# Patient Record
Sex: FEMALE | Race: BLACK OR AFRICAN AMERICAN | NOT HISPANIC OR LATINO | Employment: OTHER | URBAN - METROPOLITAN AREA
[De-identification: names, ages, dates, MRNs, and addresses within clinical notes are randomized per-mention and may not be internally consistent; named-entity substitution may affect disease eponyms.]

---

## 2017-01-16 ENCOUNTER — HISTORICAL (OUTPATIENT)
Dept: ADMINISTRATIVE | Facility: HOSPITAL | Age: 82
End: 2017-01-16

## 2017-02-13 ENCOUNTER — HISTORICAL (OUTPATIENT)
Dept: ADMINISTRATIVE | Facility: HOSPITAL | Age: 82
End: 2017-02-13

## 2017-02-23 ENCOUNTER — HISTORICAL (OUTPATIENT)
Dept: LAB | Facility: HOSPITAL | Age: 82
End: 2017-02-23

## 2017-03-20 ENCOUNTER — HISTORICAL (OUTPATIENT)
Dept: ADMINISTRATIVE | Facility: HOSPITAL | Age: 82
End: 2017-03-20

## 2017-04-19 ENCOUNTER — HISTORICAL (OUTPATIENT)
Dept: ADMINISTRATIVE | Facility: HOSPITAL | Age: 82
End: 2017-04-19

## 2017-05-23 ENCOUNTER — HISTORICAL (OUTPATIENT)
Dept: ADMINISTRATIVE | Facility: HOSPITAL | Age: 82
End: 2017-05-23

## 2017-06-21 ENCOUNTER — HISTORICAL (OUTPATIENT)
Dept: ADMINISTRATIVE | Facility: HOSPITAL | Age: 82
End: 2017-06-21

## 2017-07-18 ENCOUNTER — HISTORICAL (OUTPATIENT)
Dept: ADMINISTRATIVE | Facility: HOSPITAL | Age: 82
End: 2017-07-18

## 2017-09-12 ENCOUNTER — HISTORICAL (OUTPATIENT)
Dept: ADMINISTRATIVE | Facility: HOSPITAL | Age: 82
End: 2017-09-12

## 2017-10-24 ENCOUNTER — HISTORICAL (OUTPATIENT)
Dept: ADMINISTRATIVE | Facility: HOSPITAL | Age: 82
End: 2017-10-24

## 2017-12-07 ENCOUNTER — HISTORICAL (OUTPATIENT)
Dept: ADMINISTRATIVE | Facility: HOSPITAL | Age: 82
End: 2017-12-07

## 2017-12-07 LAB
INR PPP: 3.9
PROTHROMBIN TIME: 47.2 S

## 2017-12-20 ENCOUNTER — HISTORICAL (OUTPATIENT)
Dept: LAB | Facility: HOSPITAL | Age: 82
End: 2017-12-20

## 2017-12-20 LAB
ALBUMIN SERPL-MCNC: 3.4 GM/DL (ref 3.4–5)
ALBUMIN/GLOB SERPL: 0.7 RATIO (ref 1.1–2)
ALP SERPL-CCNC: 125 UNIT/L (ref 46–116)
ALT SERPL-CCNC: 18 UNIT/L (ref 12–78)
AST SERPL-CCNC: 19 UNIT/L (ref 15–37)
BILIRUB SERPL-MCNC: 0.3 MG/DL (ref 0.2–1)
BILIRUBIN DIRECT+TOT PNL SERPL-MCNC: 0.14 MG/DL (ref 0–0.2)
BILIRUBIN DIRECT+TOT PNL SERPL-MCNC: 0.16 MG/DL (ref 0–0.8)
BNP BLD-MCNC: 175 PG/ML (ref 0–125)
BUN SERPL-MCNC: 14 MG/DL (ref 7–18)
CALCIUM SERPL-MCNC: 8.9 MG/DL (ref 8.5–10.1)
CHLORIDE SERPL-SCNC: 94 MMOL/L (ref 98–107)
CO2 SERPL-SCNC: 27 MMOL/L (ref 21–32)
CREAT SERPL-MCNC: 0.97 MG/DL (ref 0.6–1.3)
ERYTHROCYTE [DISTWIDTH] IN BLOOD BY AUTOMATED COUNT: 19.4 % (ref 11.5–17)
GLOBULIN SER-MCNC: 4.8 GM/DL (ref 2.4–3.5)
GLUCOSE SERPL-MCNC: 107 MG/DL (ref 74–106)
HCT VFR BLD AUTO: 24.1 % (ref 37–47)
HGB BLD-MCNC: 7.4 GM/DL (ref 12–16)
MCH RBC QN AUTO: 22.3 PG (ref 27–31)
MCHC RBC AUTO-ENTMCNC: 30.8 GM/DL (ref 33–36)
MCV RBC AUTO: 72.4 FL (ref 80–94)
PLATELET # BLD AUTO: 458 X10(3)/MCL (ref 130–400)
PMV BLD AUTO: 6.8 FL (ref 7.4–10.4)
POTASSIUM SERPL-SCNC: 3.2 MMOL/L (ref 3.5–5.1)
PROT SERPL-MCNC: 8.2 GM/DL (ref 6.4–8.2)
RBC # BLD AUTO: 3.32 X10(6)/MCL (ref 4.2–5.4)
SODIUM SERPL-SCNC: 132 MMOL/L (ref 136–145)
WBC # SPEC AUTO: 7.1 X10(3)/MCL (ref 4.5–11.5)

## 2017-12-27 LAB
COLOR STL: NORMAL
CONSISTENCY STL: NORMAL
HEMOCCULT SP1 STL QL: NEGATIVE

## 2017-12-28 ENCOUNTER — HISTORICAL (OUTPATIENT)
Dept: LAB | Facility: HOSPITAL | Age: 82
End: 2017-12-28

## 2017-12-28 LAB
COLOR STL: NORMAL
COLOR STL: NORMAL
CONSISTENCY STL: NORMAL
CONSISTENCY STL: NORMAL
HEMOCCULT SP2 STL QL: NEGATIVE

## 2018-01-30 ENCOUNTER — HISTORICAL (OUTPATIENT)
Dept: LAB | Facility: HOSPITAL | Age: 83
End: 2018-01-30

## 2018-01-30 LAB
BUN SERPL-MCNC: 15 MG/DL (ref 7–18)
CALCIUM SERPL-MCNC: 9.6 MG/DL (ref 8.5–10.1)
CHLORIDE SERPL-SCNC: 99 MMOL/L (ref 98–107)
CO2 SERPL-SCNC: 28.4 MMOL/L (ref 21–32)
CREAT SERPL-MCNC: 1.12 MG/DL (ref 0.6–1.3)
ERYTHROCYTE [DISTWIDTH] IN BLOOD BY AUTOMATED COUNT: 23.5 % (ref 11.5–17)
FERRITIN SERPL-MCNC: 15 NG/ML (ref 8–388)
GLUCOSE SERPL-MCNC: 110 MG/DL (ref 74–106)
HCT VFR BLD AUTO: 34.5 % (ref 37–47)
HGB BLD-MCNC: 10.8 GM/DL (ref 12–16)
IRON SATN MFR SERPL: 9.6 % (ref 20–50)
IRON SERPL-MCNC: 39 MCG/DL (ref 50–175)
MAGNESIUM SERPL-MCNC: 2.1 MG/DL (ref 1.8–2.4)
MCH RBC QN AUTO: 25.4 PG (ref 27–31)
MCHC RBC AUTO-ENTMCNC: 31.3 GM/DL (ref 33–36)
MCV RBC AUTO: 81.2 FL (ref 80–94)
PLATELET # BLD AUTO: 415 X10(3)/MCL (ref 130–400)
PMV BLD AUTO: 8 FL (ref 7.4–10.4)
POTASSIUM SERPL-SCNC: 4.6 MMOL/L (ref 3.5–5.1)
RBC # BLD AUTO: 4.25 X10(6)/MCL (ref 4.2–5.4)
SODIUM SERPL-SCNC: 135 MMOL/L (ref 136–145)
TIBC SERPL-MCNC: 401 MCG/DL (ref 250–450)
TRANSFERRIN SERPL-MCNC: 340 MG/DL (ref 200–360)
WBC # SPEC AUTO: 9.8 X10(3)/MCL (ref 4.5–11.5)

## 2018-06-05 ENCOUNTER — HISTORICAL (OUTPATIENT)
Dept: LAB | Facility: HOSPITAL | Age: 83
End: 2018-06-05

## 2018-06-05 LAB
ALBUMIN SERPL-MCNC: 3.3 GM/DL (ref 3.4–5)
ALBUMIN/GLOB SERPL: 0.7 RATIO (ref 1.1–2)
ALP SERPL-CCNC: 122 UNIT/L (ref 46–116)
ALT SERPL-CCNC: 13 UNIT/L (ref 12–78)
AST SERPL-CCNC: 12 UNIT/L (ref 15–37)
BILIRUB SERPL-MCNC: 0.3 MG/DL (ref 0.2–1)
BILIRUBIN DIRECT+TOT PNL SERPL-MCNC: 0.15 MG/DL (ref 0–0.2)
BILIRUBIN DIRECT+TOT PNL SERPL-MCNC: 0.15 MG/DL (ref 0–0.8)
BUN SERPL-MCNC: 16.4 MG/DL (ref 7–18)
CALCIUM SERPL-MCNC: 8.9 MG/DL (ref 8.5–10.1)
CHLORIDE SERPL-SCNC: 98 MMOL/L (ref 98–107)
CO2 SERPL-SCNC: 27.6 MMOL/L (ref 21–32)
CREAT SERPL-MCNC: 1.12 MG/DL (ref 0.6–1.3)
ERYTHROCYTE [DISTWIDTH] IN BLOOD BY AUTOMATED COUNT: 19.6 % (ref 11.5–17)
GLOBULIN SER-MCNC: 4.6 GM/DL (ref 2.4–3.5)
GLUCOSE SERPL-MCNC: 127 MG/DL (ref 74–106)
HCT VFR BLD AUTO: 24.6 % (ref 37–47)
HGB BLD-MCNC: 7.2 GM/DL (ref 12–16)
MCH RBC QN AUTO: 20.7 PG (ref 27–31)
MCHC RBC AUTO-ENTMCNC: 29.2 GM/DL (ref 33–36)
MCV RBC AUTO: 70.7 FL (ref 80–94)
PLATELET # BLD AUTO: 560 X10(3)/MCL (ref 130–400)
PMV BLD AUTO: 6.8 FL (ref 7.4–10.4)
POTASSIUM SERPL-SCNC: 4.6 MMOL/L (ref 3.5–5.1)
PROT SERPL-MCNC: 7.9 GM/DL (ref 6.4–8.2)
RBC # BLD AUTO: 3.47 X10(6)/MCL (ref 4.2–5.4)
SODIUM SERPL-SCNC: 135 MMOL/L (ref 136–145)
WBC # SPEC AUTO: 8.6 X10(3)/MCL (ref 4.5–11.5)

## 2018-07-06 ENCOUNTER — HISTORICAL (OUTPATIENT)
Dept: LAB | Facility: HOSPITAL | Age: 83
End: 2018-07-06

## 2018-07-06 LAB
ERYTHROCYTE [DISTWIDTH] IN BLOOD BY AUTOMATED COUNT: 28.6 % (ref 11.5–17)
HCT VFR BLD AUTO: 29.5 % (ref 37–47)
HGB BLD-MCNC: 8.6 GM/DL (ref 12–16)
MCH RBC QN AUTO: 21.9 PG (ref 27–31)
MCHC RBC AUTO-ENTMCNC: 29.3 GM/DL (ref 33–36)
MCV RBC AUTO: 74.8 FL (ref 80–94)
PLATELET # BLD AUTO: 486 X10(3)/MCL (ref 130–400)
PMV BLD AUTO: 7.5 FL (ref 7.4–10.4)
RBC # BLD AUTO: 3.94 X10(6)/MCL (ref 4.2–5.4)
WBC # SPEC AUTO: 7.4 X10(3)/MCL (ref 4.5–11.5)

## 2018-07-07 LAB
COLOR STL: ABNORMAL
CONSISTENCY STL: ABNORMAL
HEMOCCULT SP1 STL QL: POSITIVE

## 2018-07-08 LAB
COLOR STL: ABNORMAL
CONSISTENCY STL: ABNORMAL
HEMOCCULT SP2 STL QL: POSITIVE

## 2018-07-09 LAB
COLOR STL: NORMAL
CONSISTENCY STL: NORMAL

## 2019-03-13 ENCOUNTER — HISTORICAL (OUTPATIENT)
Dept: LAB | Facility: HOSPITAL | Age: 84
End: 2019-03-13

## 2019-03-13 LAB
ALBUMIN SERPL-MCNC: 3.5 GM/DL (ref 3.4–5)
ALBUMIN/GLOB SERPL: 0.8 RATIO (ref 1.1–2)
ALP SERPL-CCNC: 116 UNIT/L (ref 46–116)
ALT SERPL-CCNC: 19 UNIT/L (ref 12–78)
AST SERPL-CCNC: 17 UNIT/L (ref 15–37)
BILIRUB SERPL-MCNC: 0.3 MG/DL (ref 0.2–1)
BILIRUBIN DIRECT+TOT PNL SERPL-MCNC: 0.14 MG/DL (ref 0–0.2)
BILIRUBIN DIRECT+TOT PNL SERPL-MCNC: 0.16 MG/DL (ref 0–0.8)
BUN SERPL-MCNC: 18.9 MG/DL (ref 7–18)
CALCIUM SERPL-MCNC: 9.9 MG/DL (ref 8.5–10.1)
CHLORIDE SERPL-SCNC: 97 MMOL/L (ref 98–107)
CO2 SERPL-SCNC: 31.7 MMOL/L (ref 21–32)
COLOR STL: NORMAL
CONSISTENCY STL: NORMAL
CREAT SERPL-MCNC: 1.13 MG/DL (ref 0.6–1.3)
ERYTHROCYTE [DISTWIDTH] IN BLOOD BY AUTOMATED COUNT: 12.8 % (ref 11.5–17)
GLOBULIN SER-MCNC: 4.3 GM/DL (ref 2.4–3.5)
GLUCOSE SERPL-MCNC: 109 MG/DL (ref 74–106)
HCT VFR BLD AUTO: 39.1 % (ref 37–47)
HEMOCCULT SP1 STL QL: NEGATIVE
HGB BLD-MCNC: 13 GM/DL (ref 12–16)
MCH RBC QN AUTO: 31.6 PG (ref 27–31)
MCHC RBC AUTO-ENTMCNC: 33.2 GM/DL (ref 33–36)
MCV RBC AUTO: 95.1 FL (ref 80–94)
PLATELET # BLD AUTO: 310 X10(3)/MCL (ref 130–400)
PMV BLD AUTO: 9.3 FL (ref 9.4–12.4)
POTASSIUM SERPL-SCNC: 4.5 MMOL/L (ref 3.5–5.1)
PROT SERPL-MCNC: 7.8 GM/DL (ref 6.4–8.2)
RBC # BLD AUTO: 4.11 X10(6)/MCL (ref 4.2–5.4)
SODIUM SERPL-SCNC: 137 MMOL/L (ref 136–145)
WBC # SPEC AUTO: 9.5 X10(3)/MCL (ref 4.5–11.5)

## 2019-03-14 LAB
COLOR STL: NORMAL
CONSISTENCY STL: NORMAL
HEMOCCULT SP2 STL QL: NEGATIVE

## 2019-03-15 LAB
COLOR STL: NORMAL
CONSISTENCY STL: NORMAL

## 2019-06-11 ENCOUNTER — HISTORICAL (OUTPATIENT)
Dept: RADIOLOGY | Facility: HOSPITAL | Age: 84
End: 2019-06-11

## 2019-06-11 LAB
ALBUMIN SERPL-MCNC: 3.5 GM/DL (ref 3.4–5)
ALBUMIN/GLOB SERPL: 0.8 RATIO (ref 1.1–2)
ALP SERPL-CCNC: 122 UNIT/L (ref 46–116)
ALT SERPL-CCNC: 19 UNIT/L (ref 12–78)
APPEARANCE, UA: ABNORMAL
AST SERPL-CCNC: 16 UNIT/L (ref 15–37)
BACTERIA SPEC CULT: ABNORMAL
BILIRUB SERPL-MCNC: 0.3 MG/DL (ref 0.2–1)
BILIRUB UR QL STRIP: NEGATIVE
BILIRUBIN DIRECT+TOT PNL SERPL-MCNC: 0.11 MG/DL (ref 0–0.2)
BILIRUBIN DIRECT+TOT PNL SERPL-MCNC: 0.19 MG/DL (ref 0–0.8)
BUN SERPL-MCNC: 21.4 MG/DL (ref 7–18)
CALCIUM SERPL-MCNC: 9.1 MG/DL (ref 8.5–10.1)
CHLORIDE SERPL-SCNC: 101 MMOL/L (ref 98–107)
CHOLEST SERPL-MCNC: 166 MG/DL (ref 0–200)
CHOLEST/HDLC SERPL: 2.2 {RATIO} (ref 0–4)
CO2 SERPL-SCNC: 30.9 MMOL/L (ref 21–32)
COLOR UR: YELLOW
CREAT SERPL-MCNC: 1.1 MG/DL (ref 0.6–1.3)
ERYTHROCYTE [DISTWIDTH] IN BLOOD BY AUTOMATED COUNT: 13.5 % (ref 11.5–17)
GLOBULIN SER-MCNC: 4.5 GM/DL (ref 2.4–3.5)
GLUCOSE (UA): NEGATIVE
GLUCOSE SERPL-MCNC: 106 MG/DL (ref 74–106)
HCT VFR BLD AUTO: 42.7 % (ref 37–47)
HDLC SERPL-MCNC: 75 MG/DL (ref 40–60)
HGB BLD-MCNC: 14 GM/DL (ref 12–16)
HGB UR QL STRIP: ABNORMAL
KETONES UR QL STRIP: 15
LDLC SERPL CALC-MCNC: 73 MG/DL (ref 0–129)
LEUKOCYTE ESTERASE UR QL STRIP: ABNORMAL
MCH RBC QN AUTO: 31.7 PG (ref 27–31)
MCHC RBC AUTO-ENTMCNC: 32.8 GM/DL (ref 33–36)
MCV RBC AUTO: 96.6 FL (ref 80–94)
NITRITE UR QL STRIP: POSITIVE
PH UR STRIP: 5 [PH] (ref 5–9)
PLATELET # BLD AUTO: 285 X10(3)/MCL (ref 130–400)
PMV BLD AUTO: 9.4 FL (ref 9.4–12.4)
POTASSIUM SERPL-SCNC: 3.5 MMOL/L (ref 3.5–5.1)
PROT SERPL-MCNC: 8 GM/DL (ref 6.4–8.2)
PROT UR QL STRIP: ABNORMAL
RBC # BLD AUTO: 4.42 X10(6)/MCL (ref 4.2–5.4)
RBC #/AREA URNS HPF: ABNORMAL /HPF
SODIUM SERPL-SCNC: 143 MMOL/L (ref 136–145)
SP GR UR STRIP: >=1.03 (ref 1–1.03)
SQUAMOUS EPITHELIAL, UA: ABNORMAL
TRIGL SERPL-MCNC: 92 MG/DL
TSH SERPL-ACNC: 2.18 MIU/ML (ref 0.36–3.74)
UROBILINOGEN UR STRIP-ACNC: 1
VLDLC SERPL CALC-MCNC: 18 MG/DL
WBC # SPEC AUTO: 7 X10(3)/MCL (ref 4.5–11.5)
WBC #/AREA URNS HPF: ABNORMAL /HPF

## 2020-03-03 ENCOUNTER — HISTORICAL (OUTPATIENT)
Dept: RADIOLOGY | Facility: HOSPITAL | Age: 85
End: 2020-03-03

## 2020-03-03 LAB
ALBUMIN SERPL-MCNC: 3.7 GM/DL (ref 3.4–5)
ALBUMIN/GLOB SERPL: 0.9 RATIO (ref 1.1–2)
ALP SERPL-CCNC: 130 UNIT/L (ref 46–116)
ALT SERPL-CCNC: 17 UNIT/L (ref 12–78)
AST SERPL-CCNC: 18 UNIT/L (ref 15–37)
BILIRUB SERPL-MCNC: 0.2 MG/DL (ref 0.2–1)
BILIRUBIN DIRECT+TOT PNL SERPL-MCNC: 0.1 MG/DL (ref 0–0.2)
BILIRUBIN DIRECT+TOT PNL SERPL-MCNC: 0.1 MG/DL (ref 0–0.8)
BNP BLD-MCNC: 193 PG/ML (ref 0–125)
BUN SERPL-MCNC: 24 MG/DL (ref 7–18)
CALCIUM SERPL-MCNC: 9.6 MG/DL (ref 8.5–10.1)
CHLORIDE SERPL-SCNC: 103 MMOL/L (ref 98–107)
CO2 SERPL-SCNC: 31.8 MMOL/L (ref 21–32)
CREAT SERPL-MCNC: 1.19 MG/DL (ref 0.6–1.3)
GLOBULIN SER-MCNC: 4.3 GM/DL (ref 2.4–3.5)
GLUCOSE SERPL-MCNC: 118 MG/DL (ref 74–106)
POTASSIUM SERPL-SCNC: 4.1 MMOL/L (ref 3.5–5.1)
PROT SERPL-MCNC: 8 GM/DL (ref 6.4–8.2)
SODIUM SERPL-SCNC: 141 MMOL/L (ref 136–145)

## 2020-04-22 ENCOUNTER — HISTORICAL (OUTPATIENT)
Dept: LAB | Facility: HOSPITAL | Age: 85
End: 2020-04-22

## 2020-04-22 LAB
ERYTHROCYTE [DISTWIDTH] IN BLOOD BY AUTOMATED COUNT: 13.7 % (ref 11.5–17)
HCT VFR BLD AUTO: 42.7 % (ref 37–47)
HGB BLD-MCNC: 13.4 GM/DL (ref 12–16)
MCH RBC QN AUTO: 30.7 PG (ref 27–31)
MCHC RBC AUTO-ENTMCNC: 31.4 GM/DL (ref 33–36)
MCV RBC AUTO: 97.9 FL (ref 80–94)
PLATELET # BLD AUTO: 324 X10(3)/MCL (ref 130–400)
PMV BLD AUTO: 9.7 FL (ref 9.4–12.4)
RBC # BLD AUTO: 4.36 X10(6)/MCL (ref 4.2–5.4)
WBC # SPEC AUTO: 7.7 X10(3)/MCL (ref 4.5–11.5)

## 2020-06-04 ENCOUNTER — HISTORICAL (OUTPATIENT)
Dept: LAB | Facility: HOSPITAL | Age: 85
End: 2020-06-04

## 2020-06-04 LAB
BNP BLD-MCNC: 290 PG/ML (ref 0–125)
BUN SERPL-MCNC: 16.5 MG/DL (ref 7–18)
CALCIUM SERPL-MCNC: 9.1 MG/DL (ref 8.5–10.1)
CHLORIDE SERPL-SCNC: 103 MMOL/L (ref 98–107)
CO2 SERPL-SCNC: 30.6 MMOL/L (ref 21–32)
CREAT SERPL-MCNC: 0.86 MG/DL (ref 0.6–1.3)
CREAT/UREA NIT SERPL: 19
GLUCOSE SERPL-MCNC: 125 MG/DL (ref 74–106)
POTASSIUM SERPL-SCNC: 3.5 MMOL/L (ref 3.5–5.1)
SODIUM SERPL-SCNC: 140 MMOL/L (ref 136–145)

## 2020-07-30 ENCOUNTER — HISTORICAL (OUTPATIENT)
Dept: LAB | Facility: HOSPITAL | Age: 85
End: 2020-07-30

## 2020-07-30 LAB
ALBUMIN SERPL-MCNC: 3.5 GM/DL (ref 3.4–5)
ALBUMIN/GLOB SERPL: 0.8 RATIO (ref 1.1–2)
ALP SERPL-CCNC: 113 UNIT/L (ref 46–116)
ALT SERPL-CCNC: 14 UNIT/L (ref 12–78)
APPEARANCE, UA: ABNORMAL
AST SERPL-CCNC: 15 UNIT/L (ref 15–37)
BACTERIA SPEC CULT: ABNORMAL
BILIRUB SERPL-MCNC: 0.3 MG/DL (ref 0.2–1)
BILIRUB UR QL STRIP: NEGATIVE
BILIRUBIN DIRECT+TOT PNL SERPL-MCNC: 0.11 MG/DL (ref 0–0.2)
BILIRUBIN DIRECT+TOT PNL SERPL-MCNC: 0.19 MG/DL (ref 0–0.8)
BUN SERPL-MCNC: 16.7 MG/DL (ref 7–18)
CALCIUM SERPL-MCNC: 10 MG/DL (ref 8.5–10.1)
CHLORIDE SERPL-SCNC: 102 MMOL/L (ref 98–107)
CHOLEST SERPL-MCNC: 174 MG/DL (ref 0–200)
CHOLEST/HDLC SERPL: 2.1 {RATIO} (ref 0–4)
CO2 SERPL-SCNC: 35.4 MMOL/L (ref 21–32)
COLOR UR: YELLOW
CREAT SERPL-MCNC: 0.94 MG/DL (ref 0.6–1.3)
ERYTHROCYTE [DISTWIDTH] IN BLOOD BY AUTOMATED COUNT: 13.8 % (ref 11.5–17)
GLOBULIN SER-MCNC: 4.6 GM/DL (ref 2.4–3.5)
GLUCOSE (UA): NEGATIVE
GLUCOSE SERPL-MCNC: 107 MG/DL (ref 74–106)
HCT VFR BLD AUTO: 44.1 % (ref 37–47)
HDLC SERPL-MCNC: 83 MG/DL (ref 40–60)
HGB BLD-MCNC: 13.6 GM/DL (ref 12–16)
HGB UR QL STRIP: ABNORMAL
KETONES UR QL STRIP: NEGATIVE
LDLC SERPL CALC-MCNC: 72 MG/DL (ref 0–129)
LEUKOCYTE ESTERASE UR QL STRIP: NEGATIVE
MCH RBC QN AUTO: 30.9 PG (ref 27–31)
MCHC RBC AUTO-ENTMCNC: 30.8 GM/DL (ref 33–36)
MCV RBC AUTO: 100.2 FL (ref 80–94)
NITRITE UR QL STRIP: NEGATIVE
PH UR STRIP: 5.5 [PH] (ref 5–9)
PLATELET # BLD AUTO: 303 X10(3)/MCL (ref 130–400)
PMV BLD AUTO: 9.6 FL (ref 9.4–12.4)
POTASSIUM SERPL-SCNC: 4.7 MMOL/L (ref 3.5–5.1)
PROT SERPL-MCNC: 8.1 GM/DL (ref 6.4–8.2)
PROT UR QL STRIP: NEGATIVE
RBC # BLD AUTO: 4.4 X10(6)/MCL (ref 4.2–5.4)
RBC #/AREA URNS HPF: ABNORMAL /HPF
SODIUM SERPL-SCNC: 142 MMOL/L (ref 136–145)
SP GR UR STRIP: 1.02 (ref 1–1.03)
SQUAMOUS EPITHELIAL, UA: ABNORMAL
TRIGL SERPL-MCNC: 95 MG/DL
TSH SERPL-ACNC: 2.74 MIU/ML (ref 0.36–3.74)
UROBILINOGEN UR STRIP-ACNC: 0.2
VLDLC SERPL CALC-MCNC: 19 MG/DL
WBC # SPEC AUTO: 7.4 X10(3)/MCL (ref 4.5–11.5)
WBC #/AREA URNS HPF: ABNORMAL /[HPF]

## 2021-06-11 LAB — EST CREAT CLEARANCE SER (OHS): 95.59 ML/MIN

## 2021-09-20 ENCOUNTER — HISTORICAL (OUTPATIENT)
Dept: LAB | Facility: HOSPITAL | Age: 86
End: 2021-09-20

## 2021-09-20 LAB
ALBUMIN SERPL-MCNC: 3.4 GM/DL (ref 3.4–4.8)
ALBUMIN/GLOB SERPL: 0.8 RATIO (ref 1.1–2)
ALP SERPL-CCNC: 99 UNIT/L (ref 40–150)
ALT SERPL-CCNC: 8 UNIT/L (ref 0–55)
APPEARANCE, UA: CLEAR
AST SERPL-CCNC: 13 UNIT/L (ref 5–34)
BACTERIA #/AREA URNS AUTO: NORMAL /HPF
BILIRUB SERPL-MCNC: 0.5 MG/DL
BILIRUB UR QL STRIP: NEGATIVE
BILIRUBIN DIRECT+TOT PNL SERPL-MCNC: 0.2 MG/DL (ref 0–0.8)
BILIRUBIN DIRECT+TOT PNL SERPL-MCNC: 0.3 MG/DL (ref 0–0.5)
BUN SERPL-MCNC: 13 MG/DL (ref 9.8–20.1)
CALCIUM SERPL-MCNC: 9.4 MG/DL (ref 8.4–10.2)
CHLORIDE SERPL-SCNC: 101 MMOL/L (ref 98–107)
CHOLEST SERPL-MCNC: 163 MG/DL
CHOLEST/HDLC SERPL: 2 {RATIO} (ref 0–5)
CO2 SERPL-SCNC: 27 MMOL/L (ref 23–31)
COLOR UR: YELLOW
CREAT SERPL-MCNC: 0.82 MG/DL (ref 0.55–1.02)
ERYTHROCYTE [DISTWIDTH] IN BLOOD BY AUTOMATED COUNT: 13.2 % (ref 11.5–17)
GLOBULIN SER-MCNC: 4.2 GM/DL (ref 2.4–3.5)
GLUCOSE (UA): NEGATIVE
GLUCOSE SERPL-MCNC: 109 MG/DL (ref 82–115)
HCT VFR BLD AUTO: 43.5 % (ref 37–47)
HDLC SERPL-MCNC: 71 MG/DL (ref 35–60)
HGB BLD-MCNC: 13.5 GM/DL (ref 12–16)
HGB UR QL STRIP: NEGATIVE
KETONES UR QL STRIP: NEGATIVE
LDLC SERPL CALC-MCNC: 76 MG/DL (ref 50–140)
LEUKOCYTE ESTERASE UR QL STRIP: NEGATIVE
MCH RBC QN AUTO: 33.3 PG (ref 27–31)
MCHC RBC AUTO-ENTMCNC: 31 GM/DL (ref 33–36)
MCV RBC AUTO: 107.4 FL (ref 80–94)
NITRITE UR QL STRIP.AUTO: NEGATIVE
PH UR STRIP: 5.5 [PH] (ref 5–9)
PLATELET # BLD AUTO: 242 X10(3)/MCL (ref 130–400)
PMV BLD AUTO: 9.1 FL (ref 9.4–12.4)
POTASSIUM SERPL-SCNC: 4.2 MMOL/L (ref 3.5–5.1)
PROT SERPL-MCNC: 7.6 GM/DL (ref 5.8–7.6)
PROT UR QL STRIP: NEGATIVE
RBC # BLD AUTO: 4.05 X10(6)/MCL (ref 4.2–5.4)
RBC #/AREA URNS HPF: NORMAL /[HPF]
SODIUM SERPL-SCNC: 139 MMOL/L (ref 136–145)
SP GR UR STRIP: >=1.03 (ref 1–1.03)
SQUAMOUS EPITHELIAL, UA: NORMAL
TRIGL SERPL-MCNC: 78 MG/DL (ref 37–140)
TSH SERPL-ACNC: 1.61 UIU/ML (ref 0.35–4.94)
UROBILINOGEN UR STRIP-ACNC: 1
VLDLC SERPL CALC-MCNC: 16 MG/DL
WBC # SPEC AUTO: 6.8 X10(3)/MCL (ref 4.5–11.5)
WBC #/AREA URNS AUTO: NORMAL /[HPF]

## 2022-03-18 ENCOUNTER — HISTORICAL (OUTPATIENT)
Dept: ADMINISTRATIVE | Facility: HOSPITAL | Age: 87
End: 2022-03-18

## 2022-03-18 LAB
APPEARANCE, UA: NORMAL
BACTERIA SPEC CULT: NORMAL
BILIRUB UR QL STRIP: NEGATIVE
BUDDING YEAST: PRESENT
CASTS, UA: NORMAL
COLOR UR: NORMAL
CRYSTALS: NORMAL
GLUCOSE (UA): NEGATIVE
HGB UR QL STRIP: NEGATIVE
KETONES UR QL STRIP: NEGATIVE
LEUKOCYTE ESTERASE UR QL STRIP: NORMAL
NITRITE UR QL STRIP: NEGATIVE
PH UR STRIP: 6 [PH] (ref 5–9)
PROT UR QL STRIP: NEGATIVE
RBC #/AREA URNS HPF: NORMAL /[HPF] (ref 0–2)
SMALL ROUND CELLS, UA: NORMAL
SP GR UR STRIP: 1.02 (ref 1–1.03)
SPERM URNS QL MICRO: NORMAL
SQUAMOUS EPITHELIAL, UA: NORMAL (ref 0–4)
UROBILINOGEN UR STRIP-ACNC: 1
WBC #/AREA URNS HPF: NORMAL /[HPF] (ref 0–2)

## 2022-03-23 ENCOUNTER — HISTORICAL (OUTPATIENT)
Dept: ADMINISTRATIVE | Facility: HOSPITAL | Age: 87
End: 2022-03-23

## 2022-04-16 ENCOUNTER — HISTORICAL (OUTPATIENT)
Dept: ADMINISTRATIVE | Facility: HOSPITAL | Age: 87
End: 2022-04-16

## 2022-04-21 ENCOUNTER — HOSPITAL ENCOUNTER (INPATIENT)
Facility: HOSPITAL | Age: 87
LOS: 12 days | Discharge: NURSING, REHAB OR LONG-TERM CARE | DRG: 492 | End: 2022-05-03
Attending: ORTHOPAEDIC SURGERY | Admitting: INTERNAL MEDICINE
Payer: MEDICARE

## 2022-04-21 ENCOUNTER — HISTORICAL (OUTPATIENT)
Dept: ADMINISTRATIVE | Facility: HOSPITAL | Age: 87
End: 2022-04-21
Payer: MEDICARE

## 2022-04-21 PROCEDURE — 93306 TTE W/DOPPLER COMPLETE: CPT

## 2022-04-21 PROCEDURE — 70450 CT HEAD/BRAIN W/O DYE: CPT

## 2022-04-21 PROCEDURE — 71045 X-RAY EXAM CHEST 1 VIEW: CPT

## 2022-04-21 PROCEDURE — 93005 ELECTROCARDIOGRAM TRACING: CPT

## 2022-04-21 PROCEDURE — 85025 COMPLETE CBC W/AUTO DIFF WBC: CPT

## 2022-04-21 PROCEDURE — 93970 EXTREMITY STUDY: CPT

## 2022-04-21 PROCEDURE — 93880 EXTRACRANIAL BILAT STUDY: CPT

## 2022-04-21 PROCEDURE — 36415 COLL VENOUS BLD VENIPUNCTURE: CPT

## 2022-04-21 PROCEDURE — 99990 CHARGE CONVERSION: CPT | Mod: 25

## 2022-04-21 PROCEDURE — 99284 EMERGENCY DEPT VISIT MOD MDM: CPT | Mod: 25

## 2022-04-21 PROCEDURE — 85730 THROMBOPLASTIN TIME PARTIAL: CPT

## 2022-04-21 PROCEDURE — 80053 COMPREHEN METABOLIC PANEL: CPT

## 2022-04-21 PROCEDURE — 85610 PROTHROMBIN TIME: CPT

## 2022-04-22 PROCEDURE — 71045 X-RAY EXAM CHEST 1 VIEW: CPT

## 2022-04-22 PROCEDURE — 84443 ASSAY THYROID STIM HORMONE: CPT

## 2022-04-22 PROCEDURE — 92610 EVALUATE SWALLOWING FUNCTION: CPT | Mod: GN

## 2022-04-22 PROCEDURE — 99990 CHARGE CONVERSION: CPT | Mod: GO

## 2022-04-22 PROCEDURE — 97162 PT EVAL MOD COMPLEX 30 MIN: CPT | Mod: GP

## 2022-04-22 PROCEDURE — 85025 COMPLETE CBC W/AUTO DIFF WBC: CPT

## 2022-04-22 PROCEDURE — 83880 ASSAY OF NATRIURETIC PEPTIDE: CPT

## 2022-04-22 PROCEDURE — 36415 COLL VENOUS BLD VENIPUNCTURE: CPT

## 2022-04-22 PROCEDURE — 97166 OT EVAL MOD COMPLEX 45 MIN: CPT | Mod: GO

## 2022-04-22 PROCEDURE — 80053 COMPREHEN METABOLIC PANEL: CPT

## 2022-04-22 PROCEDURE — 87635 SARS-COV-2 COVID-19 AMP PRB: CPT | Mod: QW

## 2022-04-23 PROCEDURE — 97110 THERAPEUTIC EXERCISES: CPT | Mod: GO

## 2022-04-23 PROCEDURE — 99990 CHARGE CONVERSION: CPT | Mod: GP

## 2022-04-23 PROCEDURE — 80053 COMPREHEN METABOLIC PANEL: CPT

## 2022-04-23 PROCEDURE — 97530 THERAPEUTIC ACTIVITIES: CPT | Mod: GP

## 2022-04-23 PROCEDURE — 83735 ASSAY OF MAGNESIUM: CPT

## 2022-04-23 PROCEDURE — 36415 COLL VENOUS BLD VENIPUNCTURE: CPT

## 2022-04-23 PROCEDURE — 85025 COMPLETE CBC W/AUTO DIFF WBC: CPT

## 2022-04-24 PROCEDURE — 83880 ASSAY OF NATRIURETIC PEPTIDE: CPT

## 2022-04-24 PROCEDURE — 36415 COLL VENOUS BLD VENIPUNCTURE: CPT

## 2022-04-24 PROCEDURE — 80048 BASIC METABOLIC PNL TOTAL CA: CPT

## 2022-04-24 PROCEDURE — 83735 ASSAY OF MAGNESIUM: CPT

## 2022-04-24 PROCEDURE — 99990 CHARGE CONVERSION: CPT

## 2022-04-25 PROCEDURE — 97530 THERAPEUTIC ACTIVITIES: CPT | Mod: GO

## 2022-04-25 PROCEDURE — 99990 CHARGE CONVERSION: CPT | Mod: GO

## 2022-04-26 PROCEDURE — 99990 CHARGE CONVERSION: CPT | Mod: RT

## 2022-04-26 PROCEDURE — C1769 GUIDE WIRE: HCPCS

## 2022-04-26 PROCEDURE — 80053 COMPREHEN METABOLIC PANEL: CPT

## 2022-04-26 PROCEDURE — C1713 ANCHOR/SCREW BN/BN,TIS/BN: HCPCS

## 2022-04-26 PROCEDURE — 85027 COMPLETE CBC AUTOMATED: CPT

## 2022-04-26 PROCEDURE — 73600 X-RAY EXAM OF ANKLE: CPT | Mod: RT

## 2022-04-26 PROCEDURE — 73610 X-RAY EXAM OF ANKLE: CPT | Mod: RT

## 2022-04-26 PROCEDURE — 85007 BL SMEAR W/DIFF WBC COUNT: CPT

## 2022-04-26 PROCEDURE — 36415 COLL VENOUS BLD VENIPUNCTURE: CPT

## 2022-04-27 PROCEDURE — 97530 THERAPEUTIC ACTIVITIES: CPT | Mod: GP

## 2022-04-27 PROCEDURE — 99990 CHARGE CONVERSION: CPT | Mod: GO

## 2022-04-27 PROCEDURE — 36415 COLL VENOUS BLD VENIPUNCTURE: CPT

## 2022-04-27 PROCEDURE — 85007 BL SMEAR W/DIFF WBC COUNT: CPT

## 2022-04-27 PROCEDURE — 97162 PT EVAL MOD COMPLEX 30 MIN: CPT | Mod: GP

## 2022-04-27 PROCEDURE — 97168 OT RE-EVAL EST PLAN CARE: CPT | Mod: GO

## 2022-04-27 PROCEDURE — 85027 COMPLETE CBC AUTOMATED: CPT

## 2022-04-27 PROCEDURE — A9150 MISC/EXPER NON-PRESCRIPT DRU: HCPCS

## 2022-04-27 PROCEDURE — 80048 BASIC METABOLIC PNL TOTAL CA: CPT

## 2022-04-28 PROCEDURE — 83540 ASSAY OF IRON: CPT

## 2022-04-28 PROCEDURE — 76770 US EXAM ABDO BACK WALL COMP: CPT

## 2022-04-28 PROCEDURE — 82607 VITAMIN B-12: CPT

## 2022-04-28 PROCEDURE — 82728 ASSAY OF FERRITIN: CPT

## 2022-04-28 PROCEDURE — A9150 MISC/EXPER NON-PRESCRIPT DRU: HCPCS

## 2022-04-28 PROCEDURE — 87077 CULTURE AEROBIC IDENTIFY: CPT

## 2022-04-28 PROCEDURE — 83615 LACTATE (LD) (LDH) ENZYME: CPT

## 2022-04-28 PROCEDURE — 85060 BLOOD SMEAR INTERPRETATION: CPT

## 2022-04-28 PROCEDURE — 94729 DIFFUSING CAPACITY: CPT

## 2022-04-28 PROCEDURE — 94010 BREATHING CAPACITY TEST: CPT

## 2022-04-28 PROCEDURE — 81001 URINALYSIS AUTO W/SCOPE: CPT

## 2022-04-28 PROCEDURE — 71045 X-RAY EXAM CHEST 1 VIEW: CPT

## 2022-04-28 PROCEDURE — 85045 AUTOMATED RETICULOCYTE COUNT: CPT

## 2022-04-28 PROCEDURE — 80048 BASIC METABOLIC PNL TOTAL CA: CPT

## 2022-04-28 PROCEDURE — 97535 SELF CARE MNGMENT TRAINING: CPT | Mod: GO

## 2022-04-28 PROCEDURE — 36415 COLL VENOUS BLD VENIPUNCTURE: CPT

## 2022-04-28 PROCEDURE — 99990 CHARGE CONVERSION: CPT

## 2022-04-28 PROCEDURE — 83550 IRON BINDING TEST: CPT

## 2022-04-28 PROCEDURE — 87184 SC STD DISK METHOD PER PLATE: CPT

## 2022-04-28 PROCEDURE — 97530 THERAPEUTIC ACTIVITIES: CPT | Mod: GO

## 2022-04-28 PROCEDURE — 82746 ASSAY OF FOLIC ACID SERUM: CPT

## 2022-04-28 PROCEDURE — 85025 COMPLETE CBC W/AUTO DIFF WBC: CPT

## 2022-04-28 PROCEDURE — 87040 BLOOD CULTURE FOR BACTERIA: CPT

## 2022-04-28 PROCEDURE — 83010 ASSAY OF HAPTOGLOBIN QUANT: CPT

## 2022-04-28 PROCEDURE — 87088 URINE BACTERIA CULTURE: CPT

## 2022-04-29 DIAGNOSIS — S82.891A CLOSED RIGHT ANKLE FRACTURE: ICD-10-CM

## 2022-04-29 DIAGNOSIS — R78.81 STAPHYLOCOCCUS EPIDERMIDIS BACTEREMIA: ICD-10-CM

## 2022-04-29 DIAGNOSIS — B95.7 STAPHYLOCOCCUS EPIDERMIDIS BACTEREMIA: ICD-10-CM

## 2022-04-29 PROCEDURE — 97530 THERAPEUTIC ACTIVITIES: CPT | Mod: GO

## 2022-04-29 PROCEDURE — A9150 MISC/EXPER NON-PRESCRIPT DRU: HCPCS

## 2022-04-29 PROCEDURE — 85025 COMPLETE CBC W/AUTO DIFF WBC: CPT

## 2022-04-29 PROCEDURE — 36600 WITHDRAWAL OF ARTERIAL BLOOD: CPT

## 2022-04-29 PROCEDURE — 83880 ASSAY OF NATRIURETIC PEPTIDE: CPT

## 2022-04-29 PROCEDURE — 80053 COMPREHEN METABOLIC PANEL: CPT

## 2022-04-29 PROCEDURE — 36415 COLL VENOUS BLD VENIPUNCTURE: CPT

## 2022-04-29 PROCEDURE — 99990 CHARGE CONVERSION: CPT

## 2022-04-29 PROCEDURE — A9152 SINGLE VITAMIN NOS: HCPCS

## 2022-04-29 PROCEDURE — 71250 CT THORAX DX C-: CPT

## 2022-04-29 PROCEDURE — 71045 X-RAY EXAM CHEST 1 VIEW: CPT

## 2022-04-29 PROCEDURE — 82805 BLOOD GASES W/O2 SATURATION: CPT

## 2022-04-30 PROCEDURE — 80202 ASSAY OF VANCOMYCIN: CPT

## 2022-04-30 PROCEDURE — A9150 MISC/EXPER NON-PRESCRIPT DRU: HCPCS

## 2022-04-30 PROCEDURE — 36415 COLL VENOUS BLD VENIPUNCTURE: CPT

## 2022-04-30 PROCEDURE — 99990 CHARGE CONVERSION: CPT

## 2022-04-30 PROCEDURE — A9152 SINGLE VITAMIN NOS: HCPCS

## 2022-04-30 PROCEDURE — 85025 COMPLETE CBC W/AUTO DIFF WBC: CPT

## 2022-04-30 PROCEDURE — 80053 COMPREHEN METABOLIC PANEL: CPT

## 2022-04-30 RX ORDER — ALBUTEROL SULFATE 90 UG/1
2 AEROSOL, METERED RESPIRATORY (INHALATION) EVERY 6 HOURS
COMMUNITY
Start: 2022-03-24

## 2022-04-30 RX ORDER — CLOTRIMAZOLE AND BETAMETHASONE DIPROPIONATE 10; .64 MG/G; MG/G
1 CREAM TOPICAL 2 TIMES DAILY
COMMUNITY
Start: 2021-12-06

## 2022-04-30 RX ORDER — DICLOFENAC SODIUM 10 MG/G
1 GEL TOPICAL 4 TIMES DAILY
COMMUNITY
Start: 2021-12-06

## 2022-04-30 RX ORDER — TRAZODONE HYDROCHLORIDE 50 MG/1
50 TABLET ORAL NIGHTLY
Status: DISCONTINUED | OUTPATIENT
Start: 2022-04-30 | End: 2022-05-03 | Stop reason: HOSPADM

## 2022-04-30 RX ORDER — PHENOL/SODIUM PHENOLATE
1 AEROSOL, SPRAY (ML) MUCOUS MEMBRANE DAILY
COMMUNITY
Start: 2021-12-06 | End: 2022-05-03

## 2022-04-30 RX ORDER — LOSARTAN POTASSIUM 50 MG/1
100 TABLET ORAL DAILY
Status: DISCONTINUED | OUTPATIENT
Start: 2022-04-30 | End: 2022-05-03 | Stop reason: HOSPADM

## 2022-04-30 RX ORDER — PANTOPRAZOLE SODIUM 40 MG/1
40 TABLET, DELAYED RELEASE ORAL DAILY
Status: DISCONTINUED | OUTPATIENT
Start: 2022-04-30 | End: 2022-05-03 | Stop reason: HOSPADM

## 2022-04-30 RX ORDER — MORPHINE SULFATE 4 MG/ML
2 INJECTION, SOLUTION INTRAMUSCULAR; INTRAVENOUS
Status: DISCONTINUED | OUTPATIENT
Start: 2022-04-30 | End: 2022-05-03 | Stop reason: HOSPADM

## 2022-04-30 RX ORDER — HYDRALAZINE HYDROCHLORIDE 20 MG/ML
10 INJECTION INTRAMUSCULAR; INTRAVENOUS EVERY 4 HOURS PRN
Status: DISCONTINUED | OUTPATIENT
Start: 2022-04-30 | End: 2022-05-01

## 2022-04-30 RX ORDER — DICYCLOMINE HYDROCHLORIDE 20 MG/1
1 TABLET ORAL DAILY
Status: ON HOLD | COMMUNITY
Start: 2021-12-06 | End: 2022-06-13 | Stop reason: HOSPADM

## 2022-04-30 RX ORDER — DILTIAZEM HYDROCHLORIDE 120 MG/1
120 CAPSULE, COATED, EXTENDED RELEASE ORAL DAILY
Status: DISCONTINUED | OUTPATIENT
Start: 2022-04-30 | End: 2022-05-03 | Stop reason: HOSPADM

## 2022-04-30 RX ORDER — HYDROCODONE BITARTRATE AND ACETAMINOPHEN 5; 325 MG/1; MG/1
1 TABLET ORAL EVERY 4 HOURS PRN
Status: DISCONTINUED | OUTPATIENT
Start: 2022-04-30 | End: 2022-05-03 | Stop reason: HOSPADM

## 2022-04-30 RX ORDER — POLYETHYLENE GLYCOL 3350 17 G/17G
17 POWDER, FOR SOLUTION ORAL DAILY
Status: DISCONTINUED | OUTPATIENT
Start: 2022-04-30 | End: 2022-05-03 | Stop reason: HOSPADM

## 2022-04-30 RX ORDER — TRAZODONE HYDROCHLORIDE 50 MG/1
1 TABLET ORAL DAILY
Status: ON HOLD | COMMUNITY
Start: 2021-12-06 | End: 2022-05-03 | Stop reason: SDUPTHER

## 2022-04-30 RX ORDER — FLUTICASONE PROPIONATE AND SALMETEROL XINAFOATE 115; 21 UG/1; UG/1
2 AEROSOL, METERED RESPIRATORY (INHALATION) 2 TIMES DAILY
COMMUNITY
Start: 2022-03-24 | End: 2022-05-03

## 2022-04-30 RX ORDER — ONDANSETRON 2 MG/ML
4 INJECTION INTRAMUSCULAR; INTRAVENOUS EVERY 4 HOURS PRN
Status: DISCONTINUED | OUTPATIENT
Start: 2022-04-30 | End: 2022-05-03 | Stop reason: HOSPADM

## 2022-04-30 RX ORDER — DONEPEZIL HYDROCHLORIDE 10 MG/1
1 TABLET, FILM COATED ORAL NIGHTLY
COMMUNITY
Start: 2021-12-06

## 2022-04-30 RX ORDER — GABAPENTIN 300 MG/1
300 CAPSULE ORAL NIGHTLY
Status: DISCONTINUED | OUTPATIENT
Start: 2022-04-30 | End: 2022-05-03 | Stop reason: HOSPADM

## 2022-04-30 RX ORDER — POTASSIUM CHLORIDE 1500 MG/1
20 TABLET, EXTENDED RELEASE ORAL 2 TIMES DAILY
Status: ON HOLD | COMMUNITY
Start: 2022-03-24 | End: 2022-05-03

## 2022-04-30 RX ORDER — LORAZEPAM 1 MG/1
1 TABLET ORAL NIGHTLY PRN
COMMUNITY
Start: 2022-01-04 | End: 2022-06-13

## 2022-04-30 RX ORDER — DICYCLOMINE HYDROCHLORIDE 10 MG/1
20 CAPSULE ORAL EVERY 12 HOURS PRN
Status: DISCONTINUED | OUTPATIENT
Start: 2022-04-30 | End: 2022-05-03 | Stop reason: HOSPADM

## 2022-04-30 RX ORDER — BETAMETHASONE DIPROPIONATE 0.5 MG/G
1 CREAM TOPICAL 2 TIMES DAILY
Status: ON HOLD | COMMUNITY
Start: 2022-01-04 | End: 2022-05-03 | Stop reason: HOSPADM

## 2022-04-30 RX ORDER — BISACODYL 10 MG
10 SUPPOSITORY, RECTAL RECTAL DAILY PRN
Status: DISCONTINUED | OUTPATIENT
Start: 2022-04-30 | End: 2022-05-03 | Stop reason: HOSPADM

## 2022-04-30 RX ORDER — ALBUTEROL SULFATE 90 UG/1
2 AEROSOL, METERED RESPIRATORY (INHALATION) EVERY 6 HOURS
Status: DISCONTINUED | OUTPATIENT
Start: 2022-04-30 | End: 2022-05-03 | Stop reason: HOSPADM

## 2022-04-30 RX ORDER — PRAMIPEXOLE DIHYDROCHLORIDE 1 MG/1
1 TABLET ORAL NIGHTLY
COMMUNITY
Start: 2021-12-06

## 2022-04-30 RX ORDER — DONEPEZIL HYDROCHLORIDE 5 MG/1
10 TABLET, FILM COATED ORAL NIGHTLY
Status: DISCONTINUED | OUTPATIENT
Start: 2022-04-30 | End: 2022-05-03 | Stop reason: HOSPADM

## 2022-04-30 RX ORDER — FLUTICASONE FUROATE AND VILANTEROL 200; 25 UG/1; UG/1
1 POWDER RESPIRATORY (INHALATION) DAILY
Status: DISCONTINUED | OUTPATIENT
Start: 2022-04-30 | End: 2022-05-03 | Stop reason: HOSPADM

## 2022-04-30 RX ORDER — VENLAFAXINE HYDROCHLORIDE 37.5 MG/1
75 CAPSULE, EXTENDED RELEASE ORAL DAILY
Status: DISCONTINUED | OUTPATIENT
Start: 2022-04-30 | End: 2022-05-03 | Stop reason: HOSPADM

## 2022-04-30 RX ORDER — UBIDECARENONE 75 MG
500 CAPSULE ORAL DAILY
Status: DISCONTINUED | OUTPATIENT
Start: 2022-04-30 | End: 2022-05-03 | Stop reason: HOSPADM

## 2022-04-30 RX ORDER — DILTIAZEM HYDROCHLORIDE 120 MG/1
1 TABLET, FILM COATED ORAL DAILY
Status: ON HOLD | COMMUNITY
Start: 2021-12-06 | End: 2022-08-11 | Stop reason: HOSPADM

## 2022-04-30 RX ORDER — PRAMIPEXOLE DIHYDROCHLORIDE 0.25 MG/1
1 TABLET ORAL NIGHTLY
Status: DISCONTINUED | OUTPATIENT
Start: 2022-04-30 | End: 2022-05-03 | Stop reason: HOSPADM

## 2022-04-30 RX ORDER — FUROSEMIDE 20 MG/1
20 TABLET ORAL DAILY
Status: DISCONTINUED | OUTPATIENT
Start: 2022-04-30 | End: 2022-05-03 | Stop reason: HOSPADM

## 2022-04-30 RX ORDER — VENLAFAXINE HYDROCHLORIDE 37.5 MG/1
1 CAPSULE, EXTENDED RELEASE ORAL DAILY
Status: ON HOLD | COMMUNITY
Start: 2021-12-06 | End: 2022-08-11 | Stop reason: SDUPTHER

## 2022-04-30 RX ORDER — GABAPENTIN 300 MG/1
1 CAPSULE ORAL NIGHTLY
Status: ON HOLD | COMMUNITY
Start: 2021-12-06 | End: 2022-06-13 | Stop reason: HOSPADM

## 2022-04-30 RX ORDER — HYDROCHLOROTHIAZIDE 12.5 MG/1
1 TABLET ORAL DAILY
Status: ON HOLD | COMMUNITY
Start: 2021-12-06 | End: 2022-08-11 | Stop reason: HOSPADM

## 2022-04-30 RX ORDER — ADHESIVE BANDAGE
30 BANDAGE TOPICAL DAILY PRN
Status: DISCONTINUED | OUTPATIENT
Start: 2022-04-30 | End: 2022-05-03 | Stop reason: HOSPADM

## 2022-04-30 RX ORDER — LORATADINE 10 MG/1
1 TABLET ORAL DAILY
COMMUNITY
Start: 2022-01-04

## 2022-04-30 RX ORDER — LOSARTAN POTASSIUM 100 MG/1
1 TABLET ORAL DAILY
Status: ON HOLD | COMMUNITY
Start: 2021-12-30 | End: 2022-08-19 | Stop reason: HOSPADM

## 2022-04-30 RX ORDER — DOCUSATE SODIUM 100 MG/1
100 CAPSULE, LIQUID FILLED ORAL DAILY
Status: DISCONTINUED | OUTPATIENT
Start: 2022-04-30 | End: 2022-05-03 | Stop reason: HOSPADM

## 2022-05-01 LAB
ANION GAP SERPL CALC-SCNC: 6 MEQ/L
BUN SERPL-MCNC: 18.3 MG/DL (ref 9.8–20.1)
CALCIUM SERPL-MCNC: 9.3 MG/DL (ref 8.4–10.2)
CHLORIDE SERPL-SCNC: 91 MMOL/L (ref 98–107)
CO2 SERPL-SCNC: 34 MMOL/L (ref 23–31)
CREAT SERPL-MCNC: 0.67 MG/DL (ref 0.55–1.02)
CREAT/UREA NIT SERPL: 27
GLUCOSE SERPL-MCNC: 99 MG/DL (ref 82–115)
MAGNESIUM SERPL-MCNC: 2 MG/DL (ref 1.6–2.6)
POTASSIUM SERPL-SCNC: 4.2 MMOL/L (ref 3.5–5.1)
SODIUM SERPL-SCNC: 131 MMOL/L (ref 136–145)
VANCOMYCIN TROUGH SERPL-MCNC: 14.7 UG/ML (ref 15–20)

## 2022-05-01 PROCEDURE — 25000003 PHARM REV CODE 250

## 2022-05-01 PROCEDURE — 83735 ASSAY OF MAGNESIUM: CPT | Performed by: ORTHOPAEDIC SURGERY

## 2022-05-01 PROCEDURE — 36415 COLL VENOUS BLD VENIPUNCTURE: CPT | Performed by: ORTHOPAEDIC SURGERY

## 2022-05-01 PROCEDURE — 97530 THERAPEUTIC ACTIVITIES: CPT | Mod: CQ

## 2022-05-01 PROCEDURE — 80202 ASSAY OF VANCOMYCIN: CPT | Performed by: ORTHOPAEDIC SURGERY

## 2022-05-01 PROCEDURE — 11000001 HC ACUTE MED/SURG PRIVATE ROOM

## 2022-05-01 PROCEDURE — 63600175 PHARM REV CODE 636 W HCPCS

## 2022-05-01 PROCEDURE — 25000242 PHARM REV CODE 250 ALT 637 W/ HCPCS

## 2022-05-01 PROCEDURE — 80048 BASIC METABOLIC PNL TOTAL CA: CPT | Performed by: ORTHOPAEDIC SURGERY

## 2022-05-01 PROCEDURE — 99990 CHARGE CONVERSION: CPT

## 2022-05-01 RX ORDER — HYDRALAZINE HYDROCHLORIDE 20 MG/ML
10 INJECTION INTRAMUSCULAR; INTRAVENOUS EVERY 4 HOURS PRN
Status: DISCONTINUED | OUTPATIENT
Start: 2022-04-30 | End: 2022-05-03 | Stop reason: HOSPADM

## 2022-05-01 RX ADMIN — PANTOPRAZOLE SODIUM 40 MG: 40 TABLET, DELAYED RELEASE ORAL at 10:05

## 2022-05-01 RX ADMIN — FUROSEMIDE 20 MG: 20 TABLET ORAL at 10:05

## 2022-05-01 RX ADMIN — FLUTICASONE FUROATE AND VILANTEROL TRIFENATATE 1 PUFF: 200; 25 POWDER RESPIRATORY (INHALATION) at 10:05

## 2022-05-01 RX ADMIN — CYANOCOBALAMIN TAB 500 MCG 500 MCG: 500 TAB at 10:05

## 2022-05-01 RX ADMIN — VENLAFAXINE HYDROCHLORIDE 75 MG: 37.5 CAPSULE, EXTENDED RELEASE ORAL at 10:05

## 2022-05-01 RX ADMIN — DOCUSATE SODIUM 100 MG: 100 CAPSULE, LIQUID FILLED ORAL at 10:05

## 2022-05-01 RX ADMIN — ALBUTEROL SULFATE 2 PUFF: 90 AEROSOL, METERED RESPIRATORY (INHALATION) at 06:05

## 2022-05-01 RX ADMIN — ALBUTEROL SULFATE 2 PUFF: 90 AEROSOL, METERED RESPIRATORY (INHALATION) at 12:05

## 2022-05-01 RX ADMIN — POLYETHYLENE GLYCOL 3350 17 G: 17 POWDER, FOR SOLUTION ORAL at 10:05

## 2022-05-01 RX ADMIN — DILTIAZEM HYDROCHLORIDE 120 MG: 120 CAPSULE, COATED, EXTENDED RELEASE ORAL at 10:05

## 2022-05-01 RX ADMIN — GABAPENTIN 300 MG: 300 CAPSULE ORAL at 09:05

## 2022-05-01 RX ADMIN — LOSARTAN POTASSIUM 100 MG: 50 TABLET, FILM COATED ORAL at 10:05

## 2022-05-01 RX ADMIN — DONEPEZIL HYDROCHLORIDE 10 MG: 5 TABLET, FILM COATED ORAL at 09:05

## 2022-05-01 RX ADMIN — TRAZODONE HYDROCHLORIDE 50 MG: 50 TABLET ORAL at 09:05

## 2022-05-01 RX ADMIN — APIXABAN 2.5 MG: 2.5 TABLET, FILM COATED ORAL at 09:05

## 2022-05-01 RX ADMIN — HYDRALAZINE HYDROCHLORIDE 10 MG: 20 INJECTION, SOLUTION INTRAMUSCULAR; INTRAVENOUS at 12:05

## 2022-05-01 RX ADMIN — PRAMIPEXOLE DIHYDROCHLORIDE 1 MG: 0.25 TABLET ORAL at 09:05

## 2022-05-01 RX ADMIN — APIXABAN 2.5 MG: 2.5 TABLET, FILM COATED ORAL at 10:05

## 2022-05-01 NOTE — PROGRESS NOTES
"[facility] Hospital Medicine Progress Note        Chief Complaint: Inpatient Follow-up for     HPI:   88 years old with history of dementia, PAF, remote hx of DVT/PE on Eliquis, HTN, anxiety  Recently patient started to have multiple falls. She had lower extremity fractures which was managed by orthopedic surgery team. Now coming with a new fracture.  During this fall episodes she never had symptoms like chest pain shortness of breath dizziness, according to history the family gives and patient confirms that she was having mechanical falls.  She had a recent UTI 2 weeks ago which was resolved also was treated for pneumonia twice in the last 2 months.   She denies having any MIs or heart failure,  Also denies hearing any history of smoking lung disease.  He takes hydrochlorothiazide spironolactone Cardizem for blood pressure.  She also complains of abdominal pain, however she denies any nausea vomiting or diarrhea, her pain is chronic and she was treated for H. pylori years ago.  Patient is now found to have severe AS and diastolic heart failure requiring Lasix.        Interval Hx:   No new complaints made.  Doing well.  Still remains on 2 L oxygen hopefully we can wean that soon.  Blood cultures returning back Staphylococcus epidermidis.      Objective/physical exam:  Vitals can be found below  General: In no acute distress, afebrile  Chest: Clear to auscultation bilaterally  Heart: S1, S2, no appreciable murmur  Abdomen: Soft, nontender, BS +  MSK: Warm, no lower extremity edema, no clubbing or cyanosis  Neurologic: Alert and oriented x4, right foot exam deferred to ortho, neurovascularly intact at the right foot     Blood pressure 138/66, pulse 82, temperature 98.4 °F (36.9 °C), resp. rate (!) 23, height 5' 5.75" (1.67 m), weight 111 kg (244 lb 11.4 oz), SpO2 99 %, not currently breastfeeding.    @Lab@     @Blood Culture No results found for: LABBLOO@     ultrasound, CT scan, MRI " scan, CT angiogram and X-ray    Scheduled Med:   albuterol  2 puff Inhalation Q6H    apixaban  2.5 mg Oral BID    cyanocobalamin  500 mcg Oral Daily    diltiaZEM  120 mg Oral Daily    docusate sodium  100 mg Oral Daily    donepeziL  10 mg Oral QHS    fluticasone furoate-vilanteroL  1 puff Inhalation Daily    furosemide  20 mg Oral Daily    gabapentin  300 mg Oral QHS    losartan  100 mg Oral Daily    pantoprazole  40 mg Oral Daily    polyethylene glycol  17 g Oral Daily    pramipexole  1 mg Oral QHS    trazodone  50 mg Oral QHS    venlafaxine  75 mg Oral Daily      Continuous Infusions:     PRN Meds:  bisacodyL, dicyclomine, hydrALAZINE, HYDROcodone-acetaminophen, magnesium hydroxide 400 mg/5 ml, morphine, ondansetron, vancomycin - pharmacy to dose       Assessment/Plan:  Closed Right Trimalar Ankle Fracture-s/p ORIF April 26  Acute Proximal Phalanx Great Toe Fracture  s/p Ground Level Fall  Acute kidney injury  Acute urinary retention with incomplete bladder emptying  B12 deficiency  GPC blood cultures +-contaminant versus true  Impaired Mobility  Dementia  PAF  Remote Hx of DVT/PE on Eliquis  Essential HTN  Anxiety      Orthopedics following. Nonweightbearing RLE x2 weeks. Up with Dr. Krause in 2 weeks.  ID following. D/cvancomycin, staph epi likely contaminant, will d/c abx.    Resume Lasix, continue losartan for now. Eliquis renally dosed. extra dose lasix today with developing hypona  Replace B12 especially with dementia.   Cardiology signed off. Option work-up for TAVR.  Case management following. SNF upon discharge.    DVT prophylaxis: Eliquis as above      Anticipated discharge and Disposition:      All diagnosis and differential diagnosis have been reviewed; assessment and plan has been documented; I have personally reviewed the labs and test results that are presently available; I have reviewed the patients medication list; I have reviewed the consulting providers response and  recommendations. I have reviewed or attempted to review medical records based upon their availability    All of the patient's questions have been  addressed and answered. Patient's is agreeable to the above stated plan. I will continue to monitor closely and make adjustments to medical management as needed.      Nutrition Status:        Demarcus Bailey MD   05/01/2022

## 2022-05-01 NOTE — PT/OT/SLP PROGRESS
Physical Therapy  Treatment    Emma Jorgensen   MRN: [id number]   Admitting Diagnosis: <principal problem not specified>    PT Received On: 05/01/22                     Billable Minutes:  Therapeutic Activity 19    Treatment Type: Treatment  PT/PTA: PTA     PTA Visit Number: 1       General Precautions: Standard,    Orthopedic Precautions: RLE non weight bearing, LLE weight bearing as tolerated   Braces:    Respiratory Status: Nasal cannula, flow 3 L/min         Subjective:  Communicated with nurse prior to session.      Pain/Comfort  Pain Rating 1: 8/10  Location - Side 1: Right  Location 1: ankle  Pain Addressed 1: Pre-medicate for activity, Reposition    Objective:   Patient found with: oxygen    Functional Mobility:    Balance:   Static Sit: POOR+: Needs MINIMAL assist to maintain  Dynamic Sit: FAIR+: Maintains balance through MINIMAL excursions of active trunk motion  Static Stand: 0: Needs MAXIMAL assist to maintain   Dynamic stand: 0: N/A       Therapeutic Activities and Exercises:Patient performed supine to sit transfer with modA with verbal and tactile cues for correct technique and safety awareness. Patient performed transfer training including sit<>stand tf for 2 trials with maxA x2 and verbal and tactile cues needed for correct technique, maintaining weight bearing pxns and anterior displacement of COG.Patient performed scooting along EOB x2 trials with modA and assistance for maintaining NWB pxns on RLE and verbal and tactile cues for correct technique and safety awareness .     AM-PAC 6 CLICK MOBILITY  How much help from another person does this patient currently need?   1 = Unable, Total/Dependent Assistance  2 = A lot, Maximum/Moderate Assistance  3 = A little, Minimum/Contact Guard/Supervision  4 = None, Modified [place]/Independent    Turning over in bed (including adjusting bedclothes, sheets and blankets)?: 2  Sitting down on and standing up from a chair with arms (e.g., wheelchair,  bedside commode, etc.): 1  Moving from lying on back to sitting on the side of the bed?: 2  Moving to and from a bed to a chair (including a wheelchair)?: 1  Need to walk in hospital room?: 1  Climbing 3-5 steps with a railing?: 1  Basic Mobility Total Score: 8    AM-PAC Raw Score CMS G-Code Modifier Level of Impairment Assistance   6 % Total / Unable   7 - 9 CM 80 - 100% Maximal Assist   10 - 14 CL 60 - 80% Moderate Assist   15 - 19 CK 40 - 60% Moderate Assist   20 - 22 CJ 20 - 40% Minimal Assist   23 CI 1-20% SBA / CGA   24 CH 0% Independent/ Mod I     Patient left HOB elevated with all lines intact and call button in reach.    Assessment:  Emma Jorgensen is a 88 y.o. female with a medical diagnosis of trimalleolar fx of R ankle s/p ORIF and presents with .    Rehab identified problem list/impairments: Rehab identified problem list/impairments: weakness, impaired endurance, impaired functional mobilty, impaired balance, pain, orthopedic precautions    Rehab potential is good.    Activity tolerance: Fair    Discharge recommendations: Discharge Facility/Level of Care Needs: nursing facility, skilled     Barriers to discharge:      Equipment recommendations:       GOALS:   Goals reviewed per previous EMR. PT to re-establish POC in [facility].     PLAN:    Patient to be seen daily  to address the above listed problems via therapeutic activities, therapeutic exercises, wheelchair management/training  Plan of Care expires:    Plan of Care reviewed with: patient         05/01/2022

## 2022-05-02 PROCEDURE — 99232 PR SUBSEQUENT HOSPITAL CARE,LEVL II: ICD-10-PCS | Mod: ,,, | Performed by: GENERAL PRACTICE

## 2022-05-02 PROCEDURE — 99232 SBSQ HOSP IP/OBS MODERATE 35: CPT | Mod: ,,, | Performed by: GENERAL PRACTICE

## 2022-05-02 PROCEDURE — 99990 CHARGE CONVERSION: CPT

## 2022-05-02 PROCEDURE — 25000003 PHARM REV CODE 250

## 2022-05-02 PROCEDURE — 97530 THERAPEUTIC ACTIVITIES: CPT | Mod: CQ

## 2022-05-02 PROCEDURE — 25000242 PHARM REV CODE 250 ALT 637 W/ HCPCS

## 2022-05-02 PROCEDURE — 11000001 HC ACUTE MED/SURG PRIVATE ROOM

## 2022-05-02 RX ADMIN — ALBUTEROL SULFATE 2 PUFF: 90 AEROSOL, METERED RESPIRATORY (INHALATION) at 12:05

## 2022-05-02 RX ADMIN — APIXABAN 2.5 MG: 2.5 TABLET, FILM COATED ORAL at 09:05

## 2022-05-02 RX ADMIN — HYDROCODONE BITARTRATE AND ACETAMINOPHEN 1 TABLET: 5; 325 TABLET ORAL at 11:05

## 2022-05-02 RX ADMIN — DILTIAZEM HYDROCHLORIDE 120 MG: 120 CAPSULE, COATED, EXTENDED RELEASE ORAL at 09:05

## 2022-05-02 RX ADMIN — TRAZODONE HYDROCHLORIDE 50 MG: 50 TABLET ORAL at 09:05

## 2022-05-02 RX ADMIN — DOCUSATE SODIUM 100 MG: 100 CAPSULE, LIQUID FILLED ORAL at 09:05

## 2022-05-02 RX ADMIN — ALBUTEROL SULFATE 2 PUFF: 90 AEROSOL, METERED RESPIRATORY (INHALATION) at 06:05

## 2022-05-02 RX ADMIN — FUROSEMIDE 20 MG: 20 TABLET ORAL at 09:05

## 2022-05-02 RX ADMIN — CYANOCOBALAMIN TAB 500 MCG 500 MCG: 500 TAB at 09:05

## 2022-05-02 RX ADMIN — VENLAFAXINE HYDROCHLORIDE 75 MG: 37.5 CAPSULE, EXTENDED RELEASE ORAL at 09:05

## 2022-05-02 RX ADMIN — ALBUTEROL SULFATE 2 PUFF: 90 AEROSOL, METERED RESPIRATORY (INHALATION) at 02:05

## 2022-05-02 RX ADMIN — POLYETHYLENE GLYCOL 3350 17 G: 17 POWDER, FOR SOLUTION ORAL at 09:05

## 2022-05-02 RX ADMIN — LOSARTAN POTASSIUM 100 MG: 50 TABLET, FILM COATED ORAL at 09:05

## 2022-05-02 RX ADMIN — PRAMIPEXOLE DIHYDROCHLORIDE 1 MG: 0.25 TABLET ORAL at 09:05

## 2022-05-02 RX ADMIN — DONEPEZIL HYDROCHLORIDE 10 MG: 5 TABLET, FILM COATED ORAL at 09:05

## 2022-05-02 RX ADMIN — GABAPENTIN 300 MG: 300 CAPSULE ORAL at 09:05

## 2022-05-02 RX ADMIN — PANTOPRAZOLE SODIUM 40 MG: 40 TABLET, DELAYED RELEASE ORAL at 09:05

## 2022-05-02 RX ADMIN — HYDROCODONE BITARTRATE AND ACETAMINOPHEN 1 TABLET: 5; 325 TABLET ORAL at 09:05

## 2022-05-02 RX ADMIN — FLUTICASONE FUROATE AND VILANTEROL TRIFENATATE 1 PUFF: 200; 25 POWDER RESPIRATORY (INHALATION) at 09:05

## 2022-05-02 NOTE — PLAN OF CARE
05/02/22 0858   Post-Acute Status   Post-Acute Authorization Placement   Post-Acute Placement Status Pending state direction/certification   Discharge Plan   Discharge Plan A Skilled Nursing Facility

## 2022-05-02 NOTE — PLAN OF CARE
Problem: Adult Inpatient Plan of Care  Goal: Plan of Care Review  Outcome: Ongoing, Progressing  Flowsheets (Taken 5/2/2022 0210)  Plan of Care Reviewed With: patient  Goal: Absence of Hospital-Acquired Illness or Injury  Outcome: Ongoing, Progressing  Intervention: Prevent Skin Injury  Flowsheets (Taken 5/2/2022 0210)  Skin Protection:   incontinence pads utilized   protective footwear used   pulse oximeter probe site changed   skin-to-device areas padded   tubing/devices free from skin contact  Goal: Optimal Comfort and Wellbeing  Outcome: Ongoing, Progressing  Intervention: Provide Person-Centered Care  Flowsheets (Taken 5/2/2022 0210)  Trust Relationship/Rapport:   care explained   choices provided   emotional support provided   empathic listening provided   thoughts/feelings acknowledged   reassurance provided   questions encouraged   questions answered     Problem: Fall Injury Risk  Goal: Absence of Fall and Fall-Related Injury  Outcome: Ongoing, Progressing  Intervention: Promote Injury-Free Environment  Flowsheets (Taken 5/2/2022 0210)  Safety Promotion/Fall Prevention:   assistive device/personal item within reach   side rails raised x 3   supervised activity   instructed to call staff for mobility

## 2022-05-02 NOTE — PT/OT/SLP PROGRESS
Physical Therapy  Treatment    Emma Jorgensen   MRN: [id number]   Admitting Diagnosis: <principal problem not specified>                          Billable Minutes:  Therapeutic Activity 24    Treatment Type: Treatment  PT/PTA: PTA     PTA Visit Number: 2       General Precautions: Standard,    Orthopedic Precautions: RLE non weight bearing (WBAT with LLE with orthotic shoe)   Braces:    Respiratory Status: Room air         Subjective:  Communicated with nurse prior to session.           Objective:   Patient found with: torres catheter    Functional Mobility:  Bed Mobility:        Transfers:   Mod assist bed mobility and stood with max assist x 2 but could not maintain WB precautions. Max assist x 2 to get into chair.           Balance:   Static Sit: FAIR: Maintains without assist, but unable to take any challenges   Dynamic Sit: FAIR+: Maintains balance through MINIMAL excursions of active trunk motion  Static Stand: 0: Needs MAXIMAL assist to maintain   Dynamic stand: 0: N/A              AM-PAC 6 CLICK MOBILITY  How much help from another person does this patient currently need?   1 = Unable, Total/Dependent Assistance  2 = A lot, Maximum/Moderate Assistance  3 = A little, Minimum/Contact Guard/Supervision  4 = None, Modified [place]/Independent    Turning over in bed (including adjusting bedclothes, sheets and blankets)?: 2  Sitting down on and standing up from a chair with arms (e.g., wheelchair, bedside commode, etc.): 2  Moving from lying on back to sitting on the side of the bed?: 2  Moving to and from a bed to a chair (including a wheelchair)?: 2    AM-PAC Raw Score CMS G-Code Modifier Level of Impairment Assistance   6 % Total / Unable   7 - 9 CM 80 - 100% Maximal Assist   10 - 14 CL 60 - 80% Moderate Assist   15 - 19 CK 40 - 60% Moderate Assist   20 - 22 CJ 20 - 40% Minimal Assist   23 CI 1-20% SBA / CGA   24 CH 0% Independent/ Mod I     Patient left up in chair on oleg pad with call button  in reach.    Assessment:  Emma Jorgensen is a 88 y.o. female with a medical diagnosis of right ankle fracutre and presents with generalize weakness.     Rehab identified problem list/impairments:      Rehab potential is good.    Activity tolerance: Fair    Discharge recommendations:       Barriers to discharge:      Equipment recommendations:       GOALS:   Multidisciplinary Problems     Physical Therapy Goals     Not on file                PLAN:    Patient to be seen daily  to address the above listed problems via gait training, therapeutic activities  Plan of Care expires:    Plan of Care reviewed with: patient         05/02/2022

## 2022-05-02 NOTE — PROGRESS NOTES
[facility] Hospital Medicine Progress Note        CHIEF COMPLAINT  Hospital follow up    HOSPITAL COURSE  88 years old with history of dementia, PAF, remote hx of DVT/PE on Eliquis, HTN, anxiety  Recently patient started to have multiple falls. She had lower extremity fractures which was managed by orthopedic surgery team. Now coming with a new fracture.  During this fall episodes she never had symptoms like chest pain shortness of breath dizziness, according to history the family gives and patient confirms that she was having mechanical falls.  She had a recent UTI 2 weeks ago which was resolved also was treated for pneumonia twice in the last 2 months.   She denies having any MIs or heart failure,  Also denies hearing any history of smoking lung disease.  He takes hydrochlorothiazide spironolactone Cardizem for blood pressure.  She also complains of abdominal pain, however she denies any nausea vomiting or diarrhea, her pain is chronic and she was treated for H. pylori years ago.  Patient is now found to have severe AS and diastolic heart failure requiring Lasix.    Seen this morning remains on 2L NC.  No complaints and doing well.        OBJECTIVE/PHYSICAL EXAM  Vitals:    05/02/22 0800   BP: (!) 167/90   Pulse: 89   Resp: 18   Temp: 98.5 °F (36.9 °C)     General: In no acute distress, afebrile  Chest: Clear to auscultation bilaterally  Heart: S1, S2, no appreciable murmur  Abdomen: Soft, nontender, BS +  MSK: Warm, no lower extremity edema, no clubbing or cyanosis, right foot in brace  Neurologic: Alert and oriented x4, moving all extremities with good strength         ASSESSMENT/PLAN  Closed Right Trimalar Ankle Fracture-s/p ORIF April 26  Acute Proximal Phalanx Great Toe Fracture  s/p Ground Level Fall  Acute kidney injury-resolved  Acute urinary retention with incomplete bladder emptying  B12 deficiency  GPC blood cultures +-contaminant versus true  Impaired  Mobility  Dementia  PAF  Remote Hx of DVT/PE on Eliquis  Essential HTN  Anxiety      Orthopedics following. Nonweightbearing RLE x2 weeks. Up with Dr. Krause in 2 weeks.  ID following. D/cvancomycin, staph epi likely contaminant, will d/c abx.    Continue Lasix and losartan for now. Eliquis renally dosed.   Replace B12 especially with dementia.   Cardiology signed off. Option work-up for TAVR.  Case management following. Pending SNF placement.  Ready to discharge.      DVT prophylaxis: Eliquis as above  __________________________________________________________________________  VITALS/LABS/MEDS/DIAGNOSTICS    No results found for this or any previous visit (from the past 24 hour(s)).    Microbiology Results (last 7 days)     ** No results found for the last 168 hours. **          Current Facility-Administered Medications   Medication Frequency    albuterol inhaler 2 puff Q6H    apixaban tablet 2.5 mg BID    bisacodyL suppository 10 mg Daily PRN    cyanocobalamin tablet 500 mcg Daily    dicyclomine capsule 20 mg Q12H PRN    diltiaZEM 24 hr capsule 120 mg Daily    docusate sodium capsule 100 mg Daily    donepeziL tablet 10 mg QHS    fluticasone furoate-vilanteroL 200-25 mcg/dose diskus inhaler 1 puff Daily    furosemide tablet 20 mg Daily    gabapentin capsule 300 mg QHS    hydrALAZINE injection 10 mg Q4H PRN    HYDROcodone-acetaminophen 5-325 mg per tablet 1 tablet Q4H PRN    losartan tablet 100 mg Daily    magnesium hydroxide 400 mg/5 ml suspension 2,400 mg Daily PRN    morphine injection 2 mg Q3H PRN    ondansetron injection 4 mg Q4H PRN    pantoprazole EC tablet 40 mg Daily    polyethylene glycol packet 17 g Daily    pramipexole tablet 1 mg QHS    traZODone tablet 50 mg QHS    venlafaxine 24 hr capsule 75 mg Daily        No orders to display            All diagnosis and differential diagnosis have been reviewed; assessment and plan has been documented; I have personally reviewed the labs and  test results that are presently available; I have reviewed the patients medication list; I have reviewed the consulting providers response and recommendations. I have reviewed or attempted to review medical records based upon their availability    All of the patient's questions have been  addressed and answered. Patient's is agreeable to the above stated plan. I will continue to monitor closely and make adjustments to medical management as needed.      Demarcus Bailey MD   05/02/2022   Internal Medicine  Department of [facility]

## 2022-05-02 NOTE — PLAN OF CARE
Problem: Adult Inpatient Plan of Care  Goal: Plan of Care Review  Outcome: Ongoing, Progressing  Flowsheets (Taken 5/1/2022 1957)  Plan of Care Reviewed With: patient  Goal: Patient-Specific Goal (Individualized)  Outcome: Ongoing, Progressing  Goal: Absence of Hospital-Acquired Illness or Injury  Outcome: Ongoing, Progressing  Intervention: Identify and Manage Fall Risk  Flowsheets (Taken 5/1/2022 1957)  Safety Promotion/Fall Prevention:   assistive device/personal item within reach   pulse ox   nonskid shoes/socks when out of bed   medications reviewed  Intervention: Prevent Skin Injury  Flowsheets (Taken 5/1/2022 1957)  Skin Protection: pulse oximeter probe site changed  Intervention: Prevent and Manage VTE (Venous Thromboembolism) Risk  Flowsheets (Taken 5/1/2022 1957)  Activity Management: Patient unable to perform activities  Goal: Optimal Comfort and Wellbeing  Outcome: Ongoing, Progressing  Goal: Readiness for Transition of Care  Outcome: Ongoing, Progressing  Intervention: Mutually Develop Transition Plan  Flowsheets (Taken 5/1/2022 1957)  Equipment Currently Used at Home:   walker, rolling   wheelchair     Problem: Infection  Goal: Absence of Infection Signs and Symptoms  Outcome: Ongoing, Progressing     Problem: Fall Injury Risk  Goal: Absence of Fall and Fall-Related Injury  Outcome: Ongoing, Progressing  Intervention: Identify and Manage Contributors  Flowsheets (Taken 5/1/2022 1957)  Self-Care Promotion: meal set-up provided  Medication Review/Management:   medications reviewed   high-risk medications identified  Intervention: Promote Injury-Free Environment  Flowsheets (Taken 5/1/2022 1957)  Safety Promotion/Fall Prevention:   assistive device/personal item within reach   pulse ox   nonskid shoes/socks when out of bed   medications reviewed     Problem: Skin Injury Risk Increased  Goal: Skin Health and Integrity  Outcome: Ongoing, Progressing

## 2022-05-03 VITALS
HEIGHT: 66 IN | OXYGEN SATURATION: 92 % | WEIGHT: 244.69 LBS | RESPIRATION RATE: 18 BRPM | HEART RATE: 75 BPM | TEMPERATURE: 98 F | DIASTOLIC BLOOD PRESSURE: 72 MMHG | SYSTOLIC BLOOD PRESSURE: 158 MMHG | BODY MASS INDEX: 39.32 KG/M2

## 2022-05-03 PROBLEM — S82.891A CLOSED RIGHT ANKLE FRACTURE: Status: ACTIVE | Noted: 2022-05-03

## 2022-05-03 PROCEDURE — 97530 THERAPEUTIC ACTIVITIES: CPT | Mod: CQ

## 2022-05-03 PROCEDURE — 25000003 PHARM REV CODE 250

## 2022-05-03 PROCEDURE — 99990 CHARGE CONVERSION: CPT

## 2022-05-03 PROCEDURE — 97116 GAIT TRAINING THERAPY: CPT | Mod: CQ

## 2022-05-03 PROCEDURE — 97110 THERAPEUTIC EXERCISES: CPT | Mod: CQ

## 2022-05-03 RX ORDER — TRAZODONE HYDROCHLORIDE 50 MG/1
50 TABLET ORAL NIGHTLY
Qty: 30 TABLET | Refills: 0 | Status: SHIPPED | OUTPATIENT
Start: 2022-05-03 | End: 2022-05-03 | Stop reason: SDUPTHER

## 2022-05-03 RX ORDER — TRAZODONE HYDROCHLORIDE 50 MG/1
50 TABLET ORAL NIGHTLY
Qty: 30 TABLET | Refills: 0 | Status: ON HOLD | OUTPATIENT
Start: 2022-05-03 | End: 2022-08-11 | Stop reason: SDUPTHER

## 2022-05-03 RX ORDER — HYDROCODONE BITARTRATE AND ACETAMINOPHEN 5; 325 MG/1; MG/1
1 TABLET ORAL EVERY 6 HOURS PRN
Qty: 28 TABLET | Refills: 0 | Status: SHIPPED | OUTPATIENT
Start: 2022-05-03 | End: 2022-05-03 | Stop reason: SDUPTHER

## 2022-05-03 RX ORDER — FUROSEMIDE 20 MG/1
20 TABLET ORAL DAILY
Qty: 30 TABLET | Refills: 11 | Status: ON HOLD
Start: 2022-05-03 | End: 2022-05-27 | Stop reason: HOSPADM

## 2022-05-03 RX ORDER — PANTOPRAZOLE SODIUM 40 MG/1
40 TABLET, DELAYED RELEASE ORAL DAILY
Qty: 30 TABLET | Refills: 11
Start: 2022-05-03 | End: 2022-08-19

## 2022-05-03 RX ORDER — UBIDECARENONE 75 MG
500 CAPSULE ORAL DAILY
Qty: 30 TABLET | Refills: 0
Start: 2022-05-03

## 2022-05-03 RX ORDER — HYDROCODONE BITARTRATE AND ACETAMINOPHEN 5; 325 MG/1; MG/1
1 TABLET ORAL EVERY 6 HOURS PRN
Qty: 28 TABLET | Refills: 0 | Status: SHIPPED | OUTPATIENT
Start: 2022-05-03 | End: 2022-05-10

## 2022-05-03 RX ADMIN — HYDROCODONE BITARTRATE AND ACETAMINOPHEN 1 TABLET: 5; 325 TABLET ORAL at 09:05

## 2022-05-03 RX ADMIN — CYANOCOBALAMIN TAB 500 MCG 500 MCG: 500 TAB at 09:05

## 2022-05-03 RX ADMIN — HYDROCODONE BITARTRATE AND ACETAMINOPHEN 1 TABLET: 5; 325 TABLET ORAL at 03:05

## 2022-05-03 RX ADMIN — FUROSEMIDE 20 MG: 20 TABLET ORAL at 09:05

## 2022-05-03 RX ADMIN — POLYETHYLENE GLYCOL 3350 17 G: 17 POWDER, FOR SOLUTION ORAL at 09:05

## 2022-05-03 RX ADMIN — DILTIAZEM HYDROCHLORIDE 120 MG: 120 CAPSULE, COATED, EXTENDED RELEASE ORAL at 09:05

## 2022-05-03 RX ADMIN — LOSARTAN POTASSIUM 100 MG: 50 TABLET, FILM COATED ORAL at 09:05

## 2022-05-03 RX ADMIN — HYDROCODONE BITARTRATE AND ACETAMINOPHEN 1 TABLET: 5; 325 TABLET ORAL at 04:05

## 2022-05-03 RX ADMIN — APIXABAN 2.5 MG: 2.5 TABLET, FILM COATED ORAL at 09:05

## 2022-05-03 RX ADMIN — PANTOPRAZOLE SODIUM 40 MG: 40 TABLET, DELAYED RELEASE ORAL at 09:05

## 2022-05-03 RX ADMIN — DOCUSATE SODIUM 100 MG: 100 CAPSULE, LIQUID FILLED ORAL at 09:05

## 2022-05-03 RX ADMIN — VENLAFAXINE HYDROCHLORIDE 75 MG: 37.5 CAPSULE, EXTENDED RELEASE ORAL at 09:05

## 2022-05-03 NOTE — PROGRESS NOTES
Infectious Disease  Progress Note    Patient Name: Emma Jorgensen   MRN: [id number]   Admission Date: 4/21/2022   Hospital Length of Stay: 12 days  Attending Physician: Rajiv Boles DO   Primary Care Provider: Mary Jane Dean MD     Isolation Status: No active isolations       Subjective:     Principal Problem: RLE ankle fracture    Interval History:  Sitting in chair comfortably, denies fevers chills reports that her pain has been much better controlled.    Review of Systems   Constitutional: Negative for chills, fatigue and fever.    Eyes: Negative for visual disturbance.   Respiratory: Negative for cough, shortness of breath and wheezing.    Cardiovascular: Negative for chest pain and palpitations.   Gastrointestinal: Negative for abdominal pain, diarrhea, nausea and vomiting.   Musculoskeletal: Negative for arthralgias, back pain, myalgias and neck stiffness.   Skin: Negative for rash.   Neurological: Negative for seizures and headaches.   Psychiatric/Behavioral: Negative for agitation and confusion.     Objective:     Vital Signs (Most Recent):  Temp: 98.3 °F (36.8 °C) (05/02/22 1614)  Pulse: 78 (05/02/22 1825)  Resp: 14 (05/02/22 2334)  BP: (!) 160/77 (05/02/22 1614)  SpO2: 96 % (05/02/22 1825)  Vital Signs (24h Range):  Temp:  [98.3 °F (36.8 °C)-99 °F (37.2 °C)] 98.3 °F (36.8 °C)  Pulse:  [78-89] 78  Resp:  [14-20] 14  SpO2:  [93 %-99 %] 96 %  BP: (112-167)/(66-90) 160/77      Weight:   Wt Readings from Last 1 Encounters:   04/29/22 111 kg (244 lb 11.4 oz)      Body mass index is Body mass index is 39.8 kg/m².     Estimated Creatinine Clearance: Estimated Creatinine Clearance: 72.9 mL/min (based on SCr of 0.67 mg/dL).     Physical Exam  Constitutional: Normal appearance, Not in acute distress  HENT: No oropharyngeal exudate or posterior oropharyngeal erythema.   Eyes: Extraocular movements intact. Pupils are equal, round, and reactive to light.   Cardiovascular: Normal rate and regular rhythm.   Systolic murmur heard.  Pulmonary: No respiratory distress. No wheezing, rhonchi or rales.   Abdominal: Bowel sounds are normal. There is no distension. Abdomen is soft. No tenderness. No CVA tenderness  Musculoskeletal:  Right foot in clean dressing  Skin:No lesion or rash.   Neurological: No focal deficit present. AAOx3. No gross cranial nerve deficit. No weakness.   Psychiatric: Mood normal. Behavior normal.     Significant Labs:   CBC: No results for input(s): WBC, HGB, HCT, PLT in the last 48 hours.  CMP:   Recent Labs   Lab 05/01/22  0645   CO2 34*   BUN 18.3   CREATININE 0.67   CALCIUM 9.3       Microbiology Results (last 7 days)     ** No results found for the last 168 hours. **           Significant Imaging: I have reviewed all pertinent imaging results/findings within the past 24 hours.    Assessment/Plan:      88-year-old patient history significant for dementia, AFib, hypertension with increased frequency of recent falls, presents fall and a right ankle pain found fracture, underwent ORIF on 04/26/2022.  She was noted to temperature of 38.1° on 04/28/2022 and blood cultures were collected.  Culture is growing epidermidis which ID is consulted.      1. Staph epidermidis in blood culture  2. R ankle fracture    -staph epidermidis collected in blood culture likely to represent contamination  -Patient not exhibiting any signs of active infection    Plan:  -Maintain off antibiotics unless clinical change  -Discussed with primary physician Dr. Bailey    ID will signoff, please call with questions or concerns.     Andrea Dumont MD  Infectious Disease  [facility]

## 2022-05-03 NOTE — PT/OT/SLP PROGRESS
Physical Therapy  Treatment    Emma Jorgensen   MRN: [id number]   Admitting Diagnosis: Closed right ankle fracture                          Billable Minutes:  Therapeutic Activity 13 and Therapeutic Exercise 13    Treatment Type: Treatment  PT/PTA: PTA     PTA Visit Number: 3       General Precautions: Standard,    Orthopedic Precautions: RLE non weight bearing   Braces:    Respiratory Status: Room air         Subjective:  Communicated with nurse prior to session.           Objective:   Patient found with: torres catheter    Functional Mobility:  Bed Mobility:    mod assist bed mobility    Transfers:   Max assist x 2 squat pivot to chair NWB RLE                        Therapeutic Activities and Exercises:  Pt performed LE PREs supine and sitting to improve transfer and independence. Pt able to stand x 2 trials with better static stand and improve compliance with maintaining WB precaution.      AM-PAC 6 CLICK MOBILITY  How much help from another person does this patient currently need?   1 = Unable, Total/Dependent Assistance  2 = A lot, Maximum/Moderate Assistance  3 = A little, Minimum/Contact Guard/Supervision  4 = None, Modified [place]/Independent    Turning over in bed (including adjusting bedclothes, sheets and blankets)?: 2  Sitting down on and standing up from a chair with arms (e.g., wheelchair, bedside commode, etc.): 2  Moving from lying on back to sitting on the side of the bed?: 3  Moving to and from a bed to a chair (including a wheelchair)?: 2  Need to walk in hospital room?: 1  Climbing 3-5 steps with a railing?: 1  Basic Mobility Total Score: 11    AM-PAC Raw Score CMS G-Code Modifier Level of Impairment Assistance   6 % Total / Unable   7 - 9 CM 80 - 100% Maximal Assist   10 - 14 CL 60 - 80% Moderate Assist   15 - 19 CK 40 - 60% Moderate Assist   20 - 22 CJ 20 - 40% Minimal Assist   23 CI 1-20% SBA / CGA   24 CH 0% Independent/ Mod I     Patient left on oleg pad with all lines  intact and call button in reach.    Assessment:  Emma Jorgensen is a 88 y.o. female with a medical diagnosis of Closed right ankle fracture.    Rehab identified problem list/impairments:      Rehab potential is good.    Activity tolerance: Good    Discharge recommendations: Discharge Facility/Level of Care Needs: nursing facility, skilled     Barriers to discharge:      Equipment recommendations:       GOALS:   Multidisciplinary Problems     Physical Therapy Goals     Not on file                PLAN:    Patient to be seen daily  to address the above listed problems via gait training, therapeutic exercises  Plan of Care expires:    Plan of Care reviewed with: patient         05/03/2022

## 2022-05-03 NOTE — PLAN OF CARE
Problem: Infection  Goal: Absence of Infection Signs and Symptoms  MY URINE CATHETER WONT GET ME INFECTED   Intervention: Prevent or Manage Infection  Flowsheets (Taken 5/3/2022 0827)  Fever Reduction/Comfort Measures:   fluid intake increased   lightweight bedding     Problem: Fall Injury Risk  Goal: Absence of Fall and Fall-Related Injury  Outcome: Ongoing, Progressing     Problem: Skin Injury Risk Increased  Goal: Skin Health and Integrity  Outcome: Ongoing, Progressing  I WILL TURN WITH THE PINK WEDGE    Problem: Adult Inpatient Plan of Care  Goal: Absence of Hospital-Acquired Illness or Injury  TO NOT FALL WITH THERAPY   Intervention: Identify and Manage Fall Risk  Flowsheets (Taken 5/3/2022 0827)  Safety Promotion/Fall Prevention:   assistive device/personal item within reach   Fall Risk reviewed with patient/family   instructed to call staff for mobility   pulse ox   medications reviewed

## 2022-05-03 NOTE — PROGRESS NOTES
Ms. Jorgensen and her daughter have verbalized understanding of all discharge instructions, new prescriptions, wound care, and importance of follow up visits. Report has been called to MASON Whyte at [place]. [facility] has been scheduled for transport, will be here within the hour.

## 2022-05-03 NOTE — CONSULTS
Pt accepted by [facility]. They can admit today. Dr. Bailey notified.

## 2022-05-03 NOTE — DISCHARGE SUMMARY
[facility] MEDICINE DISCHARGE SUMMARY    Admit Date: 4/21/2022  Discharge Date and Time: 5/3/30958:16 AM  Admitting Physician: [unfilled]   Discharge Attending Physician: Demarcus Bailey.  Primary Care Physician: Mary Jane Dean MD    DISCHARGE DIAGNOSIS  Closed Right Trimalar Ankle Fracture-s/p ORIF April 26  Acute Proximal Phalanx Great Toe Fracture  s/p Ground Level Fall  Acute kidney injury-resolved  Acute urinary retention with incomplete bladder emptying  B12 deficiency  GPC blood cultures +-contaminant versus true  Impaired Mobility  Dementia  PAF  Remote Hx of DVT/PE on Eliquis  Essential HTN  Anxiety        HOSPITAL COURSE  88 years old with history of dementia, PAF, remote hx of DVT/PE on Eliquis, HTN, anxiety  Recently patient started to have multiple falls. She had lower extremity fractures which was managed by orthopedic surgery team. Now coming with a new fracture.  During this fall episodes she never had symptoms like chest pain shortness of breath dizziness, according to history the family gives and patient confirms that she was having mechanical falls.  She had a recent UTI 2 weeks ago which was resolved also was treated for pneumonia twice in the last 2 months.   She denies having any MIs or heart failure,  Also denies hearing any history of smoking lung disease.  He takes hydrochlorothiazide spironolactone Cardizem for blood pressure.  She also complains of abdominal pain, however she denies any nausea vomiting or diarrhea, her pain is chronic and she was treated for H. pylori years ago.  Patient is now found to have severe AS and diastolic heart failure requiring Lasix.    She underwent ORIF of the right ankle on April 26.  She did have 1 episode of fever and that blood cultures were obtained however came back contaminated per Infectious Disease consult.  She also did have episode of urinary retention and had to get a Daugherty catheter placed  "back.        PHYSICAL EXAM  BP (!) 184/80   Pulse 80   Temp 98 °F (36.7 °C) (Oral)   Resp 18   Ht 5' 5.75" (1.67 m)   Wt 111 kg (244 lb 11.4 oz)   LMP  (LMP Unknown)   SpO2 95%   Breastfeeding No   BMI 39.80 kg/m²    General: In no acute distress, afebrile  Chest: Clear to auscultation bilaterally  Heart: S1, S2, no appreciable murmur  Abdomen: Soft, nontender, BS +  MSK: Warm, no lower extremity edema, no clubbing or cyanosis  Neurologic: Alert and oriented x4, moving all extremities with good strength        DISCHARGE MEDICATION RECONCILIATION  Current Discharge Medication List      START taking these medications    Details   apixaban (ELIQUIS) 2.5 mg Tab Take 2 tablets (5 mg total) by mouth 2 (two) times daily.      cyanocobalamin 500 MCG tablet Take 1 tablet (500 mcg total) by mouth once daily.  Qty: 30 tablet, Refills: 0      furosemide (LASIX) 20 MG tablet Take 1 tablet (20 mg total) by mouth once daily.  Qty: 30 tablet, Refills: 11      pantoprazole (PROTONIX) 40 MG tablet Take 1 tablet (40 mg total) by mouth once daily.  Qty: 30 tablet, Refills: 11         CONTINUE these medications which have NOT CHANGED    Details   albuterol (PROVENTIL/VENTOLIN HFA) 90 mcg/actuation inhaler Inhale 2 puffs into the lungs every 6 (six) hours.      budesonide-glycopyr-formoterol 160-9-4.8 mcg/actuation HFAA Inhale 2 puffs into the lungs 2 (two) times a day.      clotrimazole-betamethasone 1-0.05% (LOTRISONE) cream Apply 1 application topically 2 (two) times daily.      diclofenac sodium (VOLTAREN) 1 % Gel Apply 1 application topically 4 (four) times daily.      dicyclomine (BENTYL) 20 mg tablet Take 1 tablet by mouth once daily at 6am.      diltiaZEM (CARDIZEM) 120 MG tablet Take 1 tablet by mouth once daily at 6am.      donepeziL (ARICEPT) 10 MG tablet Take 1 tablet by mouth nightly.      gabapentin (NEURONTIN) 300 MG capsule Take 1 tablet by mouth nightly.      hydroCHLOROthiazide (HYDRODIURIL) 12.5 MG Tab Take 1 " tablet by mouth once daily at 6am.      loratadine (CLARITIN) 10 mg tablet Take 1 tablet by mouth once daily at 6am.      LORazepam (ATIVAN) 1 MG tablet Take 1 tablet by mouth nightly as needed.      losartan (COZAAR) 100 MG tablet Take 1 tablet by mouth once daily.      pramipexole (MIRAPEX) 1 MG tablet Take 1 tablet by mouth nightly.      traZODone (DESYREL) 50 MG tablet Take 1 tablet by mouth once daily.      venlafaxine (EFFEXOR-XR) 37.5 MG 24 hr capsule Take 1 tablet by mouth once daily at 6am.         STOP taking these medications       betamethasone dipropionate 0.05 % cream Comments:   Reason for Stopping:         FLUTICASONE FUROATE NASL Comments:   Reason for Stopping:         fluticasone propion-salmeterol 115-21 mcg/dose (ADVAIR HFA) 115-21 mcg/actuation HFAA inhaler Comments:   Reason for Stopping:         omeprazole 20 mg TbEC Comments:   Reason for Stopping:         potassium chloride (K-TAB) 20 mEq Comments:   Reason for Stopping:                  Procedures Performed: No admission procedures for hospital encounter.     Significant Diagnostic Studies: See Full reports for all details  Admission on 04/21/2022   Component Date Value    Sodium Level 05/01/2022 131 (A)    Potassium Level 05/01/2022 4.2     Chloride 05/01/2022 91 (A)    Carbon Dioxide 05/01/2022 34 (A)    Glucose Level 05/01/2022 99     Blood Urea Nitrogen 05/01/2022 18.3     Creatinine 05/01/2022 0.67     BUN/Creatinine Ratio 05/01/2022 27     Calcium Level Total 05/01/2022 9.3     Estimated GFR-Non Jessy* 05/01/2022 >60     Anion Gap 05/01/2022 6.0     Magnesium Level 05/01/2022 2.00     Vancomycin Trough 05/01/2022 14.7 (A)        Microbiology Results (last 7 days)     ** No results found for the last 168 hours. **           No results found.- pulls last radiology orders     DISCHARGE INSTRUCTIONS  Explained in detail to the patient about the discharge plan, medications, and follow-up visits. Pt understands and agrees  with the treatment plan  Discharged Condition: stable  Diet as tolerated  Activities as tolerated  Follow-up   For further questions contact hospitalist office  Discharge to SNF    TIME SPENT ON DISCHARGE  35 minutes        Demarcus Winn M.D on 5/3/2022. at 9:16 AM.

## 2022-05-06 PROCEDURE — 83735 ASSAY OF MAGNESIUM: CPT | Performed by: NURSE PRACTITIONER

## 2022-05-06 PROCEDURE — 83718 ASSAY OF LIPOPROTEIN: CPT | Performed by: NURSE PRACTITIONER

## 2022-05-06 PROCEDURE — 80053 COMPREHEN METABOLIC PANEL: CPT | Performed by: NURSE PRACTITIONER

## 2022-05-06 PROCEDURE — 83036 HEMOGLOBIN GLYCOSYLATED A1C: CPT | Performed by: NURSE PRACTITIONER

## 2022-05-06 PROCEDURE — 84134 ASSAY OF PREALBUMIN: CPT | Performed by: NURSE PRACTITIONER

## 2022-05-06 PROCEDURE — 84443 ASSAY THYROID STIM HORMONE: CPT | Performed by: NURSE PRACTITIONER

## 2022-05-06 PROCEDURE — 82306 VITAMIN D 25 HYDROXY: CPT | Performed by: NURSE PRACTITIONER

## 2022-05-06 PROCEDURE — 82607 VITAMIN B-12: CPT | Performed by: NURSE PRACTITIONER

## 2022-05-09 ENCOUNTER — LAB REQUISITION (OUTPATIENT)
Dept: LAB | Facility: HOSPITAL | Age: 87
End: 2022-05-09
Payer: MEDICARE

## 2022-05-09 DIAGNOSIS — I10 ESSENTIAL (PRIMARY) HYPERTENSION: ICD-10-CM

## 2022-05-09 LAB
ALBUMIN SERPL-MCNC: 2.9 GM/DL (ref 3.4–4.8)
ALBUMIN/GLOB SERPL: 0.7 RATIO (ref 1.1–2)
ALP SERPL-CCNC: 109 UNIT/L (ref 40–150)
ALT SERPL-CCNC: 7 UNIT/L (ref 0–55)
AST SERPL-CCNC: 15 UNIT/L (ref 5–34)
BILIRUBIN DIRECT+TOT PNL SERPL-MCNC: 0.1 MG/DL (ref 0–0.5)
BILIRUBIN DIRECT+TOT PNL SERPL-MCNC: 0.2 MG/DL (ref 0–0.8)
BILIRUBIN DIRECT+TOT PNL SERPL-MCNC: 0.3 MG/DL
BUN SERPL-MCNC: 16.5 MG/DL (ref 9.8–20.1)
CALCIUM SERPL-MCNC: 8.9 MG/DL (ref 8.4–10.2)
CHLORIDE SERPL-SCNC: 97 MMOL/L (ref 98–107)
CHOLEST SERPL-MCNC: 130 MG/DL
CHOLEST/HDLC SERPL: 3 {RATIO} (ref 0–5)
CO2 SERPL-SCNC: 28 MMOL/L (ref 23–31)
CREAT SERPL-MCNC: 0.78 MG/DL (ref 0.55–1.02)
DEPRECATED CALCIDIOL+CALCIFEROL SERPL-MC: 5.6 NG/ML (ref 30–80)
EST. AVERAGE GLUCOSE BLD GHB EST-MCNC: 111.2 MG/DL
GLOBULIN SER-MCNC: 3.9 GM/DL (ref 2.4–3.5)
GLUCOSE SERPL-MCNC: 105 MG/DL (ref 82–115)
HBA1C MFR BLD: 5.5 %
HDLC SERPL-MCNC: 50 MG/DL (ref 35–60)
LDLC SERPL CALC-MCNC: 65 MG/DL (ref 50–140)
MAGNESIUM SERPL-MCNC: 2.3 MG/DL (ref 1.6–2.6)
POTASSIUM SERPL-SCNC: 4.3 MMOL/L (ref 3.5–5.1)
PREALB SERPL-MCNC: 14.5 MG/DL (ref 14–37)
PROT SERPL-MCNC: 6.8 GM/DL (ref 5.8–7.6)
SODIUM SERPL-SCNC: 136 MMOL/L (ref 136–145)
TRIGL SERPL-MCNC: 75 MG/DL (ref 37–140)
TSH SERPL-ACNC: 1.54 UIU/ML (ref 0.35–4.94)
VIT B12 SERPL-MCNC: 869 PG/ML (ref 213–816)
VLDLC SERPL CALC-MCNC: 15 MG/DL

## 2022-05-18 ENCOUNTER — OFFICE VISIT (OUTPATIENT)
Dept: ORTHOPEDICS | Facility: CLINIC | Age: 87
End: 2022-05-18
Payer: MEDICARE

## 2022-05-18 VITALS — BODY MASS INDEX: 40.77 KG/M2 | WEIGHT: 244.69 LBS | HEIGHT: 65 IN

## 2022-05-18 DIAGNOSIS — S82.854D: Primary | ICD-10-CM

## 2022-05-18 PROCEDURE — 99024 PR POST-OP FOLLOW-UP VISIT: ICD-10-PCS | Mod: ,,, | Performed by: NURSE PRACTITIONER

## 2022-05-18 PROCEDURE — 99024 POSTOP FOLLOW-UP VISIT: CPT | Mod: ,,, | Performed by: NURSE PRACTITIONER

## 2022-05-18 NOTE — PROGRESS NOTES
Subjective:       Patient ID: Emma Jorgensen is a 88 y.o. female.    Chief Complaint   Patient presents with    Right Ankle - Post-op Evaluation, Injury, Pain     2 week f/u ORIF right trimalleolar ankle fx, SX 04/26/22, NWB in splint, wheel chair, sutures present, burning sensation        The patient is here today for a follow-up evaluation 3 weeks out from placement of a hindfoot fusion nail for a right trimalleolar ankle fracture.  She states she is doing well today.  Her pain is controlled.  She does report some burning in the ankle intermittently.  She comes in with her splint intact.  She states she has been compliant with her nonweightbearing status.  She states she has been working with physical therapy at the skilled nursing facility.  She has not had any fever.  She has no other complaints today.      Review of Systems   Constitutional: Negative for chills and fever.   HENT: Negative for congestion and hearing loss.    Eyes: Negative for visual disturbance.   Cardiovascular: Negative for chest pain and syncope.   Respiratory: Negative for cough and shortness of breath.    Hematologic/Lymphatic: Does not bruise/bleed easily.   Skin: Negative for color change and rash.   Gastrointestinal: Negative for abdominal pain, nausea and vomiting.   Genitourinary: Negative for dysuria and hematuria.   Neurological: Negative for numbness, sensory change and weakness.   Psychiatric/Behavioral: Negative for altered mental status.        Current Outpatient Medications on File Prior to Visit   Medication Sig Dispense Refill    albuterol (PROVENTIL/VENTOLIN HFA) 90 mcg/actuation inhaler Inhale 2 puffs into the lungs every 6 (six) hours.      apixaban (ELIQUIS) 2.5 mg Tab Take 2 tablets (5 mg total) by mouth 2 (two) times daily.      budesonide-glycopyr-formoterol 160-9-4.8 mcg/actuation HFAA Inhale 2 puffs into the lungs 2 (two) times a day.      clotrimazole-betamethasone 1-0.05% (LOTRISONE) cream Apply 1  "application topically 2 (two) times daily.      cyanocobalamin 500 MCG tablet Take 1 tablet (500 mcg total) by mouth once daily. 30 tablet 0    diclofenac sodium (VOLTAREN) 1 % Gel Apply 1 application topically 4 (four) times daily.      dicyclomine (BENTYL) 20 mg tablet Take 1 tablet by mouth once daily at 6am.      diltiaZEM (CARDIZEM) 120 MG tablet Take 1 tablet by mouth once daily at 6am.      donepeziL (ARICEPT) 10 MG tablet Take 1 tablet by mouth nightly.      furosemide (LASIX) 20 MG tablet Take 1 tablet (20 mg total) by mouth once daily. 30 tablet 11    gabapentin (NEURONTIN) 300 MG capsule Take 1 tablet by mouth nightly.      hydroCHLOROthiazide (HYDRODIURIL) 12.5 MG Tab Take 1 tablet by mouth once daily at 6am.      loratadine (CLARITIN) 10 mg tablet Take 1 tablet by mouth once daily at 6am.      LORazepam (ATIVAN) 1 MG tablet Take 1 tablet by mouth nightly as needed.      losartan (COZAAR) 100 MG tablet Take 1 tablet by mouth once daily.      pantoprazole (PROTONIX) 40 MG tablet Take 1 tablet (40 mg total) by mouth once daily. 30 tablet 11    pramipexole (MIRAPEX) 1 MG tablet Take 1 tablet by mouth nightly.      traZODone (DESYREL) 50 MG tablet Take 1 tablet (50 mg total) by mouth every evening. 30 tablet 0    venlafaxine (EFFEXOR-XR) 37.5 MG 24 hr capsule Take 1 tablet by mouth once daily at 6am.      [DISCONTINUED] betamethasone dipropionate 0.05 % cream Apply 1 application topically 2 (two) times daily.      [DISCONTINUED] fluticasone propion-salmeterol 115-21 mcg/dose (ADVAIR HFA) 115-21 mcg/actuation HFAA inhaler Inhale 2 puffs into the lungs 2 (two) times daily.      [DISCONTINUED] omeprazole 20 mg TbEC Take 1 tablet by mouth once daily at 6am.       No current facility-administered medications on file prior to visit.          Objective:      Ht 5' 5" (1.651 m)   Wt 111 kg (244 lb 11.4 oz)   LMP  (LMP Unknown)   BMI 40.72 kg/m²   Physical Exam  Constitutional:       General: She is not in acute " distress.     Appearance: Normal appearance.   HENT:      Head: Normocephalic and atraumatic.      Mouth/Throat:      Mouth: Mucous membranes are moist.   Eyes:      Extraocular Movements: Extraocular movements intact.   Cardiovascular:      Rate and Rhythm: Normal rate.      Pulses: Normal pulses.   Pulmonary:      Effort: Pulmonary effort is normal. No respiratory distress.   Abdominal:      General: There is no distension.      Palpations: Abdomen is soft.      Tenderness: There is no abdominal tenderness.   Musculoskeletal:      Cervical back: Normal range of motion and neck supple.      Comments: Right ankle:  No calf swelling or tenderness.  Surgical incisions are well healed with no signs of infection.  She has no painful or prominent hardware.  Mild swelling at the ankle as expected postoperatively.  No ankle range of motion.  Brisk capillary refill distally.  Sensation to light touch is intact distally.  She can flex and extend her digits actively.   Neurological:      Mental Status: She is alert and oriented to person, place, and time. Mental status is at baseline.   Psychiatric:         Mood and Affect: Mood normal.         Behavior: Behavior normal.         Thought Content: Thought content normal.         Judgment: Judgment normal.        Body mass index is 40.72 kg/m².    Radiology:  No new x-rays        Assessment:         1. Closed nondisplaced trimalleolar fracture of right ankle with routine healing, subsequent encounter             Plan:     the patient is doing well today 3 weeks out from a right hindfoot fusion nail for a trimalleolar ankle fracture.  Her incisions are well healed and free of any signs of infection.  Sutures were removed in the office.  She can shower with antibacterial soap and water, pat dry, and leave incisions open to air.  Her splint was removed.  She was fitted for a cam boot.  They will need to remove the Cam boot daily for skin checks.  She can begin weight-bearing as  tolerated to the right leg to stand and pivot for transfers.  No ambulation.  Updated orders were provided for the skilled nursing facility.  Follow-up in 1 month for repeat x-rays and evaluation.  All questions and concerns were addressed.  The patient and her family understand and agree with the plan.    The above findings, diagnosis, and treatment plan were discussed with Dr. Miguel Krause who is in agreement.        I have evaluated the patient and reviewed the plan of care with Marnie Ford NP and agree with her assessment with any exceptions or additions noted.     Miguel Krause MD  Orthopedic Trauma  [facility]       Follow up in about 4 weeks (around 6/15/2022).    Closed nondisplaced trimalleolar fracture of right ankle with routine healing, subsequent encounter              No orders of the defined types were placed in this encounter.      No future appointments.

## 2022-05-24 ENCOUNTER — HOSPITAL ENCOUNTER (OUTPATIENT)
Facility: HOSPITAL | Age: 87
LOS: 3 days | Discharge: NURSING, REHAB OR LONG-TERM CARE | End: 2022-06-01
Attending: EMERGENCY MEDICINE | Admitting: INTERNAL MEDICINE
Payer: MEDICARE

## 2022-05-24 DIAGNOSIS — J18.9 PNEUMONIA OF BOTH LUNGS DUE TO INFECTIOUS ORGANISM, UNSPECIFIED PART OF LUNG: ICD-10-CM

## 2022-05-24 DIAGNOSIS — I50.9 CHF (CONGESTIVE HEART FAILURE): Primary | ICD-10-CM

## 2022-05-24 DIAGNOSIS — R06.02 SHORTNESS OF BREATH: ICD-10-CM

## 2022-05-24 LAB
ALBUMIN SERPL-MCNC: 3 GM/DL (ref 3.4–4.8)
ALBUMIN/GLOB SERPL: 0.8 RATIO (ref 1.1–2)
ALP SERPL-CCNC: 100 UNIT/L (ref 40–150)
ALT SERPL-CCNC: 9 UNIT/L (ref 0–55)
AST SERPL-CCNC: 15 UNIT/L (ref 5–34)
AV INDEX (PROSTH): 0.24
AV MEAN GRADIENT: 30 MMHG
AV PEAK GRADIENT: 50 MMHG
AV REGURGITATION PRESSURE HALF TIME: 340 MS
AV VALVE AREA: 0.75 CM2
AV VELOCITY RATIO: 0.27
BASOPHILS # BLD AUTO: 0.03 X10(3)/MCL (ref 0–0.2)
BASOPHILS NFR BLD AUTO: 0.4 %
BILIRUBIN DIRECT+TOT PNL SERPL-MCNC: 0.4 MG/DL
BNP BLD-MCNC: 412.6 PG/ML
BUN SERPL-MCNC: 11.4 MG/DL (ref 9.8–20.1)
CALCIUM SERPL-MCNC: 9.1 MG/DL (ref 8.4–10.2)
CHLORIDE SERPL-SCNC: 91 MMOL/L (ref 98–107)
CO2 SERPL-SCNC: 36 MMOL/L (ref 23–31)
CREAT SERPL-MCNC: 0.7 MG/DL (ref 0.55–1.02)
CV ECHO LV RWT: 0.53 CM
DOP CALC AO PEAK VEL: 3.53 M/S
DOP CALC AO VTI: 74.6 CM
DOP CALC LVOT AREA: 3.1 CM2
DOP CALC LVOT DIAMETER: 2 CM
DOP CALC LVOT PEAK VEL: 0.95 M/S
DOP CALC LVOT STROKE VOLUME: 55.58 CM3
DOP CALC MV VTI: 31.6 CM
DOP CALCLVOT PEAK VEL VTI: 17.7 CM
E WAVE DECELERATION TIME: 224 MSEC
E/A RATIO: 1.1
E/E' RATIO: 15.43 M/S
ECHO LV POSTERIOR WALL: 1.3 CM (ref 0.6–1.1)
EJECTION FRACTION: 55 %
EOSINOPHIL # BLD AUTO: 0.12 X10(3)/MCL (ref 0–0.9)
EOSINOPHIL NFR BLD AUTO: 1.6 %
ERYTHROCYTE [DISTWIDTH] IN BLOOD BY AUTOMATED COUNT: 13.7 % (ref 11.5–17)
FLUAV AG UPPER RESP QL IA.RAPID: NOT DETECTED
FLUBV AG UPPER RESP QL IA.RAPID: NOT DETECTED
FRACTIONAL SHORTENING: 41 % (ref 28–44)
GLOBULIN SER-MCNC: 3.6 GM/DL (ref 2.4–3.5)
GLUCOSE SERPL-MCNC: 98 MG/DL (ref 82–115)
HCT VFR BLD AUTO: 32.5 % (ref 37–47)
HGB BLD-MCNC: 9.9 GM/DL (ref 12–16)
IMM GRANULOCYTES # BLD AUTO: 0.04 X10(3)/MCL (ref 0–0.02)
IMM GRANULOCYTES NFR BLD AUTO: 0.5 % (ref 0–0.43)
INTERVENTRICULAR SEPTUM: 1.31 CM (ref 0.6–1.1)
LEFT ATRIUM SIZE: 4.5 CM
LEFT INTERNAL DIMENSION IN SYSTOLE: 2.92 CM (ref 2.1–4)
LEFT VENTRICLE DIASTOLIC VOLUME: 113.35 ML
LEFT VENTRICLE SYSTOLIC VOLUME: 32.76 ML
LEFT VENTRICULAR INTERNAL DIMENSION IN DIASTOLE: 4.91 CM (ref 3.5–6)
LEFT VENTRICULAR MASS: 255.93 G
LV LATERAL E/E' RATIO: 15.43 M/S
LV SEPTAL E/E' RATIO: 15.43 M/S
LVOT MG: 2 MMHG
LVOT MV: 0.59 CM/S
LYMPHOCYTES # BLD AUTO: 0.85 X10(3)/MCL (ref 0.6–4.6)
LYMPHOCYTES NFR BLD AUTO: 11.3 %
MCH RBC QN AUTO: 31 PG (ref 27–31)
MCHC RBC AUTO-ENTMCNC: 30.5 MG/DL (ref 33–36)
MCV RBC AUTO: 101.9 FL (ref 80–94)
MONOCYTES # BLD AUTO: 0.73 X10(3)/MCL (ref 0.1–1.3)
MONOCYTES NFR BLD AUTO: 9.7 %
MRSA PCR SCRN (OHS): NOT DETECTED
MV MEAN GRADIENT: 3 MMHG
MV PEAK A VEL: 0.98 M/S
MV PEAK E VEL: 1.08 M/S
MV PEAK GRADIENT: 6 MMHG
MV STENOSIS PRESSURE HALF TIME: 95 MS
MV VALVE AREA BY CONTINUITY EQUATION: 1.76 CM2
MV VALVE AREA P 1/2 METHOD: 2.32 CM2
NEUTROPHILS # BLD AUTO: 5.7 X10(3)/MCL (ref 2.1–9.2)
NEUTROPHILS NFR BLD AUTO: 76.5 %
NRBC BLD AUTO-RTO: 0 %
PISA AR MAX VEL: 2.88 M/S
PISA MRMAX VEL: 5.14 M/S
PISA TR MAX VEL: 2.6 M/S
PLATELET # BLD AUTO: 309 X10(3)/MCL (ref 130–400)
PMV BLD AUTO: 9.7 FL (ref 9.4–12.4)
POTASSIUM SERPL-SCNC: 4.3 MMOL/L (ref 3.5–5.1)
PROT SERPL-MCNC: 6.6 GM/DL (ref 5.8–7.6)
PV PEAK VELOCITY: 1.24 CM/S
RA PRESSURE: 8 MMHG
RBC # BLD AUTO: 3.19 X10(6)/MCL (ref 4.2–5.4)
SARS-COV-2 RNA RESP QL NAA+PROBE: NOT DETECTED
SODIUM SERPL-SCNC: 133 MMOL/L (ref 136–145)
TDI LATERAL: 0.07 M/S
TDI SEPTAL: 0.07 M/S
TDI: 0.07 M/S
TR MAX PG: 27 MMHG
TRICUSPID ANNULAR PLANE SYSTOLIC EXCURSION: 1.86 CM
TROPONIN I SERPL-MCNC: 0.03 NG/ML (ref 0–0.04)
TV REST PULMONARY ARTERY PRESSURE: 35 MMHG
WBC # SPEC AUTO: 7.5 X10(3)/MCL (ref 4.5–11.5)

## 2022-05-24 PROCEDURE — 63600175 PHARM REV CODE 636 W HCPCS: Performed by: EMERGENCY MEDICINE

## 2022-05-24 PROCEDURE — 25000003 PHARM REV CODE 250: Performed by: INTERNAL MEDICINE

## 2022-05-24 PROCEDURE — 25000003 PHARM REV CODE 250: Performed by: EMERGENCY MEDICINE

## 2022-05-24 PROCEDURE — 36415 COLL VENOUS BLD VENIPUNCTURE: CPT | Performed by: EMERGENCY MEDICINE

## 2022-05-24 PROCEDURE — 93005 ELECTROCARDIOGRAM TRACING: CPT

## 2022-05-24 PROCEDURE — 87636 SARSCOV2 & INF A&B AMP PRB: CPT | Performed by: EMERGENCY MEDICINE

## 2022-05-24 PROCEDURE — 87641 MR-STAPH DNA AMP PROBE: CPT | Performed by: INTERNAL MEDICINE

## 2022-05-24 PROCEDURE — 25000242 PHARM REV CODE 250 ALT 637 W/ HCPCS: Performed by: INTERNAL MEDICINE

## 2022-05-24 PROCEDURE — 80053 COMPREHEN METABOLIC PANEL: CPT | Performed by: EMERGENCY MEDICINE

## 2022-05-24 PROCEDURE — 85025 COMPLETE CBC W/AUTO DIFF WBC: CPT | Performed by: EMERGENCY MEDICINE

## 2022-05-24 PROCEDURE — 63600175 PHARM REV CODE 636 W HCPCS: Performed by: INTERNAL MEDICINE

## 2022-05-24 PROCEDURE — 36415 COLL VENOUS BLD VENIPUNCTURE: CPT | Performed by: INTERNAL MEDICINE

## 2022-05-24 PROCEDURE — 87040 BLOOD CULTURE FOR BACTERIA: CPT | Performed by: INTERNAL MEDICINE

## 2022-05-24 PROCEDURE — 99285 EMERGENCY DEPT VISIT HI MDM: CPT | Mod: 25

## 2022-05-24 PROCEDURE — 83880 ASSAY OF NATRIURETIC PEPTIDE: CPT | Performed by: EMERGENCY MEDICINE

## 2022-05-24 PROCEDURE — 11000001 HC ACUTE MED/SURG PRIVATE ROOM

## 2022-05-24 PROCEDURE — 84484 ASSAY OF TROPONIN QUANT: CPT | Performed by: EMERGENCY MEDICINE

## 2022-05-24 RX ORDER — LOSARTAN POTASSIUM 50 MG/1
100 TABLET ORAL DAILY
Status: DISCONTINUED | OUTPATIENT
Start: 2022-05-25 | End: 2022-06-01 | Stop reason: HOSPADM

## 2022-05-24 RX ORDER — PANTOPRAZOLE SODIUM 40 MG/1
40 TABLET, DELAYED RELEASE ORAL DAILY
Status: DISCONTINUED | OUTPATIENT
Start: 2022-05-25 | End: 2022-06-01 | Stop reason: HOSPADM

## 2022-05-24 RX ORDER — DONEPEZIL HYDROCHLORIDE 5 MG/1
10 TABLET, FILM COATED ORAL NIGHTLY
Status: DISCONTINUED | OUTPATIENT
Start: 2022-05-24 | End: 2022-06-01 | Stop reason: HOSPADM

## 2022-05-24 RX ORDER — HYDROCODONE BITARTRATE AND ACETAMINOPHEN 5; 325 MG/1; MG/1
1 TABLET ORAL EVERY 6 HOURS PRN
Status: DISCONTINUED | OUTPATIENT
Start: 2022-05-24 | End: 2022-06-01 | Stop reason: HOSPADM

## 2022-05-24 RX ORDER — FUROSEMIDE 20 MG/1
20 TABLET ORAL DAILY
Status: DISCONTINUED | OUTPATIENT
Start: 2022-05-25 | End: 2022-05-24

## 2022-05-24 RX ORDER — TRAZODONE HYDROCHLORIDE 50 MG/1
50 TABLET ORAL NIGHTLY
Status: DISCONTINUED | OUTPATIENT
Start: 2022-05-24 | End: 2022-06-01 | Stop reason: HOSPADM

## 2022-05-24 RX ORDER — DILTIAZEM HYDROCHLORIDE 60 MG/1
120 TABLET, FILM COATED ORAL DAILY
Status: DISCONTINUED | OUTPATIENT
Start: 2022-05-25 | End: 2022-06-01 | Stop reason: HOSPADM

## 2022-05-24 RX ORDER — FLUTICASONE FUROATE AND VILANTEROL 100; 25 UG/1; UG/1
1 POWDER RESPIRATORY (INHALATION) DAILY
Status: DISCONTINUED | OUTPATIENT
Start: 2022-05-25 | End: 2022-06-01 | Stop reason: HOSPADM

## 2022-05-24 RX ORDER — CETIRIZINE HYDROCHLORIDE 10 MG/1
10 TABLET ORAL DAILY
Status: DISCONTINUED | OUTPATIENT
Start: 2022-05-25 | End: 2022-06-01 | Stop reason: HOSPADM

## 2022-05-24 RX ORDER — FUROSEMIDE 20 MG/1
20 TABLET ORAL DAILY
Status: DISCONTINUED | OUTPATIENT
Start: 2022-05-26 | End: 2022-06-01 | Stop reason: HOSPADM

## 2022-05-24 RX ORDER — LORAZEPAM 1 MG/1
1 TABLET ORAL NIGHTLY PRN
Status: DISCONTINUED | OUTPATIENT
Start: 2022-05-24 | End: 2022-06-01 | Stop reason: HOSPADM

## 2022-05-24 RX ORDER — HYDROCODONE BITARTRATE AND ACETAMINOPHEN 5; 325 MG/1; MG/1
1 TABLET ORAL EVERY 6 HOURS PRN
Status: ON HOLD | COMMUNITY
End: 2022-08-11 | Stop reason: SDUPTHER

## 2022-05-24 RX ORDER — PRAMIPEXOLE DIHYDROCHLORIDE 0.25 MG/1
1 TABLET ORAL NIGHTLY
Status: DISCONTINUED | OUTPATIENT
Start: 2022-05-24 | End: 2022-06-01 | Stop reason: HOSPADM

## 2022-05-24 RX ORDER — ONDANSETRON 2 MG/ML
4 INJECTION INTRAMUSCULAR; INTRAVENOUS EVERY 6 HOURS PRN
Status: DISCONTINUED | OUTPATIENT
Start: 2022-05-24 | End: 2022-06-01 | Stop reason: HOSPADM

## 2022-05-24 RX ORDER — VENLAFAXINE HYDROCHLORIDE 37.5 MG/1
37.5 CAPSULE, EXTENDED RELEASE ORAL DAILY
Status: DISCONTINUED | OUTPATIENT
Start: 2022-05-25 | End: 2022-06-01 | Stop reason: HOSPADM

## 2022-05-24 RX ORDER — GABAPENTIN 100 MG/1
100 CAPSULE ORAL NIGHTLY
Status: DISCONTINUED | OUTPATIENT
Start: 2022-05-24 | End: 2022-06-01 | Stop reason: HOSPADM

## 2022-05-24 RX ORDER — FUROSEMIDE 10 MG/ML
20 INJECTION INTRAMUSCULAR; INTRAVENOUS
Status: COMPLETED | OUTPATIENT
Start: 2022-05-24 | End: 2022-05-25

## 2022-05-24 RX ORDER — ALBUTEROL SULFATE 90 UG/1
2 AEROSOL, METERED RESPIRATORY (INHALATION) EVERY 6 HOURS
Status: DISCONTINUED | OUTPATIENT
Start: 2022-05-24 | End: 2022-06-01 | Stop reason: HOSPADM

## 2022-05-24 RX ORDER — UBIDECARENONE 75 MG
500 CAPSULE ORAL DAILY
Status: DISCONTINUED | OUTPATIENT
Start: 2022-05-25 | End: 2022-06-01 | Stop reason: HOSPADM

## 2022-05-24 RX ADMIN — DONEPEZIL HYDROCHLORIDE 10 MG: 5 TABLET, FILM COATED ORAL at 09:05

## 2022-05-24 RX ADMIN — PRAMIPEXOLE DIHYDROCHLORIDE 1 MG: 0.25 TABLET ORAL at 09:05

## 2022-05-24 RX ADMIN — VANCOMYCIN HYDROCHLORIDE 2000 MG: 1 INJECTION, POWDER, LYOPHILIZED, FOR SOLUTION INTRAVENOUS at 06:05

## 2022-05-24 RX ADMIN — APIXABAN 5 MG: 5 TABLET, FILM COATED ORAL at 09:05

## 2022-05-24 RX ADMIN — ALBUTEROL SULFATE 2 PUFF: 90 AEROSOL, METERED RESPIRATORY (INHALATION) at 08:05

## 2022-05-24 RX ADMIN — TRAZODONE HYDROCHLORIDE 50 MG: 50 TABLET ORAL at 09:05

## 2022-05-24 RX ADMIN — FUROSEMIDE 20 MG: 10 INJECTION INTRAMUSCULAR; INTRAVENOUS at 06:05

## 2022-05-24 RX ADMIN — PIPERACILLIN SODIUM AND TAZOBACTAM SODIUM 4.5 G: 4; .5 INJECTION, POWDER, LYOPHILIZED, FOR SOLUTION INTRAVENOUS at 04:05

## 2022-05-24 RX ADMIN — GABAPENTIN 100 MG: 100 CAPSULE ORAL at 09:05

## 2022-05-24 NOTE — ED PROVIDER NOTES
Encounter Date: 5/24/2022    SCRIBE #1 NOTE: I, Chuyita Shaffer, am scribing for, and in the presence of,  Buddy Morrison MD. I have scribed the following portions of the note - Other sections scribed: HPI, ROS, PE.       History     Chief Complaint   Patient presents with    Shortness of Breath     Pt to ED via [facility] from [facility] for SOB/cough since last night. Pt was on 3L NC 97-98 but pt put on NRB. PMH CHF on lasix and HCTZ, has meds this AM. Increased to 10LPM NRB.     88 year old female with history of HTN, Parkinson's disease presents to ED, via EMS, from [facility] complaining of shortness of breath.  Pt's daughter, at bedside, says she is in the rehab facility, because she broke her right ankle and left big toe.  Pt reports that she has a dry cough and SOB.  Pt denies any chest pain or wheezing. She states she is not short of breath at the moment, but she was earlier today.    The history is provided by the patient and a relative. No  was used.   Shortness of Breath  This is a new problem. The current episode started 6 to 12 hours ago. The problem has been gradually improving. Associated symptoms include cough. Pertinent negatives include no fever, no rhinorrhea, no neck pain, no wheezing, no chest pain, no vomiting, no abdominal pain and no rash.     Review of patient's allergies indicates:  No Known Allergies  Past Medical History:   Diagnosis Date    Anxiety disorder, unspecified     Arthritis     Deep vein thrombosis     Depression     Irregular heart beat     Obesity, unspecified     Other pulmonary embolism without acute cor pulmonale     Parkinson's disease     Trimalleolar fracture of ankle, closed     Unspecified dementia without behavioral disturbance      Past Surgical History:   Procedure Laterality Date    ANKLE FRACTURE SURGERY Right     CATARACT EXTRACTION      COLONOSCOPY W/ POLYPECTOMY      HYSTERECTOMY      SMALL INTESTINE  SURGERY       Family History   Family history unknown: Yes     Social History     Tobacco Use    Smoking status: Former Smoker    Smokeless tobacco: Never Used   Substance Use Topics    Alcohol use: Never    Drug use: Never     Review of Systems   Constitutional: Negative for fatigue, fever and unexpected weight change.   HENT: Negative for congestion and rhinorrhea.    Eyes: Negative for pain.   Respiratory: Positive for cough and shortness of breath. Negative for chest tightness and wheezing.    Cardiovascular: Negative for chest pain.   Gastrointestinal: Negative for abdominal pain, constipation, diarrhea, nausea and vomiting.   Genitourinary: Negative for dysuria.   Musculoskeletal: Negative for back pain and neck pain.   Skin: Negative for rash.   Allergic/Immunologic: Negative for environmental allergies, food allergies and immunocompromised state.   Neurological: Negative for dizziness and speech difficulty.   Hematological: Does not bruise/bleed easily.   Psychiatric/Behavioral: Negative for sleep disturbance and suicidal ideas.       Physical Exam     Initial Vitals [05/24/22 0939]   BP Pulse Resp Temp SpO2   (!) 165/66 70 14 99 °F (37.2 °C) 97 %      MAP       --         Physical Exam    Nursing note and vitals reviewed.  Constitutional: No distress.   Elderly female minimally tachypneic no respiratory distress   HENT:   Head: Normocephalic and atraumatic.   Neck: Trachea normal.   Cardiovascular: Normal rate and regular rhythm.   No murmur heard.  Bilateral 2+ edema   Pulmonary/Chest: Breath sounds normal. Tachypnea (mild) noted. She is in respiratory distress (mildly).   Abdominal: Abdomen is soft. Bowel sounds are normal. She exhibits no distension. There is no abdominal tenderness.   Musculoskeletal:         General: Normal range of motion.      Lumbar back: Normal range of motion.      Comments: Right ankle in dressing     Neurological: She is alert and oriented to person, place, and time. She has  normal strength. No cranial nerve deficit.   Skin: Skin is warm and dry. No rash noted.   Psychiatric: She has a normal mood and affect. Judgment normal.         ED Course   Procedures  Labs Reviewed   COMPREHENSIVE METABOLIC PANEL - Abnormal; Notable for the following components:       Result Value    Sodium Level 133 (*)     Chloride 91 (*)     Carbon Dioxide 36 (*)     Albumin Level 3.0 (*)     Globulin 3.6 (*)     Albumin/Globulin Ratio 0.8 (*)     All other components within normal limits   B-TYPE NATRIURETIC PEPTIDE - Abnormal; Notable for the following components:    Natriuretic Peptide 412.6 (*)     All other components within normal limits   CBC WITH DIFFERENTIAL - Abnormal; Notable for the following components:    RBC 3.19 (*)     Hgb 9.9 (*)     Hct 32.5 (*)     .9 (*)     MCHC 30.5 (*)     IG# 0.04 (*)     IG% 0.5 (*)     All other components within normal limits   TROPONIN I - Normal   COVID/FLU A&B PCR - Normal   CBC W/ AUTO DIFFERENTIAL    Narrative:     The following orders were created for panel order CBC auto differential.  Procedure                               Abnormality         Status                     ---------                               -----------         ------                     CBC with Differential[[id number]]        Abnormal            Final result                 Please view results for these tests on the individual orders.     EKG Readings: (Independently Interpreted)   Initial Reading: No STEMI. Previous EKG: Compared with most recent EKG Rhythm: Normal Sinus Rhythm. Heart Rate: 65. Ectopy: No Ectopy. Conduction: Normal. ST Segments: Normal ST Segments. T Waves: Normal. Clinical Impression: Normal Sinus Rhythm   1043     ECG Results          EKG 12-lead (In process)  Result time 05/24/22 12:04:29    In process by Interface, Lab In [facility] (05/24/22 12:04:29)                 Narrative:    Test Reason : R06.02,    Vent. Rate : 065 BPM     Atrial Rate : 065 BPM     P-R Int  : 136 ms          QRS Dur : 084 ms      QT Int : 448 ms       P-R-T Axes : 034 031 017 degrees     QTc Int : 465 ms    Normal sinus rhythm  Normal ECG  No previous ECGs available    Referred By: [employer]           Confirmed By:                             Imaging Results          X-Ray Chest AP Portable (Final result)  Result time 05/24/22 11:57:24    Final result by Cherri Arrieta MD (05/24/22 11:57:24)                 Impression:      Patchy airspace opacities with slightly lower lung volumes and atelectatic change compared to prior.      Electronically signed by: Cherri Arrieta  Date:    05/24/2022  Time:    11:57             Narrative:    EXAMINATION:  XR CHEST AP PORTABLE    CLINICAL HISTORY:  CHF;    TECHNIQUE:  Single frontal view of the chest was performed.    COMPARISON:  Chest radiograph 05/03/2022    FINDINGS:  LINES AND TUBES: EKG/telemetry leads overlie the chest.    MEDIASTINUM AND PETER: Cardiac silhouette is enlarged. Aortic atherosclerosis.    LUNGS: Lung volumes are low with associated atelectatic change.  Unchanged elevation the right hemidiaphragm.  Patchy opacities are seen.    PLEURA:No pleural effusion. No pneumothorax.    BONES: No acute osseous abnormality.                                 Medications - No data to display  Medical Decision Making:   History:   Old Records Summarized: records from previous admission(s).  Clinical Tests:   Lab Tests: Ordered and Reviewed  Radiological Study: Ordered and Reviewed  Medical Tests: Reviewed and Ordered  ED Management:  Chest x-ray with patchy infiltrates will start on IV Zosyn for healthcare acquired pneumonia and admit to hospital medicine            Attending Attestation:           Physician Attestation for Scribe:  Physician Attestation Statement for Scribe #1: I, Buddy Morrison MD, reviewed documentation, as scribed by Chuyita Shaffer in my presence, and it is both accurate and complete.                      Clinical Impression:    Final diagnoses:  [R06.02] Shortness of breath  [J18.9] Pneumonia of both lungs due to infectious organism, unspecified part of lung (Primary)          ED Disposition Condition    Admit               Buddy Morrison III, MD  05/24/22 3516

## 2022-05-24 NOTE — H&P
[facility] Hospital Medicine History & Physical Examination       Patient Name: Emma Jorgensen  MRN: [id number]  Patient Class: IP- Inpatient   Admission Date: 5/24/2022   Admitting Physician: VERENICE Service   Length of Stay: 0  Attending Physician: Shakila Bui MD  Primary Care Provider: Mary Jane Dean MD  Face-to-Face encounter date: 05/24/2022  Code Status:<CODESTATUS>   Chief Complaint: Shortness of Breath (Pt to ED via [facility] from [facility] for SOB/cough since last night. Pt was on 3L NC 97-98 but pt put on NRB. PMH CHF on lasix and HCTZ, has meds this AM. Increased to 10LPM NRB.)        Patient information was obtained from patient, patient's family, past medical records and ER records.     HISTORY OF PRESENT ILLNESS:   88-year-old female with significant history of diastolic heart failure, PAF, valvular heart disease, bilateral carotid artery stenosis, HTN, chronic dementia, history of DVT/PE, GERD, anxiety, obesity.  Patient had a recent hospital admission for closed right trimalleolar ankle fracture for which she underwent fixation on 04/26.  Patient also had acute proximal phalanx great toe fracture.  This was secondary to fall.  She does have history of recurrent falls at home.  The patient was admitted to hospitalist medicine for this.  Hospital stay then was even full for acute kidney injury which improved after holding Lasix, losartan.  She was then evaluated by Cardiology for moderate to severe aortic stenosis.  Recommended TAVR as outpatient.  Patient was discharged to skilled nursing facility.  Patient was sent to the ED from skilled nursing facility with complaints of shortness of breath.  Reports acute on chronic dry cough lately.  Upon initial evaluation in the ED patient was hemodynamically stable except for hypoxemia for which she was initiated on 6 L oxygen mask and saturations were stable.  Chest x-ray with bilateral opacities.  Patient was  treated with antimicrobials in the ED and hospitalist team consulted for admission.  Lab significant for chronic anemia.  She was also noted to have mild hyponatremic hypochloremia.    PAST MEDICAL HISTORY:     Past Medical History:   Diagnosis Date    Anxiety disorder, unspecified     Arthritis     Deep vein thrombosis     Depression     Irregular heart beat     Obesity, unspecified     Other pulmonary embolism without acute cor pulmonale     Parkinson's disease     Trimalleolar fracture of ankle, closed     Unspecified dementia without behavioral disturbance        PAST SURGICAL HISTORY:     Past Surgical History:   Procedure Laterality Date    ANKLE FRACTURE SURGERY Right     CATARACT EXTRACTION      COLONOSCOPY W/ POLYPECTOMY      HYSTERECTOMY      SMALL INTESTINE SURGERY         ALLERGIES:   Patient has no known allergies.    FAMILY HISTORY:   Reviewed and negative    SOCIAL HISTORY:     Social History     Tobacco Use    Smoking status: Former Smoker    Smokeless tobacco: Never Used   Substance Use Topics    Alcohol use: Never        HOME MEDICATIONS:     Prior to Admission medications    Medication Sig Start Date End Date Taking? Authorizing Provider   albuterol (PROVENTIL/VENTOLIN HFA) 90 mcg/actuation inhaler Inhale 2 puffs into the lungs every 6 (six) hours. 3/24/22  Yes Historical Provider   apixaban (ELIQUIS) 2.5 mg Tab Take 2 tablets (5 mg total) by mouth 2 (two) times daily. 5/3/22  Yes Demarcus Bailey MD   budesonide-glycopyr-formoterol 160-9-4.8 mcg/actuation HFAA Inhale 2 puffs into the lungs 2 (two) times a day. 3/24/22  Yes Historical Provider   clotrimazole-betamethasone 1-0.05% (LOTRISONE) cream Apply 1 application topically 2 (two) times daily. 12/6/21  Yes Historical Provider   cyanocobalamin 500 MCG tablet Take 1 tablet (500 mcg total) by mouth once daily. 5/3/22  Yes Demarcus Bailey MD   diclofenac sodium (VOLTAREN) 1 % Gel Apply 1 application topically 4 (four) times daily.  12/6/21  Yes Historical Provider   dicyclomine (BENTYL) 20 mg tablet Take 1 tablet by mouth once daily at 6am. 12/6/21  Yes Historical Provider   diltiaZEM (CARDIZEM) 120 MG tablet Take 1 tablet by mouth once daily at 6am. 12/6/21  Yes Historical Provider   donepeziL (ARICEPT) 10 MG tablet Take 1 tablet by mouth nightly. 12/6/21  Yes Historical Provider   furosemide (LASIX) 20 MG tablet Take 1 tablet (20 mg total) by mouth once daily. 5/3/22 5/3/23 Yes Demarcus Bailey MD   gabapentin (NEURONTIN) 300 MG capsule Take 1 tablet by mouth nightly. 12/6/21  Yes Historical Provider   hydroCHLOROthiazide (HYDRODIURIL) 12.5 MG Tab Take 1 tablet by mouth once daily at 6am. 12/6/21  Yes Historical Provider   HYDROcodone-acetaminophen (NORCO) 5-325 mg per tablet Take 1 tablet by mouth every 6 (six) hours as needed for Pain.   Yes Historical Provider   loratadine (CLARITIN) 10 mg tablet Take 1 tablet by mouth once daily at 6am. 1/4/22  Yes Historical Provider   LORazepam (ATIVAN) 1 MG tablet Take 1 tablet by mouth nightly as needed. 1/4/22  Yes Historical Provider   losartan (COZAAR) 100 MG tablet Take 1 tablet by mouth once daily. 12/30/21  Yes Historical Provider   pantoprazole (PROTONIX) 40 MG tablet Take 1 tablet (40 mg total) by mouth once daily. 5/3/22 5/3/23 Yes Demarcus Bailey MD   pramipexole (MIRAPEX) 1 MG tablet Take 1 tablet by mouth nightly. 12/6/21  Yes Historical Provider   traZODone (DESYREL) 50 MG tablet Take 1 tablet (50 mg total) by mouth every evening. 5/3/22  Yes Demarcus Bailey MD   venlafaxine (EFFEXOR-XR) 37.5 MG 24 hr capsule Take 1 tablet by mouth once daily at 6am. 12/6/21  Yes Historical Provider   betamethasone dipropionate 0.05 % cream Apply 1 application topically 2 (two) times daily. 1/4/22 5/3/22  Historical Provider   fluticasone propion-salmeterol 115-21 mcg/dose (ADVAIR HFA) 115-21 mcg/actuation HFAA inhaler Inhale 2 puffs into the lungs 2 (two) times daily. 3/24/22 5/3/22  Historical Provider    omeprazole 20 mg TbEC Take 1 tablet by mouth once daily at 6am. 12/6/21 5/3/22  Historical Provider       REVIEW OF SYSTEMS:   Except as documented, all other systems reviewed and negative     PHYSICAL EXAM:     VITAL SIGNS: 24 HRS MIN & MAX LAST   Temp  Min: 99 °F (37.2 °C)  Max: 99 °F (37.2 °C) 99 °F (37.2 °C)   BP  Min: 114/57  Max: 165/66 131/78   Pulse  Min: 61  Max: 85  85   Resp  Min: 14  Max: 19 16   SpO2  Min: 96 %  Max: 98 % 98 %       General appearance: Well-developed, well-nourished female in no apparent distress.  HENT: Atraumatic head. Moist mucous membranes of oral cavity.  Eyes: Normal extraocular movements.   Neck: Supple.   Lungs: Clear to auscultation bilaterally. No wheezing present.   Heart: Regular rate and rhythm. S1 and S2 present with no murmurs/gallop/rub. No pedal edema. No JVD present.   Abdomen: Soft, non-distended, non-tender. No rebound tenderness/guarding. Bowel sounds are normal.   Extremities: No cyanosis, clubbing, or edema.  Skin: No Rash.   Neuro: Motor and sensory exams grossly intact. Good tone. Muscle strength 5/5 in all 4 extremities  Psych/mental status: Appropriate mood and affect. Responds appropriately to questions.     LABS AND IMAGING:     Recent Labs   Lab 05/24/22  1218   WBC 7.5   RBC 3.19*   HGB 9.9*   HCT 32.5*   .9*   MCH 31.0   MCHC 30.5*   RDW 13.7      MPV 9.7       Recent Labs   Lab 05/24/22  1218   *   K 4.3   CO2 36*   BUN 11.4   CREATININE 0.70   CALCIUM 9.1   ALBUMIN 3.0*   ALKPHOS 100   ALT 9   AST 15   BILITOT 0.4       Microbiology Results (last 7 days)     Procedure Component Value Units Date/Time    Blood Culture [[id number]]     Order Status: Sent Specimen: Blood     Blood Culture [[id number]]     Order Status: Sent Specimen: Blood     Respiratory Culture [[id number]]     Order Status: Sent Specimen: Sputum, Expectorated            X-Ray Chest AP Portable  Narrative: EXAMINATION:  XR CHEST AP PORTABLE    CLINICAL  HISTORY:  CHF;    TECHNIQUE:  Single frontal view of the chest was performed.    COMPARISON:  Chest radiograph 05/03/2022    FINDINGS:  LINES AND TUBES: EKG/telemetry leads overlie the chest.    MEDIASTINUM AND PETER: Cardiac silhouette is enlarged. Aortic atherosclerosis.    LUNGS: Lung volumes are low with associated atelectatic change.  Unchanged elevation the right hemidiaphragm.  Patchy opacities are seen.    PLEURA:No pleural effusion. No pneumothorax.    BONES: No acute osseous abnormality.  Impression: Patchy airspace opacities with slightly lower lung volumes and atelectatic change compared to prior.    Electronically signed by: Cherri Arrieta  Date:    05/24/2022  Time:    11:57        ASSESSMENT & PLAN:     Bilateral lung opacities-HAP versus aspiration pneumonia versus volume overload  Acute hypoxemic respiratory failure secondary to above  Chronic diastolic heart failure-appears compensated  Moderate to severe aortic stenosis awaiting TAVR  Recent trimalleolar ankle fracture status post surgical fixation, late April, discharged to skilled nursing facility  PAF  History of DVT/PE  Bilateral carotid artery disease  Essential HTN-stable  HLD  History of GERD  Obesity  Chronic dementia  Anxiety  Prophylaxis    Chest x-ray reviewed  More concerning for pneumonia even though there is concern for mild volume overload  Questionable aspiration  Consult speech, keep NPO until cleared by speech  I have initiated her on Zosyn, vancomycin  MRSA PCR, respiratory culture and blood culture ordered  Deescalate soon based on the same   Patient had  a recent echocardiogram during April hospital admission which reveals normal ejection fraction, does have grade 2 diastolic dysfunction  She is on Lasix 20 mg daily which I will switch to IV Lasix 20 mg b.i.d. for 24 hours and then can transition to home dose Lasix  Patient also was then evaluated by Cardiology for moderate to severe aortic stenosis and there was plan for  TAVR as outpatient when she is discharged from skilled nursing facility  Supplemental oxygen to maintain saturations more than 90%  Currently she is on 6 L  Wean down as tolerated  Resume home meds appropriately-bronchodilators, Eliquis, vitamin B12, Cardizem, donepezil, gabapentin, losartan, ppi, pramipexole, trazodone, venlafaxine  DVT prophylaxis-she is on Eliquis    Critical care time-45 minutes  Critical care diagnosis-acute hypoxemic respiratory failure requiring 6 L oxygen mask   Critical care interventions: Hands-on evaluation, review of labs/radiographs/records and discussions with patient      VTE Prophylaxis: will be placed on Heparin/Lovenox/ Xarelto/ SCD for DVT prophylaxis and will be advised to be as mobile as possible and sit in a chair as tolerated    Patient condition:  Stable/Fair/Gaurded/ Serious/ Critical    __________________________________________________________________________  INPATIENT LIST OF MEDICATIONS     Scheduled Meds:   albuterol  2 puff Inhalation Q6H    apixaban  5 mg Oral BID    [START ON 5/25/2022] cetirizine  10 mg Oral Daily    [START ON 5/25/2022] cyanocobalamin  500 mcg Oral Daily    [START ON 5/25/2022] diltiaZEM  120 mg Oral Daily    donepeziL  10 mg Oral Nightly    [START ON 5/25/2022] fluticasone furoate-vilanteroL  1 puff Inhalation Daily    [START ON 5/25/2022] furosemide  20 mg Oral Daily    gabapentin  100 mg Oral Nightly    [START ON 5/25/2022] losartan  100 mg Oral Daily    [START ON 5/25/2022] pantoprazole  40 mg Oral Daily    pramipexole  1 mg Oral Nightly    traZODone  50 mg Oral QHS    [START ON 5/25/2022] vancomycin (VANCOCIN) IVPB  1,000 mg Intravenous Q8H    vancomycin (VANCOCIN) IVPB  2,000 mg Intravenous Once    [START ON 5/25/2022] venlafaxine  37.5 mg Oral Daily     Continuous Infusions:  PRN Meds:.HYDROcodone-acetaminophen, LORazepam, ondansetron, Pharmacy to dose Vancomycin consult **AND** vancomycin - pharmacy to dose      I, _ NP/PA  have reviewed and discussed the case with  _   Please see the following addendum for further assessment and plan from there attending MD.    05/24/2022    ________________________________________________________________________________      Shakila Bui MD   05/24/2022

## 2022-05-24 NOTE — ED NOTES
Pt aaox4. Respirations equal and mildly labored. Skin warm and dry. Pt presents to ED from nursing home for increased shortness of breath. Pt placed on 10 L NRB by EMS. Pt able to speak in clear, full sentences. [place] present upon arrival. Coarse breath sounds noted.

## 2022-05-25 LAB
ALBUMIN SERPL-MCNC: 2.9 GM/DL (ref 3.4–4.8)
ALBUMIN/GLOB SERPL: 0.9 RATIO (ref 1.1–2)
ALP SERPL-CCNC: 89 UNIT/L (ref 40–150)
ALT SERPL-CCNC: 9 UNIT/L (ref 0–55)
AST SERPL-CCNC: 16 UNIT/L (ref 5–34)
BASOPHILS # BLD AUTO: 0.05 X10(3)/MCL (ref 0–0.2)
BASOPHILS NFR BLD AUTO: 0.7 %
BILIRUBIN DIRECT+TOT PNL SERPL-MCNC: 0.4 MG/DL
BUN SERPL-MCNC: 11.4 MG/DL (ref 9.8–20.1)
CALCIUM SERPL-MCNC: 8.9 MG/DL (ref 8.4–10.2)
CHLORIDE SERPL-SCNC: 92 MMOL/L (ref 98–107)
CO2 SERPL-SCNC: 34 MMOL/L (ref 23–31)
CREAT SERPL-MCNC: 0.7 MG/DL (ref 0.55–1.02)
EOSINOPHIL # BLD AUTO: 0.29 X10(3)/MCL (ref 0–0.9)
EOSINOPHIL NFR BLD AUTO: 3.9 %
ERYTHROCYTE [DISTWIDTH] IN BLOOD BY AUTOMATED COUNT: 13.4 % (ref 11.5–17)
GLOBULIN SER-MCNC: 3.3 GM/DL (ref 2.4–3.5)
GLUCOSE SERPL-MCNC: 112 MG/DL (ref 82–115)
HCT VFR BLD AUTO: 31 % (ref 37–47)
HGB BLD-MCNC: 9.8 GM/DL (ref 12–16)
IMM GRANULOCYTES # BLD AUTO: 0.03 X10(3)/MCL (ref 0–0.02)
IMM GRANULOCYTES NFR BLD AUTO: 0.4 % (ref 0–0.43)
LYMPHOCYTES # BLD AUTO: 0.92 X10(3)/MCL (ref 0.6–4.6)
LYMPHOCYTES NFR BLD AUTO: 12.4 %
MCH RBC QN AUTO: 31 PG (ref 27–31)
MCHC RBC AUTO-ENTMCNC: 31.6 MG/DL (ref 33–36)
MCV RBC AUTO: 98.1 FL (ref 80–94)
MONOCYTES # BLD AUTO: 0.69 X10(3)/MCL (ref 0.1–1.3)
MONOCYTES NFR BLD AUTO: 9.3 %
NEUTROPHILS # BLD AUTO: 5.4 X10(3)/MCL (ref 2.1–9.2)
NEUTROPHILS NFR BLD AUTO: 73.3 %
NRBC BLD AUTO-RTO: 0 %
PLATELET # BLD AUTO: 304 X10(3)/MCL (ref 130–400)
PMV BLD AUTO: 9.6 FL (ref 9.4–12.4)
POTASSIUM SERPL-SCNC: 4 MMOL/L (ref 3.5–5.1)
PROT SERPL-MCNC: 6.2 GM/DL (ref 5.8–7.6)
RBC # BLD AUTO: 3.16 X10(6)/MCL (ref 4.2–5.4)
SODIUM SERPL-SCNC: 132 MMOL/L (ref 136–145)
VANCOMYCIN TROUGH SERPL-MCNC: 22.5 UG/ML (ref 15–20)
WBC # SPEC AUTO: 7.4 X10(3)/MCL (ref 4.5–11.5)

## 2022-05-25 PROCEDURE — 97535 SELF CARE MNGMENT TRAINING: CPT

## 2022-05-25 PROCEDURE — 63600175 PHARM REV CODE 636 W HCPCS: Performed by: INTERNAL MEDICINE

## 2022-05-25 PROCEDURE — 92610 EVALUATE SWALLOWING FUNCTION: CPT | Mod: 59

## 2022-05-25 PROCEDURE — 25000003 PHARM REV CODE 250: Performed by: INTERNAL MEDICINE

## 2022-05-25 PROCEDURE — 92611 MOTION FLUOROSCOPY/SWALLOW: CPT

## 2022-05-25 PROCEDURE — 80053 COMPREHEN METABOLIC PANEL: CPT | Performed by: PHYSICIAN ASSISTANT

## 2022-05-25 PROCEDURE — 25000242 PHARM REV CODE 250 ALT 637 W/ HCPCS: Performed by: INTERNAL MEDICINE

## 2022-05-25 PROCEDURE — 97166 OT EVAL MOD COMPLEX 45 MIN: CPT

## 2022-05-25 PROCEDURE — 85025 COMPLETE CBC W/AUTO DIFF WBC: CPT | Performed by: PHYSICIAN ASSISTANT

## 2022-05-25 PROCEDURE — 36415 COLL VENOUS BLD VENIPUNCTURE: CPT | Performed by: PHYSICIAN ASSISTANT

## 2022-05-25 PROCEDURE — 80202 ASSAY OF VANCOMYCIN: CPT | Performed by: INTERNAL MEDICINE

## 2022-05-25 PROCEDURE — 11000001 HC ACUTE MED/SURG PRIVATE ROOM

## 2022-05-25 PROCEDURE — 97162 PT EVAL MOD COMPLEX 30 MIN: CPT

## 2022-05-25 PROCEDURE — 36415 COLL VENOUS BLD VENIPUNCTURE: CPT | Performed by: INTERNAL MEDICINE

## 2022-05-25 RX ADMIN — DONEPEZIL HYDROCHLORIDE 10 MG: 5 TABLET, FILM COATED ORAL at 09:05

## 2022-05-25 RX ADMIN — DILTIAZEM HYDROCHLORIDE 120 MG: 60 TABLET, FILM COATED ORAL at 05:05

## 2022-05-25 RX ADMIN — APIXABAN 5 MG: 5 TABLET, FILM COATED ORAL at 12:05

## 2022-05-25 RX ADMIN — PANTOPRAZOLE SODIUM 40 MG: 40 TABLET, DELAYED RELEASE ORAL at 12:05

## 2022-05-25 RX ADMIN — FUROSEMIDE 20 MG: 10 INJECTION INTRAMUSCULAR; INTRAVENOUS at 05:05

## 2022-05-25 RX ADMIN — VANCOMYCIN HYDROCHLORIDE 1000 MG: 1 INJECTION, POWDER, LYOPHILIZED, FOR SOLUTION INTRAVENOUS at 10:05

## 2022-05-25 RX ADMIN — LOSARTAN POTASSIUM 100 MG: 50 TABLET, FILM COATED ORAL at 12:05

## 2022-05-25 RX ADMIN — ALBUTEROL SULFATE 2 PUFF: 90 AEROSOL, METERED RESPIRATORY (INHALATION) at 12:05

## 2022-05-25 RX ADMIN — CYANOCOBALAMIN TAB 500 MCG 500 MCG: 500 TAB at 12:05

## 2022-05-25 RX ADMIN — VENLAFAXINE HYDROCHLORIDE 37.5 MG: 37.5 CAPSULE, EXTENDED RELEASE ORAL at 05:05

## 2022-05-25 RX ADMIN — CETIRIZINE HYDROCHLORIDE 10 MG: 10 TABLET, FILM COATED ORAL at 12:05

## 2022-05-25 RX ADMIN — GABAPENTIN 100 MG: 100 CAPSULE ORAL at 09:05

## 2022-05-25 RX ADMIN — ALBUTEROL SULFATE 2 PUFF: 90 AEROSOL, METERED RESPIRATORY (INHALATION) at 05:05

## 2022-05-25 RX ADMIN — TRAZODONE HYDROCHLORIDE 50 MG: 50 TABLET ORAL at 09:05

## 2022-05-25 RX ADMIN — FLUTICASONE FUROATE AND VILANTEROL TRIFENATATE 1 PUFF: 100; 25 POWDER RESPIRATORY (INHALATION) at 05:05

## 2022-05-25 RX ADMIN — VANCOMYCIN HYDROCHLORIDE 1000 MG: 1 INJECTION, POWDER, LYOPHILIZED, FOR SOLUTION INTRAVENOUS at 02:05

## 2022-05-25 RX ADMIN — PRAMIPEXOLE DIHYDROCHLORIDE 1 MG: 0.25 TABLET ORAL at 09:05

## 2022-05-25 RX ADMIN — APIXABAN 5 MG: 5 TABLET, FILM COATED ORAL at 09:05

## 2022-05-25 NOTE — PROGRESS NOTES
Change regimen to vancomycin 1250mg IV every 18 hours.    Pharmacokinetic Assessment Follow Up: IV Vancomycin    Vancomycin serum concentration assessment(s):    The trough level was drawn correctly and can be used to guide therapy at this time. The measurement is above the desired definitive target range of 15 to 20 mcg/mL.    Vancomycin Regimen Plan:    Change regimen to Vancomycin 1250  mg IV every 12 hours with next serum trough concentration measured at 1000 prior to 4th dose on 05/27    Drug levels (last 3 results):  Recent Labs   Lab Result Units 05/25/22  1720   Vancomycin Trough ug/ml 22.5*       Pharmacy will continue to follow and monitor vancomycin.    Please contact pharmacy at extension [phone] for questions regarding this assessment.    Thank you for the consult,   Katey Arreguin       Patient brief summary:  Emma Jorgensen is a 88 y.o. female initiated on antimicrobial therapy with IV Vancomycin for treatment of lower respiratory infection    The patient's current regimen is 1000 mg IV q8hr      Drug Allergies:   Review of patient's allergies indicates:  No Known Allergies    Actual Body Weight:   117 kg    Renal Function:   Estimated Creatinine Clearance: 71.8 mL/min (based on SCr of 0.7 mg/dL).,     Dialysis Method (if applicable):  N/A    CBC (last 72 hours):  Recent Labs   Lab Result Units 05/24/22  1218 05/25/22  0416   WBC x10(3)/mcL 7.5 7.4   Hgb gm/dL 9.9* 9.8*   Hct % 32.5* 31.0*   Platelet x10(3)/mcL 309 304   Mono % % 9.7 9.3   Eos % % 1.6 3.9   Basophil % % 0.4 0.7       Metabolic Panel (last 72 hours):  Recent Labs   Lab Result Units 05/24/22  1218 05/25/22  0416   Sodium Level mmol/L 133* 132*   Potassium Level mmol/L 4.3 4.0   Chloride mmol/L 91* 92*   Carbon Dioxide mmol/L 36* 34*   Glucose Level mg/dL 98 112   Blood Urea Nitrogen mg/dL 11.4 11.4   Creatinine mg/dL 0.70 0.70   Albumin Level gm/dL 3.0* 2.9*   Bilirubin Total mg/dL 0.4 0.4   Alkaline Phosphatase unit/L 100 89    Aspartate Aminotransferase unit/L 15 16   Alanine Aminotransferase unit/L 9 9       Vancomycin Administrations:  vancomycin given in the last 96 hours                   vancomycin (VANCOCIN) 1,000 mg in dextrose 5 % 250 mL IVPB (mg) 1,000 mg New Bag 05/25/22 1052     1,000 mg New Bag  0252    vancomycin (VANCOCIN) 2,000 mg in dextrose 5 % 500 mL IVPB (mg) 2,000 mg New Bag 05/24/22 1800                Microbiologic Results:  Microbiology Results (last 7 days)     Procedure Component Value Units Date/Time    Blood Culture [[id number]] Collected: 05/24/22 1731    Order Status: Resulted Specimen: Blood Updated: 05/24/22 1744    Blood Culture [[id number]] Collected: 05/24/22 1731    Order Status: Resulted Specimen: Blood Updated: 05/24/22 1744    Respiratory Culture [[id number]]     Order Status: Sent Specimen: Sputum, Expectorated

## 2022-05-25 NOTE — PROCEDURES
Modified Barium Swallow    Patient Name:  Emma Jorgensen   MRN:  [id number]      Recommendations:     Recommendations:                General Recommendations:  diet follow up x1  Diet recommendations:   (soft and bite sized), Thin (NO straws) meds whole in puree  Aspiration Precautions: HOB to 90 degrees and No straws   General Precautions: Standard,    Communication strategies:  none    Referral     Reason for Referral  Patient was referred for a Modified Barium Swallow Study to assess the efficiency of his/her swallow function, rule out aspiration and make recommendations regarding safe dietary consistencies, effective compensatory strategies, and safe eating environment.     Diagnosis: <principal problem not specified>       History:     Past Medical History:   Diagnosis Date    Anxiety disorder, unspecified     Arthritis     Deep vein thrombosis     Depression     Irregular heart beat     Obesity, unspecified     Other pulmonary embolism without acute cor pulmonale     Parkinson's disease     Trimalleolar fracture of ankle, closed     Unspecified dementia without behavioral disturbance        Objective:     Current Respiratory Status: 05/25/22    Alert: yes    Cooperative: yes    Follows Directions: yes    Visualization  · Patient was seen in the lateral view    Oral Peripheral Examination  · Oral Musculature: WFL  · Dentition:  (lower teeth present, missing upper dentures)  · Secretion Management: problems swallowing secretions  · Mandibular Strength and Mobility: WFL  · Oral Labial Strength and Mobility: WFL  · Buccal Strength and Mobility: WFL  · Voice Prior to PO Intake: adequate    Consistencies Tested:   · Thin liquids via cup and straw  · Mildly thick liquids via cup  · Puree  · Chewable solids    Oral Preparation/Oral Phase  · prolonged mastication  · Premature spillage    Pharyngeal Phase   Prespill to the pyriform sinuses and laryngeal penetration with thin liquids via straw    Cervical  Esophageal Phase  · UES appeared to accommodate all bolus types without stasis or retrograde movement observed     Assessment:     Impressions  ·   Patient demonstrates mild Oropharyngeal  dysphagia characterized by prolonged mastication secondary to lack of dention, loss of bolus control, reduced base of tongue retraction, and swallow delay with prespill to the pyriform sinsues resulting in reduced airway protection with laryngeal penetration of thin liquids via straw which did NOT clear from the laryngeal vestibule/.     Prognosis: Good    Barriers:  · None    Plan  Skilled SLP services not warranted    Education  Results discussed with pt and daughter at bedside after completion of exam, all questions answered.  Nursing notified of recommendations.    Goals:   Multidisciplinary Problems     SLP Goals     Not on file                Plan:   · Patient to be seen:      · Plan of Care expires:  05/26/22  · Plan of Care reviewed with:  patient, daughter        Discharge recommendations:      Barriers to Discharge:  None    Time Tracking:   SLP Treatment Date:   05/25/22  Speech Start Time:  1030  Speech Stop Time:  1100     Speech Total Time (min):  30 min    05/25/2022

## 2022-05-25 NOTE — PROGRESS NOTES
[facility]  Hospital Medicine Progress Note        Chief Complaint: Inpatient Follow-up for     HPI: 88-year-old female with significant history of diastolic heart failure, PAF, valvular heart disease, bilateral carotid artery stenosis, HTN, chronic dementia, history of DVT/PE, GERD, anxiety, obesity. Patient had a recent hospital admission for closed right trimalleolar ankle fracture for which she underwent fixation on 04/26. Patient also had acute proximal phalanx great toe fracture. This was secondary to fall. She does have history of recurrent falls at home. The patient was admitted to hospitalist medicine for this. Hospital stay then was even full for acute kidney injury which improved after holding Lasix, losartan. She was then evaluated by Cardiology for moderate to severe aortic stenosis. Recommended TAVR as outpatient. Patient was discharged to skilled nursing facility. Patient was sent to the ED from skilled nursing facility with complaints of shortness of breath. Reports acute on chronic dry cough lately. Upon initial evaluation in the ED patient was hemodynamically stable except for hypoxemia for which she was initiated on 6 L oxygen mask and saturations were stable. Chest x-ray with bilateral opacities. Patient was treated with antimicrobials in the ED and hospitalist team consulted for admission. Lab significant for chronic anemia. She was also noted to have mild hyponatremic hypochloremia      Interval Hx:   May 25th-patient can maintain 96% O2 sat with 2 L nasal cannula, patient underwent physical therapy    Objective/physical exam:  General: In no acute distress, afebrile a, morbidly obese  Chest: Clear to auscultation bilaterally  Heart: RRR, +S1, S2, no appreciable murmur  Abdomen: Soft, nontender, BS +  MSK: Warm, no lower extremity edema, no clubbing or cyanosis  Neurologic: Alert and oriented x4, Cranial nerve II-XII intact, Strength 5/5 in all 4  extremities    VITAL SIGNS: 24 HRS MIN & MAX LAST   Temp  Min: 97.9 °F (36.6 °C)  Max: 98.4 °F (36.9 °C) 98.3 °F (36.8 °C)   BP  Min: 118/65  Max: 154/73 134/71   Pulse  Min: 66  Max: 85  76   Resp  Min: 15  Max: 24 17   SpO2  Min: 95 %  Max: 100 % 100 %       Recent Labs   Lab 05/24/22 1218 05/25/22  0416   WBC 7.5 7.4   RBC 3.19* 3.16*   HGB 9.9* 9.8*   HCT 32.5* 31.0*   .9* 98.1*   MCH 31.0 31.0   MCHC 30.5* 31.6*   RDW 13.7 13.4    304   MPV 9.7 9.6       Recent Labs   Lab 05/24/22 1218 05/25/22 0416   * 132*   K 4.3 4.0   CO2 36* 34*   BUN 11.4 11.4   CREATININE 0.70 0.70   CALCIUM 9.1 8.9   ALBUMIN 3.0* 2.9*   ALKPHOS 100 89   ALT 9 9   AST 15 16   BILITOT 0.4 0.4          Microbiology Results (last 7 days)     Procedure Component Value Units Date/Time    Blood Culture [[id number]] Collected: 05/24/22 1731    Order Status: Resulted Specimen: Blood Updated: 05/24/22 1744    Blood Culture [[id number]] Collected: 05/24/22 1731    Order Status: Resulted Specimen: Blood Updated: 05/24/22 1744    Respiratory Culture [[id number]]     Order Status: Sent Specimen: Sputum, Expectorated            See below for Radiology    Scheduled Med:   albuterol  2 puff Inhalation Q6H    apixaban  5 mg Oral BID    cetirizine  10 mg Oral Daily    cyanocobalamin  500 mcg Oral Daily    diltiaZEM  120 mg Oral Daily    donepeziL  10 mg Oral Nightly    fluticasone furoate-vilanteroL  1 puff Inhalation Daily    furosemide (LASIX) injection  20 mg Intravenous Q12H    [START ON 5/26/2022] furosemide  20 mg Oral Daily    gabapentin  100 mg Oral Nightly    losartan  100 mg Oral Daily    pantoprazole  40 mg Oral Daily    pramipexole  1 mg Oral Nightly    traZODone  50 mg Oral QHS    vancomycin (VANCOCIN) IVPB  1,000 mg Intravenous Q8H    venlafaxine  37.5 mg Oral Daily        Continuous Infusions:       PRN Meds:  HYDROcodone-acetaminophen, LORazepam, ondansetron, Pharmacy to dose Vancomycin consult **AND**  vancomycin - pharmacy to dose       Assessment/Plan:  Bilateral lung opacities-HAP versus aspiration pneumonia versus volume overload   Acute hypoxemic respiratory failure secondary to above   Chronic diastolic heart failure-appears compensated   Moderate to severe aortic stenosis awaiting TAVR   Recent trimalleolar ankle fracture status post surgical fixation, late April, discharged to skilled nursing facility   PAF   History of DVT/PE   Bilateral carotid artery disease   Essential HTN-stable   HLD   History of GERD   Obesity   Chronic dementia   Anxiety   Prophylaxis  sputum PCR shows MRSA    Continue IV vancomycin  Continue Lasix 20 mg IV q.d.  Continue on albuterol and Pulmicort  continue physical therapy  Check CBC BMP in a.m.        Patient condition:  Stable/Fair/Guarded/ Serious/ Critical    Anticipated discharge and Disposition:      All diagnosis and differential diagnosis have been reviewed; assessment and plan has been documented; I have personally reviewed the labs and test results that are presently available; I have reviewed the patients medication list; I have reviewed the consulting providers response and recommendations. I have reviewed or attempted to review medical records based upon their availability    All of the patient's questions have been  addressed and answered. Patient's is agreeable to the above stated plan. I will continue to monitor closely and make adjustments to medical management as needed.  _____________________________________________________________________    Nutrition Status:    Radiology:  Fl Modified Barium Swallow Speech  See Speech Therapist Notes    This procedure was auto-finalized.      Sariah Moore MD   05/25/2022

## 2022-05-25 NOTE — PLAN OF CARE
Problem: Occupational Therapy  Goal: Occupational Therapy Goal  Description: LTG: Pt will complete all BADL'S (accept for LB dressing)with SBA by d/c     STG: goal to met by 6/8     1.Pt will complete UE Dressing with CGA.     2.Pt will perform LE Dressing with Mod A.    3. Pt will complete grooming while standing >2 min at sink with Stand by Assist using RW.    4. Toileting/ toilet transfer from bedside commode with Min A using RW.    Outcome: Ongoing, Progressing

## 2022-05-25 NOTE — PLAN OF CARE
Problem: Physical Therapy  Goal: Physical Therapy Goal  Description: Goals to be met by: 22    Patient will increase functional independence with mobility by performin. Supine to sit with Stand-by Assistance  2. Sit to supine with Stand-by Assistance  3. Sit to stand transfer with Minimal Assistance using Rolling Walker  4. Bed to chair transfer with Minimal Assistance using Rolling Walker  5. Wheelchair propulsion x200 feet with Stand-by Assistance using bilateral uppper extremities    Outcome: Ongoing, Progressing

## 2022-05-25 NOTE — PT/OT/SLP EVAL
Occupational Therapy   Evaluation    Name: Emma Jorgensen  MRN: [id number]  Admitting Diagnosis:  Shortness of breath, CHF, Pneumonia of both lungs due to infectious organism.   Recent Surgery: R ankle fracture s/p repair     Recommendations:     Discharge Recommendations: nursing facility, skilled  Discharge Equipment Recommendations:  walker, rolling  Barriers to discharge:  Decreased caregiver support    Assessment:     Emma Jorgensen is a 88 y.o. female with a medical diagnosis of SOB.  She presents with impaired ADL and mobility d/t recent LE fxs, further exacerbated by acute SOB. Performance deficits affecting function: weakness, impaired endurance, impaired balance, impaired functional mobilty, impaired cognition.  She would benefit from return to SNF for continued therapy upon dc.    Rehab Prognosis: Fair; patient would benefit from acute skilled OT services to address these deficits and reach maximum level of function.       Plan:     Patient to be seen 4 x/week, 5 x/week to address the above listed problems via self-care/home management, therapeutic activities, therapeutic exercises  · Plan of Care Expires: 06/08/22  · Plan of Care Reviewed with: patient    Subjective     Chief Complaint: SOB  Patient/Family Comments/goals: to go home    Occupational Profile:  Pt is not a reliable historian (states she admitted from home where she was independent). Per H&P, pt was t/f from SNF facility where she was in rehab following recent R ankle fx and L toe fx. Per OT eval from last admission, prior to SNF stay pt was living at home with family care in a single story house with tub/shower combo. Reports of recent falls within the home were noted.    Pain/Comfort:  ·  0/10    Patients cultural, spiritual, Orthodoxy conflicts given the current situation: no    Objective:     Communicated with: MD/RN during session.  Patient found HOB elevated with telemetry, torres catheter, pulse ox (continuous) (pt was found  with oxy max) upon OT entry to room.    General Precautions: Standard, fall   Orthopedic Precautions: (LLE WBAT ( darco shoe?). RLE WBAT in CAM boot for t/f only-no ambulation)   Braces:  (CAM boot on RLE, Darco for LLE not present on eval.)  Respiratory Status: oxymask, 2L    Occupational Performance:    Bed Mobility:    · Patient completed Supine to Sit with moderate assistance.  · Patient completed Sit to Supine with moderate assistance.    Functional Mobility/Transfers:  · Patient completed Sit <> Stand Transfer with minimum assistance  with  rolling walker     Activities of Daily Living:  · Feeding : set up assist to drink from open cup with tremors observed (no spilling)  · Lower Body dressing:  Max A  · Toileting: brief    Cognitive  :   Oriented : intact  Followed commands: intact  Communication :intact   H/o dementia      Physical Exam:  Upper Extremity Range of Motion:     -       Right Upper Extremity: WFL  -       Left Upper Extremity: WFL except Mildly reduced at shoulder   Upper Extremity Strength:    -       Right Upper Extremity: WFL  -       Left Upper Extremity: WFL   Strength:    -       Right Upper Extremity: WFL  -       Left Upper Extremity: WFL     Upper extremity coordination   Finger opposition: moderately impaired  Finger to nose: minimally impaired         Treatment & Education:  Initial eval performed. Pt educated on OT role/goals/POC.  Education:    Patient left HOB elevated with all lines intact, call button in reach and bed alarm on    GOALS:   Multidisciplinary Problems     Occupational Therapy Goals        Problem: Occupational Therapy    Goal Priority Disciplines Outcome Interventions   Occupational Therapy Goal     OT, PT/OT Ongoing, Progressing    Description: LTG: Pt will complete all BADL'S (accept for LB dressing)with SBA by d/c     STG: goal to met by 6/8     1.Pt will complete UE Dressing with CGA.     2.Pt will perform LE Dressing with Mod A.    3. Pt will complete  grooming while standing >2 min at sink with Stand by Assist using RW.    4. Toileting/ toilet transfer from bedside commode with Min A using RW.                     History:     Past Medical History:   Diagnosis Date    Anxiety disorder, unspecified     Arthritis     Deep vein thrombosis     Depression     Irregular heart beat     Obesity, unspecified     Other pulmonary embolism without acute cor pulmonale     Parkinson's disease     Trimalleolar fracture of ankle, closed     Unspecified dementia without behavioral disturbance        Past Surgical History:   Procedure Laterality Date    ANKLE FRACTURE SURGERY Right     CATARACT EXTRACTION      COLONOSCOPY W/ POLYPECTOMY      HYSTERECTOMY      SMALL INTESTINE SURGERY         Time Tracking:     OT Date of Treatment: 05/25/22  OT Start Time: 1402  OT Stop Time: 1422  OT Total Time (min): 20 min    Billable Minutes:Evaluation , Moderate    5/25/2022

## 2022-05-25 NOTE — PLAN OF CARE
05/25/22 1414   Discharge Assessment   Assessment Type Discharge Planning Assessment   Confirmed/corrected address, phone number and insurance Yes   Confirmed Demographics Correct on Facesheet   Source of Information patient   Communicated KATARZYNA with patient/caregiver Date not available/Unable to determine   Reason For Admission SOB   Lives With child(jacquie), adult   Facility Arrived From: [facility]   Do you expect to return to your current living situation? Other (see comments)  (Will return to SNF and plan to d/c home from there)   Do you have help at home or someone to help you manage your care at home? Yes   Who are your caregiver(s) and their phone number(s)? 2 adult daughters   Prior to hospitilization cognitive status: Alert/Oriented   Current cognitive status: Alert/Oriented   Walking or Climbing Stairs Difficulty ambulation difficulty, assistance 1 person;stair climbing difficulty, dependent;transferring difficulty, assistance 1 person   Mobility Management ankle boot and walker   Dressing/Bathing Difficulty dressing difficulty, assistance 1 person   Dressing/Bathing Management needs assist since ORIF   Home Layout Able to live on 1st floor   Equipment Currently Used at Home walker, rolling;other (see comments)  (ankle boot)   Readmission within 30 days? Yes   Patient currently being followed by outpatient case management? No   Do you take prescription medications? Yes   Do you have prescription coverage? Yes   Coverage Medicare   Do you have any problems affording any of your prescribed medications? No   Is the patient taking medications as prescribed? yes   Who is going to help you get home at discharge? [facility] transportation   How do you get to doctors appointments? family or friend will provide   Are you on dialysis? No   Discharge Plan A Skilled Nursing Facility   Discharge Plan B Home Health   DME Needed Upon Discharge  none   Discharge Plan discussed with: Patient   Discharge Barriers  Identified None

## 2022-05-25 NOTE — PT/OT/SLP EVAL
"Speech Language Pathology Evaluation  Bedside Swallow    Patient Name:  Emma Jorgensen   MRN:  [id number]  Admitting Diagnosis: <principal problem not specified>    Recommendations:                 General Recommendations:  Modified barium swallow study  Diet recommendations:  NPO, NPO   Aspiration Precautions: HOB to 90 degrees   General Precautions: Standard,    Communication strategies:  none    History:     Past Medical History:   Diagnosis Date    Anxiety disorder, unspecified     Arthritis     Deep vein thrombosis     Depression     Irregular heart beat     Obesity, unspecified     Other pulmonary embolism without acute cor pulmonale     Parkinson's disease     Trimalleolar fracture of ankle, closed     Unspecified dementia without behavioral disturbance        Past Surgical History:   Procedure Laterality Date    ANKLE FRACTURE SURGERY Right     CATARACT EXTRACTION      COLONOSCOPY W/ POLYPECTOMY      HYSTERECTOMY      SMALL INTESTINE SURGERY         Prior diet: per pt and daughter, regular solids and thin liquids      Subjective       Patient goals: "to eat"    Pain/Comfort:  · Pain Rating 1: 0/10    Respiratory Status: oxymask    Objective:     Oral Musculature Evaluation  · Oral Musculature: WFL  · Dentition:  (lower teeth, no upper dentures present)  · Secretion Management: problems swallowing secretions  · Mandibular Strength and Mobility: WFL  · Oral Labial Strength and Mobility: WFL  · Voice Prior to PO Intake: adequate    Bedside Swallow Eval:   Consistencies Tested:   · Puree  · ice chips      Oral Phase:   · bolus holding    Pharyngeal Phase:   · no overt clinical signs/symptoms of aspiration    Compensatory Strategies  · None    Assessment:     Emma Jorgensen is a 88 y.o. female with an SLP diagnosis of possible oropharyngeal dysphagia as evidenced by respiratory failure.  Rec:  NPO, ok for meds crushed in pudding.  SLP to proceed with MBS to assess current swallow " function.    Goals:   Multidisciplinary Problems     SLP Goals     Not on file                Plan:     · Patient to be seen:      · Plan of Care expires:     · Plan of Care reviewed with:  patient, daughter   · SLP Follow-Up:          Discharge recommendations:      Barriers to Discharge:  acuity of illness    Time Tracking:     SLP Treatment Date:   05/25/22  Speech Start Time:  0920  Speech Stop Time:  0940     Speech Total Time (min):  20 min    Billable Minutes: Eval Swallow and Oral Function 20 minutes    05/25/2022

## 2022-05-25 NOTE — PT/OT/SLP EVAL
Physical Therapy Evaluation    Patient Name:  Emma Jorgensen   MRN:  [id number]    Recommendations:     Discharge Recommendations:  nursing facility, skilled   Discharge Equipment Recommendations: walker, rolling, wheelchair   Barriers to discharge: decreased fucntional mobility; medical diagnosis    Assessment:     Emma Jorgensen is a 88 y.o. female admitted with a medical diagnosis of SOB; scute on chronic diastolic heart failure; moderate to severe aortic stenosis awaiting TAVR; B lung opacities, pneumonia.  She presents with the following impairments/functional limitations:  weakness, gait instability, impaired cardiopulmonary response to activity, impaired endurance, impaired balance, impaired functional mobilty     Of note, pt was t/f here from NH where she originally got place there due to closed R trimalleolar ankle fx s/p fixation; acute proximal phalanx great toe fx on L LE.   This stemmed from pt with multiple falls at home.    Rehab Prognosis: Good; patient would benefit from acute skilled PT services to address these deficits and reach maximum level of function.    Recent Surgery: * No surgery found *      Plan:     During this hospitalization, patient to be seen 5 x/week to address the identified rehab impairments via therapeutic activities, therapeutic exercises, wheelchair management/training and progress toward the following goals:    · Plan of Care Expires:  06/25/22    Subjective     Chief Complaint: SOB  Patient/Family Comments/goals: to get stronger  Pain/Comfort:  · Pain Rating 1: 0/10    Patients cultural, spiritual, Bahai conflicts given the current situation: no    Living Environment:  Difficulty obtaining accurate PLOF/home env 2/2 pt being a poor historian. Patient with recent hospital stay within this facility; therefore, referred back to that encounter to obtain such info. Gathered that pt was living in a [facility] with family care prior to her SNF stay.   Prior to admission,  patients level of function was assist required.  Equipment used at home: walker, rolling.  DME owned (not currently used): rolling walker.    Upon discharge, patient will have assistance from family; will investigate further to make sure this is true..    Objective:     Communicated with NSG prior to session.  Patient found HOB elevated with telemetry, torres catheter, pulse ox (continuous)  upon PT entry to room.    General Precautions: Standard, fall   Orthopedic Precautions: (R LE WBAT with CAM boot; L LE WBAT with Darco shoe (?); stand and transfers only, no ambulation)   Braces:  (CAM boot on R LE)  Respiratory Status: 2L/min on Oxymask     Vitals:  · O2 at rest= 100%  · O2 with activity= 78%  · This reading found while pt sitting EOB  · Unsure of the accuracy; taken from telemetry box  · Machine  Read 93%  · RN became present to assist with assessing    Exams:  · Cognitive Exam:  Patient is oriented to Person, Place and Time    · RLE Strength: grossly 3+/5  · LLE Strength: grossly 3+/5    Functional Mobility:  · Bed Mobility:     · Supine to Sit: moderate assistance  · Sit to Supine: moderate assistance    · Transfers:     · Sit to Stand:  moderate assistance with rolling walker      Patient left HOB elevated with all lines intact and call button in reach.    GOALS:   Multidisciplinary Problems     Physical Therapy Goals        Problem: Physical Therapy    Goal Priority Disciplines Outcome Goal Variances Interventions   Physical Therapy Goal     PT, PT/OT Ongoing, Progressing     Description: Goals to be met by: 22    Patient will increase functional independence with mobility by performin. Supine to sit with Stand-by Assistance  2. Sit to supine with Stand-by Assistance  3. Sit to stand transfer with Minimal Assistance using Rolling Walker  4. Bed to chair transfer with Minimal Assistance using Rolling Walker  5. Wheelchair propulsion x200 feet with Stand-by Assistance using bilateral uppper  extremities                     History:     Past Medical History:   Diagnosis Date    Anxiety disorder, unspecified     Arthritis     Deep vein thrombosis     Depression     Irregular heart beat     Obesity, unspecified     Other pulmonary embolism without acute cor pulmonale     Parkinson's disease     Trimalleolar fracture of ankle, closed     Unspecified dementia without behavioral disturbance        Past Surgical History:   Procedure Laterality Date    ANKLE FRACTURE SURGERY Right     CATARACT EXTRACTION      COLONOSCOPY W/ POLYPECTOMY      HYSTERECTOMY      SMALL INTESTINE SURGERY         Time Tracking:     PT Received On: 05/25/22  PT Start Time: 1402     PT Stop Time: 1422  PT Total Time (min): 20 min     Billable Minutes: Evaluation 20      05/25/2022

## 2022-05-25 NOTE — PROGRESS NOTES
Pharmacokinetic Initial Assessment: IV Vancomycin    Assessment/Plan:    Initiate intravenous vancomycin with loading dose of 2000 mg once followed by a maintenance dose of vancomycin 1000mg IV every 8 hours  Desired empiric serum trough concentration is 15 to 20 mcg/mL  Draw vancomycin trough level 60 min prior to fourth dose on 5/25 at approximately 1700  Pharmacy will continue to follow and monitor vancomycin.      Please contact pharmacy at extension [phone] with any questions regarding this assessment.     Thank you for the consult,   Lisa Leroy       Patient brief summary:  Emma Jorgensen is a 88 y.o. female initiated on antimicrobial therapy with IV Vancomycin for treatment of suspected lower respiratory infection    Drug Allergies:   Review of patient's allergies indicates:  No Known Allergies    Actual Body Weight:   117 kg    Renal Function:   Estimated Creatinine Clearance: 71 mL/min (based on SCr of 0.7 mg/dL).,     Dialysis Method (if applicable):  N/A    CBC (last 72 hours):  Recent Labs   Lab Result Units 05/24/22  1218   WBC x10(3)/mcL 7.5   Hgb gm/dL 9.9*   Hct % 32.5*   Platelet x10(3)/mcL 309   Mono % % 9.7   Eos % % 1.6   Basophil % % 0.4       Metabolic Panel (last 72 hours):  Recent Labs   Lab Result Units 05/24/22  1218   Sodium Level mmol/L 133*   Potassium Level mmol/L 4.3   Chloride mmol/L 91*   Carbon Dioxide mmol/L 36*   Glucose Level mg/dL 98   Blood Urea Nitrogen mg/dL 11.4   Creatinine mg/dL 0.70   Albumin Level gm/dL 3.0*   Bilirubin Total mg/dL 0.4   Alkaline Phosphatase unit/L 100   Aspartate Aminotransferase unit/L 15   Alanine Aminotransferase unit/L 9       Drug levels (last 3 results):  No results for input(s): VANCOMYCINRA, VANCORANDOM, VANCOMYCINPE, VANCOPEAK, VANCOMYCINTR, VANCOTROUGH in the last 72 hours.    Microbiologic Results:  Microbiology Results (last 7 days)       Procedure Component Value Units Date/Time    Blood Culture [[id number]] Collected: 05/24/22  1731    Order Status: Sent Specimen: Blood Updated: 05/24/22 1744    Blood Culture [[id number]] Collected: 05/24/22 1731    Order Status: Sent Specimen: Blood Updated: 05/24/22 1744    Respiratory Culture [[id number]]     Order Status: Sent Specimen: Sputum, Expectorated

## 2022-05-25 NOTE — ED NOTES
PATIENT HAS NO NEEDS AT THIS TIME. CALL BELL WITHIN REACH. SIDE RAILS UP X2. AUTOMATIC BP CUFF, PULSE OX, AND CARDIAC MONITOR PLACED ON PATIENT. POC REVIEWED WITH PATIENT AND FAMILY. NO ACUTE DISTRESS NOTED. WILL CONTINUE TO MONTIOR.

## 2022-05-25 NOTE — ED NOTES
Pt report received from [facility] states that pt arrived via EMS from NH with c/o new onset of SOB. Pt currently admitted to hospital unknown when bed availability.  Upon assessment pt sitting up in bed, torres to gravity, currently on 3L oxymask, when vanc infusing.  Pt denies any c/o pain or discomfort at this time but is have a slight difficulty breathing with pursed lips and rate at 24 noted.  Confirmed with pharmacy that inhaler is ready for .  Pt's daughter at bedside.  Foot / ankle boot noted.  Reviewed with pt and family ambulatory precautions.  Both verbalized an understanding.

## 2022-05-26 PROCEDURE — 97535 SELF CARE MNGMENT TRAINING: CPT

## 2022-05-26 PROCEDURE — 97530 THERAPEUTIC ACTIVITIES: CPT | Mod: CQ

## 2022-05-26 PROCEDURE — 25000242 PHARM REV CODE 250 ALT 637 W/ HCPCS: Performed by: INTERNAL MEDICINE

## 2022-05-26 PROCEDURE — 11000001 HC ACUTE MED/SURG PRIVATE ROOM

## 2022-05-26 PROCEDURE — 97530 THERAPEUTIC ACTIVITIES: CPT

## 2022-05-26 PROCEDURE — 63600175 PHARM REV CODE 636 W HCPCS: Performed by: INTERNAL MEDICINE

## 2022-05-26 PROCEDURE — 25000003 PHARM REV CODE 250: Performed by: INTERNAL MEDICINE

## 2022-05-26 RX ADMIN — APIXABAN 5 MG: 5 TABLET, FILM COATED ORAL at 08:05

## 2022-05-26 RX ADMIN — ALBUTEROL SULFATE 2 PUFF: 90 AEROSOL, METERED RESPIRATORY (INHALATION) at 06:05

## 2022-05-26 RX ADMIN — FUROSEMIDE 20 MG: 20 TABLET ORAL at 08:05

## 2022-05-26 RX ADMIN — FLUTICASONE FUROATE AND VILANTEROL TRIFENATATE 1 PUFF: 100; 25 POWDER RESPIRATORY (INHALATION) at 08:05

## 2022-05-26 RX ADMIN — CYANOCOBALAMIN TAB 500 MCG 500 MCG: 500 TAB at 08:05

## 2022-05-26 RX ADMIN — VENLAFAXINE HYDROCHLORIDE 37.5 MG: 37.5 CAPSULE, EXTENDED RELEASE ORAL at 05:05

## 2022-05-26 RX ADMIN — VANCOMYCIN HYDROCHLORIDE 1250 MG: 1.25 INJECTION, POWDER, LYOPHILIZED, FOR SOLUTION INTRAVENOUS at 09:05

## 2022-05-26 RX ADMIN — ALBUTEROL SULFATE 2 PUFF: 90 AEROSOL, METERED RESPIRATORY (INHALATION) at 02:05

## 2022-05-26 RX ADMIN — CETIRIZINE HYDROCHLORIDE 10 MG: 10 TABLET, FILM COATED ORAL at 08:05

## 2022-05-26 RX ADMIN — LOSARTAN POTASSIUM 100 MG: 50 TABLET, FILM COATED ORAL at 08:05

## 2022-05-26 RX ADMIN — PANTOPRAZOLE SODIUM 40 MG: 40 TABLET, DELAYED RELEASE ORAL at 08:05

## 2022-05-26 RX ADMIN — DONEPEZIL HYDROCHLORIDE 10 MG: 5 TABLET, FILM COATED ORAL at 08:05

## 2022-05-26 RX ADMIN — DILTIAZEM HYDROCHLORIDE 120 MG: 60 TABLET, FILM COATED ORAL at 05:05

## 2022-05-26 RX ADMIN — VANCOMYCIN HYDROCHLORIDE 1250 MG: 1.25 INJECTION, POWDER, LYOPHILIZED, FOR SOLUTION INTRAVENOUS at 12:05

## 2022-05-26 RX ADMIN — GABAPENTIN 100 MG: 100 CAPSULE ORAL at 08:05

## 2022-05-26 RX ADMIN — ALBUTEROL SULFATE 2 PUFF: 90 AEROSOL, METERED RESPIRATORY (INHALATION) at 05:05

## 2022-05-26 RX ADMIN — ALBUTEROL SULFATE 2 PUFF: 90 AEROSOL, METERED RESPIRATORY (INHALATION) at 12:05

## 2022-05-26 RX ADMIN — PRAMIPEXOLE DIHYDROCHLORIDE 1 MG: 0.25 TABLET ORAL at 08:05

## 2022-05-26 NOTE — PT/OT/SLP PROGRESS
Occupational Therapy   Treatment    Name: Emma Jorgensen  MRN: [id number]  Admitting Diagnosis:  Shortness of breath, CHF, Pneumonia of both lungs due to infectious organism unspecified part of lung     Recommendations:     Discharge Recommendations: nursing facility, skilled  Discharge Equipment Recommendations:  walker, rolling, wheelchair  Barriers to discharge:  Decreased caregiver support    Assessment:     Emma Jorgensen is a 88 y.o. female with a medical diagnosis of shortness of breath, CHF, pneumonia of both lungs due to infectious organism, unspecified part of lung. She presents with Performance deficits affecting function are weakness, impaired endurance, impaired functional mobilty, impaired balance.     Pt is progressing towards BADL's goals, however pt still requires training to improve independence.     Oxygen at 95% at rest, 90s with ax    Rehab Prognosis:  Good; patient would benefit from acute skilled OT services to address these deficits and reach maximum level of function.       Plan:     Patient to be seen 4 x/week, 5 x/week to address the above listed problems via self-care/home management, therapeutic activities, therapeutic exercises  · Plan of Care Expires: 06/08/22  · Plan of Care Reviewed with: patient    Subjective     Pain/Comfort:  ·  none     Objective:       Patient found supine with telemetry, torres catheter, pulse ox (continuous) (oxygen mask) upon OT entry to room.    General Precautions: Standard, fall   Orthopedic Precautions:RLE weight bearing as tolerated   Braces:  (CAM boot on RLE, Darco boot on the LLE)  Respiratory Status: Oxy mask 3 liters      Occupational Performance:     Bed Mobility:    · Patient completed Supine to Sit with minimum assistance to get BLE off the bed.     Functional Mobility/Transfers:  · Patient completed Sit <> Stand Transfer with moderate assistance with rolling walker and gait belt.  · Patient completed stand step t/f bed>chair with  moderate assistance x2 with RW and gait belt.    Activities of Daily Living:  · Pt completed grooming (brushing teeth) with supervision while seated in chair.   · Pt completed feeding with supervision while seated in chair. Container mgmt was Mod I.            Treatment & Education:  Pt participated in BADL's training to increase independence. VC for safety and RW management during functional mobility provided.      Patient left up in chair with call button in reachEducation:      GOALS:   Multidisciplinary Problems     Occupational Therapy Goals        Problem: Occupational Therapy    Goal Priority Disciplines Outcome Interventions   Occupational Therapy Goal     OT, PT/OT Ongoing, Progressing    Description: LTG: Pt will complete all BADL'S (accept for LB dressing)with SBA by d/c     STG: goal to met by 6/8     1.Pt will complete UE Dressing with CGA.     2.Pt will perform LE Dressing with Mod A.    3. Pt will complete grooming while standing >2 min at sink with Stand by Assist using RW.    4. Toileting/ toilet transfer from bedside commode with Min A using RW.                     Time Tracking:     OT Date of Treatment: 05/26/22  OT Start Time: 1134  OT Stop Time: 1159  OT Total Time (min): 25 min    Billable Minutes:Self Care/Home Management 10 min  Therapeutic activity 15 min    OT/ALAYNA: OT     ALAYNA Visit Number: 1    5/26/2022

## 2022-05-26 NOTE — PLAN OF CARE
Problem: Adult Inpatient Plan of Care  Goal: Plan of Care Review  Outcome: Ongoing, Progressing  Goal: Patient-Specific Goal (Individualized)  Outcome: Ongoing, Progressing  Goal: Absence of Hospital-Acquired Illness or Injury  Outcome: Ongoing, Progressing  Goal: Optimal Comfort and Wellbeing  Outcome: Ongoing, Progressing  Goal: Readiness for Transition of Care  Outcome: Ongoing, Progressing     Problem: Bariatric Environmental Safety  Goal: Safety Maintained with Care  Outcome: Ongoing, Progressing     Problem: Skin Injury Risk Increased  Goal: Skin Health and Integrity  Outcome: Ongoing, Progressing     Problem: Fall Injury Risk  Goal: Absence of Fall and Fall-Related Injury  Outcome: Ongoing, Progressing

## 2022-05-26 NOTE — PT/OT/SLP PROGRESS
SLP following up regarding diet tolerance, nursing reports no difficulty of soft and bite sized solids, thin liquids (NO straws), meds crushed in pudding.  Skilled SLP services no longer warranted, please reconsult as needed.

## 2022-05-26 NOTE — PT/OT/SLP PROGRESS
Physical Therapy Treatment    Patient Name:  Emma Jorgensen   MRN:  [id number]    Recommendations:     Discharge Recommendations:  nursing facility, skilled   Discharge Equipment Recommendations: walker, rolling, wheelchair   Barriers to discharge: None    Assessment:     Emma Jorgensen is a 88 y.o. female admitted with a medical diagnosis of of SOB; scute on chronic diastolic heart failure; moderate to severe aortic stenosis awaiting TAVR; B lung opacities, pneumonia.  She presents with the following impairments/functional limitations:  weakness, gait instability, impaired cardiopulmonary response to activity, impaired endurance, impaired balance, impaired functional mobilty     Rehab Prognosis: Good; patient would benefit from acute skilled PT services to address these deficits and reach maximum level of function.    Recent Surgery: * No surgery found *      Plan:     During this hospitalization, patient to be seen 6 x/week to address the identified rehab impairments via therapeutic activities, therapeutic exercises and progress toward the following goals:    · Plan of Care Expires:  06/25/22    Subjective     Chief Complaint: fatigue   Patient/Family Comments/goals:   Pain/Comfort:  ·        Objective:     Communicated with RN prior to session.  Patient found HOB elevated with   upon PT entry to room.     General Precautions: Standard, fall   Orthopedic Precautions: (R LE WBAT with CAM boot; L LE WBAT with Darco shoe (?); stand and transfers only, no ambulation)   Braces:    Respiratory Status: OXYMASK     Functional Mobility:  · Bed Mobility:     · Sit to Supine: moderate assistance  · Transfers:     · Sit to Stand:  moderate assistance with rolling walker  · Bed to Chair: moderate assistance with  rolling walker  using  Stand Pivot      AM-PAC 6 CLICK MOBILITY          Therapeutic Activities and Exercises:   Performed  Reps of Sit<>stands with RW, cuing for sequencing. Stand pivot TF from chair>bed  modA.  Cam boot and darco donned during activity.    Patient left HOB elevated with all lines intact and call button in reach..    GOALS:   Multidisciplinary Problems     Physical Therapy Goals        Problem: Physical Therapy    Goal Priority Disciplines Outcome Goal Variances Interventions   Physical Therapy Goal     PT, PT/OT Ongoing, Progressing     Description: Goals to be met by: 22    Patient will increase functional independence with mobility by performin. Supine to sit with Stand-by Assistance  2. Sit to supine with Stand-by Assistance  3. Sit to stand transfer with Minimal Assistance using Rolling Walker  4. Bed to chair transfer with Minimal Assistance using Rolling Walker  5. Wheelchair propulsion x200 feet with Stand-by Assistance using bilateral uppper extremities                     Time Tracking:     PT Received On: 22  PT Start Time: 1410     PT Stop Time: 1429  PT Total Time (min): 19 min     Billable Minutes: Therapeutic Activity 19    Treatment Type: Treatment  PT/PTA: PTA     PTA Visit Number: 1     2022

## 2022-05-26 NOTE — PROGRESS NOTES
[facility]  Hospital Medicine Progress Note        Chief Complaint: Inpatient Follow-up for     HPI: 88-year-old female with significant history of diastolic heart failure, PAF, valvular heart disease, bilateral carotid artery stenosis, HTN, chronic dementia, history of DVT/PE, GERD, anxiety, obesity. Patient had a recent hospital admission for closed right trimalleolar ankle fracture for which she underwent fixation on 04/26. Patient also had acute proximal phalanx great toe fracture. This was secondary to fall. She does have history of recurrent falls at home. The patient was admitted to hospitalist medicine for this. Hospital stay then was even full for acute kidney injury which improved after holding Lasix, losartan. She was then evaluated by Cardiology for moderate to severe aortic stenosis. Recommended TAVR as outpatient. Patient was discharged to skilled nursing facility. Patient was sent to the ED from skilled nursing facility with complaints of shortness of breath. Reports acute on chronic dry cough lately. Upon initial evaluation in the ED patient was hemodynamically stable except for hypoxemia for which she was initiated on 6 L oxygen mask and saturations were stable. Chest x-ray with bilateral opacities. Patient was treated with antimicrobials in the ED and hospitalist team consulted for admission. Lab significant for chronic anemia. She was also noted to have mild hyponatremic hypochloremia      Interval Hx:   May 25th-patient can maintain 96% O2 sat with 2 L nasal cannula, patient underwent physical therapy  May 26-patient feels better, the patient had a -800 cc of the fluid balance last 24 hours    Objective/physical exam:  General: In no acute distress, afebrile, morbidly obese   Chest: Clear to auscultation bilaterally  Heart: RRR, +S1, S2, no appreciable murmur  Abdomen: Soft, nontender, BS +  MSK: Warm, no lower extremity edema, no clubbing or cyanosis  Neurologic:  Alert and oriented x4, Cranial nerve II-XII intact, Strength 5/5 in all 4 extremities    VITAL SIGNS: 24 HRS MIN & MAX LAST   Temp  Min: 97.4 °F (36.3 °C)  Max: 98.2 °F (36.8 °C) 98.1 °F (36.7 °C)   BP  Min: 108/64  Max: 147/68 116/72   Pulse  Min: 63  Max: 96  63   Resp  Min: 18  Max: 22 (!) 22   SpO2  Min: 95 %  Max: 98 % 97 %       Recent Labs   Lab 05/24/22  1218 05/25/22  0416   WBC 7.5 7.4   RBC 3.19* 3.16*   HGB 9.9* 9.8*   HCT 32.5* 31.0*   .9* 98.1*   MCH 31.0 31.0   MCHC 30.5* 31.6*   RDW 13.7 13.4    304   MPV 9.7 9.6       Recent Labs   Lab 05/24/22 1218 05/25/22 0416   * 132*   K 4.3 4.0   CO2 36* 34*   BUN 11.4 11.4   CREATININE 0.70 0.70   CALCIUM 9.1 8.9   ALBUMIN 3.0* 2.9*   ALKPHOS 100 89   ALT 9 9   AST 15 16   BILITOT 0.4 0.4          Microbiology Results (last 7 days)     Procedure Component Value Units Date/Time    Blood Culture [[id number]]  (Normal) Collected: 05/24/22 1731    Order Status: Completed Specimen: Blood Updated: 05/25/22 2101     CULTURE, BLOOD ([facility]) No Growth At 24 Hours    Blood Culture [[id number]]  (Normal) Collected: 05/24/22 1731    Order Status: Completed Specimen: Blood Updated: 05/25/22 2101     CULTURE, BLOOD ([facility]) No Growth At 24 Hours    Respiratory Culture [[id number]]     Order Status: Sent Specimen: Sputum, Expectorated            See below for Radiology    Scheduled Med:   albuterol  2 puff Inhalation Q6H    apixaban  5 mg Oral BID    cetirizine  10 mg Oral Daily    cyanocobalamin  500 mcg Oral Daily    diltiaZEM  120 mg Oral Daily    donepeziL  10 mg Oral Nightly    fluticasone furoate-vilanteroL  1 puff Inhalation Daily    furosemide  20 mg Oral Daily    gabapentin  100 mg Oral Nightly    losartan  100 mg Oral Daily    pantoprazole  40 mg Oral Daily    pramipexole  1 mg Oral Nightly    traZODone  50 mg Oral QHS    vancomycin (VANCOCIN) IVPB  1,250 mg Intravenous Q18H    venlafaxine  37.5 mg Oral Daily        Continuous  Infusions:       PRN Meds:  HYDROcodone-acetaminophen, LORazepam, ondansetron, Pharmacy to dose Vancomycin consult **AND** vancomycin - pharmacy to dose       Assessment/Plan:  Bilateral lung opacities-HAP versus aspiration pneumonia versus volume overload   Acute hypoxemic respiratory failure secondary to above   Chronic diastolic heart failure-appears compensated   Moderate to severe aortic stenosis awaiting TAVR   Recent trimalleolar ankle fracture status post surgical fixation, late April, discharged to skilled nursing facility   PAF   History of DVT/PE   Bilateral carotid artery disease   Essential HTN-stable   HLD   History of GERD   Obesity   Chronic dementia   Anxiety   Prophylaxis   sputum PCR shows MRSA       Continue IV vancomycin   Continue Lasix 20 mg IV q.d.   Continue on albuterol and Pulmicort   continue physical therapy   Check CBC BMP in a.m.  The patient is  from skilled nursing facility    Patient condition:  Stable/Fair/Guarded/ Serious/ Critical    Anticipated discharge and Disposition:      All diagnosis and differential diagnosis have been reviewed; assessment and plan has been documented; I have personally reviewed the labs and test results that are presently available; I have reviewed the patients medication list; I have reviewed the consulting providers response and recommendations. I have reviewed or attempted to review medical records based upon their availability    All of the patient's questions have been  addressed and answered. Patient's is agreeable to the above stated plan. I will continue to monitor closely and make adjustments to medical management as needed.  _____________________________________________________________________    Nutrition Status:    Radiology:  Fl Modified Barium Swallow Speech  See Speech Therapist Notes    This procedure was auto-finalized.      Sariah Moore MD   05/26/2022

## 2022-05-27 PROBLEM — I50.9 CHF (CONGESTIVE HEART FAILURE): Status: ACTIVE | Noted: 2022-05-27

## 2022-05-27 PROBLEM — J18.9 PNEUMONIA OF BOTH LUNGS DUE TO INFECTIOUS ORGANISM: Status: ACTIVE | Noted: 2022-05-27

## 2022-05-27 LAB
ANION GAP SERPL CALC-SCNC: 8 MEQ/L
BASOPHILS # BLD AUTO: 0.03 X10(3)/MCL (ref 0–0.2)
BASOPHILS NFR BLD AUTO: 0.5 %
BUN SERPL-MCNC: 11.3 MG/DL (ref 9.8–20.1)
CALCIUM SERPL-MCNC: 9 MG/DL (ref 8.4–10.2)
CHLORIDE SERPL-SCNC: 90 MMOL/L (ref 98–107)
CO2 SERPL-SCNC: 33 MMOL/L (ref 23–31)
CREAT SERPL-MCNC: 0.67 MG/DL (ref 0.55–1.02)
CREAT/UREA NIT SERPL: 17
EOSINOPHIL # BLD AUTO: 0.2 X10(3)/MCL (ref 0–0.9)
EOSINOPHIL NFR BLD AUTO: 3.1 %
ERYTHROCYTE [DISTWIDTH] IN BLOOD BY AUTOMATED COUNT: 13.4 % (ref 11.5–17)
GLUCOSE SERPL-MCNC: 104 MG/DL (ref 82–115)
HCT VFR BLD AUTO: 34.8 % (ref 37–47)
HGB BLD-MCNC: 10.5 GM/DL (ref 12–16)
IMM GRANULOCYTES # BLD AUTO: 0.04 X10(3)/MCL (ref 0–0.02)
IMM GRANULOCYTES NFR BLD AUTO: 0.6 % (ref 0–0.43)
LYMPHOCYTES # BLD AUTO: 0.85 X10(3)/MCL (ref 0.6–4.6)
LYMPHOCYTES NFR BLD AUTO: 13.3 %
MCH RBC QN AUTO: 30.9 PG (ref 27–31)
MCHC RBC AUTO-ENTMCNC: 30.2 MG/DL (ref 33–36)
MCV RBC AUTO: 102.4 FL (ref 80–94)
MONOCYTES # BLD AUTO: 0.57 X10(3)/MCL (ref 0.1–1.3)
MONOCYTES NFR BLD AUTO: 8.9 %
NEUTROPHILS # BLD AUTO: 4.7 X10(3)/MCL (ref 2.1–9.2)
NEUTROPHILS NFR BLD AUTO: 73.6 %
NRBC BLD AUTO-RTO: 0 %
PLATELET # BLD AUTO: 334 X10(3)/MCL (ref 130–400)
PMV BLD AUTO: 9.5 FL (ref 9.4–12.4)
POTASSIUM SERPL-SCNC: 3.7 MMOL/L (ref 3.5–5.1)
RBC # BLD AUTO: 3.4 X10(6)/MCL (ref 4.2–5.4)
SARS-COV-2 RDRP RESP QL NAA+PROBE: NEGATIVE
SODIUM SERPL-SCNC: 131 MMOL/L (ref 136–145)
VANCOMYCIN TROUGH SERPL-MCNC: 14.6 UG/ML (ref 15–20)
WBC # SPEC AUTO: 6.4 X10(3)/MCL (ref 4.5–11.5)

## 2022-05-27 PROCEDURE — 63600175 PHARM REV CODE 636 W HCPCS: Performed by: INTERNAL MEDICINE

## 2022-05-27 PROCEDURE — 25000242 PHARM REV CODE 250 ALT 637 W/ HCPCS: Performed by: INTERNAL MEDICINE

## 2022-05-27 PROCEDURE — 85025 COMPLETE CBC W/AUTO DIFF WBC: CPT | Performed by: INTERNAL MEDICINE

## 2022-05-27 PROCEDURE — G0378 HOSPITAL OBSERVATION PER HR: HCPCS

## 2022-05-27 PROCEDURE — 94761 N-INVAS EAR/PLS OXIMETRY MLT: CPT

## 2022-05-27 PROCEDURE — 96365 THER/PROPH/DIAG IV INF INIT: CPT

## 2022-05-27 PROCEDURE — 97530 THERAPEUTIC ACTIVITIES: CPT | Mod: CO

## 2022-05-27 PROCEDURE — 36415 COLL VENOUS BLD VENIPUNCTURE: CPT | Performed by: INTERNAL MEDICINE

## 2022-05-27 PROCEDURE — 80048 BASIC METABOLIC PNL TOTAL CA: CPT | Performed by: INTERNAL MEDICINE

## 2022-05-27 PROCEDURE — 80202 ASSAY OF VANCOMYCIN: CPT | Performed by: INTERNAL MEDICINE

## 2022-05-27 PROCEDURE — 27000221 HC OXYGEN, UP TO 24 HOURS

## 2022-05-27 PROCEDURE — 25000003 PHARM REV CODE 250: Performed by: INTERNAL MEDICINE

## 2022-05-27 PROCEDURE — 87635 SARS-COV-2 COVID-19 AMP PRB: CPT | Performed by: INTERNAL MEDICINE

## 2022-05-27 PROCEDURE — 96368 THER/DIAG CONCURRENT INF: CPT

## 2022-05-27 PROCEDURE — 97530 THERAPEUTIC ACTIVITIES: CPT | Mod: CQ

## 2022-05-27 RX ORDER — FUROSEMIDE 40 MG/1
40 TABLET ORAL DAILY
Qty: 30 TABLET | Refills: 11 | Status: ON HOLD | OUTPATIENT
Start: 2022-05-27 | End: 2022-08-19 | Stop reason: HOSPADM

## 2022-05-27 RX ORDER — MUPIROCIN 20 MG/G
OINTMENT TOPICAL DAILY
Status: DISCONTINUED | OUTPATIENT
Start: 2022-05-27 | End: 2022-06-01 | Stop reason: HOSPADM

## 2022-05-27 RX ORDER — LEVOFLOXACIN 5 MG/ML
500 INJECTION, SOLUTION INTRAVENOUS
Status: DISCONTINUED | OUTPATIENT
Start: 2022-05-27 | End: 2022-05-30

## 2022-05-27 RX ORDER — CLINDAMYCIN HYDROCHLORIDE 300 MG/1
300 CAPSULE ORAL 3 TIMES DAILY
Qty: 21 CAPSULE | Refills: 0 | Status: SHIPPED | OUTPATIENT
Start: 2022-05-27 | End: 2022-05-28 | Stop reason: HOSPADM

## 2022-05-27 RX ADMIN — GABAPENTIN 100 MG: 100 CAPSULE ORAL at 09:05

## 2022-05-27 RX ADMIN — APIXABAN 5 MG: 5 TABLET, FILM COATED ORAL at 10:05

## 2022-05-27 RX ADMIN — PANTOPRAZOLE SODIUM 40 MG: 40 TABLET, DELAYED RELEASE ORAL at 10:05

## 2022-05-27 RX ADMIN — CETIRIZINE HYDROCHLORIDE 10 MG: 10 TABLET, FILM COATED ORAL at 10:05

## 2022-05-27 RX ADMIN — FUROSEMIDE 20 MG: 20 TABLET ORAL at 10:05

## 2022-05-27 RX ADMIN — PRAMIPEXOLE DIHYDROCHLORIDE 1 MG: 0.25 TABLET ORAL at 09:05

## 2022-05-27 RX ADMIN — VANCOMYCIN HYDROCHLORIDE 1250 MG: 1.25 INJECTION, POWDER, LYOPHILIZED, FOR SOLUTION INTRAVENOUS at 05:05

## 2022-05-27 RX ADMIN — ALBUTEROL SULFATE 2 PUFF: 90 AEROSOL, METERED RESPIRATORY (INHALATION) at 12:05

## 2022-05-27 RX ADMIN — ALBUTEROL SULFATE 2 PUFF: 90 AEROSOL, METERED RESPIRATORY (INHALATION) at 06:05

## 2022-05-27 RX ADMIN — CYANOCOBALAMIN TAB 500 MCG 500 MCG: 500 TAB at 10:05

## 2022-05-27 RX ADMIN — APIXABAN 5 MG: 5 TABLET, FILM COATED ORAL at 09:05

## 2022-05-27 RX ADMIN — DONEPEZIL HYDROCHLORIDE 10 MG: 5 TABLET, FILM COATED ORAL at 09:05

## 2022-05-27 RX ADMIN — MUPIROCIN: 20 OINTMENT TOPICAL at 03:05

## 2022-05-27 RX ADMIN — FLUTICASONE FUROATE AND VILANTEROL TRIFENATATE 1 PUFF: 100; 25 POWDER RESPIRATORY (INHALATION) at 10:05

## 2022-05-27 RX ADMIN — VENLAFAXINE HYDROCHLORIDE 37.5 MG: 37.5 CAPSULE, EXTENDED RELEASE ORAL at 06:05

## 2022-05-27 RX ADMIN — DILTIAZEM HYDROCHLORIDE 120 MG: 60 TABLET, FILM COATED ORAL at 06:05

## 2022-05-27 RX ADMIN — ALBUTEROL SULFATE 2 PUFF: 90 AEROSOL, METERED RESPIRATORY (INHALATION) at 05:05

## 2022-05-27 RX ADMIN — LEVOFLOXACIN 500 MG: 5 INJECTION, SOLUTION INTRAVENOUS at 05:05

## 2022-05-27 NOTE — PLAN OF CARE
Problem: Adult Inpatient Plan of Care  Goal: Plan of Care Review  Outcome: Ongoing, Progressing  Goal: Patient-Specific Goal (Individualized)  Outcome: Ongoing, Progressing  Goal: Absence of Hospital-Acquired Illness or Injury  Outcome: Ongoing, Progressing  Goal: Optimal Comfort and Wellbeing  Outcome: Ongoing, Progressing  Goal: Readiness for Transition of Care  Outcome: Ongoing, Progressing     Problem: Bariatric Environmental Safety  Goal: Safety Maintained with Care  Outcome: Ongoing, Progressing     Problem: Skin Injury Risk Increased  Goal: Skin Health and Integrity  Outcome: Ongoing, Progressing     Problem: Fall Injury Risk  Goal: Absence of Fall and Fall-Related Injury  Outcome: Ongoing, Progressing     Problem: Infection  Goal: Absence of Infection Signs and Symptoms  Outcome: Ongoing, Progressing     Problem: Behavior Regulation Impairment (Dementia Signs/Symptoms)  Goal: Improved Behavioral Control (Dementia Signs/Symptoms)  Outcome: Ongoing, Progressing     Problem: Cognitive Impairment (Dementia Signs/Symptoms)  Goal: Optimized Cognitive Function (Dementia Signs/Symptoms)  Outcome: Ongoing, Progressing     Problem: Mood Impairment (Dementia Signs/Symptoms)  Goal: Improved Mood Symptoms (Dementia Signs/Symptoms)  Outcome: Ongoing, Progressing     Problem: Sleep Disturbance (Dementia Signs/Symptoms)  Goal: Improved Sleep (Dementia Signs/Symptoms)  Outcome: Ongoing, Progressing

## 2022-05-27 NOTE — PLAN OF CARE
Discharge clinicals sent via fax and careport to Yaz[facility] Barbosa notified Anabel RODRIGUES of discharge

## 2022-05-27 NOTE — PLAN OF CARE
Notified by Anabel that since [facility] is [facility] she will need authorization before she can return. Called Bonnie with [facility] she states that the facility will need to send for auth ASAP and that [facility] is closed Monday due to Holiday. Notified patient's nurse and notified Anabel at facility to send for authorization ASAP

## 2022-05-27 NOTE — DISCHARGE SUMMARY
[facility] Hospital Medicine Discharge Summary    Admit Date: 5/24/2022  Discharge Date and Time: 5/27/202212:35 PM  Admitting Physician: [unfilled]  Discharging Physician: Sariah Moore MD.  Primary Care Physician: Mary Jane Dean MD  Consults:     Discharge Diagnoses:  Bilateral lung opacities-HAP versus aspiration pneumonia versus volume overload   Acute hypoxemic respiratory failure secondary to above   Chronic diastolic heart failure-appears compensated   Moderate to severe aortic stenosis awaiting TAVR   Recent trimalleolar ankle fracture status post surgical fixation, late April, discharged to skilled nursing facility   PAF   History of DVT/PE   Bilateral carotid artery disease   Essential HTN-stable   HLD   History of GERD   Obesity   Chronic dementia   Anxiety   Prophylaxis   sputum PCR shows MRSA         Hospital Course:   88-year-old female with significant history of diastolic heart failure, PAF, valvular heart disease, bilateral carotid artery stenosis, HTN, chronic dementia, history of DVT/PE, GERD, anxiety, obesity. Patient had a recent hospital admission for closed right trimalleolar ankle fracture for which she underwent fixation on 04/26. Patient also had acute proximal phalanx great toe fracture. This was secondary to fall. She does have history of recurrent falls at home. The patient was admitted to hospitalist medicine for this. Hospital stay then was even full for acute kidney injury which improved after holding Lasix, losartan. She was then evaluated by Cardiology for moderate to severe aortic stenosis. Recommended TAVR as outpatient. Patient was discharged to skilled nursing facility. Patient was sent to the ED from skilled nursing facility with complaints of shortness of breath. Reports acute on chronic dry cough lately. Upon initial evaluation in the ED patient was hemodynamically stable except for hypoxemia for which she was initiated on 6 L oxygen  mask and saturations were stable. Chest x-ray with bilateral opacities. Patient was treated with antimicrobials in the ED and hospitalist team consulted for admission. Lab significant for chronic anemia. She was also noted to have mild hyponatremic hypochloremia    88 years old female history of diastolic CHF was hospitalized for pneumonia and CHF exacerbation  No CHF was treated with the Lasix at 20 mg IV b.i.d.  Sputum PCR shows MRSA, pneumonia was treated with IV vancomycin  The patient underwent a physical therapy every day  Patient can maintain 96% O2 sat with 2 L nasal cannula  After 3 days of hospitalization, patient felt much better, no shortness for breath, no fever, no cough  Patient can be transferred back to skilled nursing facility today.  I gave the patient a clindamycin prescription and increase the Lasix to 40 mg once a day instead of 20 mg once a day.    Pt was seen and examined on the day of discharge  Vitals:  VITAL SIGNS: 24 HRS MIN & MAX LAST   Temp  Min: 97.5 °F (36.4 °C)  Max: 97.9 °F (36.6 °C) 97.5 °F (36.4 °C)   BP  Min: 94/42  Max: 156/82 109/61   Pulse  Min: 62  Max: 79  73   Resp  Min: 18  Max: 22 20   SpO2  Min: 94 %  Max: 99 % 98 %       Physical Exam:    General: In no acute distress, afebrile, morbidly obese   Chest: Clear to auscultation bilaterally   Heart: RRR, +S1, S2, no appreciable murmur   Abdomen: Soft, nontender, BS +   MSK: Warm, no lower extremity edema, no clubbing or cyanosis   Neurologic: Alert and oriented x4, Cranial nerve II-XII intact, Strength 5/5 in all 4 extremities        Procedures Performed: No admission procedures for hospital encounter.     Significant Diagnostic Studies: See Full reports for all details    Recent Labs   Lab 05/24/22  1218 05/25/22  0416 05/27/22  0750   WBC 7.5 7.4 6.4   RBC 3.19* 3.16* 3.40*   HGB 9.9* 9.8* 10.5*   HCT 32.5* 31.0* 34.8*   .9* 98.1* 102.4*   MCH 31.0 31.0 30.9   MCHC 30.5* 31.6* 30.2*   RDW 13.7 13.4 13.4     304 334   MPV 9.7 9.6 9.5       Recent Labs   Lab 05/24/22  1218 05/25/22  0416 05/27/22  0750   * 132* 131*   K 4.3 4.0 3.7   CO2 36* 34* 33*   BUN 11.4 11.4 11.3   CREATININE 0.70 0.70 0.67   CALCIUM 9.1 8.9 9.0   ALBUMIN 3.0* 2.9*  --    ALKPHOS 100 89  --    ALT 9 9  --    AST 15 16  --    BILITOT 0.4 0.4  --         Microbiology Results (last 7 days)     Procedure Component Value Units Date/Time    Blood Culture [[id number]]  (Normal) Collected: 05/24/22 1731    Order Status: Completed Specimen: Blood Updated: 05/26/22 2100     CULTURE, BLOOD ([facility]) No Growth At 48 Hours    Blood Culture [[id number]]  (Normal) Collected: 05/24/22 1731    Order Status: Completed Specimen: Blood Updated: 05/26/22 2100     CULTURE, BLOOD ([facility]) No Growth At 48 Hours    Respiratory Culture [[id number]]     Order Status: Sent Specimen: Sputum, Expectorated            Fl Modified Barium Swallow Speech  See Speech Therapist Notes    This procedure was auto-finalized.         Medication List      START taking these medications    clindamycin 300 MG capsule  Commonly known as: CLEOCIN  Take 1 capsule (300 mg total) by mouth 3 (three) times daily.        CHANGE how you take these medications    furosemide 40 MG tablet  Commonly known as: LASIX  Take 1 tablet (40 mg total) by mouth once daily.  What changed:   · medication strength  · how much to take        CONTINUE taking these medications    albuterol 90 mcg/actuation inhaler  Commonly known as: PROVENTIL/VENTOLIN HFA     apixaban 2.5 mg Tab  Commonly known as: ELIQUIS  Take 2 tablets (5 mg total) by mouth 2 (two) times daily.     budesonide-glycopyr-formoterol 160-9-4.8 mcg/actuation Hfaa     clotrimazole-betamethasone 1-0.05% cream  Commonly known as: LOTRISONE     cyanocobalamin 500 MCG tablet  Take 1 tablet (500 mcg total) by mouth once daily.     diclofenac sodium 1 % Gel  Commonly known as: VOLTAREN     dicyclomine 20 mg tablet  Commonly known as: BENTYL     diltiaZEM 120 MG  tablet  Commonly known as: CARDIZEM     donepeziL 10 MG tablet  Commonly known as: ARICEPT     gabapentin 300 MG capsule  Commonly known as: NEURONTIN     hydroCHLOROthiazide 12.5 MG Tab  Commonly known as: HYDRODIURIL     HYDROcodone-acetaminophen 5-325 mg per tablet  Commonly known as: NORCO     loratadine 10 mg tablet  Commonly known as: CLARITIN     LORazepam 1 MG tablet  Commonly known as: ATIVAN     losartan 100 MG tablet  Commonly known as: COZAAR     pantoprazole 40 MG tablet  Commonly known as: PROTONIX  Take 1 tablet (40 mg total) by mouth once daily.     pramipexole 1 MG tablet  Commonly known as: MIRAPEX     traZODone 50 MG tablet  Commonly known as: DESYREL  Take 1 tablet (50 mg total) by mouth every evening.     venlafaxine 37.5 MG 24 hr capsule  Commonly known as: EFFEXOR-XR        ASK your doctor about these medications    ADVAIR -21 mcg/actuation Hfaa inhaler  Generic drug: fluticasone propion-salmeterol 115-21 mcg/dose     betamethasone dipropionate 0.05 % cream     omeprazole 20 mg Tbec           Where to Get Your Medications      You can get these medications from any pharmacy    Bring a paper prescription for each of these medications  · clindamycin 300 MG capsule  · furosemide 40 MG tablet          Explained in detail to the patient about the discharge plan, medications, and follow-up visits. Pt understands and agrees with the treatment plan  Discharge Disposition: Skilled Nursing Facility   Discharged Condition: stable  Diet-   Dietary Orders (From admission, onward)     Start     Ordered    05/25/22 1110  Diet Soft & Bite Sized No Straws (meds whole in pudding)  Diet effective now        Question:  Diet Modifier:  Answer:  No Straws  Comment:  meds whole in pudding    05/25/22 1110               Medications Per DC med rec  Activities as tolerated    For further questions contact hospitalist office    Discharge time 33 minutes    For worsening symptoms, chest pain, shortness of breath,  increased abdominal pain, high grade fever, stroke or stroke like symptoms, immediately go to the nearest Emergency Room or call 911 as soon as possible.      Sariah White M.D, on 5/27/2022. at 12:35 PM.

## 2022-05-27 NOTE — PT/OT/SLP PROGRESS
Occupational Therapy  Treatment    Emma Jorgensen   MRN: [id number]   Admitting Diagnosis: CHF (congestive heart failure)    OT Date of Treatment: 05/27/22   OT Start Time: 1344  OT Stop Time: 1354  OT Total Time (min): 10 min     Billable Minutes:  Therapeutic Activity 1  Total Minutes: 10           ALAYNA Visit Number: 2    General Precautions: Standard, fall  Orthopedic Precautions: RLE weight bearing as tolerated  Braces:      Spiritual, Cultural Beliefs, Christianity Practices, Values that Affect Care: no    Subjective:  Communicated with RN prior to session.  Alert  Oxymask     Objective:  Pt. Up in chair upon entry, pt. Requiring Mod A during stand pivot t/f from low surface. Adherence given to WB pxns, using RW for UE support with balance. Pt. Requiring constant cueing during mobility.    Functional Mobility:  Bed Mobility:   Supine to sit: Maximum Assistance   Sit to supine: Maximum Assistance   Rolling: Maximum Assistance   Scooting: Maximum Assistance    Transfer Training:   Sit to stand:Moderate Assistance with Rolling Walker .    Patient left supine with all lines intact and call button in reach    ASSESSMENT:  Emma Jorgensen is a 88 y.o. female with a medical diagnosis of CHF (congestive heart failure). Pt. Tolerated session well overall Mod A for balance.    Activity tolerance: Good    Discharge recommendations: nursing facility, skilled     Equipment recommendations: walker, rolling     GOALS:   Multidisciplinary Problems     Occupational Therapy Goals        Problem: Occupational Therapy    Goal Priority Disciplines Outcome Interventions   Occupational Therapy Goal     OT, PT/OT Ongoing, Progressing    Description: LTG: Pt will complete all BADL'S (accept for LB dressing)with SBA by d/c     STG: goal to met by 6/8     1.Pt will complete UE Dressing with CGA.     2.Pt will perform LE Dressing with Mod A.    3. Pt will complete grooming while standing >2 min at sink with Stand by Assist using  RW.    4. Toileting/ toilet transfer from bedside commode with Rodolfo MARR using RW.                     Plan:  Patient to be seen 4 x/week, 5 x/week to address the above listed problems via self-care/home management, therapeutic activities, therapeutic exercises  Plan of Care expires: 06/08/22  Plan of Care reviewed with: patient         05/27/2022

## 2022-05-27 NOTE — PLAN OF CARE
5/27- Observation Letter (MOON) reviewed by phone w/ pt. Offered to review w/ the person of her choice but states 1 daughter and work and the other was unavailable. She had no questions. Emailed dc planner to drop copy w/ addendum to room when able. I did put my # on the MOON so family can call w/ questions should they have any. Copy w/ addendum loaded in Epic. MAGGY Raman    Patient baseline mental status

## 2022-05-27 NOTE — PT/OT/SLP PROGRESS
Physical Therapy Treatment    Patient Name:  Emma Jorgensen   MRN:  [id number]    Recommendations:     Discharge Recommendations:  nursing facility, skilled   Discharge Equipment Recommendations: walker, rolling, wheelchair   Barriers to discharge: None    Assessment:     Emam Jorgensen is a 88 y.o. female admitted with a medical diagnosis of CHF (congestive heart failure).  She presents with the following impairments/functional limitations:      Rehab Prognosis: Good; patient would benefit from acute skilled PT services to address these deficits and reach maximum level of function.    Recent Surgery: * No surgery found *      Plan:     During this hospitalization, patient to be seen 6 x/week to address the identified rehab impairments via therapeutic activities, therapeutic exercises and progress toward the following goals:    · Plan of Care Expires:  06/25/22    Subjective     Chief Complaint:   Patient/Family Comments/goals:   Pain/Comfort:  ·        Objective:     Communicated with RN prior to session.  Patient found HOB elevated with   upon PT entry to room.     General Precautions: Standard, fall   Orthopedic Precautions: (R LE WBAT with CAM boot; L LE WBAT with Darco shoe (?); stand and transfers only, no ambulation)   Braces:    Respiratory Status: oxymaskl     Functional Mobility:  · Bed Mobility:     · Supine to Sit: moderate assistance  · Transfers:     · Sit to Stand:  moderate assistance with rolling walker  · Bed to Chair: moderate assistance with  rolling walker  using  Stand Pivot      AM-PAC 6 CLICK MOBILITY          Therapeutic Activities and Exercises:   Pt performed W/C mobility on leveled surface. Pt propelled 70ft Alaina with cues.    Patient left up in chair with all lines intact and call button in reach..    GOALS:   Multidisciplinary Problems     Physical Therapy Goals        Problem: Physical Therapy    Goal Priority Disciplines Outcome Goal Variances Interventions   Physical  Therapy Goal     PT, PT/OT Ongoing, Progressing     Description: Goals to be met by: 22    Patient will increase functional independence with mobility by performin. Supine to sit with Stand-by Assistance  2. Sit to supine with Stand-by Assistance  3. Sit to stand transfer with Minimal Assistance using Rolling Walker  4. Bed to chair transfer with Minimal Assistance using Rolling Walker  5. Wheelchair propulsion x200 feet with Stand-by Assistance using bilateral uppper extremities                     Time Tracking:     PT Received On: 22  PT Start Time: 1158     PT Stop Time: 1228  PT Total Time (min): 30 min     Billable Minutes: Therapeutic Activity 30       Treatment Type: Treatment  PT/PTA: PTA     PTA Visit Number: 1     2022

## 2022-05-28 PROCEDURE — 25000003 PHARM REV CODE 250: Performed by: INTERNAL MEDICINE

## 2022-05-28 PROCEDURE — 96366 THER/PROPH/DIAG IV INF ADDON: CPT

## 2022-05-28 PROCEDURE — G0378 HOSPITAL OBSERVATION PER HR: HCPCS

## 2022-05-28 PROCEDURE — 63600175 PHARM REV CODE 636 W HCPCS: Performed by: INTERNAL MEDICINE

## 2022-05-28 RX ORDER — LEVOFLOXACIN 500 MG/1
500 TABLET, FILM COATED ORAL DAILY
Qty: 7 TABLET | Refills: 0 | Status: ON HOLD | OUTPATIENT
Start: 2022-05-28 | End: 2022-06-13 | Stop reason: HOSPADM

## 2022-05-28 RX ADMIN — ALBUTEROL SULFATE 2 PUFF: 90 AEROSOL, METERED RESPIRATORY (INHALATION) at 11:05

## 2022-05-28 RX ADMIN — HYDROCODONE BITARTRATE AND ACETAMINOPHEN 1 TABLET: 5; 325 TABLET ORAL at 04:05

## 2022-05-28 RX ADMIN — FUROSEMIDE 20 MG: 20 TABLET ORAL at 08:05

## 2022-05-28 RX ADMIN — LEVOFLOXACIN 500 MG: 5 INJECTION, SOLUTION INTRAVENOUS at 05:05

## 2022-05-28 RX ADMIN — MUPIROCIN: 20 OINTMENT TOPICAL at 08:05

## 2022-05-28 RX ADMIN — APIXABAN 5 MG: 5 TABLET, FILM COATED ORAL at 08:05

## 2022-05-28 RX ADMIN — DONEPEZIL HYDROCHLORIDE 10 MG: 5 TABLET, FILM COATED ORAL at 08:05

## 2022-05-28 RX ADMIN — PRAMIPEXOLE DIHYDROCHLORIDE 1 MG: 0.25 TABLET ORAL at 08:05

## 2022-05-28 RX ADMIN — PANTOPRAZOLE SODIUM 40 MG: 40 TABLET, DELAYED RELEASE ORAL at 08:05

## 2022-05-28 RX ADMIN — CETIRIZINE HYDROCHLORIDE 10 MG: 10 TABLET, FILM COATED ORAL at 08:05

## 2022-05-28 RX ADMIN — DILTIAZEM HYDROCHLORIDE 120 MG: 60 TABLET, FILM COATED ORAL at 05:05

## 2022-05-28 RX ADMIN — GABAPENTIN 100 MG: 100 CAPSULE ORAL at 08:05

## 2022-05-28 RX ADMIN — VENLAFAXINE HYDROCHLORIDE 37.5 MG: 37.5 CAPSULE, EXTENDED RELEASE ORAL at 05:05

## 2022-05-28 RX ADMIN — ALBUTEROL SULFATE 2 PUFF: 90 AEROSOL, METERED RESPIRATORY (INHALATION) at 05:05

## 2022-05-28 RX ADMIN — CYANOCOBALAMIN TAB 500 MCG 500 MCG: 500 TAB at 08:05

## 2022-05-28 RX ADMIN — LOSARTAN POTASSIUM 100 MG: 50 TABLET, FILM COATED ORAL at 08:05

## 2022-05-28 RX ADMIN — TRAZODONE HYDROCHLORIDE 50 MG: 50 TABLET ORAL at 08:05

## 2022-05-28 RX ADMIN — FLUTICASONE FUROATE AND VILANTEROL TRIFENATATE 1 PUFF: 100; 25 POWDER RESPIRATORY (INHALATION) at 08:05

## 2022-05-28 NOTE — PT/OT/SLP PROGRESS
Physical Therapy      Patient Name:  Emma Jorgensen   MRN:  [id number]    Patient not seen today secondary to her stating she is discharging home.

## 2022-05-28 NOTE — DISCHARGE SUMMARY
[facility] Hospital Medicine Discharge Summary    Admit Date: 5/24/2022  Discharge Date and Time: 5/28/202210:02 AM  Admitting Physician: [unfilled]  Discharging Physician: Sariah Moore MD.  Primary Care Physician: Mary Jane Dean MD  Consults:     Discharge Diagnoses:  Bilateral lung opacities-HAP versus aspiration pneumonia versus volume overload   Acute hypoxemic respiratory failure secondary to above   Chronic diastolic heart failure-appears compensated   Moderate to severe aortic stenosis awaiting TAVR   Recent trimalleolar ankle fracture status post surgical fixation, late April, discharged to skilled nursing facility   PAF   History of DVT/PE   Bilateral carotid artery disease   Essential HTN-stable   HLD   History of GERD   Obesity   Chronic dementia   Anxiety   Prophylaxis       Hospital Course:   88-year-old female with significant history of diastolic heart failure, PAF, valvular heart disease, bilateral carotid artery stenosis, HTN, chronic dementia, history of DVT/PE, GERD, anxiety, obesity. Patient had a recent hospital admission for closed right trimalleolar ankle fracture for which she underwent fixation on 04/26. Patient also had acute proximal phalanx great toe fracture. This was secondary to fall. She does have history of recurrent falls at home. The patient was admitted to hospitalist medicine for this. Hospital stay then was even full for acute kidney injury which improved after holding Lasix, losartan. She was then evaluated by Cardiology for moderate to severe aortic stenosis. Recommended TAVR as outpatient. Patient was discharged to skilled nursing facility. Patient was sent to the ED from skilled nursing facility with complaints of shortness of breath. Reports acute on chronic dry cough lately. Upon initial evaluation in the ED patient was hemodynamically stable except for hypoxemia for which she was initiated on 6 L oxygen mask and saturations were  stable. Chest x-ray with bilateral opacities. Patient was treated with antimicrobials in the ED and hospitalist team consulted for admission. Lab significant for chronic anemia. She was also noted to have mild hyponatremic hypochloremia   88 years old female history of diastolic CHF was hospitalized for pneumonia and CHF exacerbation   No CHF was treated with the Lasix at 20 mg IV b.i.d.   pneumonia was treated with IV vancomycin and IV Levaquin  The patient underwent a physical therapy every day   Patient can maintain 96% O2 sat with 2 L nasal cannula   After 4 days of hospitalization, patient felt much better, no shortness for breath, no fever, no cough   Patient can be transferred back to skilled nursing facility today.   I gave the patient Levaquin prescription and increase the Lasix to 40 mg once a day instead of 20 mg once a day.     Pt was seen and examined on the day of discharge  Vitals:  VITAL SIGNS: 24 HRS MIN & MAX LAST   Temp  Min: 97.5 °F (36.4 °C)  Max: 98.2 °F (36.8 °C) 98.1 °F (36.7 °C)   BP  Min: 109/61  Max: 161/79 116/63   Pulse  Min: 62  Max: 80  66   Resp  Min: 18  Max: 22 20   SpO2  Min: 90 %  Max: 100 % 100 %       Physical Exam:  General: In no acute distress, afebrile, morbidly obese   Chest: Clear to auscultation bilaterally   Heart: RRR, +S1, S2, no appreciable murmur   Abdomen: Soft, nontender, BS +   MSK: Warm, no lower extremity edema, no clubbing or cyanosis   Neurologic: Alert and oriented x4, Cranial nerve II-XII intact, Strength 5/5 in all 4 extremities       Procedures Performed: No admission procedures for hospital encounter.     Significant Diagnostic Studies: See Full reports for all details    Recent Labs   Lab 05/24/22  1218 05/25/22  0416 05/27/22  0750   WBC 7.5 7.4 6.4   RBC 3.19* 3.16* 3.40*   HGB 9.9* 9.8* 10.5*   HCT 32.5* 31.0* 34.8*   .9* 98.1* 102.4*   MCH 31.0 31.0 30.9   MCHC 30.5* 31.6* 30.2*   RDW 13.7 13.4 13.4    304 334   MPV 9.7 9.6 9.5        Recent Labs   Lab 05/24/22  1218 05/25/22  0416 05/27/22  0750   * 132* 131*   K 4.3 4.0 3.7   CO2 36* 34* 33*   BUN 11.4 11.4 11.3   CREATININE 0.70 0.70 0.67   CALCIUM 9.1 8.9 9.0   ALBUMIN 3.0* 2.9*  --    ALKPHOS 100 89  --    ALT 9 9  --    AST 15 16  --    BILITOT 0.4 0.4  --         Microbiology Results (last 7 days)     Procedure Component Value Units Date/Time    Blood Culture [[id number]]  (Normal) Collected: 05/24/22 1731    Order Status: Completed Specimen: Blood Updated: 05/27/22 2100     CULTURE, BLOOD ([facility]) No Growth At 72 Hours    Blood Culture [[id number]]  (Normal) Collected: 05/24/22 1731    Order Status: Completed Specimen: Blood Updated: 05/27/22 2100     CULTURE, BLOOD ([facility]) No Growth At 72 Hours    Respiratory Culture [[id number]]     Order Status: Sent Specimen: Sputum, Expectorated            Fl Modified Barium Swallow Speech  See Speech Therapist Notes    This procedure was auto-finalized.         Medication List      START taking these medications    levoFLOXacin 500 MG tablet  Commonly known as: LEVAQUIN  Take 1 tablet (500 mg total) by mouth once daily.        CHANGE how you take these medications    furosemide 40 MG tablet  Commonly known as: LASIX  Take 1 tablet (40 mg total) by mouth once daily.  What changed:   · medication strength  · how much to take        CONTINUE taking these medications    albuterol 90 mcg/actuation inhaler  Commonly known as: PROVENTIL/VENTOLIN HFA     apixaban 2.5 mg Tab  Commonly known as: ELIQUIS  Take 2 tablets (5 mg total) by mouth 2 (two) times daily.     budesonide-glycopyr-formoterol 160-9-4.8 mcg/actuation Hfaa     clotrimazole-betamethasone 1-0.05% cream  Commonly known as: LOTRISONE     cyanocobalamin 500 MCG tablet  Take 1 tablet (500 mcg total) by mouth once daily.     diclofenac sodium 1 % Gel  Commonly known as: VOLTAREN     dicyclomine 20 mg tablet  Commonly known as: BENTYL     diltiaZEM 120 MG tablet  Commonly known as: CARDIZEM      donepeziL 10 MG tablet  Commonly known as: ARICEPT     gabapentin 300 MG capsule  Commonly known as: NEURONTIN     hydroCHLOROthiazide 12.5 MG Tab  Commonly known as: HYDRODIURIL     HYDROcodone-acetaminophen 5-325 mg per tablet  Commonly known as: NORCO     loratadine 10 mg tablet  Commonly known as: CLARITIN     LORazepam 1 MG tablet  Commonly known as: ATIVAN     losartan 100 MG tablet  Commonly known as: COZAAR     pantoprazole 40 MG tablet  Commonly known as: PROTONIX  Take 1 tablet (40 mg total) by mouth once daily.     pramipexole 1 MG tablet  Commonly known as: MIRAPEX     traZODone 50 MG tablet  Commonly known as: DESYREL  Take 1 tablet (50 mg total) by mouth every evening.     venlafaxine 37.5 MG 24 hr capsule  Commonly known as: EFFEXOR-XR        ASK your doctor about these medications    ADVAIR -21 mcg/actuation Hfaa inhaler  Generic drug: fluticasone propion-salmeterol 115-21 mcg/dose     betamethasone dipropionate 0.05 % cream     omeprazole 20 mg Tbec           Where to Get Your Medications      You can get these medications from any pharmacy    Bring a paper prescription for each of these medications  · furosemide 40 MG tablet  · levoFLOXacin 500 MG tablet          Explained in detail to the patient about the discharge plan, medications, and follow-up visits. Pt understands and agrees with the treatment plan  Discharge Disposition: Skilled Nursing Facility   Discharged Condition: stable  Diet-   Dietary Orders (From admission, onward)     Start     Ordered    05/25/22 1110  Diet Soft & Bite Sized No Straws (meds whole in pudding)  Diet effective now        Question:  Diet Modifier:  Answer:  No Straws  Comment:  meds whole in pudding    05/25/22 1110               Medications Per DC med rec  Activities as tolerated    For further questions contact hospitalist office    Discharge time 33 minutes    For worsening symptoms, chest pain, shortness of breath, increased abdominal pain, high grade  fever, stroke or stroke like symptoms, immediately go to the nearest Emergency Room or call 911 as soon as possible.      Sariah White M.D, on 5/28/2022. at 10:02 AM.

## 2022-05-28 NOTE — PLAN OF CARE
Problem: Adult Inpatient Plan of Care  Goal: Plan of Care Review  Outcome: Ongoing, Progressing  Goal: Patient-Specific Goal (Individualized)  Outcome: Ongoing, Progressing  Goal: Absence of Hospital-Acquired Illness or Injury  Outcome: Ongoing, Progressing  Goal: Optimal Comfort and Wellbeing  Outcome: Ongoing, Progressing  Goal: Readiness for Transition of Care  Outcome: Ongoing, Progressing     Problem: Bariatric Environmental Safety  Goal: Safety Maintained with Care  Outcome: Ongoing, Progressing     Problem: Skin Injury Risk Increased  Goal: Skin Health and Integrity  Outcome: Ongoing, Progressing     Problem: Fall Injury Risk  Goal: Absence of Fall and Fall-Related Injury  Outcome: Ongoing, Progressing     Problem: Infection  Goal: Absence of Infection Signs and Symptoms  Outcome: Ongoing, Progressing     Problem: Behavior Regulation Impairment (Dementia Signs/Symptoms)  Goal: Improved Behavioral Control (Dementia Signs/Symptoms)  Outcome: Ongoing, Progressing     Problem: Cognitive Impairment (Dementia Signs/Symptoms)  Goal: Optimized Cognitive Function (Dementia Signs/Symptoms)  Outcome: Ongoing, Progressing     Problem: Mood Impairment (Dementia Signs/Symptoms)  Goal: Improved Mood Symptoms (Dementia Signs/Symptoms)  Outcome: Ongoing, Progressing     Problem: Sleep Disturbance (Dementia Signs/Symptoms)  Goal: Improved Sleep (Dementia Signs/Symptoms)  Outcome: Ongoing, Progressing     Problem: Fluid Imbalance (Pneumonia)  Goal: Fluid Balance  Outcome: Ongoing, Progressing     Problem: Infection (Pneumonia)  Goal: Resolution of Infection Signs and Symptoms  Outcome: Ongoing, Progressing     Problem: Respiratory Compromise (Pneumonia)  Goal: Effective Oxygenation and Ventilation  Outcome: Ongoing, Progressing     Problem: Impaired Wound Healing  Goal: Optimal Wound Healing  Outcome: Ongoing, Progressing

## 2022-05-29 LAB
BACTERIA BLD CULT: NORMAL
BACTERIA BLD CULT: NORMAL

## 2022-05-29 PROCEDURE — 25000003 PHARM REV CODE 250: Performed by: INTERNAL MEDICINE

## 2022-05-29 PROCEDURE — 96366 THER/PROPH/DIAG IV INF ADDON: CPT

## 2022-05-29 PROCEDURE — G0378 HOSPITAL OBSERVATION PER HR: HCPCS

## 2022-05-29 PROCEDURE — 63600175 PHARM REV CODE 636 W HCPCS: Performed by: INTERNAL MEDICINE

## 2022-05-29 RX ADMIN — PANTOPRAZOLE SODIUM 40 MG: 40 TABLET, DELAYED RELEASE ORAL at 08:05

## 2022-05-29 RX ADMIN — MUPIROCIN: 20 OINTMENT TOPICAL at 08:05

## 2022-05-29 RX ADMIN — LEVOFLOXACIN 500 MG: 5 INJECTION, SOLUTION INTRAVENOUS at 04:05

## 2022-05-29 RX ADMIN — ALBUTEROL SULFATE 2 PUFF: 90 AEROSOL, METERED RESPIRATORY (INHALATION) at 05:05

## 2022-05-29 RX ADMIN — DILTIAZEM HYDROCHLORIDE 120 MG: 60 TABLET, FILM COATED ORAL at 05:05

## 2022-05-29 RX ADMIN — HYDROCODONE BITARTRATE AND ACETAMINOPHEN 1 TABLET: 5; 325 TABLET ORAL at 11:05

## 2022-05-29 RX ADMIN — TRAZODONE HYDROCHLORIDE 50 MG: 50 TABLET ORAL at 08:05

## 2022-05-29 RX ADMIN — GABAPENTIN 100 MG: 100 CAPSULE ORAL at 08:05

## 2022-05-29 RX ADMIN — LOSARTAN POTASSIUM 100 MG: 50 TABLET, FILM COATED ORAL at 08:05

## 2022-05-29 RX ADMIN — APIXABAN 5 MG: 5 TABLET, FILM COATED ORAL at 08:05

## 2022-05-29 RX ADMIN — CYANOCOBALAMIN TAB 500 MCG 500 MCG: 500 TAB at 08:05

## 2022-05-29 RX ADMIN — FUROSEMIDE 20 MG: 20 TABLET ORAL at 08:05

## 2022-05-29 RX ADMIN — FLUTICASONE FUROATE AND VILANTEROL TRIFENATATE 1 PUFF: 100; 25 POWDER RESPIRATORY (INHALATION) at 08:05

## 2022-05-29 RX ADMIN — VENLAFAXINE HYDROCHLORIDE 37.5 MG: 37.5 CAPSULE, EXTENDED RELEASE ORAL at 05:05

## 2022-05-29 RX ADMIN — PRAMIPEXOLE DIHYDROCHLORIDE 1 MG: 0.25 TABLET ORAL at 08:05

## 2022-05-29 RX ADMIN — DONEPEZIL HYDROCHLORIDE 10 MG: 5 TABLET, FILM COATED ORAL at 08:05

## 2022-05-29 RX ADMIN — CETIRIZINE HYDROCHLORIDE 10 MG: 10 TABLET, FILM COATED ORAL at 08:05

## 2022-05-29 NOTE — PLAN OF CARE
Problem: Adult Inpatient Plan of Care  Goal: Plan of Care Review  Outcome: Ongoing, Progressing  Goal: Patient-Specific Goal (Individualized)  Outcome: Ongoing, Progressing  Goal: Absence of Hospital-Acquired Illness or Injury  Outcome: Ongoing, Progressing  Goal: Optimal Comfort and Wellbeing  Outcome: Ongoing, Progressing  Goal: Readiness for Transition of Care  Outcome: Ongoing, Progressing     Problem: Skin Injury Risk Increased  Goal: Skin Health and Integrity  Outcome: Ongoing, Progressing     Problem: Fall Injury Risk  Goal: Absence of Fall and Fall-Related Injury  Outcome: Ongoing, Progressing     Problem: Infection  Goal: Absence of Infection Signs and Symptoms  Outcome: Ongoing, Progressing     Problem: Behavior Regulation Impairment (Dementia Signs/Symptoms)  Goal: Improved Behavioral Control (Dementia Signs/Symptoms)  Outcome: Ongoing, Progressing     Problem: Mood Impairment (Dementia Signs/Symptoms)  Goal: Improved Mood Symptoms (Dementia Signs/Symptoms)  Outcome: Ongoing, Progressing     Problem: Sleep Disturbance (Dementia Signs/Symptoms)  Goal: Improved Sleep (Dementia Signs/Symptoms)  Outcome: Ongoing, Progressing     Problem: Fluid Imbalance (Pneumonia)  Goal: Fluid Balance  Outcome: Ongoing, Progressing

## 2022-05-30 PROCEDURE — 97530 THERAPEUTIC ACTIVITIES: CPT | Mod: CO

## 2022-05-30 PROCEDURE — 25000003 PHARM REV CODE 250: Performed by: INTERNAL MEDICINE

## 2022-05-30 PROCEDURE — G0378 HOSPITAL OBSERVATION PER HR: HCPCS

## 2022-05-30 PROCEDURE — 97530 THERAPEUTIC ACTIVITIES: CPT | Mod: CQ

## 2022-05-30 PROCEDURE — 94761 N-INVAS EAR/PLS OXIMETRY MLT: CPT

## 2022-05-30 PROCEDURE — 97535 SELF CARE MNGMENT TRAINING: CPT | Mod: CO

## 2022-05-30 PROCEDURE — 27000221 HC OXYGEN, UP TO 24 HOURS

## 2022-05-30 RX ORDER — LEVOFLOXACIN 500 MG/1
500 TABLET, FILM COATED ORAL DAILY
Status: COMPLETED | OUTPATIENT
Start: 2022-05-30 | End: 2022-06-01

## 2022-05-30 RX ADMIN — FUROSEMIDE 20 MG: 20 TABLET ORAL at 09:05

## 2022-05-30 RX ADMIN — ALBUTEROL SULFATE 2 PUFF: 90 AEROSOL, METERED RESPIRATORY (INHALATION) at 12:05

## 2022-05-30 RX ADMIN — DILTIAZEM HYDROCHLORIDE 120 MG: 60 TABLET, FILM COATED ORAL at 05:05

## 2022-05-30 RX ADMIN — PRAMIPEXOLE DIHYDROCHLORIDE 1 MG: 0.25 TABLET ORAL at 08:05

## 2022-05-30 RX ADMIN — ALBUTEROL SULFATE 2 PUFF: 90 AEROSOL, METERED RESPIRATORY (INHALATION) at 05:05

## 2022-05-30 RX ADMIN — GABAPENTIN 100 MG: 100 CAPSULE ORAL at 08:05

## 2022-05-30 RX ADMIN — DONEPEZIL HYDROCHLORIDE 10 MG: 5 TABLET, FILM COATED ORAL at 08:05

## 2022-05-30 RX ADMIN — APIXABAN 5 MG: 5 TABLET, FILM COATED ORAL at 08:05

## 2022-05-30 RX ADMIN — MUPIROCIN: 20 OINTMENT TOPICAL at 09:05

## 2022-05-30 RX ADMIN — FLUTICASONE FUROATE AND VILANTEROL TRIFENATATE 1 PUFF: 100; 25 POWDER RESPIRATORY (INHALATION) at 09:05

## 2022-05-30 RX ADMIN — CYANOCOBALAMIN TAB 500 MCG 500 MCG: 500 TAB at 09:05

## 2022-05-30 RX ADMIN — PANTOPRAZOLE SODIUM 40 MG: 40 TABLET, DELAYED RELEASE ORAL at 09:05

## 2022-05-30 RX ADMIN — LOSARTAN POTASSIUM 100 MG: 50 TABLET, FILM COATED ORAL at 09:05

## 2022-05-30 RX ADMIN — CETIRIZINE HYDROCHLORIDE 10 MG: 10 TABLET, FILM COATED ORAL at 09:05

## 2022-05-30 RX ADMIN — TRAZODONE HYDROCHLORIDE 50 MG: 50 TABLET ORAL at 08:05

## 2022-05-30 RX ADMIN — APIXABAN 5 MG: 5 TABLET, FILM COATED ORAL at 09:05

## 2022-05-30 RX ADMIN — LEVOFLOXACIN 500 MG: 500 TABLET, FILM COATED ORAL at 02:05

## 2022-05-30 RX ADMIN — VENLAFAXINE HYDROCHLORIDE 37.5 MG: 37.5 CAPSULE, EXTENDED RELEASE ORAL at 05:05

## 2022-05-30 NOTE — PT/OT/SLP PROGRESS
Occupational Therapy  Treatment    Emma Jorgensen   MRN: [id number]   Admitting Diagnosis: CHF (congestive heart failure)    OT Date of Treatment: 05/30/22   OT Start Time: 1045  OT Stop Time: 1113  OT Total Time (min): 28 min     Billable Minutes:  Self Care/Home Management 2  Total Minutes: 28     OT/ALAYNA: ALAYNA CATALAN Visit Number: 3    General Precautions: Standard, fall  Orthopedic Precautions: RLE weight bearing as tolerated  Braces:      Spiritual, Cultural Beliefs, Restorationist Practices, Values that Affect Care: no    Subjective:  Communicated with RN prior to session.  Alert  Distracted  3L NC    Objective:  Pt. Performing grooming task EOB while brushing teeth, with appropriate preparation noted. Slight L lateral lean sitting EOB noted.    Functional Mobility:  Bed Mobility:   Supine to sit: Moderate Assistance   Sit to supine: Moderate Assistance   Rolling: Moderate Assistance   Scooting: Minimal Assistance    Transfer Training:   Sit to stand:Moderate Assistance with Rolling Walker .  Pt. Performing stand step/pivot t/f to BS chair using RW for UE support with balance Mod A with cueing throughout.    Balance:   Static Sit: SBA  Dynamic Sit:  CGA L lateral lean  Static Stand: Mod A  Dynamic stand: Mod A    Patient left up in chair with all lines intact and call button in reach    ASSESSMENT:  Emma Jorgensen is a 88 y.o. female with a medical diagnosis of CHF (congestive heart failure). Pt. Tolerated session well overall requiring Mod A during mobility with adherence given to all pxns.    Activity tolerance: Good    Discharge recommendations: nursing facility, skilled     Equipment recommendations: walker, rolling     GOALS:   Multidisciplinary Problems     Occupational Therapy Goals        Problem: Occupational Therapy    Goal Priority Disciplines Outcome Interventions   Occupational Therapy Goal     OT, PT/OT Ongoing, Progressing    Description: LTG: Pt will complete all BADL'S (accept for LB  dressing)with SBA by d/c     STG: goal to met by 6/8     1.Pt will complete UE Dressing with CGA.     2.Pt will perform LE Dressing with Mod A.    3. Pt will complete grooming while standing >2 min at sink with Stand by Assist using RW.    4. Toileting/ toilet transfer from bedside commode with Min A using RW.                     Plan:  Patient to be seen 4 x/week, 5 x/week to address the above listed problems via self-care/home management, therapeutic activities, therapeutic exercises  Plan of Care expires: 06/08/22  Plan of Care reviewed with: patient         05/30/2022

## 2022-05-30 NOTE — PT/OT/SLP PROGRESS
Physical Therapy Treatment    Patient Name:  Emma Jorgensen   MRN:  [id number]    Recommendations:     Discharge Recommendations:  nursing facility, skilled   Discharge Equipment Recommendations: walker, rolling, wheelchair   Barriers to discharge: None    Assessment:     Emma Jorgensen is a 88 y.o. female admitted with a medical diagnosis of CHF (congestive heart failure).  She presents with the following impairments/functional limitations:    .    Rehab Prognosis: Good; patient would benefit from acute skilled PT services to address these deficits and reach maximum level of function.    Recent Surgery: * No surgery found *      Plan:     During this hospitalization, patient to be seen 6 x/week to address the identified rehab impairments via therapeutic activities, therapeutic exercises and progress toward the following goals:    · Plan of Care Expires:  06/25/22    Subjective     Chief Complaint:SOB   Patient/Family Comments/goals:   Pain/Comfort:  ·        Objective:     Communicated with rn prior to session.  Patient found HOB elevated with   upon PT entry to room.     General Precautions: Standard, fall   Orthopedic Precautions: (R LE WBAT with CAM boot; L LE WBAT with Darco shoe (?); stand and transfers only, no ambulation)   Braces:    Respiratory Status: Nasal cannula, flow 3 L/min     Functional Mobility:  · Bed Mobility:     · Scooting: stand by assistance  · Supine to Sit: minimum assistance  · Transfers:     · Sit to Stand:  moderate assistance with rolling walker  · Bed to Chair: moderate assistance with  rolling walker  using  Step Transfer      AM-PAC 6 CLICK MOBILITY          Therapeutic Activities and Exercises:   Pt sat EOB while performing breathing techniques. Pt desat with activity and recovers with rest breaks.    Patient left up in chair with all lines intact and call button in reach..    GOALS:   Multidisciplinary Problems     Physical Therapy Goals        Problem: Physical Therapy     Goal Priority Disciplines Outcome Goal Variances Interventions   Physical Therapy Goal     PT, PT/OT Ongoing, Progressing     Description: Goals to be met by: 22    Patient will increase functional independence with mobility by performin. Supine to sit with Stand-by Assistance  2. Sit to supine with Stand-by Assistance  3. Sit to stand transfer with Minimal Assistance using Rolling Walker  4. Bed to chair transfer with Minimal Assistance using Rolling Walker  5. Wheelchair propulsion x200 feet with Stand-by Assistance using bilateral uppper extremities                     Time Tracking:     PT Received On: 22  PT Start Time: 1044     PT Stop Time: 1113  PT Total Time (min): 29 min     Billable Minutes: Therapeutic Activity 29    Treatment Type: Treatment  PT/PTA: PTA     PTA Visit Number: 2     2022

## 2022-05-30 NOTE — PROGRESS NOTES
[facility]  Progress Note - Hospital Medicine     Patient Name: Emma Jorgensen  MRN: [id number]  Status: OP- Observation   Admission Date: 5/24/2022  Length of Stay: 3      CC: hospital follow-up for heart failure       SUBJECTIVE   88-year-old female with a history that includes dementia, valvular heart disease, diastolic heart failure, PAF and DVT/PE, on Eliquis.  Patient had a recent hospital admission following a fall where she was treated for closed right trimalleolar ankle fracture for which she underwent fixation on 4/26.  Patient also had acute proximal phalanx great toe fracture.  She has a history of recurrent falls at home.  Hospital stay was also eventful for acute kidney injury which improved after holding Lasix, losartan. She was then evaluated by Cardiology for moderate to severe aortic stenosis, and TAVR recommended as an outpatient.  Patient was discharged to skilled nursing facility on 5/3.  She was now sent to the ED from skilled nursing facility on 5/24 with complaints of shortness of breath associated with acute on chronic dry cough. Upon initial evaluation in the ED patient was hemodynamically stable except for hypoxemia for which she was initiated on 6 L oxygen.  Chest x-ray with bilateral opacities.  Hospitalist team consulted for admission. Lab significant for chronic anemia. She was also noted to have mild hyponatremic hypochloremia.  She was treated for decompensated heart failure, over the next 4 days showed good clinical improvement.  Oxygen was weaned down to 2 L nasal cannula.    Today: Patient seen and examined at bedside, and chart reviewed.  Remains stable on a small amount of supplemental oxygen.  No new complaints today.  Nursing no no new events.        MEDICATIONS   Scheduled   albuterol  2 puff Inhalation Q6H    apixaban  5 mg Oral BID    cetirizine  10 mg Oral Daily    cyanocobalamin  500 mcg Oral Daily    diltiaZEM  120 mg Oral Daily     donepeziL  10 mg Oral Nightly    fluticasone furoate-vilanteroL  1 puff Inhalation Daily    furosemide  20 mg Oral Daily    gabapentin  100 mg Oral Nightly    levoFLOXacin  500 mg Intravenous Q24H    losartan  100 mg Oral Daily    mupirocin   Topical (Top) Daily    pantoprazole  40 mg Oral Daily    pramipexole  1 mg Oral Nightly    traZODone  50 mg Oral QHS    venlafaxine  37.5 mg Oral Daily     Continuous Infusions  None    PRN  HYDROcodone-acetaminophen, LORazepam, ondansetron       PHYSICAL EXAM   VITALS  Temp: 97.3 °F (36.3 °C) (05/30/22 1100)  Pulse: 70 (05/30/22 1225)  Resp: 16 (05/30/22 1225)  BP: (!) 140/54 (05/30/22 1100)  SpO2: 99 % (05/30/22 1225)    GENERAL: Awake and in NAD  LUNGS: CTA B/L  CVS: Normal rate  GI/: Soft, NT, bowel sounds positive.  EXTREMITIES: No peripheral edema  NEURO: Awake alert and partially oriented  PSYCH: Cooperative      LABS   CBC  No results for input(s): WBC, HGB, HCT, PLT in the last 72 hours.   CHEM  No results for input(s): NA, K, MG, CO2, BUN, CREATININE, CALCIUM, BILITOT, AST, ALT, ALKPHOS, ALBUMIN, PHOS in the last 72 hours.       ASSESSMENT   Acute decompensated diastolic heart failure, resolving  Acute hypoxemic respiratory failure secondary to above   Moderate to severe aortic stenosis, awaiting TAVR   Recent trimalleolar ankle fracture status post surgical fixation  PAF on Eliquis  Essential HTN  Obesity   Basline dementia   h/o Anxiety     PLAN   Medically stable  Continue current management including maintenance diuretics  CM working on discharge back to SNF  Continue with therapy services here in the interim        Prophylaxis: Home Glendy Villalta MD  [facility]  05/30/2022

## 2022-05-30 NOTE — PROGRESS NOTES
"Nutrition   Progress Note      Recommendations:  1. Continue diet as ordered and tolerated.   2. Add Kang BID for wound healing (90kcals, 2.5g protein per serving)      Reason for Evaluation:  Length of Stay    Diagnosis:    1. Pneumonia of both lungs due to infectious organism, unspecified part of lung    2. Shortness of breath    3. CHF (congestive heart failure)        Relevant Medical History:    Past Medical History:   Diagnosis Date    Anxiety disorder, unspecified     Arthritis     Deep vein thrombosis     Depression     Irregular heart beat     Obesity, unspecified     Other pulmonary embolism without acute cor pulmonale     Parkinson's disease     Trimalleolar fracture of ankle, closed     Unspecified dementia without behavioral disturbance          Nutrition Diet History:    Factors affecting nutritional intake: none identified at this time    Food / Anglican / Culture Preferences:      Nutrition Prescription Ordered:    Current Diet Order:     Appetite:  good    PO intake: 75 - 100 %      Labs / Medications / Procedures:    Nutrition Related Medications: B12, lasix, protonix    Nutrition Related Labs:  no recent labs       Anthropometrics:  Height: 5' 8" (1.727 m)  Admit Weight:  Weight: 117 kg (257 lb 15 oz)  Latest Weight:  110.5 kg (243 lb 8 oz)    Wt Readings from Last 5 Encounters:   05/28/22 110.5 kg (243 lb 8 oz)   05/18/22 111 kg (244 lb 11.4 oz)   04/29/22 111 kg (244 lb 11.4 oz)     IBW: 63.63kg  %IBW: 175  UBW: n/a  %Weight Change: -  Body mass index is 37.02 kg/m².  BMI classification:  Obese Grade II (BMI 35 - 39.9)      Nutrition Narrative:  5/30: Pt reports good appetite and po intake. Denies any GI complaints. Noted with R heel partial thickness tissue loss; s/p closed right trimalleolar ankle fx and fixation on 04/26. Willing to trial Kang BID for wound healing.     Monitoring and Evaluation:    Nutrition Monitoring and Evaluation:  food and beverage intake and weight " change    Nutrition Risk:  Level of Nutrition Risk:  Low  Frequency of Follow up:  Dietitian will f/up within 7 days.

## 2022-05-30 NOTE — PROGRESS NOTES
[facility]  Hospital Medicine Progress Note        Chief Complaint: Inpatient Follow-up for     HPI: 88-year-old female with significant history of diastolic heart failure, PAF, valvular heart disease, bilateral carotid artery stenosis, HTN, chronic dementia, history of DVT/PE, GERD, anxiety, obesity. Patient had a recent hospital admission for closed right trimalleolar ankle fracture for which she underwent fixation on 04/26. Patient also had acute proximal phalanx great toe fracture. This was secondary to fall. She does have history of recurrent falls at home. The patient was admitted to hospitalist medicine for this. Hospital stay then was even full for acute kidney injury which improved after holding Lasix, losartan. She was then evaluated by Cardiology for moderate to severe aortic stenosis. Recommended TAVR as outpatient. Patient was discharged to skilled nursing facility. Patient was sent to the ED from skilled nursing facility with complaints of shortness of breath. Reports acute on chronic dry cough lately. Upon initial evaluation in the ED patient was hemodynamically stable except for hypoxemia for which she was initiated on 6 L oxygen mask and saturations were stable. Chest x-ray with bilateral opacities. Patient was treated with antimicrobials in the ED and hospitalist team consulted for admission. Lab significant for chronic anemia. She was also noted to have mild hyponatremic hypochloremia   88 years old female history of diastolic CHF was hospitalized for pneumonia and CHF exacerbation   No CHF was treated with the Lasix at 20 mg IV b.i.d.   pneumonia was treated with IV vancomycin and IV Levaquin   The patient underwent a physical therapy every day   Patient can maintain 96% O2 sat with 2 L nasal cannula   After 4 days of hospitalization, patient felt much better, no shortness for breath, no fever, no cough   Patient can be transferred back to skilled nursing facility  today.   I gave the patient Levaquin prescription and increase the Lasix to 40 mg once a day instead of 20 mg once a day.       Interval Hx:   Pt has issues with her insurance [employer] and need permission to go back to snf, this will happen Tuesday since [employer] is closed.  Problem explained to pt and she is ok, voices no complains. Will continue present management                Objective/physical exam:  General: In no acute distress, afebrile  Chest: Clear to auscultation bilaterally  Heart: RRR, +S1, S2, no appreciable murmur  Abdomen: Soft, nontender, BS +  MSK: Warm, no lower extremity edema, no clubbing or cyanosis  Neurologic: Alert and oriented x4, Cranial nerve II-XII intact, Strength 5/5 in all 4 extremities    VITAL SIGNS: 24 HRS MIN & MAX LAST   Temp  Min: 97 °F (36.1 °C)  Max: 98.7 °F (37.1 °C) 97.6 °F (36.4 °C)   BP  Min: 142/66  Max: 152/75 (!) 142/66   Pulse  Min: 60  Max: 85  73   Resp  Min: 16  Max: 18 18   SpO2  Min: 95 %  Max: 99 % 99 %       Recent Labs   Lab 05/24/22  1218 05/25/22  0416 05/27/22  0750   WBC 7.5 7.4 6.4   RBC 3.19* 3.16* 3.40*   HGB 9.9* 9.8* 10.5*   HCT 32.5* 31.0* 34.8*   .9* 98.1* 102.4*   MCH 31.0 31.0 30.9   MCHC 30.5* 31.6* 30.2*   RDW 13.7 13.4 13.4    304 334   MPV 9.7 9.6 9.5       Recent Labs   Lab 05/24/22  1218 05/25/22  0416 05/27/22  0750   * 132* 131*   K 4.3 4.0 3.7   CO2 36* 34* 33*   BUN 11.4 11.4 11.3   CREATININE 0.70 0.70 0.67   CALCIUM 9.1 8.9 9.0   ALBUMIN 3.0* 2.9*  --    ALKPHOS 100 89  --    ALT 9 9  --    AST 15 16  --    BILITOT 0.4 0.4  --           Microbiology Results (last 7 days)     Procedure Component Value Units Date/Time    Blood Culture [[id number]]  (Normal) Collected: 05/24/22 1731    Order Status: Completed Specimen: Blood Updated: 05/28/22 2102     CULTURE, BLOOD ([facility]) No Growth At 96 Hours    Blood Culture [[id number]]  (Normal) Collected: 05/24/22 1731    Order Status: Completed Specimen: Blood Updated: 05/28/22 2102      CULTURE, BLOOD ([facility]) No Growth At 96 Hours    Respiratory Culture [[id number]]     Order Status: Sent Specimen: Sputum, Expectorated            See below for Radiology    Scheduled Med:   albuterol  2 puff Inhalation Q6H    apixaban  5 mg Oral BID    cetirizine  10 mg Oral Daily    cyanocobalamin  500 mcg Oral Daily    diltiaZEM  120 mg Oral Daily    donepeziL  10 mg Oral Nightly    fluticasone furoate-vilanteroL  1 puff Inhalation Daily    furosemide  20 mg Oral Daily    gabapentin  100 mg Oral Nightly    levoFLOXacin  500 mg Intravenous Q24H    losartan  100 mg Oral Daily    mupirocin   Topical (Top) Daily    pantoprazole  40 mg Oral Daily    pramipexole  1 mg Oral Nightly    traZODone  50 mg Oral QHS    venlafaxine  37.5 mg Oral Daily        Continuous Infusions:       PRN Meds:  HYDROcodone-acetaminophen, LORazepam, ondansetron       Assessment/Plan:  Bilateral lung opacities-HAP versus aspiration pneumonia versus volume overload   Acute hypoxemic respiratory failure secondary to above   Chronic diastolic heart failure-appears compensated   Moderate to severe aortic stenosis awaiting TAVR   Recent trimalleolar ankle fracture status post surgical fixation, late April, discharged to skilled nursing facility   PAF   History of DVT/PE   Bilateral carotid artery disease   Essential HTN-stable   HLD   History of GERD   Obesity   Chronic dementia   Anxiety   Prophylaxis     Patient condition:  Stable/Fair/Guarded/ Serious/ Critical    Anticipated discharge and Disposition:      All diagnosis and differential diagnosis have been reviewed; assessment and plan has been documented; I have personally reviewed the labs and test results that are presently available; I have reviewed the patients medication list; I have reviewed the consulting providers response and recommendations. I have reviewed or attempted to review medical records based upon their availability    All of the patient's questions have been   addressed and answered. Patient's is agreeable to the above stated plan. I will continue to monitor closely and make adjustments to medical management as needed.  _____________________________________________________________________    Nutrition Status:    Radiology:  Fl Modified Barium Swallow Speech  See Speech Therapist Notes    This procedure was auto-finalized.      Ayden Resendiz MD   05/29/2022

## 2022-05-30 NOTE — PT/OT/SLP PROGRESS
Occupational Therapy  Treatment    Emma Joregnsen   MRN: [id number]   Admitting Diagnosis: CHF (congestive heart failure)    OT Date of Treatment: 05/30/22   OT Start Time: 1330  OT Stop Time: 1340  OT Total Time (min): 10 min     Billable Minutes:  Therapeutic Activity 1  Total Minutes: 10     OT/ALAYNA: ALAYNA CATALAN Visit Number: 4    General Precautions: Standard, fall  Orthopedic Precautions: RLE weight bearing as tolerated  Braces:      Spiritual, Cultural Beliefs, Hindu Practices, Values that Affect Care: no    Subjective:  Communicated with RN prior to session.         Objective:  Pt. T/f BTB with Mod A during stand step/pivot t/f using RW for UE support with balance. No LOB noted requiring overall cueing for safety throughout t/f.    Patient left supine with all lines intact and call button in reach    ASSESSMENT:  Emma Jorgensen is a 88 y.o. female with a medical diagnosis of CHF (congestive heart failure). Pt. Tolerated t/f well overall Mod A for mobility.    Activity tolerance: Good    Discharge recommendations: nursing facility, skilled     Equipment recommendations: walker, rolling     GOALS:   Multidisciplinary Problems     Occupational Therapy Goals        Problem: Occupational Therapy    Goal Priority Disciplines Outcome Interventions   Occupational Therapy Goal     OT, PT/OT Ongoing, Progressing    Description: LTG: Pt will complete all BADL'S (accept for LB dressing)with SBA by d/c     STG: goal to met by 6/8     1.Pt will complete UE Dressing with CGA.     2.Pt will perform LE Dressing with Mod A.    3. Pt will complete grooming while standing >2 min at sink with Stand by Assist using RW.    4. Toileting/ toilet transfer from bedside commode with Min A using RW.                     Plan:  Patient to be seen 4 x/week, 5 x/week to address the above listed problems via self-care/home management, therapeutic activities, therapeutic exercises  Plan of Care expires: 06/08/22  Plan of Care  reviewed with: patient         05/30/2022

## 2022-05-31 LAB — SARS-COV-2 RDRP RESP QL NAA+PROBE: NEGATIVE

## 2022-05-31 PROCEDURE — 27000221 HC OXYGEN, UP TO 24 HOURS

## 2022-05-31 PROCEDURE — 94761 N-INVAS EAR/PLS OXIMETRY MLT: CPT

## 2022-05-31 PROCEDURE — 25000003 PHARM REV CODE 250: Performed by: INTERNAL MEDICINE

## 2022-05-31 PROCEDURE — G0378 HOSPITAL OBSERVATION PER HR: HCPCS

## 2022-05-31 PROCEDURE — 87635 SARS-COV-2 COVID-19 AMP PRB: CPT | Performed by: INTERNAL MEDICINE

## 2022-05-31 PROCEDURE — 97530 THERAPEUTIC ACTIVITIES: CPT | Mod: CQ

## 2022-05-31 RX ADMIN — VENLAFAXINE HYDROCHLORIDE 37.5 MG: 37.5 CAPSULE, EXTENDED RELEASE ORAL at 05:05

## 2022-05-31 RX ADMIN — FUROSEMIDE 20 MG: 20 TABLET ORAL at 08:05

## 2022-05-31 RX ADMIN — FLUTICASONE FUROATE AND VILANTEROL TRIFENATATE 1 PUFF: 100; 25 POWDER RESPIRATORY (INHALATION) at 08:05

## 2022-05-31 RX ADMIN — GABAPENTIN 100 MG: 100 CAPSULE ORAL at 08:05

## 2022-05-31 RX ADMIN — TRAZODONE HYDROCHLORIDE 50 MG: 50 TABLET ORAL at 08:05

## 2022-05-31 RX ADMIN — PANTOPRAZOLE SODIUM 40 MG: 40 TABLET, DELAYED RELEASE ORAL at 08:05

## 2022-05-31 RX ADMIN — APIXABAN 5 MG: 5 TABLET, FILM COATED ORAL at 08:05

## 2022-05-31 RX ADMIN — ALBUTEROL SULFATE 2 PUFF: 90 AEROSOL, METERED RESPIRATORY (INHALATION) at 05:05

## 2022-05-31 RX ADMIN — MUPIROCIN: 20 OINTMENT TOPICAL at 08:05

## 2022-05-31 RX ADMIN — PRAMIPEXOLE DIHYDROCHLORIDE 1 MG: 0.25 TABLET ORAL at 08:05

## 2022-05-31 RX ADMIN — CYANOCOBALAMIN TAB 500 MCG 500 MCG: 500 TAB at 08:05

## 2022-05-31 RX ADMIN — CETIRIZINE HYDROCHLORIDE 10 MG: 10 TABLET, FILM COATED ORAL at 08:05

## 2022-05-31 RX ADMIN — DONEPEZIL HYDROCHLORIDE 10 MG: 5 TABLET, FILM COATED ORAL at 08:05

## 2022-05-31 RX ADMIN — LEVOFLOXACIN 500 MG: 500 TABLET, FILM COATED ORAL at 08:05

## 2022-05-31 RX ADMIN — ALBUTEROL SULFATE 2 PUFF: 90 AEROSOL, METERED RESPIRATORY (INHALATION) at 12:05

## 2022-05-31 RX ADMIN — LOSARTAN POTASSIUM 100 MG: 50 TABLET, FILM COATED ORAL at 08:05

## 2022-05-31 NOTE — PT/OT/SLP PROGRESS
Physical Therapy Treatment    Patient Name:  Emma Jorgensen   MRN:  [id number]    Recommendations:     Discharge Recommendations:  nursing facility, skilled   Discharge Equipment Recommendations: walker, rolling, wheelchair   Barriers to discharge: insurance    Assessment:     Emma Jorgensen is a 88 y.o. female admitted with a medical diagnosis of CHF (congestive heart failure).  She presents with the following impairments/functional limitations:  .weakness, gait instability, impaired cardiopulmonary response to activity, impaired endurance, impaired balance, impaired functional mobilty     Rehab Prognosis: Good and Fair; patient would benefit from acute skilled PT services to address these deficits and reach maximum level of function.    Recent Surgery: * No surgery found *      Plan:     During this hospitalization, patient to be seen 6 x/week to address the identified rehab impairments via therapeutic activities, therapeutic exercises and progress toward the following goals:    · Plan of Care Expires:  06/25/22    Subjective     Chief Complaint: SOB   Patient/Family Comments/goals:   Pain/Comfort:  ·        Objective:     Communicated with rn prior to session.  Patient found HOB elevated with oxygen upon PT entry to room.     General Precautions: Standard, fall   Orthopedic Precautions: (R LE WBAT with CAM boot; L LE WBAT with Darco shoe (?); stand and transfers only, no ambulation)   Braces:    Respiratory Status: Nasal cannula, flow 3 L/min     Functional Mobility:  · Bed Mobility:     · Rolling Left:  minimum assistance  · Rolling Right: minimum assistance  · Supine to Sit: minimum assistance  · Sit to Supine: minimum assistance  · Transfers:     · Sit to Stand:  moderate assistance with rolling walker  · Balance: Pt sat EOB Jaspreet, Pt Stood in RWm modA with cuing for posture and hip ext.. Pt unable to stand long dt fatigue.      AM-PAC 6 CLICK MOBILITY          Therapeutic Activities and Exercises:        Patient left with bed in chair position with all lines intact, call button in reach and RN notified..    GOALS:   Multidisciplinary Problems     Physical Therapy Goals        Problem: Physical Therapy    Goal Priority Disciplines Outcome Goal Variances Interventions   Physical Therapy Goal     PT, PT/OT Ongoing, Progressing     Description: Goals to be met by: 22    Patient will increase functional independence with mobility by performin. Supine to sit with Stand-by Assistance  2. Sit to supine with Stand-by Assistance  3. Sit to stand transfer with Minimal Assistance using Rolling Walker  4. Bed to chair transfer with Minimal Assistance using Rolling Walker  5. Wheelchair propulsion x200 feet with Stand-by Assistance using bilateral uppper extremities                     Time Tracking:     PT Received On: 22  PT Start Time: 1014     PT Stop Time: 1050  PT Total Time (min): 36 min     Billable Minutes: Therapeutic Activity 36    Treatment Type: Treatment  PT/PTA: PTA     PTA Visit Number: 3     2022

## 2022-05-31 NOTE — PROGRESS NOTES
[facility]  Progress Note - Hospital Medicine     Patient Name: Emma Jorgensen  MRN: [id number]  Status: OP- Observation   Admission Date: 5/24/2022  Length of Stay: 3      CC: hospital follow-up for heart failure       SUBJECTIVE   88-year-old female with a history that includes dementia, valvular heart disease, diastolic heart failure, PAF and DVT/PE, on Eliquis.  Patient had a recent hospital admission following a fall where she was treated for closed right trimalleolar ankle fracture for which she underwent fixation on 4/26.  Patient also had acute proximal phalanx great toe fracture.  She has a history of recurrent falls at home.  Hospital stay was also eventful for acute kidney injury which improved after holding Lasix, losartan. She was then evaluated by Cardiology for moderate to severe aortic stenosis, and TAVR recommended as an outpatient.  Patient was discharged to skilled nursing facility on 5/3.  She was now sent to the ED from skilled nursing facility on 5/24 with complaints of shortness of breath associated with acute on chronic dry cough. Upon initial evaluation in the ED patient was hemodynamically stable except for hypoxemia for which she was initiated on 6 L oxygen.  Chest x-ray with bilateral opacities.  Hospitalist team consulted for admission. Lab significant for chronic anemia. She was also noted to have mild hyponatremic hypochloremia.  She was treated for decompensated heart failure, over the next 4 days showed good clinical improvement.  Oxygen was weaned down to 2 L nasal cannula.    Today: Patient seen and examined at bedside, and chart reviewed.  No new issues or complaints.  Remains medically stable.      MEDICATIONS   Scheduled   albuterol  2 puff Inhalation Q6H    apixaban  5 mg Oral BID    cetirizine  10 mg Oral Daily    cyanocobalamin  500 mcg Oral Daily    diltiaZEM  120 mg Oral Daily    donepeziL  10 mg Oral Nightly    fluticasone  furoate-vilanteroL  1 puff Inhalation Daily    furosemide  20 mg Oral Daily    gabapentin  100 mg Oral Nightly    levoFLOXacin  500 mg Oral Daily    losartan  100 mg Oral Daily    mupirocin   Topical (Top) Daily    pantoprazole  40 mg Oral Daily    pramipexole  1 mg Oral Nightly    traZODone  50 mg Oral QHS    venlafaxine  37.5 mg Oral Daily     Continuous Infusions  None    PRN  HYDROcodone-acetaminophen, LORazepam, ondansetron       PHYSICAL EXAM   VITALS  Temp: 97.7 °F (36.5 °C) (05/31/22 0701)  Pulse: 76 (05/31/22 1201)  Resp: 20 (05/31/22 1201)  BP: (!) 149/80 (05/31/22 0701)  SpO2: 96 % (05/31/22 1201)    GENERAL: Awake and in NAD  LUNGS: CTA B/L  CVS: Normal rate  GI/: Soft, NT, bowel sounds positive.  EXTREMITIES: No peripheral edema  NEURO: Awake alert and partially oriented  PSYCH: Cooperative      LABS   CBC  No results for input(s): WBC, HGB, HCT, PLT in the last 72 hours.   CHEM  No results for input(s): NA, K, MG, CO2, BUN, CREATININE, CALCIUM, BILITOT, AST, ALT, ALKPHOS, ALBUMIN, PHOS in the last 72 hours.       ASSESSMENT   Acute decompensated diastolic heart failure, resolving  Acute hypoxemic respiratory failure secondary to above   Moderate to severe aortic stenosis, awaiting TAVR   Recent trimalleolar ankle fracture status post surgical fixation  PAF on Eliquis  Essential HTN  Obesity   Basline dementia   h/o Anxiety     PLAN   Medically stable  Continue current management including maintenance diuretics and therapy services  CM working on discharge back to SNF        Prophylaxis: Home Glendy Villalta MD   [facility]  05/31/2022

## 2022-05-31 NOTE — PLAN OF CARE
Problem: Adult Inpatient Plan of Care  Goal: Patient-Specific Goal (Individualized)  5/31/2022 0335 by Praveen Roth RN  Outcome: Ongoing, Progressing  5/31/2022 0335 by Praveen Roth RN  Outcome: Ongoing, Progressing     Problem: Bariatric Environmental Safety  Goal: Safety Maintained with Care  5/31/2022 0335 by Praveen Roth RN  Outcome: Ongoing, Progressing  5/31/2022 0335 by Praveen Roth RN  Outcome: Ongoing, Progressing      Call to patient. Patient states pain with urination and noticed some blood in the urine yesterday. No fever/body aches/chills. No frequency with urination. Patient coming 6/13/18 for nurse visit to give urine sample.     Future Appointments  Date Time Raini

## 2022-06-01 VITALS
BODY MASS INDEX: 36.9 KG/M2 | DIASTOLIC BLOOD PRESSURE: 65 MMHG | WEIGHT: 243.5 LBS | RESPIRATION RATE: 16 BRPM | TEMPERATURE: 98 F | OXYGEN SATURATION: 98 % | HEART RATE: 74 BPM | SYSTOLIC BLOOD PRESSURE: 130 MMHG | HEIGHT: 68 IN

## 2022-06-01 LAB
ANION GAP SERPL CALC-SCNC: 7 MEQ/L
BUN SERPL-MCNC: 12.6 MG/DL (ref 9.8–20.1)
CALCIUM SERPL-MCNC: 8.9 MG/DL (ref 8.4–10.2)
CHLORIDE SERPL-SCNC: 89 MMOL/L (ref 98–107)
CO2 SERPL-SCNC: 33 MMOL/L (ref 23–31)
CREAT SERPL-MCNC: 0.77 MG/DL (ref 0.55–1.02)
CREAT/UREA NIT SERPL: 16
ERYTHROCYTE [DISTWIDTH] IN BLOOD BY AUTOMATED COUNT: 13.2 % (ref 11.5–17)
GLUCOSE SERPL-MCNC: 91 MG/DL (ref 82–115)
HCT VFR BLD AUTO: 33.4 % (ref 37–47)
HGB BLD-MCNC: 10 GM/DL (ref 12–16)
MAGNESIUM SERPL-MCNC: 1.8 MG/DL (ref 1.6–2.6)
MCH RBC QN AUTO: 30.4 PG (ref 27–31)
MCHC RBC AUTO-ENTMCNC: 29.9 MG/DL (ref 33–36)
MCV RBC AUTO: 101.5 FL (ref 80–94)
NRBC BLD AUTO-RTO: 0 %
PLATELET # BLD AUTO: 274 X10(3)/MCL (ref 130–400)
PMV BLD AUTO: 9.4 FL (ref 9.4–12.4)
POTASSIUM SERPL-SCNC: 4.4 MMOL/L (ref 3.5–5.1)
RBC # BLD AUTO: 3.29 X10(6)/MCL (ref 4.2–5.4)
SODIUM SERPL-SCNC: 129 MMOL/L (ref 136–145)
WBC # SPEC AUTO: 7.8 X10(3)/MCL (ref 4.5–11.5)

## 2022-06-01 PROCEDURE — 36415 COLL VENOUS BLD VENIPUNCTURE: CPT | Performed by: INTERNAL MEDICINE

## 2022-06-01 PROCEDURE — 85027 COMPLETE CBC AUTOMATED: CPT | Performed by: INTERNAL MEDICINE

## 2022-06-01 PROCEDURE — 25000003 PHARM REV CODE 250: Performed by: INTERNAL MEDICINE

## 2022-06-01 PROCEDURE — G0378 HOSPITAL OBSERVATION PER HR: HCPCS

## 2022-06-01 PROCEDURE — 27000221 HC OXYGEN, UP TO 24 HOURS

## 2022-06-01 PROCEDURE — 83735 ASSAY OF MAGNESIUM: CPT | Performed by: INTERNAL MEDICINE

## 2022-06-01 PROCEDURE — 80048 BASIC METABOLIC PNL TOTAL CA: CPT | Performed by: INTERNAL MEDICINE

## 2022-06-01 RX ORDER — MUPIROCIN 20 MG/G
OINTMENT TOPICAL DAILY
Qty: 2 G | Refills: 0
Start: 2022-06-01

## 2022-06-01 RX ADMIN — FLUTICASONE FUROATE AND VILANTEROL TRIFENATATE 1 PUFF: 100; 25 POWDER RESPIRATORY (INHALATION) at 09:06

## 2022-06-01 RX ADMIN — CYANOCOBALAMIN TAB 500 MCG 500 MCG: 500 TAB at 09:06

## 2022-06-01 RX ADMIN — LEVOFLOXACIN 500 MG: 500 TABLET, FILM COATED ORAL at 09:06

## 2022-06-01 RX ADMIN — CETIRIZINE HYDROCHLORIDE 10 MG: 10 TABLET, FILM COATED ORAL at 09:06

## 2022-06-01 RX ADMIN — MUPIROCIN: 20 OINTMENT TOPICAL at 09:06

## 2022-06-01 RX ADMIN — APIXABAN 5 MG: 5 TABLET, FILM COATED ORAL at 09:06

## 2022-06-01 RX ADMIN — VENLAFAXINE HYDROCHLORIDE 37.5 MG: 37.5 CAPSULE, EXTENDED RELEASE ORAL at 05:06

## 2022-06-01 RX ADMIN — DILTIAZEM HYDROCHLORIDE 120 MG: 60 TABLET, FILM COATED ORAL at 05:06

## 2022-06-01 RX ADMIN — FUROSEMIDE 20 MG: 20 TABLET ORAL at 09:06

## 2022-06-01 RX ADMIN — PANTOPRAZOLE SODIUM 40 MG: 40 TABLET, DELAYED RELEASE ORAL at 09:06

## 2022-06-01 RX ADMIN — ALBUTEROL SULFATE 2 PUFF: 90 AEROSOL, METERED RESPIRATORY (INHALATION) at 12:06

## 2022-06-01 RX ADMIN — LOSARTAN POTASSIUM 100 MG: 50 TABLET, FILM COATED ORAL at 09:06

## 2022-06-01 NOTE — NURSING
Patient left unit in wheelchair with portable oxygen to be transported by representative from [facility] [place]. Patient vitals stable. Patient discharge papers and belongings sent with patient and transporter. All questions and concerns addressed.

## 2022-06-02 NOTE — DISCHARGE SUMMARY
[facility]  Discharge Summary - Hospital Medicine     Patient Name: Emma Jorgensen  MRN: [id number]  Admit Date: 5/24/2022  Discharge Date: 6/1/2022  Status: OP- Observation   Length of Stay: 3      PHYSICIANS   Admitting Physician: Shakila Bui MD  Discharging Physician: Jag Villalta MD.  Primary Care Physician: Mary Jane Dean MD  Consults: None      DISCHARGE DIAGNOSES   Acute decompensated diastolic heart failure  Acute hypoxemic respiratory failure secondary to above   Moderate to severe aortic stenosis, awaiting TAVR   Recent trimalleolar ankle fracture status post surgical fixation  PAF on Eliquis  Essential HTN  Obesity   Basline dementia   h/o Anxiety      PROCEDURES   None      HOSPITAL COURSE    88-year-old female with a history that includes dementia, valvular heart disease, diastolic heart failure, PAF and DVT/PE, on Eliquis.  Patient had a recent hospital admission following a fall where she was treated for closed right trimalleolar ankle fracture for which she underwent fixation on 4/26.  Patient also had acute proximal phalanx great toe fracture.  She has a history of recurrent falls at home.  Hospital stay was also eventful for acute kidney injury which improved after holding Lasix, losartan. She was then evaluated by Cardiology for moderate to severe aortic stenosis, and TAVR recommended as an outpatient.  Patient was discharged to skilled nursing facility on 5/3.  She was now sent to the ED from skilled nursing facility on 5/24 with complaints of shortness of breath associated with acute on chronic dry cough. Upon initial evaluation in the ED patient was hemodynamically stable except for hypoxemia for which she was initiated on 6 L oxygen.  Chest x-ray with bilateral opacities.  Hospitalist team consulted for admission. Lab significant for chronic anemia. She was also noted to have mild hyponatremic hypochloremia.  She was treated for decompensated heart  failure, over the next 4 days showed good clinical improvement.  Oxygen was weaned down to 2 L nasal cannula.  Patient was stable and appropriate for transfer back to SNF unit.      STATUS  Improved    DISPOSITION  Discharge to SNF    DIET  Cardiac    ACTIVITY  As tolerated      FOLLOW-UP   Per accepting facility      DISCHARGE MEDICATION RECONCILIATION     PRESCRIBED    levofloxacin 500 MG tablet  Commonly known as: LEVAQUIN  Take 1 tablet (500 mg total) by mouth once daily.     mupirocin 2 % ointment  Commonly known as: BACTROBAN  Apply topically once daily. Apply to affected area at right plantar heel. After cleansing well with dermal cleanser and rinsing with saline, cover with nonadherent dressing secured with rolled gauze. Keep off loaded.     furosemide 40 MG tablet  Commonly known as: LASIX  Take 1 tablet (40 mg total) by mouth once daily.        CONTINUE   albuterol 90 mcg/actuation inhaler  Commonly known as: PROVENTIL/VENTOLIN HFA     apixaban 2.5 mg Tab  Commonly known as: ELIQUIS  Take 2 tablets (5 mg total) by mouth 2 (two) times daily.     budesonide-glycopyr-formoterol 160-9-4.8 mcg/actuation Hfaa     clotrimazole-betamethasone 1-0.05% cream  Commonly known as: LOTRISONE     cyanocobalamin 500 MCG tablet  Take 1 tablet (500 mcg total) by mouth once daily.     diclofenac sodium 1 % Gel  Commonly known as: VOLTAREN     dicyclomine 20 mg tablet  Commonly known as: BENTYL     diltiaZEM 120 MG tablet  Commonly known as: CARDIZEM     donepeziL 10 MG tablet  Commonly known as: ARICEPT     gabapentin 300 MG capsule  Commonly known as: NEURONTIN     hydrochlorothiazide 12.5 MG Tab  Commonly known as: HYDRODIURIL     Hydrocodone 5-325 mg per tablet  Commonly known as: NORCO     loratadine 10 mg tablet  Commonly known as: CLARITIN     LORazepam 1 MG tablet  Commonly known as: ATIVAN     losartan 100 MG tablet  Commonly known as: COZAAR     pantoprazole 40 MG tablet  Commonly known as: PROTONIX  Take 1 tablet  (40 mg total) by mouth once daily.     pramipexole 1 MG tablet  Commonly known as: MIRAPEX     trazodone 50 MG tablet  Commonly known as: DESYREL  Take 1 tablet (50 mg total) by mouth every evening.     venlafaxine 37.5 MG 24 hr capsule  Commonly known as: EFFEXOR-XR        DISCONTINUE   betamethasone dipropionate 0.05 % cream     ADVAIR -21 mcg/actuation Hfaa inhaler  Generic drug: fluticasone propion-salmeterol 115-21 mcg/dose     omeprazole 20 mg Tbec           PHYSICAL EXAM   VITALS:  Temp: 98.4 °F (36.9 °C) (06/01/22 1534)  Pulse: 74 (06/01/22 1534)  Resp: 16 (06/01/22 1534)  BP: 130/65 (06/01/22 1534)  SpO2: 98 % (06/01/22 1534)    GENERAL: Awake and in NAD  LUNGS: CTA B/L  CVS: Normal rate  GI/: Soft, NT, bowel sounds positive.  EXTREMITIES: No peripheral edema  NEURO: Awake alert and partially oriented  PSYCH: Cooperative        Discharge time: 33 minutes     Jag Villalta MD  [facility]  06/01/2022        DIAGNOSITCS     Recent Labs     06/01/22 0332   WBC 7.8   HGB 10.0*   HCT 33.4*         Recent Labs     06/01/22 0332   *   K 4.4   MG 1.80   CO2 33*   BUN 12.6   CREATININE 0.77   CALCIUM 8.9        Microbiology Results (last 7 days)     Procedure Component Value Units Date/Time    Blood Culture [[id number]]  (Normal) Collected: 05/24/22 1731    Order Status: Completed Specimen: Blood Updated: 05/29/22 2103     CULTURE, BLOOD ([facility]) No Growth at 5 days    Blood Culture [[id number]]  (Normal) Collected: 05/24/22 1731    Order Status: Completed Specimen: Blood Updated: 05/29/22 2103     CULTURE, BLOOD ([facility]) No Growth at 5 days             X-Ray Chest AP Portable  Result Date: 5/24/2022  EXAMINATION: XR CHEST AP PORTABLE CLINICAL HISTORY: CHF; TECHNIQUE: Single frontal view of the chest was performed. COMPARISON: Chest radiograph 05/03/2022 FINDINGS: LINES AND TUBES: EKG/telemetry leads overlie the chest. MEDIASTINUM AND PETER: Cardiac silhouette is enlarged. Aortic  atherosclerosis. LUNGS: Lung volumes are low with associated atelectatic change.  Unchanged elevation the right hemidiaphragm.  Patchy opacities are seen. PLEURA:No pleural effusion. No pneumothorax. BONES: No acute osseous abnormality.   Impression  Patchy airspace opacities with slightly lower lung volumes and atelectatic change compared to prior.   Electronically signed by: Cherri Arrieta Date:    05/24/2022 Time:    11:57      Echo  Result Date: 5/24/2022  · TDS due to poor acoustical windows; suboptimal doppler angles.   · Mild concentric hypertrophy and normal systolic function.   · The estimated ejection fraction is 55%.   · Grade II left ventricular diastolic dysfunction.   · Moderate aortic regurgitation.   · There is moderate aortic valve stenosis.   · Aortic valve area is 0.75 cm2; peak velocity is 3.53 m/s; mean gradient is 30 mmHg.   · Intermediate central venous pressure (8 mmHg).

## 2022-06-03 ENCOUNTER — HOSPITAL ENCOUNTER (INPATIENT)
Facility: HOSPITAL | Age: 87
LOS: 10 days | Discharge: NURSING, REHAB OR LONG-TERM CARE | DRG: 246 | End: 2022-06-13
Attending: EMERGENCY MEDICINE | Admitting: INTERNAL MEDICINE
Payer: MEDICARE

## 2022-06-03 DIAGNOSIS — I35.0 SEVERE AORTIC STENOSIS: ICD-10-CM

## 2022-06-03 DIAGNOSIS — R09.02 HYPOXIA: ICD-10-CM

## 2022-06-03 DIAGNOSIS — R06.02 SHORTNESS OF BREATH: ICD-10-CM

## 2022-06-03 DIAGNOSIS — I25.10 CORONARY ARTERY DISEASE INVOLVING NATIVE CORONARY ARTERY OF NATIVE HEART, UNSPECIFIED WHETHER ANGINA PRESENT: ICD-10-CM

## 2022-06-03 DIAGNOSIS — I35.0 AORTIC VALVE STENOSIS, ETIOLOGY OF CARDIAC VALVE DISEASE UNSPECIFIED: ICD-10-CM

## 2022-06-03 DIAGNOSIS — I50.41 ACUTE COMBINED SYSTOLIC AND DIASTOLIC CONGESTIVE HEART FAILURE: Primary | ICD-10-CM

## 2022-06-03 DIAGNOSIS — S82.891D CLOSED FRACTURE OF RIGHT ANKLE WITH ROUTINE HEALING, SUBSEQUENT ENCOUNTER: ICD-10-CM

## 2022-06-03 DIAGNOSIS — I25.10 CORONARY ARTERY DISEASE INVOLVING NATIVE CORONARY ARTERY OF NATIVE HEART WITHOUT ANGINA PECTORIS: ICD-10-CM

## 2022-06-03 DIAGNOSIS — T14.8XXA HEMATOMA: ICD-10-CM

## 2022-06-03 DIAGNOSIS — I48.0 PAF (PAROXYSMAL ATRIAL FIBRILLATION): ICD-10-CM

## 2022-06-03 LAB
ALBUMIN SERPL-MCNC: 2.8 GM/DL (ref 3.4–4.8)
ALBUMIN/GLOB SERPL: 0.7 RATIO (ref 1.1–2)
ALP SERPL-CCNC: 82 UNIT/L (ref 40–150)
ALT SERPL-CCNC: 8 UNIT/L (ref 0–55)
AST SERPL-CCNC: 17 UNIT/L (ref 5–34)
BASE EXCESS ARTERIAL: 15.3 MMOL/L (ref -2–2)
BASOPHILS # BLD AUTO: 0.02 X10(3)/MCL (ref 0–0.2)
BASOPHILS NFR BLD AUTO: 0.3 %
BILIRUBIN DIRECT+TOT PNL SERPL-MCNC: 0.3 MG/DL
BNP BLD-MCNC: 414.1 PG/ML
BUN SERPL-MCNC: 13.1 MG/DL (ref 9.8–20.1)
CALCIUM SERPL-MCNC: 8.8 MG/DL (ref 8.4–10.2)
CHLORIDE SERPL-SCNC: 87 MMOL/L (ref 98–107)
CO2 SERPL-SCNC: 35 MMOL/L (ref 23–31)
CORRECTED TEMPERATURE (PCO2): 63 MMHG (ref 19–50)
CORRECTED TEMPERATURE (PH): 7.43 (ref 7.35–7.45)
CORRECTED TEMPERATURE (PO2): 87 MMHG (ref 80–100)
CREAT SERPL-MCNC: 0.74 MG/DL (ref 0.55–1.02)
EOSINOPHIL # BLD AUTO: 0.1 X10(3)/MCL (ref 0–0.9)
EOSINOPHIL NFR BLD AUTO: 1.3 %
ERYTHROCYTE [DISTWIDTH] IN BLOOD BY AUTOMATED COUNT: 13.5 % (ref 11.5–17)
FLUAV AG UPPER RESP QL IA.RAPID: NOT DETECTED
FLUBV AG UPPER RESP QL IA.RAPID: NOT DETECTED
GLOBULIN SER-MCNC: 3.9 GM/DL (ref 2.4–3.5)
GLUCOSE SERPL-MCNC: 98 MG/DL (ref 82–115)
HCO3 ARTERIAL: 41.8 MMOL/L (ref 18–23)
HCO3 UR-SCNC: 41.8 MMOL/L
HCT VFR BLD AUTO: 30.5 % (ref 37–47)
HGB BLD-MCNC: 9.1 GM/DL (ref 12–16)
HGB BLD-MCNC: 9.7 G/DL (ref 12–16)
HGB BLD-MCNC: ABNORMAL G/DL
IMM GRANULOCYTES # BLD AUTO: 0.04 X10(3)/MCL (ref 0–0.02)
IMM GRANULOCYTES NFR BLD AUTO: 0.5 % (ref 0–0.43)
LYMPHOCYTES # BLD AUTO: 0.78 X10(3)/MCL (ref 0.6–4.6)
LYMPHOCYTES NFR BLD AUTO: 10.2 %
MCH RBC QN AUTO: 30 PG (ref 27–31)
MCHC RBC AUTO-ENTMCNC: 29.8 MG/DL (ref 33–36)
MCV RBC AUTO: 100.7 FL (ref 80–94)
MONOCYTES # BLD AUTO: 0.95 X10(3)/MCL (ref 0.1–1.3)
MONOCYTES NFR BLD AUTO: 12.5 %
MRSA PCR SCRN (OHS): NOT DETECTED
NEUTROPHILS # BLD AUTO: 5.7 X10(3)/MCL (ref 2.1–9.2)
NEUTROPHILS NFR BLD AUTO: 75.2 %
NRBC BLD AUTO-RTO: 0 %
PCO2 BLDA: 63 MMHG (ref 19–50)
PCO2 BLDA: ABNORMAL MM[HG]
PCO2 BLDA: ABNORMAL MM[HG]
PH SMN: 7.43 [PH] (ref 7.35–7.45)
PH SMN: ABNORMAL [PH]
PLATELET # BLD AUTO: 293 X10(3)/MCL (ref 130–400)
PMV BLD AUTO: 9.4 FL (ref 9.4–12.4)
PO2 BLDA: 87 MMHG (ref 80–100)
PO2 BLDA: ABNORMAL MM[HG]
POC BASE DEFICIT: 15.3 MMOL/L (ref -2–2)
POC COHB: 1.6 %
POC COHB: ABNORMAL
POC IONIZED CALCIUM: 1.11 MMOL/L (ref 1.12–1.23)
POC IONIZED CALCIUM: ABNORMAL
POC METHB: 1.2 % (ref 0.4–1.5)
POC METHB: ABNORMAL
POC O2HB: 95.7 % (ref 94–97)
POC O2HB: ABNORMAL
POC SATURATED O2: 96.9 %
POC TEMPERATURE: 37 °C
POTASSIUM BLD-SCNC: 3.7 MMOL/L (ref 3.5–5)
POTASSIUM BLD-SCNC: ABNORMAL MMOL/L
POTASSIUM SERPL-SCNC: 4.1 MMOL/L (ref 3.5–5.1)
PROT SERPL-MCNC: 6.7 GM/DL (ref 5.8–7.6)
RBC # BLD AUTO: 3.03 X10(6)/MCL (ref 4.2–5.4)
SARS-COV-2 RNA RESP QL NAA+PROBE: NOT DETECTED
SATURATED O2 ARTERIAL, I-STAT: ABNORMAL
SODIUM BLD-SCNC: 126 MMOL/L (ref 137–145)
SODIUM BLD-SCNC: ABNORMAL MMOL/L
SODIUM SERPL-SCNC: 134 MMOL/L (ref 136–145)
SPECIMEN SOURCE: ABNORMAL
TROPONIN I SERPL-MCNC: 0.01 NG/ML (ref 0–0.04)
WBC # SPEC AUTO: 7.6 X10(3)/MCL (ref 4.5–11.5)

## 2022-06-03 PROCEDURE — 27000190 HC CPAP FULL FACE MASK W/VALVE

## 2022-06-03 PROCEDURE — 93005 ELECTROCARDIOGRAM TRACING: CPT

## 2022-06-03 PROCEDURE — 99900035 HC TECH TIME PER 15 MIN (STAT)

## 2022-06-03 PROCEDURE — 99291 CRITICAL CARE FIRST HOUR: CPT | Mod: 25

## 2022-06-03 PROCEDURE — 36600 WITHDRAWAL OF ARTERIAL BLOOD: CPT

## 2022-06-03 PROCEDURE — 36415 COLL VENOUS BLD VENIPUNCTURE: CPT | Performed by: EMERGENCY MEDICINE

## 2022-06-03 PROCEDURE — 87636 SARSCOV2 & INF A&B AMP PRB: CPT | Performed by: EMERGENCY MEDICINE

## 2022-06-03 PROCEDURE — 84484 ASSAY OF TROPONIN QUANT: CPT | Performed by: EMERGENCY MEDICINE

## 2022-06-03 PROCEDURE — 83880 ASSAY OF NATRIURETIC PEPTIDE: CPT | Performed by: EMERGENCY MEDICINE

## 2022-06-03 PROCEDURE — 63600175 PHARM REV CODE 636 W HCPCS: Performed by: INTERNAL MEDICINE

## 2022-06-03 PROCEDURE — 87641 MR-STAPH DNA AMP PROBE: CPT | Performed by: INTERNAL MEDICINE

## 2022-06-03 PROCEDURE — 82803 BLOOD GASES ANY COMBINATION: CPT

## 2022-06-03 PROCEDURE — 25000003 PHARM REV CODE 250: Performed by: INTERNAL MEDICINE

## 2022-06-03 PROCEDURE — 80053 COMPREHEN METABOLIC PANEL: CPT | Performed by: EMERGENCY MEDICINE

## 2022-06-03 PROCEDURE — 85025 COMPLETE CBC W/AUTO DIFF WBC: CPT | Performed by: EMERGENCY MEDICINE

## 2022-06-03 PROCEDURE — 94660 CPAP INITIATION&MGMT: CPT

## 2022-06-03 PROCEDURE — 63600175 PHARM REV CODE 636 W HCPCS: Performed by: EMERGENCY MEDICINE

## 2022-06-03 PROCEDURE — 27000221 HC OXYGEN, UP TO 24 HOURS

## 2022-06-03 PROCEDURE — 96374 THER/PROPH/DIAG INJ IV PUSH: CPT

## 2022-06-03 PROCEDURE — 11000001 HC ACUTE MED/SURG PRIVATE ROOM

## 2022-06-03 PROCEDURE — 87040 BLOOD CULTURE FOR BACTERIA: CPT | Performed by: INTERNAL MEDICINE

## 2022-06-03 RX ORDER — ENOXAPARIN SODIUM 100 MG/ML
40 INJECTION SUBCUTANEOUS EVERY 24 HOURS
Status: DISCONTINUED | OUTPATIENT
Start: 2022-06-03 | End: 2022-06-03

## 2022-06-03 RX ORDER — GABAPENTIN 100 MG/1
100 CAPSULE ORAL NIGHTLY
Status: DISCONTINUED | OUTPATIENT
Start: 2022-06-03 | End: 2022-06-13 | Stop reason: HOSPADM

## 2022-06-03 RX ORDER — PANTOPRAZOLE SODIUM 40 MG/1
40 TABLET, DELAYED RELEASE ORAL DAILY
Status: DISCONTINUED | OUTPATIENT
Start: 2022-06-04 | End: 2022-06-13 | Stop reason: HOSPADM

## 2022-06-03 RX ORDER — FLUTICASONE FUROATE AND VILANTEROL 100; 25 UG/1; UG/1
1 POWDER RESPIRATORY (INHALATION) DAILY
Status: DISCONTINUED | OUTPATIENT
Start: 2022-06-04 | End: 2022-06-13 | Stop reason: HOSPADM

## 2022-06-03 RX ORDER — UBIDECARENONE 75 MG
500 CAPSULE ORAL DAILY
Status: DISCONTINUED | OUTPATIENT
Start: 2022-06-04 | End: 2022-06-13 | Stop reason: HOSPADM

## 2022-06-03 RX ORDER — FUROSEMIDE 10 MG/ML
20 INJECTION INTRAMUSCULAR; INTRAVENOUS
Status: COMPLETED | OUTPATIENT
Start: 2022-06-03 | End: 2022-06-08

## 2022-06-03 RX ORDER — ONDANSETRON 2 MG/ML
4 INJECTION INTRAMUSCULAR; INTRAVENOUS EVERY 6 HOURS PRN
Status: DISCONTINUED | OUTPATIENT
Start: 2022-06-03 | End: 2022-06-13 | Stop reason: HOSPADM

## 2022-06-03 RX ORDER — ALBUTEROL SULFATE 90 UG/1
2 AEROSOL, METERED RESPIRATORY (INHALATION) EVERY 6 HOURS
Status: DISCONTINUED | OUTPATIENT
Start: 2022-06-03 | End: 2022-06-13 | Stop reason: HOSPADM

## 2022-06-03 RX ORDER — PRAMIPEXOLE DIHYDROCHLORIDE 0.25 MG/1
1 TABLET ORAL NIGHTLY
Status: DISCONTINUED | OUTPATIENT
Start: 2022-06-03 | End: 2022-06-13 | Stop reason: HOSPADM

## 2022-06-03 RX ORDER — FUROSEMIDE 10 MG/ML
80 INJECTION INTRAMUSCULAR; INTRAVENOUS ONCE
Status: COMPLETED | OUTPATIENT
Start: 2022-06-03 | End: 2022-06-03

## 2022-06-03 RX ORDER — VENLAFAXINE HYDROCHLORIDE 37.5 MG/1
37.5 CAPSULE, EXTENDED RELEASE ORAL DAILY
Status: DISCONTINUED | OUTPATIENT
Start: 2022-06-04 | End: 2022-06-13 | Stop reason: HOSPADM

## 2022-06-03 RX ORDER — FUROSEMIDE 10 MG/ML
40 INJECTION INTRAMUSCULAR; INTRAVENOUS
Status: DISCONTINUED | OUTPATIENT
Start: 2022-06-03 | End: 2022-06-03

## 2022-06-03 RX ORDER — DONEPEZIL HYDROCHLORIDE 5 MG/1
10 TABLET, FILM COATED ORAL NIGHTLY
Status: DISCONTINUED | OUTPATIENT
Start: 2022-06-03 | End: 2022-06-13 | Stop reason: HOSPADM

## 2022-06-03 RX ADMIN — FUROSEMIDE 80 MG: 10 INJECTION, SOLUTION INTRAMUSCULAR; INTRAVENOUS at 12:06

## 2022-06-03 RX ADMIN — APIXABAN 5 MG: 5 TABLET, FILM COATED ORAL at 09:06

## 2022-06-03 RX ADMIN — DONEPEZIL HYDROCHLORIDE 10 MG: 5 TABLET, FILM COATED ORAL at 09:06

## 2022-06-03 RX ADMIN — FUROSEMIDE 20 MG: 10 INJECTION INTRAMUSCULAR; INTRAVENOUS at 08:06

## 2022-06-03 RX ADMIN — PRAMIPEXOLE DIHYDROCHLORIDE 1 MG: 0.25 TABLET ORAL at 09:06

## 2022-06-03 RX ADMIN — GABAPENTIN 100 MG: 100 CAPSULE ORAL at 09:06

## 2022-06-03 RX ADMIN — PIPERACILLIN AND TAZOBACTAM 4.5 G: 4; .5 INJECTION, POWDER, LYOPHILIZED, FOR SOLUTION INTRAVENOUS; PARENTERAL at 11:06

## 2022-06-03 NOTE — Clinical Note
An angiography was performed of the left coronary arteries. The angiography was performed via power injection.

## 2022-06-03 NOTE — Clinical Note
The catheter was inserted into the and was inserted over the wire into the mid   left anterior descending.

## 2022-06-03 NOTE — Clinical Note
The catheter was inserted into the and was inserted over the wire into the ostium   left main. An angiography was performed of the left coronary arteries.

## 2022-06-03 NOTE — ED PROVIDER NOTES
Encounter Date: 6/3/2022    SCRIBE #1 NOTE: I, Myron Zhang, am scribing for, and in the presence of,  Nathalie Johnson MD. I have scribed the entire note.       History     Chief Complaint   Patient presents with    Shortness of Breath     Sent from [facility] ([facility]les). C/o sob x 2 days, worsened over the last hour. Seen in er 2 days ago for same complaint. Had neb tx at NH prior to arrival. 82% on RA on ems arrival. 97% on 6L nasal cannula.      An 87 y/o BF with a history of CHF (discharged from [facility] for CHF 2 days ago), prior DVT,prior PE, AS, and Parkinson's disease presents to the [facility] ED from [facility] with worsening of her chronic SOB. Pt reports that she is not on O2 at [facility]. No chest pain, no fever/chills; no syncope. Pt says she has SOB that waxes and wanes at times.     The history is provided by the patient.   Shortness of Breath  This is a chronic problem. The average episode lasts 2 days. The current episode started 2 days ago. The problem has been gradually worsening. Pertinent negatives include no ear pain, no neck pain, no chest pain, no abdominal pain and no leg pain. She has tried nothing for the symptoms. The treatment provided no relief. She has had prior hospitalizations. She has had prior ED visits. Associated medical issues include PE and DVT. Associated medical issues comments: CHF, Parkinson's disease.     Review of patient's allergies indicates:  No Known Allergies  Past Medical History:   Diagnosis Date    Anxiety disorder, unspecified     Arthritis     Deep vein thrombosis     Depression     Irregular heart beat     Obesity, unspecified     Other pulmonary embolism without acute cor pulmonale     Parkinson's disease     Trimalleolar fracture of ankle, closed     Unspecified dementia without behavioral disturbance      Past Surgical History:   Procedure Laterality Date    ANKLE FRACTURE SURGERY Right     CATARACT EXTRACTION      COLONOSCOPY W/  POLYPECTOMY      HYSTERECTOMY      SMALL INTESTINE SURGERY       Family History   Family history unknown: Yes     Social History     Tobacco Use    Smoking status: Former Smoker    Smokeless tobacco: Never Used   Substance Use Topics    Alcohol use: Never    Drug use: Never     Review of Systems   Constitutional: Negative.    HENT: Negative.  Negative for ear pain.    Eyes: Negative.    Respiratory: Positive for shortness of breath.    Cardiovascular: Negative.  Negative for chest pain.   Gastrointestinal: Negative.  Negative for abdominal pain.   Endocrine: Negative.    Genitourinary: Negative.    Musculoskeletal: Negative.  Negative for neck pain.   Skin: Negative.    Allergic/Immunologic: Negative.    Neurological: Negative.    Hematological: Negative.    Psychiatric/Behavioral: Negative.    All other systems reviewed and are negative.      Physical Exam     Initial Vitals [06/03/22 1120]   BP Pulse Resp Temp SpO2   (!) 99/55 82 (!) 24 98.1 °F (36.7 °C) 100 %      MAP       --         Physical Exam    Nursing note and vitals reviewed.  Constitutional: She does not appear ill. No distress.   Obese female; on O2 per nc. Seems winded when speaks   HENT:   Head: Normocephalic and atraumatic.   Mouth/Throat: Oropharynx is clear and moist. No oropharyngeal exudate or posterior oropharyngeal erythema.   Eyes: Conjunctivae and EOM are normal. Pupils are equal, round, and reactive to light.   Neck: Neck supple. No tracheal tenderness present. Carotid bruit is not present.   Cardiovascular: Normal rate and regular rhythm.   Murmur (2/6 systolic) heard.   Systolic murmur is present with a grade of 2/6.  Pulmonary/Chest: No respiratory distress. She has rales.   Pt has bilateral crackles.  Pt appears to be short winded upon speaking.    Abdominal: Abdomen is soft. Bowel sounds are normal. There is no abdominal tenderness.   Musculoskeletal:         General: Normal range of motion.      Cervical back: Neck supple.       Comments: Wearing a walking boot on RLE     Neurological: She is alert and oriented to person, place, and time.   Skin: Skin is warm, dry and intact. Capillary refill takes less than 2 seconds. No cyanosis.         ED Course   Critical Care    Date/Time: 6/3/2022 6:38 PM  Performed by: Nathalie Johnson MD  Authorized by: Shakila Bui MD   Total critical care time (exclusive of procedural time) : 35 minutes        Labs Reviewed   B-TYPE NATRIURETIC PEPTIDE - Abnormal; Notable for the following components:       Result Value    Natriuretic Peptide 414.1 (*)     All other components within normal limits   COMPREHENSIVE METABOLIC PANEL - Abnormal; Notable for the following components:    Sodium Level 134 (*)     Chloride 87 (*)     Carbon Dioxide 35 (*)     Albumin Level 2.8 (*)     Globulin 3.9 (*)     Albumin/Globulin Ratio 0.7 (*)     All other components within normal limits   CBC WITH DIFFERENTIAL - Abnormal; Notable for the following components:    RBC 3.03 (*)     Hgb 9.1 (*)     Hct 30.5 (*)     .7 (*)     MCHC 29.8 (*)     IG# 0.04 (*)     IG% 0.5 (*)     All other components within normal limits   TROPONIN I - Normal   COVID/FLU A&B PCR - Normal   CBC W/ AUTO DIFFERENTIAL    Narrative:     The following orders were created for panel order CBC Auto Differential.  Procedure                               Abnormality         Status                     ---------                               -----------         ------                     CBC with Differential[[id number]]        Abnormal            Final result                 Please view results for these tests on the individual orders.     EKG Readings: (Independently Interpreted)   Initial Reading: No STEMI. Rhythm: Normal Sinus Rhythm. Heart Rate: 80.     ECG Results          EKG 12-lead (In process)  Result time 06/03/22 12:04:53    In process by Interface, Lab In [facility] (06/03/22 12:04:53)                 Narrative:    Test Reason :  R06.02,    Vent. Rate : 080 BPM     Atrial Rate : 080 BPM     P-R Int : 128 ms          QRS Dur : 090 ms      QT Int : 390 ms       P-R-T Axes : 044 -14 025 degrees     QTc Int : 449 ms    Normal sinus rhythm  Moderate voltage criteria for LVH, may be normal variant ( R in aVL ,   Rafa product )  Borderline Abnormal ECG  When compared with ECG of 24-MAY-2022 10:43,  No significant change was found    Referred By:             Confirmed By:                             Imaging Results          X-Ray Chest 1 View (Final result)  Result time 06/03/22 13:15:46    Final result by Billy Salas MD (06/03/22 13:15:46)                 Impression:      Low increase in interstitial and pulmonary vascular markings.    More confluent airspace opacities in the left infrahilar region and left base infiltrate cannot be completely excluded.    No other significant change      Electronically signed by: Billy Salas  Date:    06/03/2022  Time:    13:15             Narrative:    EXAMINATION:  XR CHEST 1 VIEW    CPT 87260    CLINICAL HISTORY:  shor;    COMPARISON:  May 24, 2022    FINDINGS:  There is calcification of the aortic arch mediastinal silhouette is within normal limits cardiac silhouette is at the upper limits of normal to mildly enlarged.    There is some increase in interstitial and pulmonary vascular markings with some confluent airspace opacities in the left perihilar region and left base infiltrate cannot be completely excluded.    No other focal consolidative changes                                 Medications   furosemide injection 80 mg (80 mg Intravenous Given 6/3/22 1238)     Medical Decision Making:   History:   Old Records Summarized: records from previous admission(s).       <> Summary of Records: Pt just discharged from here 2 days ago : in for acute CHF ; found to have severe AS; had cards consult for possible TAVR workup          Scribe Attestation:   Scribe #1: I performed the above scribed service  and the documentation accurately describes the services I performed. I attest to the accuracy of the note.    Attending Attestation:           Physician Attestation for Scribe:  Physician Attestation Statement for Scribe #1: I, Nathalie Johnson MD, reviewed documentation, as scribed by Myron Zhang in my presence, and it is both accurate and complete.                    Pt sent from [facility] for worsening SOB with reported low pox. She just got out of hospital for CHF exacerbation ; CXR worsening; she appears SOB but no distress; plan readmission back to hospitalist service  Clinical Impression:   Final diagnoses:  [I50.41] Acute combined systolic and diastolic congestive heart failure  [R09.02] Hypoxia          ED Disposition Condition    Admit               Nathalie Johnson MD  06/03/22 6302       Nathalie Johnson MD  06/03/22 8004

## 2022-06-03 NOTE — Clinical Note
The catheter was inserted into the and was inserted over the wire into the ostium   right coronary artery. An angiography was performed of the right coronary arteries. Multiple views were taken. The angiography was performed via power injection.  RAYMOND

## 2022-06-03 NOTE — H&P
"[facility] Medicine History & Physical Examination       Patient Name: Emma Jorgensen  MRN: [id number]  Patient Class: IP- Inpatient   Admission Date: 6/3/2022   Admitting Physician: Dr. Bui  Length of Stay: 0  Primary Care Provider: Mary Jane Dean MD  Attending Physician: Dr. Bui  Face-to-Face encounter date: 06/03/2022  Code Status:  N/A  Chief Complaint: SOB      Patient information was obtained from patient, patient's family, past medical records and ER records.     HISTORY OF PRESENT ILLNESS:   Emma Jorgensen is a 88 y.o. female with a past medical history of diastolic heart failure (LVEF 55% and grade II diastolic dysfunction from an echo on 05/24/2022),  PAF on Eliquis, moderate to severe aortic stenosis with plans for a TAVR as an outpatient with Cardiology, bilateral carotid artery stenosis, HTN, chronic dementia, previous DVT/PE, and recent surgical fixation of a closed right trimalleolar ankle fracture on 04/26/2022 who presented to [facility] on 6/3/2022 from [facility] with complaints of worsening shortness breath that began today. The daughter, who is at bedside, stated that she has been on nasal cannula 2 L/min at the facility and visited the patient yesterday where an aid reportedly informed her that she had an episode of SOB after her "oxygen tank ran out." The daughter also states that she is currently at her cognitive baseline although she is more lethargic than normal.  She denied that she was complaining of any fever, cough, chest pain, nausea, vomiting, abdominal pain, or dysuria.    She was recently admitted and discharged from the hospital at [facility] on 06/01/2022 where she received IV diuretics for acute decompensated diastolic heart failure. Her symptoms and respiratory status improved so she was transferred back to the SNF.     Her initial vital signs showed that she was afebrile with an RR 24, HR 82, BP 99/55, and SpO2 100% on nasal " cannula 6 L/min.  Her labs show mild hyponatremia, mild hypochloremia, a bicarb 35, and a .1.  COVID-19 and influenza negative. CXR showed a low increase in interstitial and pulmonary vascular markings. More confluent airspace opacities in the left infrahilar region and left base infiltrate cannot be completely excluded.  PAST MEDICAL HISTORY:      Anxiety disorder, unspecified      Arthritis      Deep vein thrombosis      Depression      Atrial fibrillation      Obesity, unspecified      Other pulmonary embolism without acute cor pulmonale      Parkinson's disease      Trimalleolar fracture of ankle, closed      Unspecified dementia without behavioral disturbance    VHD-aortic stenosis  HTN  PAST SURGICAL HISTORY:      ANKLE FRACTURE SURGERY Right      CATARACT EXTRACTION        COLONOSCOPY W/ POLYPECTOMY        HYSTERECTOMY        SMALL INTESTINE SURGERY           ALLERGIES:   Patient has no known allergies.    FAMILY HISTORY:   Reviewed and negative    SOCIAL HISTORY:   Tobacco- none  Alcohol- none  Illicit Drugs- none  HOME MEDICATIONS:     Prior to Admission medications    Medication Sig Start Date End Date Taking? Authorizing Provider   apixaban (ELIQUIS) 2.5 mg Tab Take 2 tablets (5 mg total) by mouth 2 (two) times daily. 5/3/22  Yes Demarcus Bailey MD   cyanocobalamin 500 MCG tablet Take 1 tablet (500 mcg total) by mouth once daily. 5/3/22  Yes Demarcus Bailey MD   dicyclomine (BENTYL) 20 mg tablet Take 1 tablet by mouth once daily at 6am. 12/6/21  Yes Historical Provider   diltiaZEM (CARDIZEM) 120 MG tablet Take 1 tablet by mouth once daily at 6am. 12/6/21  Yes Historical Provider   donepeziL (ARICEPT) 10 MG tablet Take 1 tablet by mouth nightly. 12/6/21  Yes Historical Provider   furosemide (LASIX) 40 MG tablet Take 1 tablet (40 mg total) by mouth once daily. 5/27/22 5/27/23 Yes Sariah Moore MD   gabapentin (NEURONTIN) 300 MG capsule Take 1 tablet by mouth nightly. 12/6/21  Yes Historical  Provider   hydroCHLOROthiazide (HYDRODIURIL) 12.5 MG Tab Take 1 tablet by mouth once daily at 6am. 12/6/21  Yes Historical Provider   levoFLOXacin (LEVAQUIN) 500 MG tablet Take 1 tablet (500 mg total) by mouth once daily. 5/28/22  Yes Sariah Moore MD   loratadine (CLARITIN) 10 mg tablet Take 1 tablet by mouth once daily at 6am. 1/4/22  Yes Historical Provider   losartan (COZAAR) 100 MG tablet Take 1 tablet by mouth once daily. 12/30/21  Yes Historical Provider   pramipexole (MIRAPEX) 1 MG tablet Take 1 tablet by mouth nightly. 12/6/21  Yes Historical Provider   traZODone (DESYREL) 50 MG tablet Take 1 tablet (50 mg total) by mouth every evening. 5/3/22  Yes Demarcus Bailey MD   venlafaxine (EFFEXOR-XR) 37.5 MG 24 hr capsule Take 1 tablet by mouth once daily at 6am. 12/6/21  Yes Historical Provider   albuterol (PROVENTIL/VENTOLIN HFA) 90 mcg/actuation inhaler Inhale 2 puffs into the lungs every 6 (six) hours. 3/24/22   Historical Provider   budesonide-glycopyr-formoterol 160-9-4.8 mcg/actuation HFAA Inhale 2 puffs into the lungs 2 (two) times a day. 3/24/22   Historical Provider   clotrimazole-betamethasone 1-0.05% (LOTRISONE) cream Apply 1 application topically 2 (two) times daily. 12/6/21   Historical Provider   diclofenac sodium (VOLTAREN) 1 % Gel Apply 1 application topically 4 (four) times daily. 12/6/21   Historical Provider   HYDROcodone-acetaminophen (NORCO) 5-325 mg per tablet Take 1 tablet by mouth every 6 (six) hours as needed for Pain.    Historical Provider   LORazepam (ATIVAN) 1 MG tablet Take 1 tablet by mouth nightly as needed. 1/4/22   Historical Provider   mupirocin (BACTROBAN) 2 % ointment Apply topically once daily. Apply to affected area at right plantar heel. After cleansing well with dermal cleanser and rinsing with saline, cover with nonadherent dressing secured with rolled gauze. Keep off loaded. 6/1/22   Jag Villalta MD   pantoprazole (PROTONIX) 40 MG tablet Take 1 tablet (40 mg total) by  mouth once daily. 5/3/22 5/3/23  Demarcus Bailey MD   betamethasone dipropionate 0.05 % cream Apply 1 application topically 2 (two) times daily. 1/4/22 5/3/22  Historical Provider   fluticasone propion-salmeterol 115-21 mcg/dose (ADVAIR HFA) 115-21 mcg/actuation HFAA inhaler Inhale 2 puffs into the lungs 2 (two) times daily. 3/24/22 5/3/22  Historical Provider   omeprazole 20 mg TbEC Take 1 tablet by mouth once daily at 6am. 12/6/21 5/3/22  Historical Provider       REVIEW OF SYSTEMS:   Except as documented, all other systems reviewed and negative     PHYSICAL EXAM:     VITAL SIGNS: 24 HRS MIN & MAX LAST   Temp  Min: 98.1 °F (36.7 °C)  Max: 98.1 °F (36.7 °C) 98.1 °F (36.7 °C)   BP  Min: 96/56  Max: 105/45 104/61   Pulse  Min: 78  Max: 82  80   Resp  Min: 20  Max: 25 20   SpO2  Min: 94 %  Max: 100 % 100 %   Vital signs reviewed.    General:  Lethargic, alert oriented to self only (at cognitive baseline per daughter at bedside), follows commands appropriately   Eye: clear conjunctiva, eyelids normal  HENT: oropharynx and nasal mucosal surfaces moist  Respiratory:  Poor respiratory effort, bilateral crackles, moderate respiratory distress, nasal cannula in place  Cardiovascular: regular rate and rhythm without gallops or rubs, systolic murmur  Gastrointestinal: soft, non-tender, non-distended with normal bowel sounds, without masses to palpation  Musculoskeletal: able to move all four extremities, right foot in walking boot from recent surgery  Integumentary: warm, dry  Neurologic:  Lethargic, drowsy, was able to arouse.    LABS AND IMAGING:     Recent Labs   Lab 06/01/22  0332 06/03/22  1234   WBC 7.8 7.6   RBC 3.29* 3.03*   HGB 10.0* 9.1*   HCT 33.4* 30.5*   .5* 100.7*   MCH 30.4 30.0   MCHC 29.9* 29.8*   RDW 13.2 13.5    293   MPV 9.4 9.4       Recent Labs   Lab 06/01/22  0332 06/03/22  1234 06/03/22  1513   * 134*  --    K 4.4 4.1  --    CO2 33* 35*  --    BUN 12.6 13.1  --    CREATININE 0.77 0.74   --    CALCIUM 8.9 8.8  --    PH  --   --  7.43   MG 1.80  --   --    ALBUMIN  --  2.8*  --    ALKPHOS  --  82  --    ALT  --  8  --    AST  --  17  --    BILITOT  --  0.3  --        Microbiology Results (last 7 days)     Procedure Component Value Units Date/Time    Blood Culture [[id number]] Collected: 06/03/22 1514    Order Status: Sent Specimen: Blood from Arm, Left     Respiratory Culture [[id number]]     Order Status: Sent Specimen: Sputum, Expectorated     Blood Culture [[id number]]     Order Status: Sent Specimen: Blood            CT Head Without Contrast  Narrative: EXAMINATION:  CT HEAD WITHOUT CONTRAST    CLINICAL HISTORY:  Mental status change, unknown cause;    TECHNIQUE:  Low dose axial images were obtained through the head.  Coronal and sagittal reformations were also performed. Contrast was not administered.    Automatic exposure control was utilized to reduce the patient's radiation dose.    DLP= 1008    COMPARISON:  04/21/2022    FINDINGS:  No acute intracranial hemorrhage, edema or mass. No acute parenchymal abnormality.    Diffuse cerebral atrophy with concordant ventricular enlargement.    Scattered hypodensities throughout the deep periventricular white matter.    The osseous structures are normal.    The mastoid air cells are clear.    The auditory canals are patent bilaterally.    The globes and orbital contents are normal bilaterally.    The visualized maxillary, ethmoid and sphenoid sinuses are clear.  Impression: No acute intracranial abnormality identified.  Findings of chronic microvascular ischemic disease.    Electronically signed by: Kenn Jalloh  Date:    06/03/2022  Time:    15:08  X-Ray Chest 1 View  Narrative: EXAMINATION:  XR CHEST 1 VIEW    CPT 25147    CLINICAL HISTORY:  shor;    COMPARISON:  May 24, 2022    FINDINGS:  There is calcification of the aortic arch mediastinal silhouette is within normal limits cardiac silhouette is at the upper limits of normal to mildly  enlarged.    There is some increase in interstitial and pulmonary vascular markings with some confluent airspace opacities in the left perihilar region and left base infiltrate cannot be completely excluded.    No other focal consolidative changes  Impression: Low increase in interstitial and pulmonary vascular markings.    More confluent airspace opacities in the left infrahilar region and left base infiltrate cannot be completely excluded.    No other significant change    Electronically signed by: Billy Salas  Date:    06/03/2022  Time:    13:15        ASSESSMENT & PLAN:   Acute decompensated diastolic heart failure  Left hilar and left lower lobe infiltrates possible pneumonia  Acute on chronic hypoxemic respiratory failure  Lethargic possible hypercapnic  Macrocytic anemia  History of PAF on Eliquis  History of moderate to severe aortic stenosi, plans for a TAVR as an outpatient with Cardiology  History of bilateral carotid artery stenosis  History of HTN  History of chronic dementia at cognitive baseline per daughter  History of previous DVT/PE,  History of  recent surgical fixation of a closed right trimalleolar ankle fracture on 04/26/2022    Plan:  -keep npo for now given her lethargy  -continue with oxygen supplementation as needed  -ABG-pending  -blood cultures x2, respiratory cultures, MRSA PCR-pending  -continue with Zosyn for possible pneumonia, can change or adjust antibiotics as necessary once cultures results  -monitor daily standing weights and accurate I&Os  -Lasix 40 mg IV BID  -she recently had an echocardiogram done 05/24/2022 that revealed LVEF 55% and grade II diastolic dysfunction so no need to repeat  -CT head to rule out any intracranial abnormalities  -currently on SCDs, if CT shows no acute bleeding we will start Lovenox  -Repeat CBC and CMP in AM    VTE Prophylaxis: will be placed on SCD for DVT prophylaxis and will be advised to be as mobile as possible and sit in a chair as  tolerated  __________________________________________________________________________  INPATIENT LIST OF MEDICATIONS     Current Facility-Administered Medications:     enoxaparin injection 40 mg, 40 mg, Subcutaneous, Daily, CARLOS ALBERTO Acevedo    furosemide injection 40 mg, 40 mg, Intravenous, Q12H, Shakila Bui MD    ondansetron injection 4 mg, 4 mg, Intravenous, Q6H PRN, CARLOS ALBERTO Acevedo    piperacillin-tazobactam (ZOSYN) 4.5 g in dextrose 5 % in water (D5W) 5 % 100 mL IVPB (MB+), 4.5 g, Intravenous, Q8H, Shakila Bui MD    Current Outpatient Medications:     apixaban (ELIQUIS) 2.5 mg Tab, Take 2 tablets (5 mg total) by mouth 2 (two) times daily., Disp: , Rfl:     cyanocobalamin 500 MCG tablet, Take 1 tablet (500 mcg total) by mouth once daily., Disp: 30 tablet, Rfl: 0    dicyclomine (BENTYL) 20 mg tablet, Take 1 tablet by mouth once daily at 6am., Disp: , Rfl:     diltiaZEM (CARDIZEM) 120 MG tablet, Take 1 tablet by mouth once daily at 6am., Disp: , Rfl:     donepeziL (ARICEPT) 10 MG tablet, Take 1 tablet by mouth nightly., Disp: , Rfl:     furosemide (LASIX) 40 MG tablet, Take 1 tablet (40 mg total) by mouth once daily., Disp: 30 tablet, Rfl: 11    gabapentin (NEURONTIN) 300 MG capsule, Take 1 tablet by mouth nightly., Disp: , Rfl:     hydroCHLOROthiazide (HYDRODIURIL) 12.5 MG Tab, Take 1 tablet by mouth once daily at 6am., Disp: , Rfl:     levoFLOXacin (LEVAQUIN) 500 MG tablet, Take 1 tablet (500 mg total) by mouth once daily., Disp: 7 tablet, Rfl: 0    loratadine (CLARITIN) 10 mg tablet, Take 1 tablet by mouth once daily at 6am., Disp: , Rfl:     losartan (COZAAR) 100 MG tablet, Take 1 tablet by mouth once daily., Disp: , Rfl:     pramipexole (MIRAPEX) 1 MG tablet, Take 1 tablet by mouth nightly., Disp: , Rfl:     traZODone (DESYREL) 50 MG tablet, Take 1 tablet (50 mg total) by mouth every evening., Disp: 30 tablet, Rfl: 0    venlafaxine (EFFEXOR-XR) 37.5 MG 24 hr capsule, Take 1 tablet  by mouth once daily at 6am., Disp: , Rfl:     albuterol (PROVENTIL/VENTOLIN HFA) 90 mcg/actuation inhaler, Inhale 2 puffs into the lungs every 6 (six) hours., Disp: , Rfl:     budesonide-glycopyr-formoterol 160-9-4.8 mcg/actuation HFAA, Inhale 2 puffs into the lungs 2 (two) times a day., Disp: , Rfl:     clotrimazole-betamethasone 1-0.05% (LOTRISONE) cream, Apply 1 application topically 2 (two) times daily., Disp: , Rfl:     diclofenac sodium (VOLTAREN) 1 % Gel, Apply 1 application topically 4 (four) times daily., Disp: , Rfl:     HYDROcodone-acetaminophen (NORCO) 5-325 mg per tablet, Take 1 tablet by mouth every 6 (six) hours as needed for Pain., Disp: , Rfl:     LORazepam (ATIVAN) 1 MG tablet, Take 1 tablet by mouth nightly as needed., Disp: , Rfl:     mupirocin (BACTROBAN) 2 % ointment, Apply topically once daily. Apply to affected area at right plantar heel. After cleansing well with dermal cleanser and rinsing with saline, cover with nonadherent dressing secured with rolled gauze. Keep off loaded., Disp: 2 g, Rfl: 0    pantoprazole (PROTONIX) 40 MG tablet, Take 1 tablet (40 mg total) by mouth once daily., Disp: 30 tablet, Rfl: 11      Scheduled Meds:  Continuous Infusions:  PRN Meds:.      Discharge Planning and Disposition/Anticipated discharge: TBD    I, Jerzy Sumner, NP/PA have reviewed and discussed the case with Dr. Bui.   Please see the following addendum for further assessment and plan from there attending MD.  06/03/2022    ________________________________________________________________________________    MD Addendum:  Dr.Abraham MATTY  assumed care of this patient today at around 2:30 p.m.  For the patient encounter, I performed the substantive portion of the visit, I reviewed the NP/PA documentation, treatment plan, and medical decision making.  I had face to face time with this patient     A. History:  88-year-old  female with significant history of valvular heart  disease-moderate to severe aortic stenosis awaiting TAVR, chronic diastolic heart failure, chronic dementia, HTN, previous DVT/PE,  patient had a recent trimalleolar ankle fracture late April for which she underwent surgical repair and was discharged to skilled nursing facility.  Additional past medical history includes bilateral carotid artery disease, GERD, obesity, AFib, anxiety.  Patient had a recent hospital admission late me for worsening dyspnea/acute on chronic hypoxemia secondary to heart failure versus pneumonia.  Patient was appropriately treatment with diuretics.  Antibiotics later deescalated is concern for pneumonia was less likely and was discharged back to nursing home.  Patient presented back to the ED from nursing home today with complaints of worsening dyspnea x1 day.  History is limited as the patient was very lethargic at the time of my rounds.  Blood pressure was low normal.  Was on oxygen mask with stable saturations.  Lab significant for hyponatremia.  CBC with chronic anemia.  Troponins negative.  Chest x-ray consistent with pulmonary vascular congestion versus pneumonia.  Hospitalist team consulted for admission.    B. Physical exam:  As above    C. Medical decision making:     Acute decompensated diastolic heart failure  Rule out HAP  Acute encephalopathy-CO2 narcosis  Acute on chronic hypoxemic/hypercapnic respiratory failure  Moderate to severe aortic stenosis awaiting TAVR  History of paroxysmal atrial fibrillation  History of DVT/PE  Chronic dementia  Recent trimalleolar fracture requiring surgical fixation  Severe physical deconditioning  Morbid obesity  History of GERD  History of essential HTN-now BP low normal  Prophylaxis    Chest x-ray concerning for pulmonary vascular congestion versus hospital-acquired pneumonia  CT thorax without contrast to further evaluate  Patient received IV Lasix 80 mg in the ED  Continue IV Lasix 20 mg b.i.d.  Will be cautious given low normal blood  pressure  Given concern for hospital-acquired pneumonia have initiated IV Zosyn  Ordered blood cultures x2, respiratory culture, MRSA PCR  If  CT thorax is not concerning for pneumonia can deescalate antibiotics soon  ABG consistent with hypercapnia  PH is compensated  But since she is encephalopathic will initiate her on BiPAP-10/05/30% FiO2  CT head within normal limits  Keep her NPO given encephalopathy, okay for meds  Advance diet once she is more awake, alert and oriented  I will consult speech therapy  Holding home antihypertensives given low normal blood pressure  Patient is on losartan, diltiazem at home  Also holding benzos, trazodone given encephalopathy  Resume as tolerated  Resumed other home meds-bronchodilator, Eliquis, vitamin B12, donepezil, gabapentin, ppi, pramipexole, venlafaxine  DVT prophylaxis-on Eliquis    Critical care time-45 minutes  Critical care diagnosis- acute on chronic hypercapnic respiratory failure requiring BiPAP  Critical care interventions: Hands-on evaluation, review of labs/radiographs/records and discussions with patient's family    All diagnosis and differential diagnosis have been reviewed; assessment and plan has been documented; I have personally reviewed the labs and test results that are presently available; I have reviewed the patients medication list; I have reviewed the consulting providers response and recommendations. I have reviewed or attempted to review medical records based upon their availability.    All of the patient and family questions have been addressed and answered. Patient's is agreeable to the above stated plan. I will continue to monitor closely and make adjustments to medical management as needed.    CARLOS ALBERTO Acevedo   06/03/2022

## 2022-06-03 NOTE — Clinical Note
The catheter was inserted into the and was inserted over the wire into the ostium   left main. An angiography was performed of the left coronary arteries. Multiple views were taken. JL4

## 2022-06-04 LAB
ALBUMIN SERPL-MCNC: 2.7 GM/DL (ref 3.4–4.8)
ALBUMIN/GLOB SERPL: 0.7 RATIO (ref 1.1–2)
ALP SERPL-CCNC: 78 UNIT/L (ref 40–150)
ALT SERPL-CCNC: 6 UNIT/L (ref 0–55)
AST SERPL-CCNC: 13 UNIT/L (ref 5–34)
BASOPHILS # BLD AUTO: 0.03 X10(3)/MCL (ref 0–0.2)
BASOPHILS NFR BLD AUTO: 0.5 %
BILIRUBIN DIRECT+TOT PNL SERPL-MCNC: 0.5 MG/DL
BUN SERPL-MCNC: 14.1 MG/DL (ref 9.8–20.1)
CALCIUM SERPL-MCNC: 9.1 MG/DL (ref 8.4–10.2)
CHLORIDE SERPL-SCNC: 89 MMOL/L (ref 98–107)
CO2 SERPL-SCNC: 36 MMOL/L (ref 23–31)
CREAT SERPL-MCNC: 0.83 MG/DL (ref 0.55–1.02)
EOSINOPHIL # BLD AUTO: 0.19 X10(3)/MCL (ref 0–0.9)
EOSINOPHIL NFR BLD AUTO: 3.1 %
ERYTHROCYTE [DISTWIDTH] IN BLOOD BY AUTOMATED COUNT: 13.4 % (ref 11.5–17)
GLOBULIN SER-MCNC: 3.7 GM/DL (ref 2.4–3.5)
GLUCOSE SERPL-MCNC: 88 MG/DL (ref 82–115)
HCT VFR BLD AUTO: 30.3 % (ref 37–47)
HGB BLD-MCNC: 9.3 GM/DL (ref 12–16)
IMM GRANULOCYTES # BLD AUTO: 0.02 X10(3)/MCL (ref 0–0.02)
IMM GRANULOCYTES NFR BLD AUTO: 0.3 % (ref 0–0.43)
LYMPHOCYTES # BLD AUTO: 0.94 X10(3)/MCL (ref 0.6–4.6)
LYMPHOCYTES NFR BLD AUTO: 15.3 %
MCH RBC QN AUTO: 30.2 PG (ref 27–31)
MCHC RBC AUTO-ENTMCNC: 30.7 MG/DL (ref 33–36)
MCV RBC AUTO: 98.4 FL (ref 80–94)
MONOCYTES # BLD AUTO: 0.86 X10(3)/MCL (ref 0.1–1.3)
MONOCYTES NFR BLD AUTO: 14 %
NEUTROPHILS # BLD AUTO: 4.1 X10(3)/MCL (ref 2.1–9.2)
NEUTROPHILS NFR BLD AUTO: 66.8 %
NRBC BLD AUTO-RTO: 0 %
PLATELET # BLD AUTO: 285 X10(3)/MCL (ref 130–400)
PMV BLD AUTO: 9.7 FL (ref 9.4–12.4)
POTASSIUM SERPL-SCNC: 3.9 MMOL/L (ref 3.5–5.1)
PROT SERPL-MCNC: 6.4 GM/DL (ref 5.8–7.6)
RBC # BLD AUTO: 3.08 X10(6)/MCL (ref 4.2–5.4)
SODIUM SERPL-SCNC: 134 MMOL/L (ref 136–145)
WBC # SPEC AUTO: 6.1 X10(3)/MCL (ref 4.5–11.5)

## 2022-06-04 PROCEDURE — 63600175 PHARM REV CODE 636 W HCPCS: Performed by: INTERNAL MEDICINE

## 2022-06-04 PROCEDURE — 94660 CPAP INITIATION&MGMT: CPT

## 2022-06-04 PROCEDURE — 94761 N-INVAS EAR/PLS OXIMETRY MLT: CPT

## 2022-06-04 PROCEDURE — 25000242 PHARM REV CODE 250 ALT 637 W/ HCPCS: Performed by: INTERNAL MEDICINE

## 2022-06-04 PROCEDURE — 99900031 HC PATIENT EDUCATION (STAT)

## 2022-06-04 PROCEDURE — 99900035 HC TECH TIME PER 15 MIN (STAT)

## 2022-06-04 PROCEDURE — 36415 COLL VENOUS BLD VENIPUNCTURE: CPT | Performed by: PHYSICIAN ASSISTANT

## 2022-06-04 PROCEDURE — 80053 COMPREHEN METABOLIC PANEL: CPT | Performed by: PHYSICIAN ASSISTANT

## 2022-06-04 PROCEDURE — 25000003 PHARM REV CODE 250: Performed by: INTERNAL MEDICINE

## 2022-06-04 PROCEDURE — 11000001 HC ACUTE MED/SURG PRIVATE ROOM

## 2022-06-04 PROCEDURE — 27000221 HC OXYGEN, UP TO 24 HOURS

## 2022-06-04 PROCEDURE — 85025 COMPLETE CBC W/AUTO DIFF WBC: CPT | Performed by: PHYSICIAN ASSISTANT

## 2022-06-04 RX ADMIN — APIXABAN 5 MG: 5 TABLET, FILM COATED ORAL at 09:06

## 2022-06-04 RX ADMIN — ALBUTEROL SULFATE 2 PUFF: 90 AEROSOL, METERED RESPIRATORY (INHALATION) at 07:06

## 2022-06-04 RX ADMIN — DONEPEZIL HYDROCHLORIDE 10 MG: 5 TABLET, FILM COATED ORAL at 09:06

## 2022-06-04 RX ADMIN — GABAPENTIN 100 MG: 100 CAPSULE ORAL at 09:06

## 2022-06-04 RX ADMIN — PRAMIPEXOLE DIHYDROCHLORIDE 1 MG: 0.25 TABLET ORAL at 09:06

## 2022-06-04 RX ADMIN — PIPERACILLIN AND TAZOBACTAM 4.5 G: 4; .5 INJECTION, POWDER, LYOPHILIZED, FOR SOLUTION INTRAVENOUS; PARENTERAL at 06:06

## 2022-06-04 RX ADMIN — FUROSEMIDE 20 MG: 10 INJECTION INTRAMUSCULAR; INTRAVENOUS at 07:06

## 2022-06-04 RX ADMIN — FUROSEMIDE 20 MG: 10 INJECTION INTRAMUSCULAR; INTRAVENOUS at 10:06

## 2022-06-04 NOTE — PROGRESS NOTES
"Nutrition   Progress Note      Recommendations:  1- Advance diet as tolerated per MD. Goal Diet: Cardiac Diet or per SLP recs if consulted.   2- If pt remains unsafe for po intake, consult RD for recommendations.   3- Kang BID with diet advancement for wound healing.   4- Monitor wt and labs.      Reason for Evaluation:  Consult    Diagnosis:    1. Shortness of breath    2. Acute combined systolic and diastolic congestive heart failure    3. Hypoxia        Relevant Medical History:    Past Medical History:   Diagnosis Date    Anxiety disorder, unspecified     Arthritis     Deep vein thrombosis     Depression     Irregular heart beat     Obesity, unspecified     Other pulmonary embolism without acute cor pulmonale     Parkinson's disease     Trimalleolar fracture of ankle, closed     Unspecified dementia without behavioral disturbance          Nutrition Diet History:    Factors affecting nutritional intake: NPO    Food / Cheondoism / Culture Preferences: none noted      Nutrition Prescription Ordered:    Current Diet Order:  NPO    Appetite:  good    PO intake: NPO      Labs / Medications / Procedures:    Nutrition Related Medications: Vit B12, furosemide, Zofran PRN    Nutrition Related Labs:  6/4/22: Na 134, Glu 88, GFR>60      Anthropometrics:  Height: 5' 8" (1.727 m)  Admit Weight:  Weight: 99.8 kg (220 lb)  Latest Weight:  129.2 kg (284 lb 13.4 oz)    Wt Readings from Last 5 Encounters:   06/03/22 129.2 kg (284 lb 13.4 oz)   05/28/22 110.5 kg (243 lb 8 oz)   05/18/22 111 kg (244 lb 11.4 oz)   04/29/22 111 kg (244 lb 11.4 oz)     IBW: 140 lbs  %IBW: 284.8%  UBW: 240 lbs (109.1 kg)  %Weight Change: +18.42%  Body mass index is 43.31 kg/m².  BMI classification:  Obese Grade III (BMI >/= 40)      Nutrition Narrative:  6/4/22: Consult for skin integrity; noted per documentation, pt has partial thickness tissue loss on right heel. Pt is currently NPO 2/2 lethargy. Pt was awake and alert during rounds. Per " family member, pt normally has a great appetite and states that lethargy occasionally is her norm. Pt denies n/v and wt loss.     Monitoring and Evaluation:    Nutrition Monitoring and Evaluation:  food and beverage intake and diet order    Nutrition Risk:  Level of Nutrition Risk:  Low  Frequency of Follow up:  Dietitian will f/up within 7 days.

## 2022-06-04 NOTE — CONSULTS
"Nutrition   Consult Note      Recommendations:  1- Advance diet as tolerated per MD. Goal Diet: Cardiac Diet or per SLP recs if consulted.   2- If pt remains unsafe for po intake, consult RD for recommendations.   3- Kang BID with diet advancement for wound healing.   4- Monitor wt and labs.        Reason for Evaluation:  Consult     Diagnosis:    1. Shortness of breath    2. Acute combined systolic and diastolic congestive heart failure    3. Hypoxia          Relevant Medical History:         Past Medical History:   Diagnosis Date    Anxiety disorder, unspecified      Arthritis      Deep vein thrombosis      Depression      Irregular heart beat      Obesity, unspecified      Other pulmonary embolism without acute cor pulmonale      Parkinson's disease      Trimalleolar fracture of ankle, closed      Unspecified dementia without behavioral disturbance              Nutrition Diet History:     Factors affecting nutritional intake: NPO     Food / Latter-day / Culture Preferences: none noted        Nutrition Prescription Ordered:     Current Diet Order:  NPO     Appetite:  good     PO intake: NPO        Labs / Medications / Procedures:     Nutrition Related Medications: Vit B12, furosemide, Zofran PRN     Nutrition Related Labs:  6/4/22: Na 134, Glu 88, GFR>60        Anthropometrics:  Height: 5' 8" (1.727 m)  Admit Weight:  Weight: 99.8 kg (220 lb)  Latest Weight:  129.2 kg (284 lb 13.4 oz)         Wt Readings from Last 5 Encounters:   06/03/22 129.2 kg (284 lb 13.4 oz)   05/28/22 110.5 kg (243 lb 8 oz)   05/18/22 111 kg (244 lb 11.4 oz)   04/29/22 111 kg (244 lb 11.4 oz)      IBW: 140 lbs  %IBW: 284.8%  UBW: 240 lbs (109.1 kg)  %Weight Change: +18.42%  Body mass index is 43.31 kg/m².  BMI classification:  Obese Grade III (BMI >/= 40)        Nutrition Narrative:  6/4/22: Consult for skin integrity; noted per documentation, pt has partial thickness tissue loss on right heel. Pt is currently NPO 2/2 lethargy. Pt " was awake and alert during rounds. Per family member, pt normally has a great appetite and states that lethargy occasionally is her norm. Pt denies n/v and wt loss.      Monitoring and Evaluation:     Nutrition Monitoring and Evaluation:  food and beverage intake and diet order     Nutrition Risk:  Level of Nutrition Risk:  Low  Frequency of Follow up:  Dietitian will f/up within 7 days.

## 2022-06-04 NOTE — PROGRESS NOTES
"[facility] Hospital Medicine Progress Note        Chief Complaint: Inpatient Follow-up for  dchf exacerbation    HPI:   Emma Jorgensen is a 88 y.o. female with a past medical history of diastolic heart failure (LVEF 55% and grade II diastolic dysfunction from an echo on 05/24/2022),  PAF on Eliquis, moderate to severe aortic stenosis with plans for a TAVR as an outpatient with Cardiology, bilateral carotid artery stenosis, HTN, chronic dementia, previous DVT/PE, and recent surgical fixation of a closed right trimalleolar ankle fracture on 04/26/2022 who presented to [facility] on 6/3/2022 from [facility] with complaints of worsening shortness breath that began today. The daughter, who is at bedside, stated that she has been on nasal cannula 2 L/min at the facility and visited the patient yesterday where an aid reportedly informed her that she had an episode of SOB after her "oxygen tank ran out." The daughter also states that she is currently at her cognitive baseline although she is more lethargic than normal.  She denied that she was complaining of any fever, cough, chest pain, nausea, vomiting, abdominal pain, or dysuria.     She was recently admitted and discharged from the hospital at [facility] on 06/01/2022 where she received IV diuretics for acute decompensated diastolic heart failure. Her symptoms and respiratory status improved so she was transferred back to the SNF.      Her initial vital signs showed that she was afebrile with an RR 24, HR 82, BP 99/55, and SpO2 100% on nasal cannula 6 L/min.  Her labs show mild hyponatremia, mild hypochloremia, a bicarb 35, and a .1.  COVID-19 and influenza negative. CXR showed a low increase in interstitial and pulmonary vascular markings. More confluent airspace opacities in the left infrahilar region and left base infiltrate cannot be completely excluded.    Patient admitted for resp failure d/t dchf exacerbation    Interval Hx: "   resp status is improving- discontinue bipap, get her a cardiac diet  D/c zosyn, ct chest does not show a new infiltrate  Continue iv lasix   Oxygen support       Objective/physical exam:  General:  awake, alert oriented to self only (at cognitive baseline per daughter at bedside), follows commands appropriately   Eye: clear conjunctiva, eyelids normal  HENT: oropharynx and nasal mucosal surfaces moist  Respiratory:  Poor respiratory effort, bilateral crackles, moderate respiratory distress, nasal cannula in place  Cardiovascular: regular rate and rhythm without gallops or rubs, systolic murmur  Gastrointestinal: soft, non-tender, non-distended with normal bowel sounds, without masses to palpation  Musculoskeletal: able to move all four extremities, right foot in walking boot from recent surgery  Integumentary: warm, dry  Neurologic: ax o x 3,    VITAL SIGNS: 24 HRS MIN & MAX LAST   Temp  Min: 97 °F (36.1 °C)  Max: 98.5 °F (36.9 °C) 97.6 °F (36.4 °C)   BP  Min: 82/51  Max: 130/64 128/71   Pulse  Min: 68  Max: 106  82   Resp  Min: 15  Max: 22 18   SpO2  Min: 95 %  Max: 100 % 98 %       Recent Labs   Lab 06/01/22  0332 06/03/22  1234 06/04/22  0420   WBC 7.8 7.6 6.1   RBC 3.29* 3.03* 3.08*   HGB 10.0* 9.1* 9.3*   HCT 33.4* 30.5* 30.3*   .5* 100.7* 98.4*   MCH 30.4 30.0 30.2   MCHC 29.9* 29.8* 30.7*   RDW 13.2 13.5 13.4    293 285   MPV 9.4 9.4 9.7       Recent Labs   Lab 06/01/22  0332 06/03/22  1234 06/03/22  1513 06/04/22  0420   * 134*  --  134*   K 4.4 4.1  --  3.9   CO2 33* 35*  --  36*   BUN 12.6 13.1  --  14.1   CREATININE 0.77 0.74  --  0.83   CALCIUM 8.9 8.8  --  9.1   PH  --   --  7.43  --    MG 1.80  --   --   --    ALBUMIN  --  2.8*  --  2.7*   ALKPHOS  --  82  --  78   ALT  --  8  --  6   AST  --  17  --  13   BILITOT  --  0.3  --  0.5          Microbiology Results (last 7 days)     Procedure Component Value Units Date/Time    Blood Culture [[id number]] Collected: 06/03/22 3380     Order Status: Resulted Specimen: Blood from Arm, Left Updated: 06/03/22 1553    Blood Culture [[id number]] Collected: 06/03/22 1532    Order Status: Resulted Specimen: Blood from Hand, Left Updated: 06/03/22 1553    Respiratory Culture [[id number]]     Order Status: Sent Specimen: Sputum, Expectorated            See below for Radiology    Scheduled Med:   albuterol  2 puff Inhalation Q6H    apixaban  5 mg Oral BID    cyanocobalamin  500 mcg Oral Daily    donepeziL  10 mg Oral Nightly    fluticasone furoate-vilanteroL  1 puff Inhalation Daily    furosemide (LASIX) injection  20 mg Intravenous Q12H    gabapentin  100 mg Oral Nightly    pantoprazole  40 mg Oral Daily    pramipexole  1 mg Oral Nightly    venlafaxine  37.5 mg Oral Daily        Continuous Infusions:       PRN Meds:  ondansetron       Assessment/Plan:  Acute decompensated diastolic heart failure  Acute on chronic hypoxemic respiratory failure, improving   Lethargic possible hypercapnic, resolved   Macrocytic anemia  History of PAF on Eliquis  History of moderate to severe aortic stenosi, plans for a TAVR as an outpatient with Cardiology  History of bilateral carotid artery stenosis  History of HTN  History of chronic dementia at cognitive baseline per daughter  History of previous DVT/PE,  History of  recent surgical fixation of a closed right trimalleolar ankle fracture on 04/26/2022     Plan:  discontinue bipap, get her a cardiac diet  D/c zosyn, ct chest does not show a new infiltrate  Continue iv lasix   Oxygen support   blood cultures x2, respiratory cultures, MRSA PCR-pending  monitor daily standing weights and accurate I&Os  -she recently had an echocardiogram done 05/24/2022 that revealed LVEF 55% and grade II diastolic dysfunction so no need to repeat  -CT head to rule out any intracranial abnormalities  -currently on SCDs, if CT shows no acute bleeding we will start Lovenox  -Repeat CBC and CMP in AM     VTE Prophylaxis: will be placed on  SCD for DVT prophylaxis and will be advised to be as mobile as possible and sit in a chair as tolerated  __________________________________________________________________________      Patient condition:  Stable/Fair/Guarded/ Serious/ Critical    Anticipated discharge and Disposition:    Critical care diagnosis acute decompensated CHF on IV Lasix  Critical care time greater than 35 minutes    All diagnosis and differential diagnosis have been reviewed; assessment and plan has been documented; I have personally reviewed the labs and test results that are presently available; I have reviewed the patients medication list; I have reviewed the consulting providers response and recommendations. I have reviewed or attempted to review medical records based upon their availability    All of the patient's questions have been  addressed and answered. Patient's is agreeable to the above stated plan. I will continue to monitor closely and make adjustments to medical management as needed.  _____________________________________________________________________    Nutrition Status:    Radiology:  CT Chest Without Contrast  Narrative: EXAMINATION:  CT CHEST WITHOUT CONTRAST    CLINICAL HISTORY:  ; Abnormal xray - lung nodule, < 1 cm, low risk;    TECHNIQUE:  Helical-acquisition CT images were obtained without the intravenous administration of iodine-based contrast media.  Multiplanar reformats were accomplished by a CT technologist at a separate workstation and pushed to PACS for physician review.    Automated tube current modulation, weight-based exposure dosing, and/or iterative reconstruction technique utilized to reach lowest reasonably achievable exposure rate.    DLP: 257 mGy*cm    COMPARISON:  29 April 2022    FINDINGS:  Images were reviewed in lung, soft tissue, and bone windows.    Exam quality: Limited secondary to patient movement throughout image acquisition, with resulting artifact.    Lines/tubes: none  visualized    Extensive aortic and coronary calcification is again appreciated.  No new focal vascular abnormality by noncontrast appearance.  Cardiac chambers are unchanged volume.  There is no significant pericardial fluid.    Central airway patency is widely maintained.  There is redemonstrated, similar to slightly increased peripheral dependent consolidative airspace density and surrounding ground-glass attenuation involving both lungs.  No development of lobar consolidation or suspicious focal pulmonary lesion.  There is no large or loculated pleural effusion.  No pneumothorax.    The included thyroid gland, regional lymph nodes, and visualized upper abdominal structures demonstrate no convincing acute or new focal process.  Right adrenal nodule is unchanged in size and CT appearance, otherwise inadequately assessed by exam protocol.  There are similar scattered cystic hypodensities of the hepatic lobes.    Multilevel spinal column degenerative changes and chronic midthoracic compression deformity with slight cortical retropulsion unchanged in comparison.  No convincing acute or worsened focal osseous lesion is identified.  The thoracic wall subcutaneous tissues and visualized musculature are unremarkable.  Impression: Limited evaluation secondary to extensive respiratory motion artifact.  Within limitations:    1. The apparent clinically suspected lung nodule is not clearly confirmed by CT assessment and there is no evidence of new or enlarging focal pulmonary lesion.  2. Scattered bilateral dependent ill-defined airspace consolidation is similar to slightly worsened, may represent atelectasis but otherwise nonspecific.  3. Additional chronic secondary details discussed above, similar to the 29 April 2022 appearance.    Electronically signed by: Sony Chavez  Date:    06/03/2022  Time:    18:37  CT Head Without Contrast  Narrative: EXAMINATION:  CT HEAD WITHOUT CONTRAST    CLINICAL HISTORY:  Mental status  change, unknown cause;    TECHNIQUE:  Low dose axial images were obtained through the head.  Coronal and sagittal reformations were also performed. Contrast was not administered.    Automatic exposure control was utilized to reduce the patient's radiation dose.    DLP= 1008    COMPARISON:  04/21/2022    FINDINGS:  No acute intracranial hemorrhage, edema or mass. No acute parenchymal abnormality.    Diffuse cerebral atrophy with concordant ventricular enlargement.    Scattered hypodensities throughout the deep periventricular white matter.    The osseous structures are normal.    The mastoid air cells are clear.    The auditory canals are patent bilaterally.    The globes and orbital contents are normal bilaterally.    The visualized maxillary, ethmoid and sphenoid sinuses are clear.  Impression: No acute intracranial abnormality identified.  Findings of chronic microvascular ischemic disease.    Electronically signed by: Kenn Jalloh  Date:    06/03/2022  Time:    15:08  X-Ray Chest 1 View  Narrative: EXAMINATION:  XR CHEST 1 VIEW    CPT 44298    CLINICAL HISTORY:  shor;    COMPARISON:  May 24, 2022    FINDINGS:  There is calcification of the aortic arch mediastinal silhouette is within normal limits cardiac silhouette is at the upper limits of normal to mildly enlarged.    There is some increase in interstitial and pulmonary vascular markings with some confluent airspace opacities in the left perihilar region and left base infiltrate cannot be completely excluded.    No other focal consolidative changes  Impression: Low increase in interstitial and pulmonary vascular markings.    More confluent airspace opacities in the left infrahilar region and left base infiltrate cannot be completely excluded.    No other significant change    Electronically signed by: Billy Salas  Date:    06/03/2022  Time:    13:15      Sai Sanchez MD   06/04/2022

## 2022-06-04 NOTE — PLAN OF CARE
Problem: Adult Inpatient Plan of Care  Goal: Plan of Care Review  Outcome: Ongoing, Progressing  Goal: Patient-Specific Goal (Individualized)  Outcome: Ongoing, Progressing  Goal: Absence of Hospital-Acquired Illness or Injury  Outcome: Ongoing, Progressing  Goal: Optimal Comfort and Wellbeing  Outcome: Ongoing, Progressing  Goal: Readiness for Transition of Care  Outcome: Ongoing, Progressing     Problem: Bariatric Environmental Safety  Goal: Safety Maintained with Care  Outcome: Ongoing, Progressing     Problem: Fluid Imbalance (Pneumonia)  Goal: Fluid Balance  Outcome: Ongoing, Progressing     Problem: Infection (Pneumonia)  Goal: Resolution of Infection Signs and Symptoms  Outcome: Ongoing, Progressing     Problem: Respiratory Compromise (Pneumonia)  Goal: Effective Oxygenation and Ventilation  Outcome: Ongoing, Progressing     Problem: Impaired Wound Healing  Goal: Optimal Wound Healing  Outcome: Ongoing, Progressing     Problem: Fall Injury Risk  Goal: Absence of Fall and Fall-Related Injury  Outcome: Ongoing, Progressing     Problem: Skin Injury Risk Increased  Goal: Skin Health and Integrity  Outcome: Ongoing, Progressing

## 2022-06-05 LAB
ANION GAP SERPL CALC-SCNC: 12 MEQ/L
BUN SERPL-MCNC: 11.7 MG/DL (ref 9.8–20.1)
CALCIUM SERPL-MCNC: 8.8 MG/DL (ref 8.4–10.2)
CHLORIDE SERPL-SCNC: 89 MMOL/L (ref 98–107)
CO2 SERPL-SCNC: 33 MMOL/L (ref 23–31)
CREAT SERPL-MCNC: 0.71 MG/DL (ref 0.55–1.02)
CREAT/UREA NIT SERPL: 16
GLUCOSE SERPL-MCNC: 91 MG/DL (ref 82–115)
HCT VFR BLD AUTO: 28.8 % (ref 37–47)
HGB BLD-MCNC: 9.2 GM/DL (ref 12–16)
MAGNESIUM SERPL-MCNC: 1.7 MG/DL (ref 1.6–2.6)
POTASSIUM SERPL-SCNC: 3.9 MMOL/L (ref 3.5–5.1)
SODIUM SERPL-SCNC: 134 MMOL/L (ref 136–145)

## 2022-06-05 PROCEDURE — 99900035 HC TECH TIME PER 15 MIN (STAT)

## 2022-06-05 PROCEDURE — 63600175 PHARM REV CODE 636 W HCPCS: Performed by: INTERNAL MEDICINE

## 2022-06-05 PROCEDURE — 27000221 HC OXYGEN, UP TO 24 HOURS

## 2022-06-05 PROCEDURE — 85014 HEMATOCRIT: CPT | Performed by: INTERNAL MEDICINE

## 2022-06-05 PROCEDURE — 11000001 HC ACUTE MED/SURG PRIVATE ROOM

## 2022-06-05 PROCEDURE — 36415 COLL VENOUS BLD VENIPUNCTURE: CPT | Performed by: INTERNAL MEDICINE

## 2022-06-05 PROCEDURE — 80048 BASIC METABOLIC PNL TOTAL CA: CPT | Performed by: INTERNAL MEDICINE

## 2022-06-05 PROCEDURE — 83735 ASSAY OF MAGNESIUM: CPT | Performed by: INTERNAL MEDICINE

## 2022-06-05 PROCEDURE — 25000242 PHARM REV CODE 250 ALT 637 W/ HCPCS: Performed by: INTERNAL MEDICINE

## 2022-06-05 PROCEDURE — 25000003 PHARM REV CODE 250: Performed by: INTERNAL MEDICINE

## 2022-06-05 RX ORDER — MAGNESIUM SULFATE HEPTAHYDRATE 40 MG/ML
2 INJECTION, SOLUTION INTRAVENOUS ONCE
Status: COMPLETED | OUTPATIENT
Start: 2022-06-05 | End: 2022-06-05

## 2022-06-05 RX ADMIN — ALBUTEROL SULFATE 2 PUFF: 90 AEROSOL, METERED RESPIRATORY (INHALATION) at 05:06

## 2022-06-05 RX ADMIN — CYANOCOBALAMIN TAB 500 MCG 500 MCG: 500 TAB at 09:06

## 2022-06-05 RX ADMIN — APIXABAN 5 MG: 5 TABLET, FILM COATED ORAL at 09:06

## 2022-06-05 RX ADMIN — GABAPENTIN 100 MG: 100 CAPSULE ORAL at 09:06

## 2022-06-05 RX ADMIN — MAGNESIUM SULFATE IN WATER 2 G: 40 INJECTION, SOLUTION INTRAVENOUS at 09:06

## 2022-06-05 RX ADMIN — FUROSEMIDE 20 MG: 10 INJECTION INTRAMUSCULAR; INTRAVENOUS at 07:06

## 2022-06-05 RX ADMIN — PANTOPRAZOLE SODIUM 40 MG: 40 TABLET, DELAYED RELEASE ORAL at 09:06

## 2022-06-05 RX ADMIN — VENLAFAXINE HYDROCHLORIDE 37.5 MG: 37.5 CAPSULE, EXTENDED RELEASE ORAL at 05:06

## 2022-06-05 RX ADMIN — PRAMIPEXOLE DIHYDROCHLORIDE 1 MG: 0.25 TABLET ORAL at 09:06

## 2022-06-05 RX ADMIN — FUROSEMIDE 20 MG: 10 INJECTION INTRAMUSCULAR; INTRAVENOUS at 06:06

## 2022-06-05 RX ADMIN — DONEPEZIL HYDROCHLORIDE 10 MG: 5 TABLET, FILM COATED ORAL at 09:06

## 2022-06-05 RX ADMIN — FLUTICASONE FUROATE AND VILANTEROL TRIFENATATE 1 PUFF: 100; 25 POWDER RESPIRATORY (INHALATION) at 09:06

## 2022-06-05 RX ADMIN — ALBUTEROL SULFATE 2 PUFF: 90 AEROSOL, METERED RESPIRATORY (INHALATION) at 12:06

## 2022-06-05 NOTE — PLAN OF CARE
Problem: Adult Inpatient Plan of Care  Goal: Plan of Care Review  Outcome: Ongoing, Progressing  Goal: Patient-Specific Goal (Individualized)  Outcome: Ongoing, Progressing  Goal: Absence of Hospital-Acquired Illness or Injury  Outcome: Ongoing, Progressing  Goal: Optimal Comfort and Wellbeing  Outcome: Ongoing, Progressing  Goal: Readiness for Transition of Care  Outcome: Ongoing, Progressing     Problem: Bariatric Environmental Safety  Goal: Safety Maintained with Care  Outcome: Ongoing, Progressing     Problem: Fluid Imbalance (Pneumonia)  Goal: Fluid Balance  Outcome: Ongoing, Progressing     Problem: Infection (Pneumonia)  Goal: Resolution of Infection Signs and Symptoms  Outcome: Ongoing, Progressing     Problem: Respiratory Compromise (Pneumonia)  Goal: Effective Oxygenation and Ventilation  Outcome: Ongoing, Progressing     Problem: Impaired Wound Healing  Goal: Optimal Wound Healing  Outcome: Ongoing, Progressing     Problem: Fall Injury Risk  Goal: Absence of Fall and Fall-Related Injury  Outcome: Ongoing, Progressing     Problem: Skin Injury Risk Increased  Goal: Skin Health and Integrity  Outcome: Ongoing, Progressing     Problem: Infection  Goal: Absence of Infection Signs and Symptoms  Outcome: Ongoing, Progressing

## 2022-06-05 NOTE — PROGRESS NOTES
"[facility] Hospital Medicine Progress Note        Chief Complaint: Inpatient Follow-up for dchf exacerbation    HPI:   Emma Jorgensen is a 88 y.o. female with a past medical history of diastolic heart failure (LVEF 55% and grade II diastolic dysfunction from an echo on 05/24/2022),  PAF on Eliquis, moderate to severe aortic stenosis with plans for a TAVR as an outpatient with Cardiology, bilateral carotid artery stenosis, HTN, chronic dementia, previous DVT/PE, and recent surgical fixation of a closed right trimalleolar ankle fracture on 04/26/2022 who presented to [facility] on 6/3/2022 from [facility] with complaints of worsening shortness breath that began today. The daughter, who is at bedside, stated that she has been on nasal cannula 2 L/min at the facility and visited the patient yesterday where an aid reportedly informed her that she had an episode of SOB after her "oxygen tank ran out." The daughter also states that she is currently at her cognitive baseline although she is more lethargic than normal.  She denied that she was complaining of any fever, cough, chest pain, nausea, vomiting, abdominal pain, or dysuria.     She was recently admitted and discharged from the hospital at [facility] on 06/01/2022 where she received IV diuretics for acute decompensated diastolic heart failure. Her symptoms and respiratory status improved so she was transferred back to the SNF.      Her initial vital signs showed that she was afebrile with an RR 24, HR 82, BP 99/55, and SpO2 100% on nasal cannula 6 L/min.  Her labs show mild hyponatremia, mild hypochloremia, a bicarb 35, and a .1.  COVID-19 and influenza negative. CXR showed a low increase in interstitial and pulmonary vascular markings. More confluent airspace opacities in the left infrahilar region and left base infiltrate cannot be completely excluded.     Patient admitted for resp failure d/t dchf exacerbation    Interval Hx: "   resp status is improving on nc oxygen 3L   CIS on board- planning for in pt [facility] in prep for tavr  No family at bedside   Replete magnesium     Objective/physical exam:  General:  awake, alert oriented to self only (at cognitive baseline per daughter at bedside), follows commands appropriately   Eye: clear conjunctiva, eyelids normal  HENT: oropharynx and nasal mucosal surfaces moist  Respiratory:  Poor respiratory effort, bilateral crackles, moderate respiratory distress, nasal cannula in place  Cardiovascular: regular rate and rhythm without gallops or rubs, systolic murmur  Gastrointestinal: soft, non-tender, non-distended with normal bowel sounds, without masses to palpation  Musculoskeletal: able to move all four extremities, right foot in walking boot from recent surgery  Integumentary: warm, dry  Neurologic: ax o x 3,    VITAL SIGNS: 24 HRS MIN & MAX LAST   Temp  Min: 98.2 °F (36.8 °C)  Max: 99.2 °F (37.3 °C) 98.8 °F (37.1 °C)   BP  Min: 109/61  Max: 153/81 136/70   Pulse  Min: 77  Max: 98  83   Resp  Min: 18  Max: 22 20   SpO2  Min: 96 %  Max: 100 % 99 %       Recent Labs   Lab 06/01/22 0332 06/03/22  1234 06/04/22  0420 06/05/22  0322   WBC 7.8 7.6 6.1  --    RBC 3.29* 3.03* 3.08*  --    HGB 10.0* 9.1* 9.3* 9.2*   HCT 33.4* 30.5* 30.3* 28.8*   .5* 100.7* 98.4*  --    MCH 30.4 30.0 30.2  --    MCHC 29.9* 29.8* 30.7*  --    RDW 13.2 13.5 13.4  --     293 285  --    MPV 9.4 9.4 9.7  --        Recent Labs   Lab 06/01/22 0332 06/03/22  1234 06/03/22  1513 06/04/22  0420 06/05/22  0322   * 134*  --  134* 134*   K 4.4 4.1  --  3.9 3.9   CO2 33* 35*  --  36* 33*   BUN 12.6 13.1  --  14.1 11.7   CREATININE 0.77 0.74  --  0.83 0.71   CALCIUM 8.9 8.8  --  9.1 8.8   PH  --   --  7.43  --   --    MG 1.80  --   --   --  1.70   ALBUMIN  --  2.8*  --  2.7*  --    ALKPHOS  --  82  --  78  --    ALT  --  8  --  6  --    AST  --  17  --  13  --    BILITOT  --  0.3  --  0.5  --           Microbiology  Results (last 7 days)     Procedure Component Value Units Date/Time    Blood Culture [[id number]]  (Normal) Collected: 06/03/22 1514    Order Status: Completed Specimen: Blood from Arm, Left Updated: 06/04/22 1803     CULTURE, BLOOD ([facility]) No Growth At 24 Hours    Blood Culture [[id number]]  (Normal) Collected: 06/03/22 1532    Order Status: Completed Specimen: Blood from Hand, Left Updated: 06/04/22 1803     CULTURE, BLOOD ([facility]) No Growth At 24 Hours    Respiratory Culture [[id number]]     Order Status: Sent Specimen: Sputum, Expectorated            See below for Radiology    Scheduled Med:   albuterol  2 puff Inhalation Q6H    cyanocobalamin  500 mcg Oral Daily    donepeziL  10 mg Oral Nightly    fluticasone furoate-vilanteroL  1 puff Inhalation Daily    furosemide (LASIX) injection  20 mg Intravenous Q12H    gabapentin  100 mg Oral Nightly    pantoprazole  40 mg Oral Daily    pramipexole  1 mg Oral Nightly    venlafaxine  37.5 mg Oral Daily        Continuous Infusions:       PRN Meds:  ondansetron       Assessment/Plan:  Hypomagnesemia  Acute decompensated diastolic heart failure  Acute on chronic hypoxemic respiratory failure, improving   Lethargic possible hypercapnic, resolved   Macrocytic anemia  History of PAF on Eliquis  History of moderate to severe aortic stenosi, plans for a TAVR as an outpatient with Cardiology  History of bilateral carotid artery stenosis  History of HTN  History of chronic dementia at cognitive baseline per daughter  History of previous DVT/PE,  History of  recent surgical fixation of a closed right trimalleolar ankle fracture on 04/26/2022     Plan:  resp status is improving on nc oxygen 3L   CIS on board- planning for in pt [facility] in prep for tavr, d/c eliquis   Replete magnesium   Continue iv lasix   Oxygen support   blood cultures x2, respiratory cultures, MRSA PCR-pending  monitor daily standing weights and accurate I&Os  -she recently had an echocardiogram done 05/24/2022 that  revealed LVEF 55% and grade II diastolic dysfunction so no need to repeat  -CT head to rule out any intracranial abnormalities  -currently on SCDs, if CT shows no acute bleeding we will start Lovenox  -Repeat CBC and CMP in AM      VTE Prophylaxis: will be placed on SCD for DVT prophylaxis and will be advised to be as mobile as possible and sit in a chair as tolerated  __________________________________________________________________________      Patient condition:  Stable/Fair/Guarded/ Serious/ Critical    Anticipated discharge and Disposition:    Critical care diagnosis diastolic CHF exacerbation  Critical care time greater than 35 minutes    All diagnosis and differential diagnosis have been reviewed; assessment and plan has been documented; I have personally reviewed the labs and test results that are presently available; I have reviewed the patients medication list; I have reviewed the consulting providers response and recommendations. I have reviewed or attempted to review medical records based upon their availability    All of the patient's questions have been  addressed and answered. Patient's is agreeable to the above stated plan. I will continue to monitor closely and make adjustments to medical management as needed.  _____________________________________________________________________    Nutrition Status:    Radiology:  CT Chest Without Contrast  Narrative: EXAMINATION:  CT CHEST WITHOUT CONTRAST    CLINICAL HISTORY:  ; Abnormal xray - lung nodule, < 1 cm, low risk;    TECHNIQUE:  Helical-acquisition CT images were obtained without the intravenous administration of iodine-based contrast media.  Multiplanar reformats were accomplished by a CT technologist at a separate workstation and pushed to PACS for physician review.    Automated tube current modulation, weight-based exposure dosing, and/or iterative reconstruction technique utilized to reach lowest reasonably achievable exposure rate.    DLP: 257  mGy*cm    COMPARISON:  29 April 2022    FINDINGS:  Images were reviewed in lung, soft tissue, and bone windows.    Exam quality: Limited secondary to patient movement throughout image acquisition, with resulting artifact.    Lines/tubes: none visualized    Extensive aortic and coronary calcification is again appreciated.  No new focal vascular abnormality by noncontrast appearance.  Cardiac chambers are unchanged volume.  There is no significant pericardial fluid.    Central airway patency is widely maintained.  There is redemonstrated, similar to slightly increased peripheral dependent consolidative airspace density and surrounding ground-glass attenuation involving both lungs.  No development of lobar consolidation or suspicious focal pulmonary lesion.  There is no large or loculated pleural effusion.  No pneumothorax.    The included thyroid gland, regional lymph nodes, and visualized upper abdominal structures demonstrate no convincing acute or new focal process.  Right adrenal nodule is unchanged in size and CT appearance, otherwise inadequately assessed by exam protocol.  There are similar scattered cystic hypodensities of the hepatic lobes.    Multilevel spinal column degenerative changes and chronic midthoracic compression deformity with slight cortical retropulsion unchanged in comparison.  No convincing acute or worsened focal osseous lesion is identified.  The thoracic wall subcutaneous tissues and visualized musculature are unremarkable.  Impression: Limited evaluation secondary to extensive respiratory motion artifact.  Within limitations:    1. The apparent clinically suspected lung nodule is not clearly confirmed by CT assessment and there is no evidence of new or enlarging focal pulmonary lesion.  2. Scattered bilateral dependent ill-defined airspace consolidation is similar to slightly worsened, may represent atelectasis but otherwise nonspecific.  3. Additional chronic secondary details discussed  above, similar to the 29 April 2022 appearance.    Electronically signed by: Sony Chavez  Date:    06/03/2022  Time:    18:37  CT Head Without Contrast  Narrative: EXAMINATION:  CT HEAD WITHOUT CONTRAST    CLINICAL HISTORY:  Mental status change, unknown cause;    TECHNIQUE:  Low dose axial images were obtained through the head.  Coronal and sagittal reformations were also performed. Contrast was not administered.    Automatic exposure control was utilized to reduce the patient's radiation dose.    DLP= 1008    COMPARISON:  04/21/2022    FINDINGS:  No acute intracranial hemorrhage, edema or mass. No acute parenchymal abnormality.    Diffuse cerebral atrophy with concordant ventricular enlargement.    Scattered hypodensities throughout the deep periventricular white matter.    The osseous structures are normal.    The mastoid air cells are clear.    The auditory canals are patent bilaterally.    The globes and orbital contents are normal bilaterally.    The visualized maxillary, ethmoid and sphenoid sinuses are clear.  Impression: No acute intracranial abnormality identified.  Findings of chronic microvascular ischemic disease.    Electronically signed by: Kenn Jalloh  Date:    06/03/2022  Time:    15:08  X-Ray Chest 1 View  Narrative: EXAMINATION:  XR CHEST 1 VIEW    CPT 94371    CLINICAL HISTORY:  shor;    COMPARISON:  May 24, 2022    FINDINGS:  There is calcification of the aortic arch mediastinal silhouette is within normal limits cardiac silhouette is at the upper limits of normal to mildly enlarged.    There is some increase in interstitial and pulmonary vascular markings with some confluent airspace opacities in the left perihilar region and left base infiltrate cannot be completely excluded.    No other focal consolidative changes  Impression: Low increase in interstitial and pulmonary vascular markings.    More confluent airspace opacities in the left infrahilar region and left base infiltrate cannot be  completely excluded.    No other significant change    Electronically signed by: Billy Salas  Date:    06/03/2022  Time:    13:15      Sai Sanchez MD   06/05/2022

## 2022-06-05 NOTE — CONSULTS
Consults   [facility] - 9th Floor Med Surg  Cardiology  Consult Note    Patient Name: Emma Jorgensen  MRN: [id number]  Admission Date: 6/3/2022  Hospital Length of Stay: 2 days  Code Status: No Order   Attending Provider: Shakila Bui MD  Consulting Provider: MERCEDES Piper  Primary Care Physician: Mary Jane Dean MD  Principal Problem:<principal problem not specified>    Patient information was obtained from ER records.     Subjective:     Chief Complaint:  SOB    HPI: 88 year old female known to Dr. Eubanks with a past medical history of Aortic Stenosis, Diastolic CHF, HTN, GERD, DVT, PE, Parkinson's Disease and recent right ankle fraction after mechanical fall on 4.26.22 presented to the ER from [facility] on 6.3.22 with c/o SOB that had worsened over the previous 2 days.  Patient had a recent hospital admission at [facility] on 6.1.22 where she was diagnosed with acute decompensated diastolic heart failure and received IV diuretics.  Of note, she was scheduled to undergo LHC with possible PCI per TAVR pre-op protocol.  BNP level drawn in ER was 414.1.  [facility] has been consulted for further evaluation of CHF.    PMH: Aortic Stenosis, Diastolic CHF, HTN, GERD, DVT, PE, Parkinson's Disease   PSH: Ankle Surgery, Cataract Surgery, Colonoscopy, Colon Resection, Toe Surgery, Hysterectomy  Family History: Father - CVA; Sister - CAD/CABG  Social History: Former Smoker.  Denies Alcohol Use.  Denies Illicit Drug Use.    Previous Cardiac Diagnostics:   Echocardiogram 5.24.22:  TDS due to poor acoustical windows; suboptimal doppler angles.  Mild concentric hypertrophy and normal systolic function.  The estimated ejection fraction is 55%.  Grade II left ventricular diastolic dysfunction.  Moderate aortic regurgitation.  There is moderate aortic valve stenosis.  Aortic valve area is 0.75 cm squared; peak velocity is 3.53m/s; mean gradient is 30mmHg.  Intermediate central venous pressure  (8mmHg).    Carotid US 4.21.22  The right internal carotid artery demonstrated less than 50% stenosis.  The left internal carotid artery demonstrated less than 50% stenosis.  Bilateral vertebral arteries were patent with antegrade flow.      PET MPI 2.22.18:  This is a normal perfusion study, no perfusion defects noted.  There is no evidence of ischemia.  This scan is suggestive of low risk for future cardiovascular events.  The left ventricular cavity is noted to be normal on the stress studies.  The stress left ventricular ejection fraction was calculated to be 74% and left ventricular global function is normal.  The rest left ventricular cavity is noted to be normal.  The rest left ventricular ejection fraction was calculated to be 74% and rest left ventricular global function is michael;.  Compared with rest and stress studies the ejection fraction remains unchanged (74).        Past Medical History:   Diagnosis Date    Anxiety disorder, unspecified     Arthritis     Deep vein thrombosis     Depression     Irregular heart beat     Obesity, unspecified     Other pulmonary embolism without acute cor pulmonale     Parkinson's disease     Trimalleolar fracture of ankle, closed     Unspecified dementia without behavioral disturbance        Past Surgical History:   Procedure Laterality Date    ANKLE FRACTURE SURGERY Right     CATARACT EXTRACTION      COLONOSCOPY W/ POLYPECTOMY      HYSTERECTOMY      SMALL INTESTINE SURGERY         Review of patient's allergies indicates:  No Known Allergies    No current facility-administered medications on file prior to encounter.     Current Outpatient Medications on File Prior to Encounter   Medication Sig    apixaban (ELIQUIS) 2.5 mg Tab Take 2 tablets (5 mg total) by mouth 2 (two) times daily.    cyanocobalamin 500 MCG tablet Take 1 tablet (500 mcg total) by mouth once daily.    dicyclomine (BENTYL) 20 mg tablet Take 1 tablet by mouth once daily at 6am.     diltiaZEM (CARDIZEM) 120 MG tablet Take 1 tablet by mouth once daily at 6am.    donepeziL (ARICEPT) 10 MG tablet Take 1 tablet by mouth nightly.    furosemide (LASIX) 40 MG tablet Take 1 tablet (40 mg total) by mouth once daily.    gabapentin (NEURONTIN) 300 MG capsule Take 1 tablet by mouth nightly.    hydroCHLOROthiazide (HYDRODIURIL) 12.5 MG Tab Take 1 tablet by mouth once daily at 6am.    levoFLOXacin (LEVAQUIN) 500 MG tablet Take 1 tablet (500 mg total) by mouth once daily.    loratadine (CLARITIN) 10 mg tablet Take 1 tablet by mouth once daily at 6am.    losartan (COZAAR) 100 MG tablet Take 1 tablet by mouth once daily.    pramipexole (MIRAPEX) 1 MG tablet Take 1 tablet by mouth nightly.    traZODone (DESYREL) 50 MG tablet Take 1 tablet (50 mg total) by mouth every evening.    venlafaxine (EFFEXOR-XR) 37.5 MG 24 hr capsule Take 1 tablet by mouth once daily at 6am.    albuterol (PROVENTIL/VENTOLIN HFA) 90 mcg/actuation inhaler Inhale 2 puffs into the lungs every 6 (six) hours.    budesonide-glycopyr-formoterol 160-9-4.8 mcg/actuation HFAA Inhale 2 puffs into the lungs 2 (two) times a day.    clotrimazole-betamethasone 1-0.05% (LOTRISONE) cream Apply 1 application topically 2 (two) times daily.    diclofenac sodium (VOLTAREN) 1 % Gel Apply 1 application topically 4 (four) times daily.    HYDROcodone-acetaminophen (NORCO) 5-325 mg per tablet Take 1 tablet by mouth every 6 (six) hours as needed for Pain.    LORazepam (ATIVAN) 1 MG tablet Take 1 tablet by mouth nightly as needed.    mupirocin (BACTROBAN) 2 % ointment Apply topically once daily. Apply to affected area at right plantar heel. After cleansing well with dermal cleanser and rinsing with saline, cover with nonadherent dressing secured with rolled gauze. Keep off loaded.    pantoprazole (PROTONIX) 40 MG tablet Take 1 tablet (40 mg total) by mouth once daily.    [DISCONTINUED] betamethasone dipropionate 0.05 % cream Apply 1 application  topically 2 (two) times daily.    [DISCONTINUED] fluticasone propion-salmeterol 115-21 mcg/dose (ADVAIR HFA) 115-21 mcg/actuation HFAA inhaler Inhale 2 puffs into the lungs 2 (two) times daily.    [DISCONTINUED] omeprazole 20 mg TbEC Take 1 tablet by mouth once daily at 6am.     Family History     Family history is unknown by patient.        Tobacco Use    Smoking status: Former Smoker    Smokeless tobacco: Never Used   Substance and Sexual Activity    Alcohol use: Never    Drug use: Never    Sexual activity: Not on file     Review of Systems:  Review of Systems   Constitutional: Negative.    HENT: Negative.    Eyes: Negative.    Respiratory: Positive for shortness of breath.    Cardiovascular: Negative for chest pain.   Gastrointestinal: Negative.    Genitourinary: Negative.    Musculoskeletal: Negative.    Skin: Negative.    Psychiatric/Behavioral: Negative.        Objective:     Vital Signs (Most Recent):  Temp: 98.2 °F (36.8 °C) (06/05/22 0406)  Pulse: 77 (06/05/22 0506)  Resp: (!) 22 (06/05/22 0506)  BP: 109/61 (06/05/22 0406)  SpO2: 98 % (06/05/22 0506) Vital Signs (24h Range):  Temp:  [97.6 °F (36.4 °C)-99.2 °F (37.3 °C)] 98.2 °F (36.8 °C)  Pulse:  [77-98] 77  Resp:  [18-22] 22  SpO2:  [95 %-100 %] 98 %  BP: (109-148)/(61-77) 109/61     Weight: 113.7 kg (250 lb 10.6 oz)  Body mass index is 38.11 kg/m².    SpO2: 98 %  O2 Device (Oxygen Therapy): nasal cannula      Intake/Output Summary (Last 24 hours) at 6/5/2022 0837  Last data filed at 6/5/2022 0516  Gross per 24 hour   Intake 580 ml   Output 1950 ml   Net -1370 ml       Lines/Drains/Airways     Drain  Duration                Urethral Catheter 04/30/22 0700 36 days          Peripheral Intravenous Line  Duration                Peripheral IV - Single Lumen 06/05/22 0745 20 G Anterior;Proximal;Right Forearm <1 day                  Significant Labs:  Recent Results (from the past 72 hour(s))   BNP    Collection Time: 06/03/22 12:34 PM   Result Value Ref  Range    Natriuretic Peptide 414.1 (H) <=100.0 pg/mL   Comprehensive Metabolic Panel    Collection Time: 06/03/22 12:34 PM   Result Value Ref Range    Sodium Level 134 (L) 136 - 145 mmol/L    Potassium Level 4.1 3.5 - 5.1 mmol/L    Chloride 87 (L) 98 - 107 mmol/L    Carbon Dioxide 35 (H) 23 - 31 mmol/L    Glucose Level 98 82 - 115 mg/dL    Blood Urea Nitrogen 13.1 9.8 - 20.1 mg/dL    Creatinine 0.74 0.55 - 1.02 mg/dL    Calcium Level Total 8.8 8.4 - 10.2 mg/dL    Protein Total 6.7 5.8 - 7.6 gm/dL    Albumin Level 2.8 (L) 3.4 - 4.8 gm/dL    Globulin 3.9 (H) 2.4 - 3.5 gm/dL    Albumin/Globulin Ratio 0.7 (L) 1.1 - 2.0 ratio    Bilirubin Total 0.3 <=1.5 mg/dL    Alkaline Phosphatase 82 40 - 150 unit/L    Alanine Aminotransferase 8 0 - 55 unit/L    Aspartate Aminotransferase 17 5 - 34 unit/L    Estimated GFR- >60 mls/min/1.73/m2   Troponin I    Collection Time: 06/03/22 12:34 PM   Result Value Ref Range    Troponin-I 0.014 0.000 - 0.045 ng/mL   CBC with Differential    Collection Time: 06/03/22 12:34 PM   Result Value Ref Range    WBC 7.6 4.5 - 11.5 x10(3)/mcL    RBC 3.03 (L) 4.20 - 5.40 x10(6)/mcL    Hgb 9.1 (L) 12.0 - 16.0 gm/dL    Hct 30.5 (L) 37.0 - 47.0 %    .7 (H) 80.0 - 94.0 fL    MCH 30.0 27.0 - 31.0 pg    MCHC 29.8 (L) 33.0 - 36.0 mg/dL    RDW 13.5 11.5 - 17.0 %    Platelet 293 130 - 400 x10(3)/mcL    MPV 9.4 9.4 - 12.4 fL    Neut % 75.2 %    Lymph % 10.2 %    Mono % 12.5 %    Eos % 1.3 %    Basophil % 0.3 %    Lymph # 0.78 0.6 - 4.6 x10(3)/mcL    Neut # 5.7 2.1 - 9.2 x10(3)/mcL    Mono # 0.95 0.1 - 1.3 x10(3)/mcL    Eos # 0.10 0 - 0.9 x10(3)/mcL    Baso # 0.02 0 - 0.2 x10(3)/mcL    IG# 0.04 (H) 0 - 0.0155 x10(3)/mcL    IG% 0.5 (H) 0 - 0.43 %    NRBC% 0.0 %   COVID/FLU A&B PCR    Collection Time: 06/03/22 12:46 PM   Result Value Ref Range    Influenza A PCR Not Detected Not Detected    Influenza B PCR Not Detected Not Detected    SARS-CoV-2 PCR Not Detected Not Detected   POCT ARTERIAL  BLOOD GAS    Collection Time: 06/03/22  3:13 PM   Result Value Ref Range    POC PH 7.43 7.35 - 7.45    POC PCO2 63 (AA) 19 - 50 mmHg    POC PO2 87 80 - 100 mmHg    POC HEMOGLOBIN 9.7 (A) 12 - 16 g/dL    POC SATURATED O2 96.9 %    POC O2Hb 95.7 94.0 - 97.0 %    POC COHb 1.6 %    POC MetHb 1.2 0.40 - 1.5 %    POC Potassium 3.7 3.5 - 5.0 mmol/l    POC Sodium 126 (A) 137 - 145 mmol/l    POC Ionized Calcium 1.11 (A) 1.12 - 1.23 mmol/l    Correct Temperature (PH) 7.43 7.35 - 7.45    Corrected Temperature (pCO2) 63 (AA) 19 - 50 mmHg    Corrected Temperature (pO2) 87 80 - 100 mmHg    POC HCO3 41.8 mmol/l    Base Deficit 15.3 (A) -2.00 - 2.00 mmol/l    POC Temp 37.0 °C    Specimen source Arterial sample    Blood Culture    Collection Time: 06/03/22  3:14 PM    Specimen: Arm, Left; Blood   Result Value Ref Range    CULTURE, BLOOD ([facility]) No Growth At 24 Hours    POCT ARTERIAL BLOOD GAS Blood Gas    Collection Time: 06/03/22  3:17 PM   Result Value Ref Range    POC PH      POC PCO2      POC PO2      POC Sodium      POC Potassium      POC Ionized Calcium      POC HEMOGLOBIN      POC O2Hb      POC COHb      POC MetHb      POC PCO2      Base Excess, Arterial 15.3 (A) -2.0 - 2.0 mmol/L    O2 Sat, Art      HCO3, Arterial 41.8 (A) 18.0 - 23.0 MMOL/L   Blood Culture    Collection Time: 06/03/22  3:32 PM    Specimen: Hand, Left; Blood   Result Value Ref Range    CULTURE, BLOOD ([facility]) No Growth At 24 Hours    MRSA PCR    Collection Time: 06/03/22  9:10 PM   Result Value Ref Range    MRSA PCR SCRN ([facility]) Not Detected Not Detected   CBC with Differential    Collection Time: 06/04/22  4:20 AM   Result Value Ref Range    WBC 6.1 4.5 - 11.5 x10(3)/mcL    RBC 3.08 (L) 4.20 - 5.40 x10(6)/mcL    Hgb 9.3 (L) 12.0 - 16.0 gm/dL    Hct 30.3 (L) 37.0 - 47.0 %    MCV 98.4 (H) 80.0 - 94.0 fL    MCH 30.2 27.0 - 31.0 pg    MCHC 30.7 (L) 33.0 - 36.0 mg/dL    RDW 13.4 11.5 - 17.0 %    Platelet 285 130 - 400 x10(3)/mcL    MPV 9.7 9.4 - 12.4 fL    Neut % 66.8  %    Lymph % 15.3 %    Mono % 14.0 %    Eos % 3.1 %    Basophil % 0.5 %    Lymph # 0.94 0.6 - 4.6 x10(3)/mcL    Neut # 4.1 2.1 - 9.2 x10(3)/mcL    Mono # 0.86 0.1 - 1.3 x10(3)/mcL    Eos # 0.19 0 - 0.9 x10(3)/mcL    Baso # 0.03 0 - 0.2 x10(3)/mcL    IG# 0.02 (H) 0 - 0.0155 x10(3)/mcL    IG% 0.3 0 - 0.43 %    NRBC% 0.0 %   Comprehensive Metabolic Panel    Collection Time: 06/04/22  4:20 AM   Result Value Ref Range    Sodium Level 134 (L) 136 - 145 mmol/L    Potassium Level 3.9 3.5 - 5.1 mmol/L    Chloride 89 (L) 98 - 107 mmol/L    Carbon Dioxide 36 (H) 23 - 31 mmol/L    Glucose Level 88 82 - 115 mg/dL    Blood Urea Nitrogen 14.1 9.8 - 20.1 mg/dL    Creatinine 0.83 0.55 - 1.02 mg/dL    Calcium Level Total 9.1 8.4 - 10.2 mg/dL    Protein Total 6.4 5.8 - 7.6 gm/dL    Albumin Level 2.7 (L) 3.4 - 4.8 gm/dL    Globulin 3.7 (H) 2.4 - 3.5 gm/dL    Albumin/Globulin Ratio 0.7 (L) 1.1 - 2.0 ratio    Bilirubin Total 0.5 <=1.5 mg/dL    Alkaline Phosphatase 78 40 - 150 unit/L    Alanine Aminotransferase 6 0 - 55 unit/L    Aspartate Aminotransferase 13 5 - 34 unit/L    Estimated GFR- >60 mls/min/1.73/m2   Basic Metabolic Panel    Collection Time: 06/05/22  3:22 AM   Result Value Ref Range    Sodium Level 134 (L) 136 - 145 mmol/L    Potassium Level 3.9 3.5 - 5.1 mmol/L    Chloride 89 (L) 98 - 107 mmol/L    Carbon Dioxide 33 (H) 23 - 31 mmol/L    Glucose Level 91 82 - 115 mg/dL    Blood Urea Nitrogen 11.7 9.8 - 20.1 mg/dL    Creatinine 0.71 0.55 - 1.02 mg/dL    BUN/Creatinine Ratio 16     Calcium Level Total 8.8 8.4 - 10.2 mg/dL    Estimated GFR- >60 mls/min/1.73/m2    Anion Gap 12.0 mEq/L   Hemoglobin and Hematocrit    Collection Time: 06/05/22  3:22 AM   Result Value Ref Range    Hgb 9.2 (L) 12.0 - 16.0 gm/dL    Hct 28.8 (L) 37.0 - 47.0 %   Magnesium    Collection Time: 06/05/22  3:22 AM   Result Value Ref Range    Magnesium Level 1.70 1.60 - 2.60 mg/dL       Significant Imaging: Echocardiogram: 2D  echo with color flow doppler: No results found for this or any previous visit.  Imaging Results          CT Chest Without Contrast (Final result)  Result time 06/03/22 18:37:03    Final result by Sony Chavez MD (06/03/22 18:37:03)                 Impression:        Limited evaluation secondary to extensive respiratory motion artifact.  Within limitations:    1. The apparent clinically suspected lung nodule is not clearly confirmed by CT assessment and there is no evidence of new or enlarging focal pulmonary lesion.  2. Scattered bilateral dependent ill-defined airspace consolidation is similar to slightly worsened, may represent atelectasis but otherwise nonspecific.  3. Additional chronic secondary details discussed above, similar to the 29 April 2022 appearance.      Electronically signed by: Sony Chavez  Date:    06/03/2022  Time:    18:37             Narrative:    EXAMINATION:  CT CHEST WITHOUT CONTRAST    CLINICAL HISTORY:  ; Abnormal xray - lung nodule, < 1 cm, low risk;    TECHNIQUE:  Helical-acquisition CT images were obtained without the intravenous administration of iodine-based contrast media.  Multiplanar reformats were accomplished by a CT technologist at a separate workstation and pushed to PACS for physician review.    Automated tube current modulation, weight-based exposure dosing, and/or iterative reconstruction technique utilized to reach lowest reasonably achievable exposure rate.    DLP: 257 mGy*cm    COMPARISON:  29 April 2022    FINDINGS:  Images were reviewed in lung, soft tissue, and bone windows.    Exam quality: Limited secondary to patient movement throughout image acquisition, with resulting artifact.    Lines/tubes: none visualized    Extensive aortic and coronary calcification is again appreciated.  No new focal vascular abnormality by noncontrast appearance.  Cardiac chambers are unchanged volume.  There is no significant pericardial fluid.    Central airway patency is widely  maintained.  There is redemonstrated, similar to slightly increased peripheral dependent consolidative airspace density and surrounding ground-glass attenuation involving both lungs.  No development of lobar consolidation or suspicious focal pulmonary lesion.  There is no large or loculated pleural effusion.  No pneumothorax.    The included thyroid gland, regional lymph nodes, and visualized upper abdominal structures demonstrate no convincing acute or new focal process.  Right adrenal nodule is unchanged in size and CT appearance, otherwise inadequately assessed by exam protocol.  There are similar scattered cystic hypodensities of the hepatic lobes.    Multilevel spinal column degenerative changes and chronic midthoracic compression deformity with slight cortical retropulsion unchanged in comparison.  No convincing acute or worsened focal osseous lesion is identified.  The thoracic wall subcutaneous tissues and visualized musculature are unremarkable.                                 CT Head Without Contrast (Final result)  Result time 06/03/22 15:08:53    Final result by Kenn Jalloh MD (06/03/22 15:08:53)                 Impression:      No acute intracranial abnormality identified.  Findings of chronic microvascular ischemic disease.      Electronically signed by: Kenn Jalloh  Date:    06/03/2022  Time:    15:08             Narrative:    EXAMINATION:  CT HEAD WITHOUT CONTRAST    CLINICAL HISTORY:  Mental status change, unknown cause;    TECHNIQUE:  Low dose axial images were obtained through the head.  Coronal and sagittal reformations were also performed. Contrast was not administered.    Automatic exposure control was utilized to reduce the patient's radiation dose.    DLP= 1008    COMPARISON:  04/21/2022    FINDINGS:  No acute intracranial hemorrhage, edema or mass. No acute parenchymal abnormality.    Diffuse cerebral atrophy with concordant ventricular enlargement.    Scattered hypodensities  throughout the deep periventricular white matter.    The osseous structures are normal.    The mastoid air cells are clear.    The auditory canals are patent bilaterally.    The globes and orbital contents are normal bilaterally.    The visualized maxillary, ethmoid and sphenoid sinuses are clear.                               X-Ray Chest 1 View (Final result)  Result time 06/03/22 13:15:46    Final result by Billy Salas MD (06/03/22 13:15:46)                 Impression:      Low increase in interstitial and pulmonary vascular markings.    More confluent airspace opacities in the left infrahilar region and left base infiltrate cannot be completely excluded.    No other significant change      Electronically signed by: Billy Salas  Date:    06/03/2022  Time:    13:15             Narrative:    EXAMINATION:  XR CHEST 1 VIEW    CPT 83989    CLINICAL HISTORY:  shor;    COMPARISON:  May 24, 2022    FINDINGS:  There is calcification of the aortic arch mediastinal silhouette is within normal limits cardiac silhouette is at the upper limits of normal to mildly enlarged.    There is some increase in interstitial and pulmonary vascular markings with some confluent airspace opacities in the left perihilar region and left base infiltrate cannot be completely excluded.    No other focal consolidative changes                                Last Lipids:  Lab Results   Component Value Date    CHOL 130 05/06/2022    CHOL 163 09/20/2021    CHOL 174 07/30/2020     Lab Results   Component Value Date    HDL 50 05/06/2022    HDL 71 (H) 09/20/2021    HDL 83 (H) 07/30/2020     No results found for: LDLCALC  Lab Results   Component Value Date    TRIG 75 05/06/2022    TRIG 78 09/20/2021    TRIG 95 07/30/2020     No results found for: CHOLHDL    EKG:    Results for orders placed or performed during the hospital encounter of 06/03/22   EKG 12-lead    Narrative    Test Reason : R06.02,    Vent. Rate : 080 BPM     Atrial Rate : 080  BPM     P-R Int : 128 ms          QRS Dur : 090 ms      QT Int : 390 ms       P-R-T Axes : 044 -14 025 degrees     QTc Int : 449 ms    Normal sinus rhythm  Moderate voltage criteria for LVH, may be normal variant ( R in aVL ,   Flora product )  Borderline Abnormal ECG  Confirmed by Claude Cruz MD ([id number]) on 6/3/2022 11:32:01 PM    Referred By:             Confirmed By:Claude Cruz MD       Telemetry:     Physical Exam  Constitutional:       Appearance: Normal appearance.   HENT:      Head: Normocephalic and atraumatic.      Mouth/Throat:      Mouth: Mucous membranes are moist.   Eyes:      Extraocular Movements: Extraocular movements intact.   Cardiovascular:      Rate and Rhythm: Normal rate and regular rhythm.      Heart sounds: Murmur heard.   Abdominal:      Palpations: Abdomen is soft.   Musculoskeletal:      Cervical back: Neck supple.   Skin:     General: Skin is warm.   Neurological:      General: No focal deficit present.      Mental Status: She is alert.   Psychiatric:         Mood and Affect: Mood normal.         Behavior: Behavior normal.         Home Medications:   No current facility-administered medications on file prior to encounter.     Current Outpatient Medications on File Prior to Encounter   Medication Sig Dispense Refill    apixaban (ELIQUIS) 2.5 mg Tab Take 2 tablets (5 mg total) by mouth 2 (two) times daily.      cyanocobalamin 500 MCG tablet Take 1 tablet (500 mcg total) by mouth once daily. 30 tablet 0    dicyclomine (BENTYL) 20 mg tablet Take 1 tablet by mouth once daily at 6am.      diltiaZEM (CARDIZEM) 120 MG tablet Take 1 tablet by mouth once daily at 6am.      donepeziL (ARICEPT) 10 MG tablet Take 1 tablet by mouth nightly.      furosemide (LASIX) 40 MG tablet Take 1 tablet (40 mg total) by mouth once daily. 30 tablet 11    gabapentin (NEURONTIN) 300 MG capsule Take 1 tablet by mouth nightly.      hydroCHLOROthiazide (HYDRODIURIL) 12.5 MG Tab Take 1 tablet by mouth once daily at  6am.      levoFLOXacin (LEVAQUIN) 500 MG tablet Take 1 tablet (500 mg total) by mouth once daily. 7 tablet 0    loratadine (CLARITIN) 10 mg tablet Take 1 tablet by mouth once daily at 6am.      losartan (COZAAR) 100 MG tablet Take 1 tablet by mouth once daily.      pramipexole (MIRAPEX) 1 MG tablet Take 1 tablet by mouth nightly.      traZODone (DESYREL) 50 MG tablet Take 1 tablet (50 mg total) by mouth every evening. 30 tablet 0    venlafaxine (EFFEXOR-XR) 37.5 MG 24 hr capsule Take 1 tablet by mouth once daily at 6am.      albuterol (PROVENTIL/VENTOLIN HFA) 90 mcg/actuation inhaler Inhale 2 puffs into the lungs every 6 (six) hours.      budesonide-glycopyr-formoterol 160-9-4.8 mcg/actuation HFAA Inhale 2 puffs into the lungs 2 (two) times a day.      clotrimazole-betamethasone 1-0.05% (LOTRISONE) cream Apply 1 application topically 2 (two) times daily.      diclofenac sodium (VOLTAREN) 1 % Gel Apply 1 application topically 4 (four) times daily.      HYDROcodone-acetaminophen (NORCO) 5-325 mg per tablet Take 1 tablet by mouth every 6 (six) hours as needed for Pain.      LORazepam (ATIVAN) 1 MG tablet Take 1 tablet by mouth nightly as needed.      mupirocin (BACTROBAN) 2 % ointment Apply topically once daily. Apply to affected area at right plantar heel. After cleansing well with dermal cleanser and rinsing with saline, cover with nonadherent dressing secured with rolled gauze. Keep off loaded. 2 g 0    pantoprazole (PROTONIX) 40 MG tablet Take 1 tablet (40 mg total) by mouth once daily. 30 tablet 11    [DISCONTINUED] betamethasone dipropionate 0.05 % cream Apply 1 application topically 2 (two) times daily.      [DISCONTINUED] fluticasone propion-salmeterol 115-21 mcg/dose (ADVAIR HFA) 115-21 mcg/actuation HFAA inhaler Inhale 2 puffs into the lungs 2 (two) times daily.      [DISCONTINUED] omeprazole 20 mg TbEC Take 1 tablet by mouth once daily at 6am.         Current Inpatient  Medications:    Current Facility-Administered Medications:     albuterol inhaler 2 puff, 2 puff, Inhalation, Q6H, Shakila Bui MD, 2 puff at 06/05/22 0506    apixaban tablet 5 mg, 5 mg, Oral, BID, Shakila Bui MD, 5 mg at 06/04/22 2107    cyanocobalamin tablet 500 mcg, 500 mcg, Oral, Daily, Shakial Bui MD    donepeziL tablet 10 mg, 10 mg, Oral, Nightly, Shakila Bui MD, 10 mg at 06/04/22 2107    fluticasone furoate-vilanteroL 100-25 mcg/dose diskus inhaler 1 puff, 1 puff, Inhalation, Daily, Shakila Bui MD    furosemide injection 20 mg, 20 mg, Intravenous, Q12H, Shakila Bui MD, 20 mg at 06/05/22 0732    gabapentin capsule 100 mg, 100 mg, Oral, Nightly, Shakila Bui MD, 100 mg at 06/04/22 2107    magnesium sulfate 2g in water 50mL IVPB (premix), 2 g, Intravenous, Once, Sai Sanchez MD    ondansetron injection 4 mg, 4 mg, Intravenous, Q6H PRN, CARLOS ALBERTO Acevedo    pantoprazole EC tablet 40 mg, 40 mg, Oral, Daily, Shakila Bui MD    pramipexole tablet 1 mg, 1 mg, Oral, Nightly, Shakila Bui MD, 1 mg at 06/04/22 2107    venlafaxine 24 hr capsule 37.5 mg, 37.5 mg, Oral, Daily, Shakila Bui MD, 37.5 mg at 06/05/22 0506         VTE Risk Mitigation (From admission, onward)         Ordered     apixaban tablet 5 mg  2 times daily         06/03/22 1727                Assessment:   Acute on Chronic Diastolic CHF       - .1       - EF 55% with Grade II DD per Echo 5.24.22  Moderate Aortic Stenosis per Echo 5.24.22  HTN  GERD  Hx of DVT  Hx of PE  Parkinson's Disease       Plan:   Continue Lasix 20mg IV q 12 hours - monitor renal function and electrolytes closely  DC Eliquis  Resume home medications including Diltiazem 120mg daily, HCTZ 12.5mg daily, and Losartan 100mg daily if BP remains elevated - patient noted to be hypotensive on 6.3.22  Labs in AM:  CBC, BMP, Mg level   Will plan for inpatient LHC per TAVR pre-op protocol      Thank you for your consult.     Sharon Pugh,  ANP  Cardiology  [facility] - 9th Floor Med Surg  06/05/2022 8:37 AM

## 2022-06-06 LAB
ANION GAP SERPL CALC-SCNC: 11 MEQ/L
BASOPHILS # BLD AUTO: 0.04 X10(3)/MCL (ref 0–0.2)
BASOPHILS NFR BLD AUTO: 0.5 %
BUN SERPL-MCNC: 11.8 MG/DL (ref 9.8–20.1)
CALCIUM SERPL-MCNC: 9.2 MG/DL (ref 8.4–10.2)
CHLORIDE SERPL-SCNC: 89 MMOL/L (ref 98–107)
CO2 SERPL-SCNC: 34 MMOL/L (ref 23–31)
CREAT SERPL-MCNC: 0.6 MG/DL (ref 0.55–1.02)
CREAT/UREA NIT SERPL: 20
EOSINOPHIL # BLD AUTO: 0.18 X10(3)/MCL (ref 0–0.9)
EOSINOPHIL NFR BLD AUTO: 2.2 %
ERYTHROCYTE [DISTWIDTH] IN BLOOD BY AUTOMATED COUNT: 13.2 % (ref 11.5–17)
GLUCOSE SERPL-MCNC: 101 MG/DL (ref 82–115)
HCT VFR BLD AUTO: 31.1 % (ref 37–47)
HGB BLD-MCNC: 9.6 GM/DL (ref 12–16)
IMM GRANULOCYTES # BLD AUTO: 0.03 X10(3)/MCL (ref 0–0.02)
IMM GRANULOCYTES NFR BLD AUTO: 0.4 % (ref 0–0.43)
LYMPHOCYTES # BLD AUTO: 0.89 X10(3)/MCL (ref 0.6–4.6)
LYMPHOCYTES NFR BLD AUTO: 10.9 %
MAGNESIUM SERPL-MCNC: 1.9 MG/DL (ref 1.6–2.6)
MCH RBC QN AUTO: 30.1 PG (ref 27–31)
MCHC RBC AUTO-ENTMCNC: 30.9 MG/DL (ref 33–36)
MCV RBC AUTO: 97.5 FL (ref 80–94)
MONOCYTES # BLD AUTO: 0.86 X10(3)/MCL (ref 0.1–1.3)
MONOCYTES NFR BLD AUTO: 10.5 %
NEUTROPHILS # BLD AUTO: 6.2 X10(3)/MCL (ref 2.1–9.2)
NEUTROPHILS NFR BLD AUTO: 75.5 %
NRBC BLD AUTO-RTO: 0 %
PLATELET # BLD AUTO: 331 X10(3)/MCL (ref 130–400)
PMV BLD AUTO: 9.6 FL (ref 9.4–12.4)
POTASSIUM SERPL-SCNC: 3.4 MMOL/L (ref 3.5–5.1)
RBC # BLD AUTO: 3.19 X10(6)/MCL (ref 4.2–5.4)
SODIUM SERPL-SCNC: 134 MMOL/L (ref 136–145)
WBC # SPEC AUTO: 8.2 X10(3)/MCL (ref 4.5–11.5)

## 2022-06-06 PROCEDURE — 25000003 PHARM REV CODE 250: Performed by: INTERNAL MEDICINE

## 2022-06-06 PROCEDURE — 80048 BASIC METABOLIC PNL TOTAL CA: CPT | Performed by: INTERNAL MEDICINE

## 2022-06-06 PROCEDURE — 63600175 PHARM REV CODE 636 W HCPCS: Performed by: INTERNAL MEDICINE

## 2022-06-06 PROCEDURE — 36415 COLL VENOUS BLD VENIPUNCTURE: CPT | Performed by: INTERNAL MEDICINE

## 2022-06-06 PROCEDURE — 99900035 HC TECH TIME PER 15 MIN (STAT)

## 2022-06-06 PROCEDURE — 11000001 HC ACUTE MED/SURG PRIVATE ROOM

## 2022-06-06 PROCEDURE — 85025 COMPLETE CBC W/AUTO DIFF WBC: CPT | Performed by: NURSE PRACTITIONER

## 2022-06-06 PROCEDURE — 27000221 HC OXYGEN, UP TO 24 HOURS

## 2022-06-06 PROCEDURE — 94761 N-INVAS EAR/PLS OXIMETRY MLT: CPT

## 2022-06-06 PROCEDURE — 83735 ASSAY OF MAGNESIUM: CPT | Performed by: NURSE PRACTITIONER

## 2022-06-06 PROCEDURE — 25000003 PHARM REV CODE 250: Performed by: NURSE PRACTITIONER

## 2022-06-06 RX ORDER — DILTIAZEM HYDROCHLORIDE 60 MG/1
60 CAPSULE, EXTENDED RELEASE ORAL 2 TIMES DAILY
Status: DISCONTINUED | OUTPATIENT
Start: 2022-06-06 | End: 2022-06-13 | Stop reason: HOSPADM

## 2022-06-06 RX ORDER — SODIUM CHLORIDE 0.9 % (FLUSH) 0.9 %
10 SYRINGE (ML) INJECTION
Status: DISCONTINUED | OUTPATIENT
Start: 2022-06-06 | End: 2022-06-13 | Stop reason: HOSPADM

## 2022-06-06 RX ORDER — POTASSIUM CHLORIDE 20 MEQ/1
40 TABLET, EXTENDED RELEASE ORAL ONCE
Status: COMPLETED | OUTPATIENT
Start: 2022-06-06 | End: 2022-06-06

## 2022-06-06 RX ORDER — POTASSIUM CHLORIDE 20 MEQ/1
40 TABLET, EXTENDED RELEASE ORAL EVERY 6 HOURS
Status: COMPLETED | OUTPATIENT
Start: 2022-06-06 | End: 2022-06-06

## 2022-06-06 RX ADMIN — ALBUTEROL SULFATE 2 PUFF: 90 AEROSOL, METERED RESPIRATORY (INHALATION) at 06:06

## 2022-06-06 RX ADMIN — DONEPEZIL HYDROCHLORIDE 10 MG: 5 TABLET, FILM COATED ORAL at 10:06

## 2022-06-06 RX ADMIN — ALBUTEROL SULFATE 2 PUFF: 90 AEROSOL, METERED RESPIRATORY (INHALATION) at 12:06

## 2022-06-06 RX ADMIN — FUROSEMIDE 20 MG: 10 INJECTION INTRAMUSCULAR; INTRAVENOUS at 06:06

## 2022-06-06 RX ADMIN — VENLAFAXINE HYDROCHLORIDE 37.5 MG: 37.5 CAPSULE, EXTENDED RELEASE ORAL at 06:06

## 2022-06-06 RX ADMIN — PRAMIPEXOLE DIHYDROCHLORIDE 1 MG: 0.25 TABLET ORAL at 10:06

## 2022-06-06 RX ADMIN — WHITE PETROLATUM: 1.75 OINTMENT TOPICAL at 10:06

## 2022-06-06 RX ADMIN — POTASSIUM CHLORIDE 40 MEQ: 1500 TABLET, EXTENDED RELEASE ORAL at 10:06

## 2022-06-06 RX ADMIN — CYANOCOBALAMIN TAB 500 MCG 500 MCG: 500 TAB at 09:06

## 2022-06-06 RX ADMIN — POTASSIUM CHLORIDE 40 MEQ: 20 TABLET, EXTENDED RELEASE ORAL at 06:06

## 2022-06-06 RX ADMIN — FUROSEMIDE 20 MG: 10 INJECTION INTRAMUSCULAR; INTRAVENOUS at 07:06

## 2022-06-06 RX ADMIN — DILTIAZEM HYDROCHLORIDE 60 MG: 60 CAPSULE, EXTENDED RELEASE ORAL at 10:06

## 2022-06-06 RX ADMIN — GABAPENTIN 100 MG: 100 CAPSULE ORAL at 10:06

## 2022-06-06 RX ADMIN — POTASSIUM CHLORIDE 40 MEQ: 20 TABLET, EXTENDED RELEASE ORAL at 09:06

## 2022-06-06 RX ADMIN — FLUTICASONE FUROATE AND VILANTEROL TRIFENATATE 1 PUFF: 100; 25 POWDER RESPIRATORY (INHALATION) at 09:06

## 2022-06-06 RX ADMIN — PANTOPRAZOLE SODIUM 40 MG: 40 TABLET, DELAYED RELEASE ORAL at 09:06

## 2022-06-06 RX ADMIN — DILTIAZEM HYDROCHLORIDE 60 MG: 60 CAPSULE, EXTENDED RELEASE ORAL at 09:06

## 2022-06-06 NOTE — NURSING
WOCN consult- 89 y/o female in with qgd-[id number]-jtyuhzuyh of breath.    She awakes easily alert oriented but needs assist with all activities-she is obese.    Skin issues rt posterior heel and rt plantar heels both black and dry areas.    Care initiated with instructions to nurse Concepcion with recommendation for podiatry consult as well-Nurse to check with hospitalist.

## 2022-06-06 NOTE — PROGRESS NOTES
"[facility] Hospital Medicine Progress Note        Chief Complaint: Inpatient Follow-up for dchf exacerbation    HPI:   Emma Jorgensen is a 88 y.o. female with a past medical history of diastolic heart failure (LVEF 55% and grade II diastolic dysfunction from an echo on 05/24/2022),  PAF on Eliquis, moderate to severe aortic stenosis with plans for a TAVR as an outpatient with Cardiology, bilateral carotid artery stenosis, HTN, chronic dementia, previous DVT/PE, and recent surgical fixation of a closed right trimalleolar ankle fracture on 04/26/2022 who presented to [facility] on 6/3/2022 from [facility] with complaints of worsening shortness breath that began today. The daughter, who is at bedside, stated that she has been on nasal cannula 2 L/min at the facility and visited the patient yesterday where an aid reportedly informed her that she had an episode of SOB after her "oxygen tank ran out." The daughter also states that she is currently at her cognitive baseline although she is more lethargic than normal.  She denied that she was complaining of any fever, cough, chest pain, nausea, vomiting, abdominal pain, or dysuria.     She was recently admitted and discharged from the hospital at [facility] on 06/01/2022 where she received IV diuretics for acute decompensated diastolic heart failure. Her symptoms and respiratory status improved so she was transferred back to the SNF.      Her initial vital signs showed that she was afebrile with an RR 24, HR 82, BP 99/55, and SpO2 100% on nasal cannula 6 L/min.  Her labs show mild hyponatremia, mild hypochloremia, a bicarb 35, and a .1.  COVID-19 and influenza negative. CXR showed a low increase in interstitial and pulmonary vascular markings. More confluent airspace opacities in the left infrahilar region and left base infiltrate cannot be completely excluded.     Patient admitted for resp failure d/t dchf exacerbation      Interval Hx: "   Vitals reviewed.  Currently stable  Labs reviewed will replete potassium and magnesium  Continue Lasix, add on diltiazem 60 mg b.i.d. for heart rate and blood pressure control  Eliquis currently on hold per cis recs   Cis planning for left heart catheterization     Objective/physical exam:  General:  awake, alert oriented to self only (at cognitive baseline per daughter at bedside), follows commands appropriately   Eye: clear conjunctiva, eyelids normal  HENT: oropharynx and nasal mucosal surfaces moist  Respiratory:  Poor respiratory effort, bilateral crackles, moderate respiratory distress, nasal cannula in place  Cardiovascular: regular rate and rhythm without gallops or rubs, systolic murmur  Gastrointestinal: soft, non-tender, non-distended with normal bowel sounds, without masses to palpation  Musculoskeletal: able to move all four extremities, right foot in walking boot from recent surgery  Integumentary: warm, dry  Neurologic: ax o x 3,       VITAL SIGNS: 24 HRS MIN & MAX LAST   Temp  Min: 97.9 °F (36.6 °C)  Max: 99 °F (37.2 °C) 98.2 °F (36.8 °C)   BP  Min: 120/68  Max: 148/66 120/68   Pulse  Min: 82  Max: 206  86   Resp  Min: 17  Max: 22 18   SpO2  Min: 97 %  Max: 99 % 98 %       Recent Labs   Lab 06/03/22  1234 06/04/22  0420 06/05/22 0322 06/06/22  0524   WBC 7.6 6.1  --  8.2   RBC 3.03* 3.08*  --  3.19*   HGB 9.1* 9.3* 9.2* 9.6*   HCT 30.5* 30.3* 28.8* 31.1*   .7* 98.4*  --  97.5*   MCH 30.0 30.2  --  30.1   MCHC 29.8* 30.7*  --  30.9*   RDW 13.5 13.4  --  13.2    285  --  331   MPV 9.4 9.7  --  9.6       Recent Labs   Lab 06/01/22  0332 06/03/22  1234 06/03/22  1513 06/04/22  0420 06/05/22  0322 06/06/22  0524   * 134*  --  134* 134* 134*   K 4.4 4.1  --  3.9 3.9 3.4*   CO2 33* 35*  --  36* 33* 34*   BUN 12.6 13.1  --  14.1 11.7 11.8   CREATININE 0.77 0.74  --  0.83 0.71 0.60   CALCIUM 8.9 8.8  --  9.1 8.8 9.2   PH  --   --  7.43  --   --   --    MG 1.80  --   --   --  1.70 1.90    ALBUMIN  --  2.8*  --  2.7*  --   --    ALKPHOS  --  82  --  78  --   --    ALT  --  8  --  6  --   --    AST  --  17  --  13  --   --    BILITOT  --  0.3  --  0.5  --   --           Microbiology Results (last 7 days)     Procedure Component Value Units Date/Time    Blood Culture [[id number]]  (Normal) Collected: 06/03/22 1514    Order Status: Completed Specimen: Blood from Arm, Left Updated: 06/05/22 1803     CULTURE, BLOOD ([facility]) No Growth At 48 Hours    Blood Culture [[id number]]  (Normal) Collected: 06/03/22 1532    Order Status: Completed Specimen: Blood from Hand, Left Updated: 06/05/22 1802     CULTURE, BLOOD ([facility]) No Growth At 48 Hours    Respiratory Culture [[id number]]     Order Status: Sent Specimen: Sputum, Expectorated            See below for Radiology    Scheduled Med:   albuterol  2 puff Inhalation Q6H    cyanocobalamin  500 mcg Oral Daily    diltiaZEM  60 mg Oral BID    donepeziL  10 mg Oral Nightly    fluticasone furoate-vilanteroL  1 puff Inhalation Daily    furosemide (LASIX) injection  20 mg Intravenous Q12H    gabapentin  100 mg Oral Nightly    pantoprazole  40 mg Oral Daily    potassium chloride  40 mEq Oral Q6H    pramipexole  1 mg Oral Nightly    venlafaxine  37.5 mg Oral Daily        Continuous Infusions:       PRN Meds:  ondansetron       Assessment/Plan:  Hypomagnesemia  hypokalemia  Acute decompensated diastolic heart failure  Acute on chronic hypoxemic respiratory failure, improving   Lethargic possible hypercapnic, resolved   Macrocytic anemia  History of PAF on Eliquis  History of moderate to severe aortic stenosi, plans for a TAVR as an outpatient with Cardiology  History of bilateral carotid artery stenosis  History of HTN  History of chronic dementia at cognitive baseline per daughter  History of previous DVT/PE,  History of  recent surgical fixation of a closed right trimalleolar ankle fracture on 04/26/2022     Plan:  replete potassium and magnesium  Continue Lasix,    add on diltiazem 60 mg b.i.d. for heart rate and blood pressure control  Eliquis currently on hold per cis recs   Cis planning for left heart catheterization   resp status is improving on nc oxygen 3L   CIS on board- planning for in pt LHC in prep for tavr, d/c eliquis   Replete magnesium   blood cultures x2, respiratory cultures, MRSA PCR-pending  monitor daily standing weights and accurate I&Os  -she recently had an echocardiogram done 05/24/2022 that revealed LVEF 55% and grade II diastolic dysfunction so no need to repeat  -CT head to rule out any intracranial abnormalities  -currently on SCDs, if CT shows no acute bleeding we will start Lovenox  -Repeat CBC and CMP in AM      VTE Prophylaxis: will be placed on SCD for DVT prophylaxis and will be advised to be as mobile as possible and sit in a chair as tolerated  __________________________________________________________________________        Patient condition:  Stable/Fair/Guarded/ Serious/ Critical     Anticipated discharge and Disposition:     Critical care diagnosis diastolic CHF exacerbation  Critical care time greater than 35 minutes    All diagnosis and differential diagnosis have been reviewed; assessment and plan has been documented; I have personally reviewed the labs and test results that are presently available; I have reviewed the patients medication list; I have reviewed the consulting providers response and recommendations. I have reviewed or attempted to review medical records based upon their availability    All of the patient's questions have been  addressed and answered. Patient's is agreeable to the above stated plan. I will continue to monitor closely and make adjustments to medical management as needed.  _____________________________________________________________________    Nutrition Status:    Radiology:  CT Chest Without Contrast  Narrative: EXAMINATION:  CT CHEST WITHOUT CONTRAST    CLINICAL HISTORY:  ; Abnormal xray - lung nodule,  < 1 cm, low risk;    TECHNIQUE:  Helical-acquisition CT images were obtained without the intravenous administration of iodine-based contrast media.  Multiplanar reformats were accomplished by a CT technologist at a separate workstation and pushed to PACS for physician review.    Automated tube current modulation, weight-based exposure dosing, and/or iterative reconstruction technique utilized to reach lowest reasonably achievable exposure rate.    DLP: 257 mGy*cm    COMPARISON:  29 April 2022    FINDINGS:  Images were reviewed in lung, soft tissue, and bone windows.    Exam quality: Limited secondary to patient movement throughout image acquisition, with resulting artifact.    Lines/tubes: none visualized    Extensive aortic and coronary calcification is again appreciated.  No new focal vascular abnormality by noncontrast appearance.  Cardiac chambers are unchanged volume.  There is no significant pericardial fluid.    Central airway patency is widely maintained.  There is redemonstrated, similar to slightly increased peripheral dependent consolidative airspace density and surrounding ground-glass attenuation involving both lungs.  No development of lobar consolidation or suspicious focal pulmonary lesion.  There is no large or loculated pleural effusion.  No pneumothorax.    The included thyroid gland, regional lymph nodes, and visualized upper abdominal structures demonstrate no convincing acute or new focal process.  Right adrenal nodule is unchanged in size and CT appearance, otherwise inadequately assessed by exam protocol.  There are similar scattered cystic hypodensities of the hepatic lobes.    Multilevel spinal column degenerative changes and chronic midthoracic compression deformity with slight cortical retropulsion unchanged in comparison.  No convincing acute or worsened focal osseous lesion is identified.  The thoracic wall subcutaneous tissues and visualized musculature are unremarkable.  Impression:  Limited evaluation secondary to extensive respiratory motion artifact.  Within limitations:    1. The apparent clinically suspected lung nodule is not clearly confirmed by CT assessment and there is no evidence of new or enlarging focal pulmonary lesion.  2. Scattered bilateral dependent ill-defined airspace consolidation is similar to slightly worsened, may represent atelectasis but otherwise nonspecific.  3. Additional chronic secondary details discussed above, similar to the 29 April 2022 appearance.    Electronically signed by: oSny Chavez  Date:    06/03/2022  Time:    18:37  CT Head Without Contrast  Narrative: EXAMINATION:  CT HEAD WITHOUT CONTRAST    CLINICAL HISTORY:  Mental status change, unknown cause;    TECHNIQUE:  Low dose axial images were obtained through the head.  Coronal and sagittal reformations were also performed. Contrast was not administered.    Automatic exposure control was utilized to reduce the patient's radiation dose.    DLP= 1008    COMPARISON:  04/21/2022    FINDINGS:  No acute intracranial hemorrhage, edema or mass. No acute parenchymal abnormality.    Diffuse cerebral atrophy with concordant ventricular enlargement.    Scattered hypodensities throughout the deep periventricular white matter.    The osseous structures are normal.    The mastoid air cells are clear.    The auditory canals are patent bilaterally.    The globes and orbital contents are normal bilaterally.    The visualized maxillary, ethmoid and sphenoid sinuses are clear.  Impression: No acute intracranial abnormality identified.  Findings of chronic microvascular ischemic disease.    Electronically signed by: Kenn Jalloh  Date:    06/03/2022  Time:    15:08  X-Ray Chest 1 View  Narrative: EXAMINATION:  XR CHEST 1 VIEW    CPT 64347    CLINICAL HISTORY:  shor;    COMPARISON:  May 24, 2022    FINDINGS:  There is calcification of the aortic arch mediastinal silhouette is within normal limits cardiac silhouette is at  the upper limits of normal to mildly enlarged.    There is some increase in interstitial and pulmonary vascular markings with some confluent airspace opacities in the left perihilar region and left base infiltrate cannot be completely excluded.    No other focal consolidative changes  Impression: Low increase in interstitial and pulmonary vascular markings.    More confluent airspace opacities in the left infrahilar region and left base infiltrate cannot be completely excluded.    No other significant change    Electronically signed by: Billy Salas  Date:    06/03/2022  Time:    13:15      Sai Sanchez MD   06/06/2022

## 2022-06-06 NOTE — PROGRESS NOTES
[facility]  Cardiology  Progress Note    Patient Name: Emma Jorgensen  MRN: [id number]  Admission Date: 6/3/2022  Hospital Length of Stay: 3 days  Code Status: No Order   Attending Physician: Shakila Bui MD   Primary Care Physician: Mary Jane Dean MD  Expected Discharge Date: 6/10/2022  Principal Problem:<principal problem not specified>    Subjective:     Brief HPI:   88 year old female known to Cassy Juarez and Raimundo with a past medical history of Aortic Stenosis, Diastolic CHF, HTN, GERD, DVT, PE, Parkinson's Disease and recent right ankle fraction after mechanical fall on 4.26.22 presented to the ER from [facility] on 6.3.22 with c/o SOB that had worsened over the previous 2 days.  Patient had a recent hospital admission at [facility] on 6.1.22 where she was diagnosed with acute decompensated diastolic heart failure and received IV diuretics.  Of note, she was scheduled to undergo LHC with possible PCI per TAVR pre-op protocol.  BNP level drawn in ER was 414.1.  CIS has been consulted for further evaluation of CHF.    Hospital Course:   6.6.22: NAD noted. VSS. SR on tele. Denies CP/Palps/SOB. States she feels like her breathing is back to her baseline. Still on 3LPM per NC. Negative 760mL in 24hrs. Weight stable.     PMH: Aortic Stenosis, Diastolic CHF, HTN, GERD, DVT, PE, Parkinson's Disease   PSH: Ankle Surgery, Cataract Surgery, Colonoscopy, Colon Resection, Toe Surgery, Hysterectomy  Family History: Father - CVA; Sister - CAD/CABG  Social History: Former Smoker.  Denies Alcohol Use.  Denies Illicit Drug Use.     Previous Cardiac Diagnostics:   Echocardiogram 5.24.22:  TDS due to poor acoustical windows; suboptimal doppler angles.  Mild concentric hypertrophy and normal systolic function.  The estimated ejection fraction is 55%.  Grade II left ventricular diastolic dysfunction.  Moderate aortic regurgitation.  There is moderate aortic valve stenosis.  Aortic  valve area is 0.75 cm squared; peak velocity is 3.53m/s; mean gradient is 30mmHg.  Intermediate central venous pressure (8mmHg).     Carotid US 4.21.22  The right internal carotid artery demonstrated less than 50% stenosis.  The left internal carotid artery demonstrated less than 50% stenosis.  Bilateral vertebral arteries were patent with antegrade flow.        PET MPI 2.22.18:  This is a normal perfusion study, no perfusion defects noted.  There is no evidence of ischemia.  This scan is suggestive of low risk for future cardiovascular events.  The left ventricular cavity is noted to be normal on the stress studies.  The stress left ventricular ejection fraction was calculated to be 74% and left ventricular global function is normal.  The rest left ventricular cavity is noted to be normal.  The rest left ventricular ejection fraction was calculated to be 74% and rest left ventricular global function is michael;.  Compared with rest and stress studies the ejection fraction remains unchanged (74).          Review of Systems   Constitutional: Negative for chills and fever.   Cardiovascular: Negative for chest pain and palpitations.   Respiratory: Negative for shortness of breath.    Psychiatric/Behavioral: Negative for altered mental status.           Objective:     Vital Signs (Most Recent):  Temp: 98.2 °F (36.8 °C) (06/06/22 0753)  Pulse: 84 (06/06/22 0800)  Resp: (!) 22 (06/06/22 0800)  BP: 120/68 (06/06/22 0753)  SpO2: 98 % (06/06/22 0800) Vital Signs (24h Range):  Temp:  [97.9 °F (36.6 °C)-99 °F (37.2 °C)] 98.2 °F (36.8 °C)  Pulse:  [] 84  Resp:  [17-22] 22  SpO2:  [97 %-99 %] 98 %  BP: (120-148)/(66-78) 120/68     Weight: 113.7 kg (250 lb 10.6 oz)  Body mass index is 38.11 kg/m².    SpO2: 98 %  O2 Device (Oxygen Therapy): nasal cannula      Intake/Output Summary (Last 24 hours) at 6/6/2022 0930  Last data filed at 6/6/2022 0600  Gross per 24 hour   Intake 440 ml   Output 1200 ml   Net -760 ml        Lines/Drains/Airways     Drain  Duration                Urethral Catheter 04/30/22 0700 37 days          Peripheral Intravenous Line  Duration                Peripheral IV - Single Lumen 06/05/22 0745 20 G Anterior;Proximal;Right Forearm 1 day                Significant Labs:   CMP   Recent Labs   Lab 06/05/22  0322 06/06/22  0524   * 134*   K 3.9 3.4*   CO2 33* 34*   BUN 11.7 11.8   CREATININE 0.71 0.60   CALCIUM 8.8 9.2    and CBC   Recent Labs   Lab 06/05/22  0322 06/06/22  0524   WBC  --  8.2   HGB 9.2* 9.6*   HCT 28.8* 31.1*   PLT  --  331       Telemetry: SR     Physical Exam:  Physical Exam  Constitutional:       Appearance: Normal appearance.   HENT:      Head: Normocephalic and atraumatic.      Mouth/Throat:      Mouth: Mucous membranes are moist.   Eyes:      Extraocular Movements: Extraocular movements intact.   Cardiovascular:      Rate and Rhythm: Normal rate and regular rhythm.      Heart sounds: Murmur heard.   Pulmonary:      Effort: Pulmonary effort is normal.      Breath sounds: Decreased breath sounds present.   Abdominal:      Palpations: Abdomen is soft.   Musculoskeletal:      Cervical back: Neck supple.   Skin:     General: Skin is warm.   Neurological:      General: No focal deficit present.      Mental Status: She is alert.   Psychiatric:         Mood and Affect: Mood normal.         Behavior: Behavior normal.         Current Inpatient Medications:    Current Facility-Administered Medications:     albuterol inhaler 2 puff, 2 puff, Inhalation, Q6H, Shakila Bui MD, 2 puff at 06/05/22 1745    cyanocobalamin tablet 500 mcg, 500 mcg, Oral, Daily, Shakila Bui MD, 500 mcg at 06/05/22 0920    diltiaZEM 12 hr capsule 60 mg, 60 mg, Oral, BID, Sai Sanchez MD    donepeziL tablet 10 mg, 10 mg, Oral, Nightly, Shakila Bui MD, 10 mg at 06/05/22 2142    fluticasone furoate-vilanteroL 100-25 mcg/dose diskus inhaler 1 puff, 1 puff, Inhalation, Daily, Shakila Bui MD, 1 puff at  06/05/22 0930    furosemide injection 20 mg, 20 mg, Intravenous, Q12H, Shakila Bui MD, 20 mg at 06/06/22 0700    gabapentin capsule 100 mg, 100 mg, Oral, Nightly, Shakila Bui MD, 100 mg at 06/05/22 2142    ondansetron injection 4 mg, 4 mg, Intravenous, Q6H PRN, CARLOS ALBERTO Acevedo    pantoprazole EC tablet 40 mg, 40 mg, Oral, Daily, Shakila Bui MD, 40 mg at 06/05/22 0920    potassium chloride SA CR tablet 40 mEq, 40 mEq, Oral, Q6H, [facility]    pramipexole tablet 1 mg, 1 mg, Oral, Nightly, Shakila Bui MD, 1 mg at 06/05/22 2141    venlafaxine 24 hr capsule 37.5 mg, 37.5 mg, Oral, Daily, Shakila Bui MD, 37.5 mg at 06/06/22 0600        Assessment:     IMPRESSION:  Acute on Chronic Diastolic CHF       - .1       - EF 55% with Grade II DD per Echo 5.24.22  Moderate Aortic Stenosis per Echo 5.24.22  HTN  GERD  Hx of DVT  Hx of PE  Parkinson's Disease       Plan:     Plan:  Continue to hold Eliquis   -Last dose was 6.5.22 @ 0900  Continue IV Lasix 20mg IV BID  Continue home medications  Replete K, 40meq x2  NPO p MN  LHC in AM for TAVR pre-op Protocol  R/B/A discussed with patient and she wishes to proceed with LHC  Will also Need CTA TAVR protocol. Will plan for this after C.  Labs in AM: CBC, CMP,    [facility]  Cardiology  [facility] - 9th Floor Med Surg  06/06/2022

## 2022-06-07 PROBLEM — I35.0 SEVERE AORTIC STENOSIS: Status: ACTIVE | Noted: 2022-06-07

## 2022-06-07 PROBLEM — I25.10 CORONARY ARTERY DISEASE INVOLVING NATIVE CORONARY ARTERY: Status: ACTIVE | Noted: 2022-06-07

## 2022-06-07 LAB
ANION GAP SERPL CALC-SCNC: 10 MEQ/L
BASOPHILS # BLD AUTO: 0.05 X10(3)/MCL (ref 0–0.2)
BASOPHILS NFR BLD AUTO: 0.6 %
BUN SERPL-MCNC: 10.8 MG/DL (ref 9.8–20.1)
CALCIUM SERPL-MCNC: 9.3 MG/DL (ref 8.4–10.2)
CHLORIDE SERPL-SCNC: 93 MMOL/L (ref 98–107)
CO2 SERPL-SCNC: 32 MMOL/L (ref 23–31)
CORRECTED TEMPERATURE (PCO2): 62 MMHG (ref 19–50)
CORRECTED TEMPERATURE (PH): 7.44 (ref 7.35–7.45)
CORRECTED TEMPERATURE (PO2): 102 MMHG (ref 80–100)
CREAT SERPL-MCNC: 0.71 MG/DL (ref 0.55–1.02)
CREAT/UREA NIT SERPL: 15
EOSINOPHIL # BLD AUTO: 0.16 X10(3)/MCL (ref 0–0.9)
EOSINOPHIL NFR BLD AUTO: 1.9 %
ERYTHROCYTE [DISTWIDTH] IN BLOOD BY AUTOMATED COUNT: 13.3 % (ref 11.5–17)
GLUCOSE SERPL-MCNC: 85 MG/DL (ref 82–115)
HCO3 UR-SCNC: 42.1 MMOL/L
HCT VFR BLD AUTO: 36.1 % (ref 37–47)
HGB BLD-MCNC: 10.1 G/DL (ref 12–16)
HGB BLD-MCNC: 10.9 GM/DL (ref 12–16)
IMM GRANULOCYTES # BLD AUTO: 0.05 X10(3)/MCL (ref 0–0.02)
IMM GRANULOCYTES NFR BLD AUTO: 0.6 % (ref 0–0.43)
LYMPHOCYTES # BLD AUTO: 1.28 X10(3)/MCL (ref 0.6–4.6)
LYMPHOCYTES NFR BLD AUTO: 15.3 %
MAGNESIUM SERPL-MCNC: 2 MG/DL (ref 1.6–2.6)
MCH RBC QN AUTO: 30.6 PG (ref 27–31)
MCHC RBC AUTO-ENTMCNC: 30.2 MG/DL (ref 33–36)
MCV RBC AUTO: 101.4 FL (ref 80–94)
MONOCYTES # BLD AUTO: 0.9 X10(3)/MCL (ref 0.1–1.3)
MONOCYTES NFR BLD AUTO: 10.8 %
NEUTROPHILS # BLD AUTO: 5.9 X10(3)/MCL (ref 2.1–9.2)
NEUTROPHILS NFR BLD AUTO: 70.8 %
NRBC BLD AUTO-RTO: 0 %
PCO2 BLDA: 62 MMHG (ref 19–50)
PH SMN: 7.44 [PH] (ref 7.35–7.45)
PLATELET # BLD AUTO: 371 X10(3)/MCL (ref 130–400)
PMV BLD AUTO: 9.5 FL (ref 9.4–12.4)
PO2 BLDA: 102 MMHG (ref 80–100)
POC BASE DEFICIT: 15.6 MMOL/L (ref -2–2)
POC COHB: 2.9 %
POC IONIZED CALCIUM: 1.19 MMOL/L (ref 1.12–1.23)
POC METHB: 0.9 % (ref 0.4–2)
POC O2HB: 96.8 % (ref 94–97)
POC SATURATED O2: 98.1 %
POC TEMPERATURE: 37 °C
POCT GLUCOSE: 118 MG/DL (ref 70–110)
POTASSIUM BLD-SCNC: 3.9 MMOL/L (ref 3.5–5)
POTASSIUM SERPL-SCNC: 4.9 MMOL/L (ref 3.5–5.1)
RBC # BLD AUTO: 3.56 X10(6)/MCL (ref 4.2–5.4)
SODIUM BLD-SCNC: 129 MMOL/L (ref 137–145)
SODIUM SERPL-SCNC: 135 MMOL/L (ref 136–145)
SPECIMEN SOURCE: ABNORMAL
WBC # SPEC AUTO: 8.4 X10(3)/MCL (ref 4.5–11.5)

## 2022-06-07 PROCEDURE — 63600175 PHARM REV CODE 636 W HCPCS: Performed by: INTERNAL MEDICINE

## 2022-06-07 PROCEDURE — 92978 ENDOLUMINL IVUS OCT C 1ST: CPT | Performed by: INTERNAL MEDICINE

## 2022-06-07 PROCEDURE — 93005 ELECTROCARDIOGRAM TRACING: CPT

## 2022-06-07 PROCEDURE — 83735 ASSAY OF MAGNESIUM: CPT | Performed by: INTERNAL MEDICINE

## 2022-06-07 PROCEDURE — C1887 CATHETER, GUIDING: HCPCS | Performed by: INTERNAL MEDICINE

## 2022-06-07 PROCEDURE — C9600 PERC DRUG-EL COR STENT SING: HCPCS | Mod: LD | Performed by: INTERNAL MEDICINE

## 2022-06-07 PROCEDURE — 25500020 PHARM REV CODE 255: Performed by: INTERNAL MEDICINE

## 2022-06-07 PROCEDURE — 25000003 PHARM REV CODE 250: Performed by: INTERNAL MEDICINE

## 2022-06-07 PROCEDURE — 63600175 PHARM REV CODE 636 W HCPCS

## 2022-06-07 PROCEDURE — C1769 GUIDE WIRE: HCPCS | Performed by: INTERNAL MEDICINE

## 2022-06-07 PROCEDURE — 11000001 HC ACUTE MED/SURG PRIVATE ROOM

## 2022-06-07 PROCEDURE — 92921 HC PTCA , ADD'L BRANCH: CPT | Mod: LD | Performed by: INTERNAL MEDICINE

## 2022-06-07 PROCEDURE — 85025 COMPLETE CBC W/AUTO DIFF WBC: CPT | Performed by: INTERNAL MEDICINE

## 2022-06-07 PROCEDURE — 80048 BASIC METABOLIC PNL TOTAL CA: CPT | Performed by: INTERNAL MEDICINE

## 2022-06-07 PROCEDURE — 99152 MOD SED SAME PHYS/QHP 5/>YRS: CPT | Performed by: INTERNAL MEDICINE

## 2022-06-07 PROCEDURE — 93460 R&L HRT ART/VENTRICLE ANGIO: CPT | Mod: 59 | Performed by: INTERNAL MEDICINE

## 2022-06-07 PROCEDURE — 27201423 OPTIME MED/SURG SUP & DEVICES STERILE SUPPLY: Performed by: INTERNAL MEDICINE

## 2022-06-07 PROCEDURE — 82803 BLOOD GASES ANY COMBINATION: CPT

## 2022-06-07 PROCEDURE — C1751 CATH, INF, PER/CENT/MIDLINE: HCPCS | Performed by: INTERNAL MEDICINE

## 2022-06-07 PROCEDURE — C1894 INTRO/SHEATH, NON-LASER: HCPCS | Performed by: INTERNAL MEDICINE

## 2022-06-07 PROCEDURE — C1874 STENT, COATED/COV W/DEL SYS: HCPCS | Performed by: INTERNAL MEDICINE

## 2022-06-07 PROCEDURE — 93571 IV DOP VEL&/PRESS C FLO 1ST: CPT | Mod: 74 | Performed by: INTERNAL MEDICINE

## 2022-06-07 PROCEDURE — 99153 MOD SED SAME PHYS/QHP EA: CPT | Performed by: INTERNAL MEDICINE

## 2022-06-07 PROCEDURE — 36600 WITHDRAWAL OF ARTERIAL BLOOD: CPT

## 2022-06-07 PROCEDURE — C1753 CATH, INTRAVAS ULTRASOUND: HCPCS | Performed by: INTERNAL MEDICINE

## 2022-06-07 PROCEDURE — 36415 COLL VENOUS BLD VENIPUNCTURE: CPT | Performed by: INTERNAL MEDICINE

## 2022-06-07 PROCEDURE — 99900035 HC TECH TIME PER 15 MIN (STAT)

## 2022-06-07 PROCEDURE — 27000221 HC OXYGEN, UP TO 24 HOURS

## 2022-06-07 PROCEDURE — C1725 CATH, TRANSLUMIN NON-LASER: HCPCS | Performed by: INTERNAL MEDICINE

## 2022-06-07 PROCEDURE — C1760 CLOSURE DEV, VASC: HCPCS | Performed by: INTERNAL MEDICINE

## 2022-06-07 DEVICE — CATH VASCADE VCS 5FR: Type: IMPLANTABLE DEVICE | Site: GROIN | Status: FUNCTIONAL

## 2022-06-07 DEVICE — STENT RONYX30015UX RESOLUTE ONYX 3.00X15
Type: IMPLANTABLE DEVICE | Site: CORONARY | Status: FUNCTIONAL
Brand: RESOLUTE ONYX™

## 2022-06-07 RX ORDER — DIPHENHYDRAMINE HYDROCHLORIDE 50 MG/ML
INJECTION INTRAMUSCULAR; INTRAVENOUS
Status: DISCONTINUED | OUTPATIENT
Start: 2022-06-07 | End: 2022-06-13 | Stop reason: HOSPADM

## 2022-06-07 RX ORDER — CLOPIDOGREL 300 MG/1
TABLET, FILM COATED ORAL
Status: DISCONTINUED | OUTPATIENT
Start: 2022-06-07 | End: 2022-06-13 | Stop reason: HOSPADM

## 2022-06-07 RX ORDER — NAPROXEN SODIUM 220 MG/1
TABLET, FILM COATED ORAL
Status: DISCONTINUED | OUTPATIENT
Start: 2022-06-07 | End: 2022-06-13 | Stop reason: HOSPADM

## 2022-06-07 RX ORDER — HYDROCODONE BITARTRATE AND ACETAMINOPHEN 5; 325 MG/1; MG/1
1 TABLET ORAL EVERY 4 HOURS PRN
Status: DISCONTINUED | OUTPATIENT
Start: 2022-06-07 | End: 2022-06-13 | Stop reason: HOSPADM

## 2022-06-07 RX ORDER — ONDANSETRON 4 MG/1
8 TABLET, ORALLY DISINTEGRATING ORAL EVERY 8 HOURS PRN
Status: DISCONTINUED | OUTPATIENT
Start: 2022-06-07 | End: 2022-06-13 | Stop reason: HOSPADM

## 2022-06-07 RX ORDER — SODIUM CHLORIDE 9 MG/ML
INJECTION, SOLUTION INTRAVENOUS CONTINUOUS
Status: ACTIVE | OUTPATIENT
Start: 2022-06-07 | End: 2022-06-07

## 2022-06-07 RX ORDER — ACETAMINOPHEN 325 MG/1
650 TABLET ORAL EVERY 4 HOURS PRN
Status: DISCONTINUED | OUTPATIENT
Start: 2022-06-07 | End: 2022-06-13 | Stop reason: HOSPADM

## 2022-06-07 RX ORDER — HEPARIN SODIUM 1000 [USP'U]/ML
INJECTION, SOLUTION INTRAVENOUS; SUBCUTANEOUS
Status: DISCONTINUED | OUTPATIENT
Start: 2022-06-07 | End: 2022-06-13 | Stop reason: HOSPADM

## 2022-06-07 RX ORDER — LIDOCAINE HYDROCHLORIDE 20 MG/ML
INJECTION, SOLUTION INFILTRATION; PERINEURAL
Status: DISCONTINUED | OUTPATIENT
Start: 2022-06-07 | End: 2022-06-13 | Stop reason: HOSPADM

## 2022-06-07 RX ORDER — MIDAZOLAM HYDROCHLORIDE 1 MG/ML
INJECTION, SOLUTION INTRAMUSCULAR; INTRAVENOUS
Status: DISCONTINUED | OUTPATIENT
Start: 2022-06-07 | End: 2022-06-13 | Stop reason: HOSPADM

## 2022-06-07 RX ORDER — FENTANYL CITRATE 50 UG/ML
INJECTION, SOLUTION INTRAMUSCULAR; INTRAVENOUS
Status: DISCONTINUED | OUTPATIENT
Start: 2022-06-07 | End: 2022-06-13 | Stop reason: HOSPADM

## 2022-06-07 RX ORDER — MORPHINE SULFATE 4 MG/ML
2 INJECTION, SOLUTION INTRAMUSCULAR; INTRAVENOUS EVERY 4 HOURS PRN
Status: DISCONTINUED | OUTPATIENT
Start: 2022-06-07 | End: 2022-06-13 | Stop reason: HOSPADM

## 2022-06-07 RX ORDER — CLOPIDOGREL BISULFATE 75 MG/1
75 TABLET ORAL DAILY
Status: DISCONTINUED | OUTPATIENT
Start: 2022-06-08 | End: 2022-06-13 | Stop reason: HOSPADM

## 2022-06-07 RX ADMIN — FUROSEMIDE 20 MG: 10 INJECTION INTRAMUSCULAR; INTRAVENOUS at 08:06

## 2022-06-07 RX ADMIN — PRAMIPEXOLE DIHYDROCHLORIDE 1 MG: 0.25 TABLET ORAL at 08:06

## 2022-06-07 RX ADMIN — ALBUTEROL SULFATE 2 PUFF: 90 AEROSOL, METERED RESPIRATORY (INHALATION) at 12:06

## 2022-06-07 RX ADMIN — PANTOPRAZOLE SODIUM 40 MG: 40 TABLET, DELAYED RELEASE ORAL at 09:06

## 2022-06-07 RX ADMIN — ALBUTEROL SULFATE 2 PUFF: 90 AEROSOL, METERED RESPIRATORY (INHALATION) at 05:06

## 2022-06-07 RX ADMIN — FUROSEMIDE 20 MG: 10 INJECTION INTRAMUSCULAR; INTRAVENOUS at 06:06

## 2022-06-07 RX ADMIN — CYANOCOBALAMIN TAB 500 MCG 500 MCG: 500 TAB at 09:06

## 2022-06-07 RX ADMIN — DILTIAZEM HYDROCHLORIDE 60 MG: 60 CAPSULE, EXTENDED RELEASE ORAL at 09:06

## 2022-06-07 RX ADMIN — GABAPENTIN 100 MG: 100 CAPSULE ORAL at 08:06

## 2022-06-07 RX ADMIN — DILTIAZEM HYDROCHLORIDE 60 MG: 60 CAPSULE, EXTENDED RELEASE ORAL at 08:06

## 2022-06-07 RX ADMIN — ALBUTEROL SULFATE 2 PUFF: 90 AEROSOL, METERED RESPIRATORY (INHALATION) at 09:06

## 2022-06-07 RX ADMIN — HYDROCODONE BITARTRATE AND ACETAMINOPHEN 1 TABLET: 5; 325 TABLET ORAL at 08:06

## 2022-06-07 RX ADMIN — WHITE PETROLATUM: 1.75 OINTMENT TOPICAL at 09:06

## 2022-06-07 RX ADMIN — SODIUM CHLORIDE: 9 INJECTION, SOLUTION INTRAVENOUS at 04:06

## 2022-06-07 RX ADMIN — DONEPEZIL HYDROCHLORIDE 10 MG: 5 TABLET, FILM COATED ORAL at 08:06

## 2022-06-07 NOTE — PT/OT/SLP PROGRESS
Physical Therapy      Patient Name:  Emma Jorgensen   MRN:  [id number]    Patient not seen today secondary to Off the floor for procedure/surgery. Patient going to Cath Lab. PT to follow up as schedule permits.

## 2022-06-07 NOTE — PROGRESS NOTES
"[facility] Hospital Medicine Progress Note        Chief Complaint: Inpatient Follow-up for dchf , severe AS , resp failure    HPI:   Emma Jorgensen is a 88 y.o. female with a past medical history of diastolic heart failure (LVEF 55% and grade II diastolic dysfunction from an echo on 05/24/2022),  PAF on Eliquis, moderate to severe aortic stenosis with plans for a TAVR as an outpatient with Cardiology, bilateral carotid artery stenosis, HTN, chronic dementia, previous DVT/PE, and recent surgical fixation of a closed right trimalleolar ankle fracture on 04/26/2022 who presented to [facility] on 6/3/2022 from [facility] with complaints of worsening shortness breath that began today. The daughter, who is at bedside, stated that she has been on nasal cannula 2 L/min at the facility and visited the patient yesterday where an aid reportedly informed her that she had an episode of SOB after her "oxygen tank ran out." The daughter also states that she is currently at her cognitive baseline although she is more lethargic than normal.  She denied that she was complaining of any fever, cough, chest pain, nausea, vomiting, abdominal pain, or dysuria.     She was recently admitted and discharged from the hospital at [facility] on 06/01/2022 where she received IV diuretics for acute decompensated diastolic heart failure. Her symptoms and respiratory status improved so she was transferred back to the SNF.      Her initial vital signs showed that she was afebrile with an RR 24, HR 82, BP 99/55, and SpO2 100% on nasal cannula 6 L/min.  Her labs show mild hyponatremia, mild hypochloremia, a bicarb 35, and a .1.  COVID-19 and influenza negative. CXR showed a low increase in interstitial and pulmonary vascular markings. More confluent airspace opacities in the left infrahilar region and left base infiltrate cannot be completely excluded.     Patient admitted for resp failure d/t dchf exacerbation.  " Patient with severe aortic stenosis.  [facility] taken for left heart catheterization for TAVR planning.      Interval Hx:   Patient seen at bedside  Vitals stable on 2 L of oxygen.  Get a blood gas may benefit from a trilogy for home.  Patient complaining of right lower extremity pain.  Foot examined looks to have purulence beneath the skin.  Get CT of the fluids, consult Podiatry, pain control      Objective/physical exam:  General:  awake, alert oriented to self only (at cognitive baseline per daughter at bedside), follows commands appropriately   Eye: clear conjunctiva, eyelids normal  HENT: oropharynx and nasal mucosal surfaces moist  Respiratory:  Poor respiratory effort, bilateral crackles, moderate respiratory distress, nasal cannula in place  Cardiovascular: regular rate and rhythm without gallops or rubs, systolic murmur  Gastrointestinal: soft, non-tender, non-distended with normal bowel sounds, without masses to palpation  Musculoskeletal: able to move all four extremities, right foot in walking boot from recent surgery  Integumentary: warm, dry  Neurologic: ax o x 3,       VITAL SIGNS: 24 HRS MIN & MAX LAST   Temp  Min: 97.7 °F (36.5 °C)  Max: 98.1 °F (36.7 °C) 97.7 °F (36.5 °C)   BP  Min: 103/64  Max: 153/79 137/66   Pulse  Min: 63  Max: 84  79   Resp  Min: 18  Max: 20 18   SpO2  Min: 93 %  Max: 100 % 99 %       Recent Labs   Lab 06/04/22  0420 06/05/22  0322 06/06/22  0524 06/07/22  0757   WBC 6.1  --  8.2 8.4   RBC 3.08*  --  3.19* 3.56*   HGB 9.3* 9.2* 9.6* 10.9*   HCT 30.3* 28.8* 31.1* 36.1*   MCV 98.4*  --  97.5* 101.4*   MCH 30.2  --  30.1 30.6   MCHC 30.7*  --  30.9* 30.2*   RDW 13.4  --  13.2 13.3     --  331 371   MPV 9.7  --  9.6 9.5       Recent Labs   Lab 06/03/22  1234 06/03/22  1513 06/04/22  0420 06/05/22  0322 06/06/22  0524 06/07/22  0757 06/07/22  0853   *  --  134* 134* 134* 135*  --    K 4.1  --  3.9 3.9 3.4* 4.9  --    CO2 35*  --  36* 33* 34* 32*  --    BUN 13.1   --  14.1 11.7 11.8 10.8  --    CREATININE 0.74  --  0.83 0.71 0.60 0.71  --    CALCIUM 8.8  --  9.1 8.8 9.2 9.3  --    PH  --  7.43  --   --   --   --  7.44   MG  --   --   --  1.70 1.90 2.00  --    ALBUMIN 2.8*  --  2.7*  --   --   --   --    ALKPHOS 82  --  78  --   --   --   --    ALT 8  --  6  --   --   --   --    AST 17  --  13  --   --   --   --    BILITOT 0.3  --  0.5  --   --   --   --           Microbiology Results (last 7 days)     Procedure Component Value Units Date/Time    Blood Culture [[id number]]  (Normal) Collected: 06/03/22 1514    Order Status: Completed Specimen: Blood from Arm, Left Updated: 06/06/22 1803     CULTURE, BLOOD ([facility]) No Growth At 72 Hours    Blood Culture [[id number]]  (Normal) Collected: 06/03/22 1532    Order Status: Completed Specimen: Blood from Hand, Left Updated: 06/06/22 1803     CULTURE, BLOOD ([facility]) No Growth At 72 Hours    Respiratory Culture [[id number]]     Order Status: Sent Specimen: Sputum, Expectorated            See below for Radiology    Scheduled Med:   albuterol  2 puff Inhalation Q6H    cyanocobalamin  500 mcg Oral Daily    diltiaZEM  60 mg Oral BID    donepeziL  10 mg Oral Nightly    fluticasone furoate-vilanteroL  1 puff Inhalation Daily    furosemide (LASIX) injection  20 mg Intravenous Q12H    gabapentin  100 mg Oral Nightly    pantoprazole  40 mg Oral Daily    pramipexole  1 mg Oral Nightly    venlafaxine  37.5 mg Oral Daily    white petrolatum   Topical (Top) BID        Continuous Infusions:       PRN Meds:  aspirin, clopidogreL, diphenhydrAMINE, fentaNYL, heparin (porcine), LIDOcaine HCL 20 mg/ml (2%), midazolam, ondansetron, sodium chloride 0.9%       Assessment/Plan:  RLE pain in foot   Concern for abscess vs osteomyelitis  Acute decompensated diastolic heart failure  Acute on chronic hypoxemic respiratory failure, improving   Lethargic possible hypercapnic, resolved   Macrocytic anemia  History of PAF on Eliquis  History of moderate to severe  aortic stenosi, plans for a TAVR as an outpatient with Cardiology  History of bilateral carotid artery stenosis  History of HTN  History of chronic dementia at cognitive baseline per daughter  History of previous DVT/PE,  History of  recent surgical fixation of a closed right trimalleolar ankle fracture on 04/26/2022   Hypomagnesemia  Hypokalemia      Plan:  Vitals stable on 2 L of oxygen.    Abg,  may benefit from a trilogy for home.  Get CT of the right foot,, consult Podiatry, pain control  Continue Lasix,   diltiazem 60 mg b.i.d. for heart rate and blood pressure control  Eliquis currently on hold per [facility] recs   blood cultures x2, respiratory cultures, MRSA PCR-pending  monitor daily standing weights and accurate I&Os  -she recently had an echocardiogram done 05/24/2022 that revealed LVEF 55% and grade II diastolic dysfunction so no need to repeat  -CT head to rule out any intracranial abnormalities  -Repeat CBC and CMP in AM      VTE Prophylaxis: will be placed on SCD for DVT prophylaxis and will be advised to be as mobile as possible and sit in a chair as tolerated  __________________________________________________________________________      Patient condition:  Stable/Fair/Guarded/ Serious/ Critical    Anticipated discharge and Disposition:       Critical care diagnosis diastolic CHF exacerbation  Critical care time greater than 35 minutes  All diagnosis and differential diagnosis have been reviewed; assessment and plan has been documented; I have personally reviewed the labs and test results that are presently available; I have reviewed the patients medication list; I have reviewed the consulting providers response and recommendations. I have reviewed or attempted to review medical records based upon their availability    All of the patient's questions have been  addressed and answered. Patient's is agreeable to the above stated plan. I will continue to monitor closely and make adjustments to medical  management as needed.  _____________________________________________________________________    Nutrition Status:    Radiology:  CT Chest Without Contrast  Narrative: EXAMINATION:  CT CHEST WITHOUT CONTRAST    CLINICAL HISTORY:  ; Abnormal xray - lung nodule, < 1 cm, low risk;    TECHNIQUE:  Helical-acquisition CT images were obtained without the intravenous administration of iodine-based contrast media.  Multiplanar reformats were accomplished by a CT technologist at a separate workstation and pushed to PACS for physician review.    Automated tube current modulation, weight-based exposure dosing, and/or iterative reconstruction technique utilized to reach lowest reasonably achievable exposure rate.    DLP: 257 mGy*cm    COMPARISON:  29 April 2022    FINDINGS:  Images were reviewed in lung, soft tissue, and bone windows.    Exam quality: Limited secondary to patient movement throughout image acquisition, with resulting artifact.    Lines/tubes: none visualized    Extensive aortic and coronary calcification is again appreciated.  No new focal vascular abnormality by noncontrast appearance.  Cardiac chambers are unchanged volume.  There is no significant pericardial fluid.    Central airway patency is widely maintained.  There is redemonstrated, similar to slightly increased peripheral dependent consolidative airspace density and surrounding ground-glass attenuation involving both lungs.  No development of lobar consolidation or suspicious focal pulmonary lesion.  There is no large or loculated pleural effusion.  No pneumothorax.    The included thyroid gland, regional lymph nodes, and visualized upper abdominal structures demonstrate no convincing acute or new focal process.  Right adrenal nodule is unchanged in size and CT appearance, otherwise inadequately assessed by exam protocol.  There are similar scattered cystic hypodensities of the hepatic lobes.    Multilevel spinal column degenerative changes and chronic  midthoracic compression deformity with slight cortical retropulsion unchanged in comparison.  No convincing acute or worsened focal osseous lesion is identified.  The thoracic wall subcutaneous tissues and visualized musculature are unremarkable.  Impression: Limited evaluation secondary to extensive respiratory motion artifact.  Within limitations:    1. The apparent clinically suspected lung nodule is not clearly confirmed by CT assessment and there is no evidence of new or enlarging focal pulmonary lesion.  2. Scattered bilateral dependent ill-defined airspace consolidation is similar to slightly worsened, may represent atelectasis but otherwise nonspecific.  3. Additional chronic secondary details discussed above, similar to the 29 April 2022 appearance.    Electronically signed by: Sony Chavez  Date:    06/03/2022  Time:    18:37  CT Head Without Contrast  Narrative: EXAMINATION:  CT HEAD WITHOUT CONTRAST    CLINICAL HISTORY:  Mental status change, unknown cause;    TECHNIQUE:  Low dose axial images were obtained through the head.  Coronal and sagittal reformations were also performed. Contrast was not administered.    Automatic exposure control was utilized to reduce the patient's radiation dose.    DLP= 1008    COMPARISON:  04/21/2022    FINDINGS:  No acute intracranial hemorrhage, edema or mass. No acute parenchymal abnormality.    Diffuse cerebral atrophy with concordant ventricular enlargement.    Scattered hypodensities throughout the deep periventricular white matter.    The osseous structures are normal.    The mastoid air cells are clear.    The auditory canals are patent bilaterally.    The globes and orbital contents are normal bilaterally.    The visualized maxillary, ethmoid and sphenoid sinuses are clear.  Impression: No acute intracranial abnormality identified.  Findings of chronic microvascular ischemic disease.    Electronically signed by: Kenn  Shubham  Date:    06/03/2022  Time:    15:08  X-Ray Chest 1 View  Narrative: EXAMINATION:  XR CHEST 1 VIEW    CPT 86613    CLINICAL HISTORY:  shor;    COMPARISON:  May 24, 2022    FINDINGS:  There is calcification of the aortic arch mediastinal silhouette is within normal limits cardiac silhouette is at the upper limits of normal to mildly enlarged.    There is some increase in interstitial and pulmonary vascular markings with some confluent airspace opacities in the left perihilar region and left base infiltrate cannot be completely excluded.    No other focal consolidative changes  Impression: Low increase in interstitial and pulmonary vascular markings.    More confluent airspace opacities in the left infrahilar region and left base infiltrate cannot be completely excluded.    No other significant change    Electronically signed by: Billy Salas  Date:    06/03/2022  Time:    13:15      Sai Sanchez MD   06/07/2022     Chronic hypercapnic respiratory failure due to obesity hypoventilation syndrome    Volume ventilation is indicated. Ordering a home non invasive volume ventilator to be used during hours of sleep as well as during waking hours when symptoms arise. A home BIPAP is not appropriate for patients ventilatory requirements

## 2022-06-07 NOTE — PROGRESS NOTES
[facility] - 9th Floor Med Surg  Cardiology  Progress Note    Patient Name: Emma Jorgensen  MRN: [id number]  Admission Date: 6/3/2022  Hospital Length of Stay: 4 days  Code Status: No Order   Attending Physician: Shakila Bui MD   Primary Care Physician: Mary Jane Dean MD  Expected Discharge Date: 6/10/2022  Principal Problem:<principal problem not specified>    Subjective:     Brief HPI:   88 year old female known to Cassy Juarez and Raimundo with a past medical history of Aortic Stenosis, Diastolic CHF, HTN, GERD, DVT, PE, Parkinson's Disease and recent right ankle fraction after mechanical fall on 4.26.22 presented to the ER from [facility] on 6.3.22 with c/o SOB that had worsened over the previous 2 days.  Patient had a recent hospital admission at [facility] on 6.1.22 where she was diagnosed with acute decompensated diastolic heart failure and received IV diuretics.  Of note, she was scheduled to undergo LHC with possible PCI per TAVR pre-op protocol.  BNP level drawn in ER was 414.1.  [facility] has been consulted for further evaluation of CHF.    Hospital Course:   6.6.22: NAD noted. VSS. SR on tele. Denies CP/Palps/SOB. States she feels like her breathing is back to her baseline. Still on 3LPM per NC. Negative 760mL in 24hrs. Weight stable.  6.7.22: NAD noted. VSS. SR on tele. Denies CP/SOB/Palps. I&O not accurate, weight down 2.2kg from 6.4.22. NPO for LHC today.     PMH: Aortic Stenosis, Diastolic CHF, HTN, GERD, DVT, PE, Parkinson's Disease   PSH: Ankle Surgery, Cataract Surgery, Colonoscopy, Colon Resection, Toe Surgery, Hysterectomy  Family History: Father - CVA; Sister - CAD/CABG  Social History: Former Smoker.  Denies Alcohol Use.  Denies Illicit Drug Use.     Previous Cardiac Diagnostics:   Echocardiogram 5.24.22:  TDS due to poor acoustical windows; suboptimal doppler angles.  Mild concentric hypertrophy and normal systolic function.  The estimated ejection fraction is 55%.  Grade II  left ventricular diastolic dysfunction.  Moderate aortic regurgitation.  There is moderate aortic valve stenosis.  Aortic valve area is 0.75 cm squared; peak velocity is 3.53m/s; mean gradient is 30mmHg.  Intermediate central venous pressure (8mmHg).     Carotid US 4.21.22  The right internal carotid artery demonstrated less than 50% stenosis.  The left internal carotid artery demonstrated less than 50% stenosis.  Bilateral vertebral arteries were patent with antegrade flow.        PET MPI 2.22.18:  This is a normal perfusion study, no perfusion defects noted.  There is no evidence of ischemia.  This scan is suggestive of low risk for future cardiovascular events.  The left ventricular cavity is noted to be normal on the stress studies.  The stress left ventricular ejection fraction was calculated to be 74% and left ventricular global function is normal.  The rest left ventricular cavity is noted to be normal.  The rest left ventricular ejection fraction was calculated to be 74% and rest left ventricular global function is michael;.  Compared with rest and stress studies the ejection fraction remains unchanged (74).          Review of Systems   Constitutional: Negative for chills and fever.   Cardiovascular: Negative for chest pain and palpitations.   Respiratory: Negative for shortness of breath.    Psychiatric/Behavioral: Negative for altered mental status.           Objective:     Vital Signs (Most Recent):  Temp: 97.7 °F (36.5 °C) (06/07/22 0723)  Pulse: 78 (06/07/22 0723)  Resp: 18 (06/07/22 0723)  BP: 137/66 (06/07/22 0723)  SpO2: 98 % (06/07/22 0723) Vital Signs (24h Range):  Temp:  [97.7 °F (36.5 °C)-98.2 °F (36.8 °C)] 97.7 °F (36.5 °C)  Pulse:  [63-86] 78  Resp:  [18-20] 18  SpO2:  [93 %-100 %] 98 %  BP: (103-153)/(52-79) 137/66     Weight: 111.5 kg (245 lb 13 oz)  Body mass index is 37.38 kg/m².    SpO2: 98 %  O2 Device (Oxygen Therapy): nasal cannula      Intake/Output Summary (Last 24 hours) at 6/7/2022  0905  Last data filed at 6/7/2022 0600  Gross per 24 hour   Intake 200 ml   Output --   Net 200 ml       Lines/Drains/Airways     Drain  Duration                Urethral Catheter 04/30/22 0700 38 days          Peripheral Intravenous Line  Duration                Peripheral IV - Single Lumen 06/05/22 0745 20 G Anterior;Proximal;Right Forearm 2 days                Significant Labs:   CMP   Recent Labs   Lab 06/06/22  0524 06/07/22  0757   * 135*   K 3.4* 4.9   CO2 34* 32*   BUN 11.8 10.8   CREATININE 0.60 0.71   CALCIUM 9.2 9.3    and CBC   Recent Labs   Lab 06/06/22  0524 06/07/22  0757   WBC 8.2 8.4   HGB 9.6* 10.9*   HCT 31.1* 36.1*    371       Telemetry: SR     Physical Exam:  Physical Exam  Constitutional:       Appearance: Normal appearance.   HENT:      Head: Normocephalic and atraumatic.      Mouth/Throat:      Mouth: Mucous membranes are moist.   Eyes:      Extraocular Movements: Extraocular movements intact.   Cardiovascular:      Rate and Rhythm: Normal rate and regular rhythm.      Heart sounds: Murmur heard.   Pulmonary:      Effort: Pulmonary effort is normal.      Breath sounds: Decreased breath sounds present.   Abdominal:      Palpations: Abdomen is soft.   Musculoskeletal:      Cervical back: Neck supple.   Skin:     General: Skin is warm.   Neurological:      General: No focal deficit present.      Mental Status: She is alert.   Psychiatric:         Mood and Affect: Mood normal.         Behavior: Behavior normal.         Current Inpatient Medications:    Current Facility-Administered Medications:     albuterol inhaler 2 puff, 2 puff, Inhalation, Q6H, Shakila Bui MD, 2 puff at 06/07/22 0000    cyanocobalamin tablet 500 mcg, 500 mcg, Oral, Daily, Shakila Bui MD, 500 mcg at 06/06/22 0941    diltiaZEM 12 hr capsule 60 mg, 60 mg, Oral, BID, Sai Sanchez MD, 60 mg at 06/06/22 2208    donepeziL tablet 10 mg, 10 mg, Oral, Nightly, Shakila Bui MD, 10 mg at 06/06/22 2208     fluticasone furoate-vilanteroL 100-25 mcg/dose diskus inhaler 1 puff, 1 puff, Inhalation, Daily, Shakila Bui MD, 1 puff at 06/06/22 0943    furosemide injection 20 mg, 20 mg, Intravenous, Q12H, Shakila Bui MD, 20 mg at 06/07/22 0628    gabapentin capsule 100 mg, 100 mg, Oral, Nightly, Shakila Bui MD, 100 mg at 06/06/22 2208    ondansetron injection 4 mg, 4 mg, Intravenous, Q6H PRN, CARLOS ALBERTO Acevedo    pantoprazole EC tablet 40 mg, 40 mg, Oral, Daily, Shakila Bui MD, 40 mg at 06/06/22 0941    pramipexole tablet 1 mg, 1 mg, Oral, Nightly, Shakila Bui MD, 1 mg at 06/06/22 2208    sodium chloride 0.9% flush 10 mL, 10 mL, Intravenous, PRN, [facility]    venlafaxine 24 hr capsule 37.5 mg, 37.5 mg, Oral, Daily, Shakila Bui MD, 37.5 mg at 06/06/22 0600    white petrolatum 41 % ointment, , Topical (Top), BID, Shakila Bui MD, Given at 06/06/22 2208        Assessment:     IMPRESSION:  Acute on Chronic Diastolic CHF       - .1       - EF 55% with Grade II DD per Echo 5.24.22  Moderate Aortic Stenosis per Echo 5.24.22  HTN  GERD  Hx of DVT  Hx of PE  Parkinson's Disease       Plan:     Plan:  Continue to hold Eliquis   -Last dose was 6.5.22 @ 0900  Continue IV Lasix 20mg IV BID  Continue home medications  NPO   C for TAVR pre-op Protocol  R/B/A discussed with patient and she wishes to proceed with LHC  Will also Need CTA TAVR protocol. Will plan for this after C.  Labs in AM: CBC, CMP,    F[facility]IAN [facility]  Cardiology  [facility] - 9th Floor Med Surg  06/07/2022

## 2022-06-07 NOTE — PLAN OF CARE
Problem: Adult Inpatient Plan of Care  Goal: Plan of Care Review  Outcome: Ongoing, Progressing  Goal: Patient-Specific Goal (Individualized)  Outcome: Ongoing, Progressing  Goal: Absence of Hospital-Acquired Illness or Injury  Outcome: Ongoing, Progressing  Goal: Optimal Comfort and Wellbeing  Outcome: Ongoing, Progressing  Goal: Readiness for Transition of Care  Outcome: Ongoing, Progressing     Problem: Bariatric Environmental Safety  Goal: Safety Maintained with Care  Outcome: Ongoing, Progressing     Problem: Fluid Imbalance (Pneumonia)  Goal: Fluid Balance  Outcome: Ongoing, Progressing     Problem: Impaired Wound Healing  Goal: Optimal Wound Healing  Outcome: Ongoing, Progressing     Problem: Respiratory Compromise (Pneumonia)  Goal: Effective Oxygenation and Ventilation  Outcome: Ongoing, Progressing     Problem: Infection (Pneumonia)  Goal: Resolution of Infection Signs and Symptoms  Outcome: Ongoing, Progressing     Problem: Skin Injury Risk Increased  Goal: Skin Health and Integrity  Outcome: Ongoing, Progressing     Problem: Infection  Goal: Absence of Infection Signs and Symptoms  Outcome: Ongoing, Progressing

## 2022-06-08 LAB
BACTERIA BLD CULT: NORMAL
BACTERIA BLD CULT: NORMAL
POCT GLUCOSE: 92 MG/DL (ref 70–110)

## 2022-06-08 PROCEDURE — 25000242 PHARM REV CODE 250 ALT 637 W/ HCPCS: Performed by: INTERNAL MEDICINE

## 2022-06-08 PROCEDURE — 25000003 PHARM REV CODE 250: Performed by: INTERNAL MEDICINE

## 2022-06-08 PROCEDURE — 11000001 HC ACUTE MED/SURG PRIVATE ROOM

## 2022-06-08 PROCEDURE — 25000003 PHARM REV CODE 250: Performed by: NURSE PRACTITIONER

## 2022-06-08 PROCEDURE — 63600175 PHARM REV CODE 636 W HCPCS: Performed by: INTERNAL MEDICINE

## 2022-06-08 PROCEDURE — 63600175 PHARM REV CODE 636 W HCPCS: Performed by: NURSE PRACTITIONER

## 2022-06-08 PROCEDURE — 97162 PT EVAL MOD COMPLEX 30 MIN: CPT

## 2022-06-08 RX ORDER — FUROSEMIDE 40 MG/1
40 TABLET ORAL DAILY
Status: DISCONTINUED | OUTPATIENT
Start: 2022-06-09 | End: 2022-06-13 | Stop reason: HOSPADM

## 2022-06-08 RX ADMIN — ALBUTEROL SULFATE 2 PUFF: 90 AEROSOL, METERED RESPIRATORY (INHALATION) at 06:06

## 2022-06-08 RX ADMIN — VENLAFAXINE HYDROCHLORIDE 37.5 MG: 37.5 CAPSULE, EXTENDED RELEASE ORAL at 06:06

## 2022-06-08 RX ADMIN — DILTIAZEM HYDROCHLORIDE 60 MG: 60 CAPSULE, EXTENDED RELEASE ORAL at 08:06

## 2022-06-08 RX ADMIN — DONEPEZIL HYDROCHLORIDE 10 MG: 5 TABLET, FILM COATED ORAL at 08:06

## 2022-06-08 RX ADMIN — PRAMIPEXOLE DIHYDROCHLORIDE 1 MG: 0.25 TABLET ORAL at 08:06

## 2022-06-08 RX ADMIN — ALBUTEROL SULFATE 2 PUFF: 90 AEROSOL, METERED RESPIRATORY (INHALATION) at 11:06

## 2022-06-08 RX ADMIN — CYANOCOBALAMIN TAB 500 MCG 500 MCG: 500 TAB at 09:06

## 2022-06-08 RX ADMIN — GABAPENTIN 100 MG: 100 CAPSULE ORAL at 08:06

## 2022-06-08 RX ADMIN — HYDROCODONE BITARTRATE AND ACETAMINOPHEN 1 TABLET: 5; 325 TABLET ORAL at 11:06

## 2022-06-08 RX ADMIN — ALBUTEROL SULFATE 2 PUFF: 90 AEROSOL, METERED RESPIRATORY (INHALATION) at 09:06

## 2022-06-08 RX ADMIN — DILTIAZEM HYDROCHLORIDE 60 MG: 60 CAPSULE, EXTENDED RELEASE ORAL at 09:06

## 2022-06-08 RX ADMIN — FUROSEMIDE 20 MG: 10 INJECTION INTRAMUSCULAR; INTRAVENOUS at 08:06

## 2022-06-08 RX ADMIN — FUROSEMIDE 20 MG: 10 INJECTION INTRAMUSCULAR; INTRAVENOUS at 06:06

## 2022-06-08 RX ADMIN — APIXABAN 5 MG: 5 TABLET, FILM COATED ORAL at 11:06

## 2022-06-08 RX ADMIN — CLOPIDOGREL 75 MG: 75 TABLET, FILM COATED ORAL at 09:06

## 2022-06-08 RX ADMIN — HYDROCODONE BITARTRATE AND ACETAMINOPHEN 1 TABLET: 5; 325 TABLET ORAL at 06:06

## 2022-06-08 RX ADMIN — WHITE PETROLATUM: 1.75 OINTMENT TOPICAL at 09:06

## 2022-06-08 RX ADMIN — FLUTICASONE FUROATE AND VILANTEROL TRIFENATATE 1 PUFF: 100; 25 POWDER RESPIRATORY (INHALATION) at 08:06

## 2022-06-08 RX ADMIN — PANTOPRAZOLE SODIUM 40 MG: 40 TABLET, DELAYED RELEASE ORAL at 09:06

## 2022-06-08 RX ADMIN — APIXABAN 5 MG: 5 TABLET, FILM COATED ORAL at 08:06

## 2022-06-08 NOTE — PLAN OF CARE
06/08/22 1422   Medicare Message   Important Message from Medicare regarding Discharge Appeal Rights Given to patient/caregiver

## 2022-06-08 NOTE — CONSULTS
[facility] - [facility] Medical Telemetry  Orthopedics  Consult Note    Patient Name: Emma Jorgensen  MRN: [id number]  Admission Date: 6/3/2022  Hospital Length of Stay: 5 days  Attending Provider: Shakila Bui MD  Primary Care Provider: Mary Jane Dean MD        Consults  Subjective:     Principal Problem: Right ankle fusion/concern for wound complications      Chief Complaint:   Chief Complaint   Patient presents with    Shortness of Breath     Sent from [facility] ([facility]). C/o sob x 2 days, worsened over the last hour. Seen in er 2 days ago for same complaint. Had neb tx at NH prior to arrival. 82% on RA on ems arrival. 97% on 6L nasal cannula.         HPI: Patient resting comfortably in bed this morning. She is out of her boot in soft dressing. She has been NWB since surgery due to delayed wound healing. She is approx 6 weeks out from surgery at this time. She remains in a boot for ambulation needs. Family at bedside states she has been compliant with NWB and only resting the foot for balance. She denies fevers, chills, nausea, vomiting, malaise.    Past Medical History:   Diagnosis Date    Anxiety disorder, unspecified     Arthritis     Deep vein thrombosis     Depression     Irregular heart beat     Obesity, unspecified     Other pulmonary embolism without acute cor pulmonale     Parkinson's disease     Trimalleolar fracture of ankle, closed     Unspecified dementia without behavioral disturbance        Past Surgical History:   Procedure Laterality Date    ANGIOGRAM, CORONARY, WITH LEFT HEART CATHETERIZATION N/A 6/7/2022    Procedure: Angiogram, Coronary, with Left Heart Cath;  Surgeon: Claude Morillo MD;  Location: [facility] CATH LAB;  Service: Cardiology;  Laterality: N/A;    ANKLE FRACTURE SURGERY Right     CATARACT EXTRACTION      CATHETERIZATION OF BOTH LEFT AND RIGHT HEART N/A 6/7/2022    Procedure: CATHETERIZATION, HEART, BOTH LEFT AND RIGHT;  Surgeon: Claude Morillo MD;   Location: [facility] CATH LAB;  Service: Cardiology;  Laterality: N/A;    COLONOSCOPY W/ POLYPECTOMY      HYSTERECTOMY      SMALL INTESTINE SURGERY         Review of patient's allergies indicates:  No Known Allergies    Current Facility-Administered Medications   Medication    acetaminophen tablet 650 mg    albuterol inhaler 2 puff    apixaban tablet 5 mg    aspirin chewable tablet    clopidogreL tablet 75 mg    clopidogreL tablet    cyanocobalamin tablet 500 mcg    diltiaZEM 12 hr capsule 60 mg    diphenhydrAMINE injection    donepeziL tablet 10 mg    fentaNYL 50 mcg/mL injection    fluticasone furoate-vilanteroL 100-25 mcg/dose diskus inhaler 1 puff    furosemide injection 20 mg    [START ON 6/9/2022] furosemide tablet 40 mg    gabapentin capsule 100 mg    heparin (porcine) injection    HYDROcodone-acetaminophen 5-325 mg per tablet 1 tablet    LIDOcaine HCL 20 mg/ml (2%) injection    midazolam injection    morphine injection 2 mg    ondansetron disintegrating tablet 8 mg    ondansetron injection 4 mg    pantoprazole EC tablet 40 mg    pramipexole tablet 1 mg    sodium chloride 0.9% flush 10 mL    venlafaxine 24 hr capsule 37.5 mg    white petrolatum 41 % ointment     Family History     Family history is unknown by patient.        Tobacco Use    Smoking status: Former Smoker    Smokeless tobacco: Never Used   Substance and Sexual Activity    Alcohol use: Never    Drug use: Never    Sexual activity: Not on file     ROS   ROS:  Constitutional: Denies fever chills  Eyes: No change in vision  ENT: No ringing or current infections  CV: No chest pain  Resp: No labored breathing;on oxygen; denies acute shortness of breath  MSK: pain only at heel incision site  Integ: No signs of abrasions or lacerations  Neuro: No numbness or tingling  Lymphatic: No swelling outside the area of injury      Objective:     Vital Signs (Most Recent):  Temp: 97.5 °F (36.4 °C) (06/08/22 0740)  Pulse: 86 (06/08/22  "1153)  Resp: 17 (06/08/22 1153)  BP: (!) 162/74 (06/08/22 0740)  SpO2: 97 % (06/08/22 1153) Vital Signs (24h Range):  Temp:  [97.4 °F (36.3 °C)-98 °F (36.7 °C)] 97.5 °F (36.4 °C)  Pulse:  [59-86] 86  Resp:  [15-22] 17  SpO2:  [95 %-100 %] 97 %  BP: (117-175)/() 162/74     Weight: 111.5 kg (245 lb 13 oz)  Height: 5' 8" (172.7 cm)  Body mass index is 37.38 kg/m².      Intake/Output Summary (Last 24 hours) at 6/8/2022 1313  Last data filed at 6/7/2022 2221  Gross per 24 hour   Intake --   Output 400 ml   Net -400 ml       Ortho/SPM Exam  Musculoskeletal:     Right lower extremity: Incisions are clean and dry; Heel incision at entry point of nail with some necrotic tissue around the incision. Appears that incision may have opened up slightly, Thre is no drainage, surrounding erythema, or maceration of the area; compartments are soft and compressible; No pain with ROM at the hip, knee, or ankle; no tenderness to palpation; dorsi/plantar flexes the foot; SILT distally; BCR distally; DP pulse 2+      Significant Labs: All pertinent labs within the past 24 hours have been reviewed.  Recent Lab Results  (Last 5 results in the past 72 hours)      06/08/22  0619   06/07/22  2134   06/07/22  0853   06/07/22  0757   06/06/22  0524        Base Deficit     15.6  Comment: Result is outside patient normal (reference) range.           Correct Temperature (PH)     7.44           Corrected Temperature (pCO2)     62  Comment: Result is outside panic range (critical limits).           Corrected Temperature (pO2)     102  Comment: Result is outside patient normal (reference) range.           POC COHb     2.9           POC MetHb     0.9           POC O2Hb     96.8           Specimen source     Arterial sample           Anion Gap       10.0   11.0       Baso #       0.05   0.04       Basophil %       0.6   0.5       BUN       10.8   11.8       BUN/CREAT RATIO       15   20       Calcium       9.3   9.2       Chloride       93   89    "    CO2       32   34       Creatinine       0.71   0.60       eGFR if        >60   >60       Eos #       0.16   0.18       Eosinophil %       1.9   2.2       Glucose       85   101       Hematocrit       36.1   31.1       Hemoglobin       10.9   9.6       Immature Grans (Abs)       0.05   0.03       Immature Granulocytes       0.6   0.4       Lymph #       1.28   0.89       LYMPH %       15.3   10.9       Magnesium       2.00   1.90       MCH       30.6   30.1       MCHC       30.2   30.9       MCV       101.4   97.5       Mono #       0.90   0.86       Mono %       10.8   10.5       MPV       9.5   9.6       Neut #       5.9   6.2       Neut %       70.8   75.5       nRBC       0.0   0.0       Platelets       371   331       POC HCO3     42.1           POC HEMOGLOBIN     10.1  Comment: Result is outside patient normal (reference) range.           POC Ionized Calcium     1.19           POC PCO2     62  Comment: Result is outside panic range (critical limits).           POC PH     7.44           POC PO2     102  Comment: Result is outside patient normal (reference) range.           POC Potassium     3.9           POC SATURATED O2     98.1           POC Sodium     129  Comment: Result is outside patient normal (reference) range.           POC Temp     37.0           POCT Glucose 92   118             Potassium       4.9   3.4       RBC       3.56   3.19       RDW       13.3   13.2       Sodium       135   134       WBC       8.4   8.2                             Significant Imaging: I have reviewed and interpreted all pertinent imaging results/findings.  X-Ray Chest 1 View    Result Date: 6/3/2022  EXAMINATION: XR CHEST 1 VIEW CPT 34015 CLINICAL HISTORY: shor; COMPARISON: May 24, 2022 FINDINGS: There is calcification of the aortic arch mediastinal silhouette is within normal limits cardiac silhouette is at the upper limits of normal to mildly enlarged. There is some increase in interstitial and  pulmonary vascular markings with some confluent airspace opacities in the left perihilar region and left base infiltrate cannot be completely excluded. No other focal consolidative changes     Low increase in interstitial and pulmonary vascular markings. More confluent airspace opacities in the left infrahilar region and left base infiltrate cannot be completely excluded. No other significant change Electronically signed by: Billy Salas Date:    06/03/2022 Time:    13:15    X-Ray Tibia Fibula 2 View Right    Result Date: 6/7/2022  EXAMINATION: XR TIBIA FIBULA 2 VIEW RIGHT CLINICAL HISTORY: post op; TECHNIQUE: AP and lateral views of the right tibia and fibula were performed. COMPARISON: None. FINDINGS: Intramedullary niyah is seen in place.  The ankle fractures show good alignment.     Position of intramedullary niyah. Electronically signed by: Clarence Sierra MD Date:    06/07/2022 Time:    20:14    X-Ray Ankle Complete Right    Result Date: 6/7/2022  EXAMINATION: XR ANKLE COMPLETE 3 VIEW RIGHT CLINICAL HISTORY: Sx; Other fracture of right lower leg, subsequent encounter for closed fracture with routine healing TECHNIQUE: AP, lateral, and oblique images of the right ankle were performed. COMPARISON: 04/26/2022 FINDINGS: There is a niyah placed through the tibia talus and into the calcaneus with a fixation screw in the calcaneus this appears grossly stable from the previous study.  There is healing of the distal fibular fracture and a medial malleolus fracture.     Healing of ankle fractures status post placement of niyah. Electronically signed by: Clarence Sierra MD Date:    06/07/2022 Time:    20:10    CT Head Without Contrast    Result Date: 6/3/2022  EXAMINATION: CT HEAD WITHOUT CONTRAST CLINICAL HISTORY: Mental status change, unknown cause; TECHNIQUE: Low dose axial images were obtained through the head.  Coronal and sagittal reformations were also performed. Contrast was not administered. Automatic exposure control  was utilized to reduce the patient's radiation dose. DLP= 1008 COMPARISON: 04/21/2022 FINDINGS: No acute intracranial hemorrhage, edema or mass. No acute parenchymal abnormality. Diffuse cerebral atrophy with concordant ventricular enlargement. Scattered hypodensities throughout the deep periventricular white matter. The osseous structures are normal. The mastoid air cells are clear. The auditory canals are patent bilaterally. The globes and orbital contents are normal bilaterally. The visualized maxillary, ethmoid and sphenoid sinuses are clear.     No acute intracranial abnormality identified.  Findings of chronic microvascular ischemic disease. Electronically signed by: Kenn Jalloh Date:    06/03/2022 Time:    15:08    CT Chest Without Contrast    Result Date: 6/3/2022  EXAMINATION: CT CHEST WITHOUT CONTRAST CLINICAL HISTORY: ; Abnormal xray - lung nodule, < 1 cm, low risk; TECHNIQUE: Helical-acquisition CT images were obtained without the intravenous administration of iodine-based contrast media.  Multiplanar reformats were accomplished by a CT technologist at a separate workstation and pushed to PACS for physician review. Automated tube current modulation, weight-based exposure dosing, and/or iterative reconstruction technique utilized to reach lowest reasonably achievable exposure rate. DLP: 257 mGy*cm COMPARISON: 29 April 2022 FINDINGS: Images were reviewed in lung, soft tissue, and bone windows. Exam quality: Limited secondary to patient movement throughout image acquisition, with resulting artifact. Lines/tubes: none visualized Extensive aortic and coronary calcification is again appreciated.  No new focal vascular abnormality by noncontrast appearance.  Cardiac chambers are unchanged volume.  There is no significant pericardial fluid. Central airway patency is widely maintained.  There is redemonstrated, similar to slightly increased peripheral dependent consolidative airspace density and surrounding  ground-glass attenuation involving both lungs.  No development of lobar consolidation or suspicious focal pulmonary lesion.  There is no large or loculated pleural effusion.  No pneumothorax. The included thyroid gland, regional lymph nodes, and visualized upper abdominal structures demonstrate no convincing acute or new focal process.  Right adrenal nodule is unchanged in size and CT appearance, otherwise inadequately assessed by exam protocol.  There are similar scattered cystic hypodensities of the hepatic lobes. Multilevel spinal column degenerative changes and chronic midthoracic compression deformity with slight cortical retropulsion unchanged in comparison.  No convincing acute or worsened focal osseous lesion is identified.  The thoracic wall subcutaneous tissues and visualized musculature are unremarkable.     Limited evaluation secondary to extensive respiratory motion artifact.  Within limitations: 1. The apparent clinically suspected lung nodule is not clearly confirmed by CT assessment and there is no evidence of new or enlarging focal pulmonary lesion. 2. Scattered bilateral dependent ill-defined airspace consolidation is similar to slightly worsened, may represent atelectasis but otherwise nonspecific. 3. Additional chronic secondary details discussed above, similar to the 29 April 2022 appearance. Electronically signed by: Sony Chavez Date:    06/03/2022 Time:    18:37    Fl Modified Barium Swallow Speech    Result Date: 5/25/2022  See Speech Therapist Notes This procedure was auto-finalized.    X-Ray Chest AP Portable    Result Date: 5/24/2022  EXAMINATION: XR CHEST AP PORTABLE CLINICAL HISTORY: CHF; TECHNIQUE: Single frontal view of the chest was performed. COMPARISON: Chest radiograph 05/03/2022 FINDINGS: LINES AND TUBES: EKG/telemetry leads overlie the chest. MEDIASTINUM AND PETER: Cardiac silhouette is enlarged. Aortic atherosclerosis. LUNGS: Lung volumes are low with associated atelectatic  change.  Unchanged elevation the right hemidiaphragm.  Patchy opacities are seen. PLEURA:No pleural effusion. No pneumothorax. BONES: No acute osseous abnormality.     Patchy airspace opacities with slightly lower lung volumes and atelectatic change compared to prior. Electronically signed by: Cherri Arrieta Date:    05/24/2022 Time:    11:57    Echo    Result Date: 5/24/2022  · TDS due to poor acoustical windows; suboptimal doppler angles. · Mild concentric hypertrophy and normal systolic function. · The estimated ejection fraction is 55%. · Grade II left ventricular diastolic dysfunction. · Moderate aortic regurgitation. · There is moderate aortic valve stenosis. · Aortic valve area is 0.75 cm2; peak velocity is 3.53 m/s; mean gradient is 30 mmHg. · Intermediate central venous pressure (8 mmHg).      Cardiac catheterization    Result Date: 6/7/2022  · The Mid LAD lesion was 85% stenosed with 0% stenosis post-intervention. · A stent was successfully placed at 12 LETTY for 10 sec. · The estimated blood loss was <50 mL. · There was single vessel coronary artery disease. · The PCI was successful. · The coronary FFR was abnormal. · The filling pressures mildly elevated.  Patient brought to catheterization lab in a fasting state.  She was placed on table prepped and draped in usual sterile fashion.  Time-out was completed.  Bilateral groins and some 1% lidocaine.  Seldinger technique and ultrasound guidance along with the microcatheter a 5 [place]inean sheath was placed into the right common femoral artery and a 7 [place] sheath into the common femoral vein.  A 4 [place] common femoral left access was also gained.  Right heart catheterization was then performed using a [place]-Robert catheter.  No complications noted.  Next AA JR4 and a stiff angled straight glide was then used to cross into the left ventricular cavity.  Then exchanged for a pigtail.  A 4 [place] pigtail was then placed from the left groin access into the aortic  root.  It was zeroed appropriately.  Hemodynamics were measured and a gradient was then measured on the LV pullback.  All catheters were removed.  Then exchanged for a JL 4 which was engaged in left main.  Images were taken in multiple views.  Then and she changed for a JR4 which was engaged RCA with images were taken in multiple views.  A lesion was noted in the area of the mid LAD and therefore sheath was upsized for 6 Yoruba system.  Subsequent heparin dose was given.  He was engaged into the left main IFR was placed out distally which showed an ratio of 0.84.  Intravascular ultrasound was then used.  2.5 mm pre balloon angioplasty was performed with a 3.0 x 15 mm stent deployed successfully.  Prior to this a secondary wire was placed into the diagonal branch.  Using a 2.5 mm of balloon in the ostium of the diagonal was then balloon.  Final angiographic images showed excellent result with 0% residual stenosis and DAVID 3 flow in both diagonal branch and the distal LAD.  A right common femoral artery angiogram was then performed and a Vascade closure device was deployed successfully.  She was transported to the postop area in stable condition. Findings: Left main:  Widely patent.  Confirmed with intravascular ultrasound LAD:  Large caliber vessel with multiple diagonal branches.  Mid vessel just past a large diagonal branch and a 60-70% lesion noted with a positive IFR.  This was treated PCI with a 3.0 mm x 15 stent with 0% residual stenosis.  Preservation of the large diagonal branch was noted Circumflex:  Widely patent RCA:  Widely patent dominant vessel Aortic valve study: - aortic valve area of 0.8 cm2, gradient greater than 50 mmHg. Right heart catheterization: Average cardiac output approximately 7 liters/minute Moderate pulmonary hypertension with pulmonary artery pressure is approximately 50mmHg  impression: 1. Severe aortic stenosis 2. CAD status post PCI to mid LAD 3. Moderate pulmonary hypertension 4.  High cardiac output of unknown etiology Plan: 1. Routine postop care for underlying CAD.  Aggressive medical management 2. Refer for aortic valve TAVR. The procedure log was documented by Documenter: Bernie Mayers RN and verified by Claude Morillo MD. Date: 6/7/2022  Time: 12:33 PM     CT Foot Without Contrast Right    Result Date: 6/8/2022  EXAMINATION: CT FOOT WITHOUT CONTRAST RIGHT CLINICAL HISTORY: Foot swelling, diabetic, osteomyelitis suspected.  Right foot pain with suspicion for an abscess or osteomyelitis.  Recent surgical fixation of closed right trimalleolar ankle fracture on 04/26/2022.  Right lower extremity pain. TECHNIQUE: Axial CT slices through the right foot were obtained without the administration of contrast.  Coronal and sagittal reconstructions were created.  Automated exposure control was utilized.  Total DLP for the study is approximately 59 mGy cm. COMPARISON: Right ankle x-rays dated 06/07/2022 and 04/26/2022 FINDINGS: Postoperative changes of tibiotalar and subtalar joint arthrodesis with intramedullary nail traversing the visualized distal tibia, talus, and calcaneus.  Interlocking screws within the talus and calcaneus are seen.  There appears to be evidence of cortical/trabecular fracture involving the plantar aspect of the calcaneus at the site of intramedullary nail entry.  There is a suggestion of subtle sclerosis about the fracture which may indicate subacute fracture (for example series 24 image 25).  Tiny metallic foci are seen along the plantar aspect of the calcaneus and adjacent soft tissues, likely related to micro metal from prior surgery.  There is additional linear lucency in the posterior aspect of the talus extending to the intramedullary nail (for example series 26 images 60-64 and series 24 images 4-12) extending to the level of the intramedullary nail, also suggestive of subacute fracture. The visualized hardware allowing for streak artifact demonstrates no clear  adjacent lucency suggestive of loosening.  The visualized osseous structures appear heterogeneously demineralized which could be related to disuse.  There appears to be a partially visualized and mildly displaced posterior malleolar fracture.  There appears to be evidence of callus formation and osseous bridging across the fracture.  There appears to be evidence of mildly displaced medial malleolar fracture with partial osseous bridging, compatible with healing fracture.  Distal fibular fracture seen on prior plain films is not included in the field of imaging on the current study.  There appears to be a small bone fragment along the anterior distal tibiofibular syndesmosis with adjacent cortical irregularity in the tibia, possibly related to displaced fracture fragment, likely rising from the tibia.  There appears to be evidence of widening of the tibiofibular syndesmosis which could reflect ligamentous injury. The background osseous demineralization lowers the sensitivity and specificity of the study for osteomyelitis.  However, there appears to be evidence of cortical osteolysis along the proximal plantar aspect of the cuboid bone (for example series 28 image 15), concerning for possible osteomyelitis.  There appears to be evidence of erosion along the base of the 5th metatarsal (for example series 28, image 11).  There are additional patchy areas of periarticular demineralization involving the tarsal bones, talus, calcaneus, and tibia which could be related to disuse, reflex sympathetic dystrophy, and/or changes of osteomyelitis.  The possibility inflammatory arthropathies resulting in the erosions is not entirely excluded.  There appears to be evidence of a soft tissue defect/wound along the plantar aspect of the hindfoot near the level of the calcaneus.  Linear tract of calcifications is seen extending from this region to the plantar aspect of the calcaneus.  Multiple small metallic foci are seen in this  region, likely related to prior surgery.  There appears to be diffuse subcutaneous soft tissue edema about the foot and included ankle which is nonspecific, possibly related to cellulitis.  No obvious loculated and atherosclerotic vascular calcifications are noted.  Assessment for abscess is limited     1. Postoperative changes of tibiotalar and subtalar joint arthrodesis with intramedullary nail traversing the visualized distal tibia, talus, and calcaneus are seen.  There appears to be evidence of cortical/trabecular fracture involving the plantar aspect of the calcaneus at the site of intramedullary nail and treat.  Subtle sclerosis about the fracture suggest subacute fracture.  There is also linear vertical lucency along the posterior aspect of the talus extending to the intramedullary nail suggestive of subacute fracture. 2. There is heterogeneous osseous demineralization which could be related to disuse.  This lowers the sensitivity and specificity of the study for osteomyelitis.  There appears to be evidence of cortical osteolysis along the proximal aspect of the cuboid bone which raises concern for possible osteomyelitis.  There is also evidence of erosion along the base of the 5th metatarsal.  There are additional patchy areas of periarticular demineralization involving the tarsal bones, talus, calcaneus, and distal tibia which could be related to disuse, reflex sympathetic dystrophy, and/or changes of osteomyelitis.  The possibility of inflammatory arthropathies resulting erosions is not entirely excluded. 3. There appears to be evidence of soft tissue defect/wound along the plantar aspect of the hindfoot.  There is subcutaneous soft tissue edema about the included foot and ankle which may be related to cellulitis in the setting of infection. 4. Healing medial and posterior malleolar fractures are partially visualized.  The distal fibular fracture seen on prior plain films is not included in the field of  imaging. 5. Additional findings and details as above. Electronically signed by: Zeferino Bailey MD Date:    06/08/2022 Time:    09:41       Assessment/Plan:     Active Diagnoses:    Diagnosis Date Noted POA    Severe aortic stenosis [I35.0] 06/07/2022 Yes    Coronary artery disease involving native coronary artery [I25.10] 06/07/2022 Yes      Problems Resolved During this Admission:     Patient approx 6 weeks out from hindfoot fusion nail with Dr. Krause. She has been NWB since surgery in a CAM boot.   Concern upon admission for wound complications of her hindfoot fusion nail today. Does not appear acutely infected today with no drainage from the incision site.   No surgical intervention at this time. Will continue to re-evaluate. Possible wound care consult in the future.   Continue with strict glucose control  Remain NWB to the RLE with no range of motion due to hindfoot nail.   Please call with any questions or concerns.      The above findings, diagnostics, and treatment plan were discussed with Dr. Boles who is in agreement with the plan of care except as stated in additional documentation.       Josselyn Matute PA-C  Orthopedic Trauma Surgery  [facility] Medical Telemetry

## 2022-06-08 NOTE — PLAN OF CARE
06/08/22 0809   Discharge Assessment   Assessment Type Discharge Planning Assessment   Source of Information family;health record   Communicated KATARZYNA with patient/caregiver Date not available/Unable to determine   Lives With facility resident   Facility Arrived From: [facility]   Do you expect to return to your current living situation? Yes   Patient currently being followed by outpatient case management? No   Discharge Plan A Return to nursing home   DME Needed Upon Discharge  ventilator  (Referral sent to [facility] to eval for Trilogy)

## 2022-06-08 NOTE — PROGRESS NOTES
"[facility] Hospital Medicine Progress Note        Chief Complaint: Inpatient Follow-up for dchf , tavr work up    HPI:   Emma Jorgensen is a 88 y.o. female with a past medical history of diastolic heart failure (LVEF 55% and grade II diastolic dysfunction from an echo on 05/24/2022),  PAF on Eliquis, moderate to severe aortic stenosis with plans for a TAVR as an outpatient with Cardiology, bilateral carotid artery stenosis, HTN, chronic dementia, previous DVT/PE, and recent surgical fixation of a closed right trimalleolar ankle fracture on 04/26/2022 who presented to [facility] on 6/3/2022 from [facility] with complaints of worsening shortness breath that began today. The daughter, who is at bedside, stated that she has been on nasal cannula 2 L/min at the facility and visited the patient yesterday where an aid reportedly informed her that she had an episode of SOB after her "oxygen tank ran out." The daughter also states that she is currently at her cognitive baseline although she is more lethargic than normal.  She denied that she was complaining of any fever, cough, chest pain, nausea, vomiting, abdominal pain, or dysuria.     She was recently admitted and discharged from the hospital at [facility] on 06/01/2022 where she received IV diuretics for acute decompensated diastolic heart failure. Her symptoms and respiratory status improved so she was transferred back to the SNF.      Her initial vital signs showed that she was afebrile with an RR 24, HR 82, BP 99/55, and SpO2 100% on nasal cannula 6 L/min.  Her labs show mild hyponatremia, mild hypochloremia, a bicarb 35, and a .1.  COVID-19 and influenza negative. CXR showed a low increase in interstitial and pulmonary vascular markings. More confluent airspace opacities in the left infrahilar region and left base infiltrate cannot be completely excluded.     Patient admitted for resp failure d/t dchf exacerbation.  Patient with " severe aortic stenosis.  [facility] taken for left heart catheterization for TAVR planning.         Interval Hx:       Objective/physical exam:  General:  awake, alert oriented to self only (at cognitive baseline per daughter at bedside), follows commands appropriately   Eye: clear conjunctiva, eyelids normal  HENT: oropharynx and nasal mucosal surfaces moist  Respiratory:  Poor respiratory effort, bilateral crackles, moderate respiratory distress, nasal cannula in place  Cardiovascular: regular rate and rhythm without gallops or rubs, systolic murmur  Gastrointestinal: soft, non-tender, non-distended with normal bowel sounds, without masses to palpation  Musculoskeletal: able to move all four extremities, right foot in walking boot from recent surgery  Integumentary: warm, dry  Neurologic: ax o x 3,        VITAL SIGNS: 24 HRS MIN & MAX LAST   Temp  Min: 97.4 °F (36.3 °C)  Max: 98 °F (36.7 °C) 97.5 °F (36.4 °C)   BP  Min: 117/63  Max: 175/73 (!) 162/74   Pulse  Min: 59  Max: 76  76   Resp  Min: 15  Max: 22 (!) 22   SpO2  Min: 95 %  Max: 100 % 96 %       Recent Labs   Lab 06/04/22  0420 06/05/22  0322 06/06/22  0524 06/07/22  0757   WBC 6.1  --  8.2 8.4   RBC 3.08*  --  3.19* 3.56*   HGB 9.3* 9.2* 9.6* 10.9*   HCT 30.3* 28.8* 31.1* 36.1*   MCV 98.4*  --  97.5* 101.4*   MCH 30.2  --  30.1 30.6   MCHC 30.7*  --  30.9* 30.2*   RDW 13.4  --  13.2 13.3     --  331 371   MPV 9.7  --  9.6 9.5       Recent Labs   Lab 06/03/22  1234 06/03/22  1513 06/04/22  0420 06/05/22  0322 06/06/22  0524 06/07/22  0757 06/07/22  0853   *  --  134* 134* 134* 135*  --    K 4.1  --  3.9 3.9 3.4* 4.9  --    CO2 35*  --  36* 33* 34* 32*  --    BUN 13.1  --  14.1 11.7 11.8 10.8  --    CREATININE 0.74  --  0.83 0.71 0.60 0.71  --    CALCIUM 8.8  --  9.1 8.8 9.2 9.3  --    PH  --  7.43  --   --   --   --  7.44   MG  --   --   --  1.70 1.90 2.00  --    ALBUMIN 2.8*  --  2.7*  --   --   --   --    ALKPHOS 82  --  78  --   --   --    --    ALT 8  --  6  --   --   --   --    AST 17  --  13  --   --   --   --    BILITOT 0.3  --  0.5  --   --   --   --           Microbiology Results (last 7 days)     Procedure Component Value Units Date/Time    Blood Culture [[id number]]  (Normal) Collected: 06/03/22 1514    Order Status: Completed Specimen: Blood from Arm, Left Updated: 06/07/22 1803     CULTURE, BLOOD ([facility]) No Growth At 96 Hours    Blood Culture [[id number]]  (Normal) Collected: 06/03/22 1532    Order Status: Completed Specimen: Blood from Hand, Left Updated: 06/07/22 1803     CULTURE, BLOOD ([facility]) No Growth At 96 Hours    Respiratory Culture [[id number]]     Order Status: Sent Specimen: Sputum, Expectorated            See below for Radiology    Scheduled Med:   albuterol  2 puff Inhalation Q6H    apixaban  5 mg Oral BID    clopidogreL  75 mg Oral Daily    cyanocobalamin  500 mcg Oral Daily    diltiaZEM  60 mg Oral BID    donepeziL  10 mg Oral Nightly    fluticasone furoate-vilanteroL  1 puff Inhalation Daily    furosemide (LASIX) injection  20 mg Intravenous Q12H    [START ON 6/9/2022] furosemide  40 mg Oral Daily    gabapentin  100 mg Oral Nightly    pantoprazole  40 mg Oral Daily    pramipexole  1 mg Oral Nightly    venlafaxine  37.5 mg Oral Daily    white petrolatum   Topical (Top) BID        Continuous Infusions:       PRN Meds:  acetaminophen, aspirin, clopidogreL, diphenhydrAMINE, fentaNYL, heparin (porcine), HYDROcodone-acetaminophen, LIDOcaine HCL 20 mg/ml (2%), midazolam, morphine, ondansetron, ondansetron, sodium chloride 0.9%       Assessment/Plan:  Acute on Chronic Diastolic CHF  Moderate Aortic Stenosis per Echo 5.24.22  -plans for a TAVR as an outpatient with Cardiology  CAD/stent to LAD  RLE pain in foot   Concern for abscess vs osteomyelitis  Acute on chronic hypoxemic respiratory failure, improving   Lethargic possibe hypercapnic, resolved  HTN  GERD  Hx of DVT  Hx of PE  Parkinson's Disease   Macrocytic  anemia  History of PAF on Eliquis  History of bilateral carotid artery stenosis  History of chronic dementia at cognitive baseline per daughter  History of previous DVT/PE,  History of  recent surgical fixation of a closed right trimalleolar ankle fracture on 04/26/2022   Hypomagnesemia  Hypokalemia        Plan:  S/p C on 6/7 , stent was placed - to be on DAPT  Plans for CTA C/AP for tavr plans   Then will f/up her surgeons on out pt for further plans   Trilogy in [facility] cm informed   Vitals stable on 2 L of oxygen.    Ct right foot, xr tib/fib, foot reviewed- stable   Continue wound care   Continue Lasix, transition to po lasix in am   diltiazem 60 mg b.i.d. for heart rate and blood pressure control  Resume eliquis   monitor daily standing weights and accurate I&Os  -she recently had an echocardiogram done 05/24/2022 that revealed LVEF 55% and grade II diastolic dysfunction so no need to repeat  -CT head to rule out any intracranial abnormalities     VTE Prophylaxis: will be placed on SCD for DVT prophylaxis and will be advised to be as mobile as possible and sit in a chair as tolerated  __________________________________________________________________________          Patient condition:  Stable/Fair/Guarded/ Serious/ Critical    Anticipated discharge and Disposition:    Critical care diagnosis diastolic CHF exacerbation  Critical care time greater than 35 minutes      All diagnosis and differential diagnosis have been reviewed; assessment and plan has been documented; I have personally reviewed the labs and test results that are presently available; I have reviewed the patients medication list; I have reviewed the consulting providers response and recommendations. I have reviewed or attempted to review medical records based upon their availability    All of the patient's questions have been  addressed and answered. Patient's is agreeable to the above stated plan. I will continue to monitor closely and make  adjustments to medical management as needed.  _____________________________________________________________________    Nutrition Status:    Radiology:  CT Foot Without Contrast Right  Narrative: EXAMINATION:  CT FOOT WITHOUT CONTRAST RIGHT    CLINICAL HISTORY:  Foot swelling, diabetic, osteomyelitis suspected.  Right foot pain with suspicion for an abscess or osteomyelitis.  Recent surgical fixation of closed right trimalleolar ankle fracture on 04/26/2022.  Right lower extremity pain.    TECHNIQUE:  Axial CT slices through the right foot were obtained without the administration of contrast.  Coronal and sagittal reconstructions were created.  Automated exposure control was utilized.  Total DLP for the study is approximately 59 mGy cm.    COMPARISON:  Right ankle x-rays dated 06/07/2022 and 04/26/2022    FINDINGS:  Postoperative changes of tibiotalar and subtalar joint arthrodesis with intramedullary nail traversing the visualized distal tibia, talus, and calcaneus.  Interlocking screws within the talus and calcaneus are seen.  There appears to be evidence of cortical/trabecular fracture involving the plantar aspect of the calcaneus at the site of intramedullary nail entry.  There is a suggestion of subtle sclerosis about the fracture which may indicate subacute fracture (for example series 24 image 25).  Tiny metallic foci are seen along the plantar aspect of the calcaneus and adjacent soft tissues, likely related to micro metal from prior surgery.  There is additional linear lucency in the posterior aspect of the talus extending to the intramedullary nail (for example series 26 images 60-64 and series 24 images 4-12) extending to the level of the intramedullary nail, also suggestive of subacute fracture.    The visualized hardware allowing for streak artifact demonstrates no clear adjacent lucency suggestive of loosening.  The visualized osseous structures appear heterogeneously demineralized which could be  related to disuse.  There appears to be a partially visualized and mildly displaced posterior malleolar fracture.  There appears to be evidence of callus formation and osseous bridging across the fracture.  There appears to be evidence of mildly displaced medial malleolar fracture with partial osseous bridging, compatible with healing fracture.  Distal fibular fracture seen on prior plain films is not included in the field of imaging on the current study.  There appears to be a small bone fragment along the anterior distal tibiofibular syndesmosis with adjacent cortical irregularity in the tibia, possibly related to displaced fracture fragment, likely rising from the tibia.  There appears to be evidence of widening of the tibiofibular syndesmosis which could reflect ligamentous injury.  The background osseous demineralization lowers the sensitivity and specificity of the study for osteomyelitis.  However, there appears to be evidence of cortical osteolysis along the proximal plantar aspect of the cuboid bone (for example series 28 image 15), concerning for possible osteomyelitis.  There appears to be evidence of erosion along the base of the 5th metatarsal (for example series 28, image 11).  There are additional patchy areas of periarticular demineralization involving the tarsal bones, talus, calcaneus, and tibia which could be related to disuse, reflex sympathetic dystrophy, and/or changes of osteomyelitis.  The possibility inflammatory arthropathies resulting in the erosions is not entirely excluded.  There appears to be evidence of a soft tissue defect/wound along the plantar aspect of the hindfoot near the level of the calcaneus.  Linear tract of calcifications is seen extending from this region to the plantar aspect of the calcaneus.  Multiple small metallic foci  are seen in this region, likely related to prior surgery.  There appears to be diffuse subcutaneous soft tissue edema about the foot and included  ankle which is nonspecific, possibly related to cellulitis.  No obvious loculated and atherosclerotic vascular calcifications are noted.  Assessment for abscess is limited  Impression: 1. Postoperative changes of tibiotalar and subtalar joint arthrodesis with intramedullary nail traversing the visualized distal tibia, talus, and calcaneus are seen.  There appears to be evidence of cortical/trabecular fracture involving the plantar aspect of the calcaneus at the site of intramedullary nail and treat.  Subtle sclerosis about the fracture suggest subacute fracture.  There is also linear vertical lucency along the posterior aspect of the talus extending to the intramedullary nail suggestive of subacute fracture.  2. There is heterogeneous osseous demineralization which could be related to disuse.  This lowers the sensitivity and specificity of the study for osteomyelitis.  There appears to be evidence of cortical osteolysis along the proximal aspect of the cuboid bone which raises concern for possible osteomyelitis.  There is also evidence of erosion along the base of the 5th metatarsal.  There are additional patchy areas of periarticular demineralization involving the tarsal bones, talus, calcaneus, and distal tibia which could be related to disuse, reflex sympathetic dystrophy, and/or changes of osteomyelitis.  The possibility of inflammatory arthropathies resulting erosions is not entirely excluded.  3. There appears to be evidence of soft tissue defect/wound along the plantar aspect of the hindfoot.  There is subcutaneous soft tissue edema about the included foot and ankle which may be related to cellulitis in the setting of infection.  4. Healing medial and posterior malleolar fractures are partially visualized.  The distal fibular fracture seen on prior plain films is not included in the field of imaging.  5. Additional findings and details as above.    Electronically signed by: Zeferino Bailey  MD  Date:    06/08/2022  Time:    09:41      Sai Sanchez MD   06/08/2022

## 2022-06-08 NOTE — PLAN OF CARE
Problem: Physical Therapy  Goal: Physical Therapy Goal  Description: Goals to be met by: 22     Patient will increase functional independence with mobility by performin. Supine to sit with Moderate Assistance  2. Sit to supine with Moderate Assistance  3. Sit to stand transfer with Minimal Assistance  4. Bed to chair transfer with Moderate Assistance using Rolling Walker  5. Increased functional strength to -4/5 on LLE and +3/5 on RLE.    Outcome: Ongoing, Progressing

## 2022-06-08 NOTE — PLAN OF CARE
06/08/22 0809   Discharge Assessment   Assessment Type Discharge Planning Assessment   Source of Information family;health record   Communicated KATARZYNA with patient/caregiver Date not available/Unable to determine   Lives With facility resident   Facility Arrived From: [facility]   Do you expect to return to your current living situation? Yes   Patient currently being followed by outpatient case management? No   Discharge Plan A Return to nursing home   DME Needed Upon Discharge  ventilator  (Referral sent to [facility] to eval for Trilogy)

## 2022-06-08 NOTE — PATIENT INSTRUCTIONS
Spoke to pt and daughter about stent, given stent card, discussed need for Plavix and compliance with.

## 2022-06-08 NOTE — PT/OT/SLP EVAL
Physical Therapy Evaluation    Patient Name:  Emma Jorgensen   MRN:  [id number]    Recommendations:     Discharge Recommendations:  nursing facility, skilled, rehabilitation facility   Discharge Equipment Recommendations:  (TBD by next level of care)   Barriers to discharge: None    Assessment:     Emma Jorgensen is a 88 y.o. female admitted with severe aortic stenosis. Patient had right trimalleolar ankle fracture in April. Underwent surgical fixation on 4/26. Was then discharged to SNF, where she has been since. Prior to ankle fracture, she was independent. Per patient and daughter, still NWB RLE. She has CAM boot to wear when she transfers.  At time of PT evaluation, patient presents   with the following impairments/functional limitations:  weakness, impaired endurance, impaired functional mobilty, gait instability, impaired balance, decreased lower extremity function, decreased safety awareness, pain. She required MAX A for bed mobility. Sitting balance was fair/good. Stood with RW & MOD A of 2. Able to stand ~30 seconds each time. Plan is for patient to return to SNF to continue therapy when discharged. Her goal is to return to PLOF. Progress patient as tolerated.     Rehab Prognosis: Good; patient would benefit from acute skilled PT services to address these deficits and reach maximum level of function.    Recent Surgery: Procedure(s) (LRB):  Angiogram, Coronary, with Left Heart Cath (N/A)  CATHETERIZATION, HEART, BOTH LEFT AND RIGHT (N/A)  Percutaneous coronary intervention (N/A)  ULTRASOUND, INTRAVASCULAR (N/A)  Instantaneous Wave-Free Ratio (IFR) (N/A)  Valve study-aortic 1 Day Post-Op    Plan:     During this hospitalization, patient to be seen 5 x/week to address the identified rehab impairments via gait training, therapeutic activities, therapeutic exercises, neuromuscular re-education and progress toward the following goals:    · Plan of Care Expires:  07/08/22    Subjective     Chief  Complaint: none  Patient/Family Comments/goals: return home  Pain/Comfort:  · Pain Rating 1: 0/10    Patients cultural, spiritual, Jewish conflicts given the current situation: no    Living Environment:  SNF   Equipment used at home: walker, rolling.  DME owned (not currently used): none.  Upon discharge, patient will have assistance from staff.    Objective:     Communicated with nurse prior to session.  Patient found supine with    upon PT entry to room.    General Precautions: Standard, fall   Orthopedic Precautions:RLE non weight bearing   Braces:  (CAM boot)  Respiratory Status: Room air    Exams:  · Cognitive Exam:  Patient is oriented to Person, Place, Time and Situation  · Sensation:    · -       Intact  · RLE ROM: WFL  · RLE Strength: -3/5 grossly  · LLE ROM: WFL  · LLE Strength: +3/5 grossly    Functional Mobility:  · Bed Mobility:     · Supine to Sit: maximal assistance  · Sit to Supine: maximal assistance  · Transfers:     · Sit to Stand:  MOD A of 2 with rolling walker  · Sitting Balance: fair/good  · Standing Balance: Poor    AM-PAC 6 CLICK MOBILITY  Total Score:10     Patient left supine with all lines intact, call button in reach and daughter present.    GOALS:   Multidisciplinary Problems     Physical Therapy Goals        Problem: Physical Therapy    Goal Priority Disciplines Outcome Goal Variances Interventions   Physical Therapy Goal     PT, PT/OT Ongoing, Progressing     Description: Goals to be met by: 22     Patient will increase functional independence with mobility by performin. Supine to sit with Moderate Assistance  2. Sit to supine with Moderate Assistance  3. Sit to stand transfer with Minimal Assistance  4. Bed to chair transfer with Moderate Assistance using Rolling Walker  5. Increased functional strength to -4/5 on LLE and +3/5 on RLE.                     History:     Past Medical History:   Diagnosis Date    Anxiety disorder, unspecified     Arthritis     Deep vein  thrombosis     Depression     Irregular heart beat     Obesity, unspecified     Other pulmonary embolism without acute cor pulmonale     Parkinson's disease     Trimalleolar fracture of ankle, closed     Unspecified dementia without behavioral disturbance        Past Surgical History:   Procedure Laterality Date    ANGIOGRAM, CORONARY, WITH LEFT HEART CATHETERIZATION N/A 6/7/2022    Procedure: Angiogram, Coronary, with Left Heart Cath;  Surgeon: Claude Morillo MD;  Location: [facility] CATH LAB;  Service: Cardiology;  Laterality: N/A;    ANKLE FRACTURE SURGERY Right     CATARACT EXTRACTION      CATHETERIZATION OF BOTH LEFT AND RIGHT HEART N/A 6/7/2022    Procedure: CATHETERIZATION, HEART, BOTH LEFT AND RIGHT;  Surgeon: Claude Morillo MD;  Location: [facility] CATH LAB;  Service: Cardiology;  Laterality: N/A;    COLONOSCOPY W/ POLYPECTOMY      HYSTERECTOMY      SMALL INTESTINE SURGERY         Time Tracking:     PT Received On: 06/08/22  PT Start Time: 1047     PT Stop Time: 1110  PT Total Time (min): 23 min     Billable Minutes: Evaluation 23 minutes      06/08/2022

## 2022-06-08 NOTE — PROGRESS NOTES
[facility] - 9th Floor Med Surg  Cardiology  Progress Note    Patient Name: Emma Jorgensen  MRN: [id number]  Admission Date: 6/3/2022  Hospital Length of Stay: 5 days  Code Status: No Order   Attending Physician: Shakila Bui MD   Primary Care Physician: Mary Jane Dean MD  Expected Discharge Date:   Principal Problem:<principal problem not specified>    Subjective:     Brief HPI:   88 year old female known to Cassy Juarez and Raimundo with a past medical history of Aortic Stenosis, Diastolic CHF, HTN, GERD, DVT, PE, Parkinson's Disease and recent right ankle fraction after mechanical fall on 4.26.22 presented to the ER from [facility] on 6.3.22 with c/o SOB that had worsened over the previous 2 days.  Patient had a recent hospital admission at [facility] on 6.1.22 where she was diagnosed with acute decompensated diastolic heart failure and received IV diuretics.  Of note, she was scheduled to undergo LHC with possible PCI per TAVR pre-op protocol.  BNP level drawn in ER was 414.1.  [facility] has been consulted for further evaluation of CHF.    Hospital Course:   6.6.22: NAD noted. VSS. SR on tele. Denies CP/Palps/SOB. States she feels like her breathing is back to her baseline. Still on 3LPM per NC. Negative 760mL in 24hrs. Weight stable.  6.7.22: NAD noted. VSS. SR on tele. Denies CP/SOB/Palps. I&O not accurate, weight down 2.2kg from 6.4.22. NPO for LHC today.  6.8.22: NAD noted. VSS with elevated BP. Denies CP/SOB/Palps. I&O not accurate. Right groin benign.     PMH: Aortic Stenosis, Diastolic CHF, HTN, GERD, DVT, PE, Parkinson's Disease   PSH: Ankle Surgery, Cataract Surgery, Colonoscopy, Colon Resection, Toe Surgery, Hysterectomy  Family History: Father - CVA; Sister - CAD/CABG  Social History: Former Smoker.  Denies Alcohol Use.  Denies Illicit Drug Use.     Previous Cardiac Diagnostics:   RLE Arterial US 6.7.22  No pseudoaneurysm was found in the right common femoral artery.   There was no  evidence of pseudoaneurysm or hematoma in the groin of the right lower extremity.     L/RHC 6.7.22  Left main:  Widely patent.  Confirmed with intravascular ultrasound  LAD:  Large caliber vessel with multiple diagonal branches.  Mid vessel just past a large diagonal branch and a 60-70% lesion noted with a positive IFR.  This was treated PCI with a 3.0 mm x 15 stent with 0% residual stenosis.  Preservation of the large diagonal branch was noted  Circumflex:  Widely patent  RCA:  Widely patent dominant vessel  Aortic valve study:  - aortic valve area of 0.8 cm2, gradient greater than 50 mmHg.  Right heart catheterization:  Average cardiac output approximately 7 liters/minute  Moderate pulmonary hypertension with pulmonary artery pressure is approximately 50mmHg    Echocardiogram 5.24.22:  TDS due to poor acoustical windows; suboptimal doppler angles.  Mild concentric hypertrophy and normal systolic function.  The estimated ejection fraction is 55%.  Grade II left ventricular diastolic dysfunction.  Moderate aortic regurgitation.  There is moderate aortic valve stenosis.  Aortic valve area is 0.75 cm squared; peak velocity is 3.53m/s; mean gradient is 30mmHg.  Intermediate central venous pressure (8mmHg).     Carotid US 4.21.22  The right internal carotid artery demonstrated less than 50% stenosis.  The left internal carotid artery demonstrated less than 50% stenosis.  Bilateral vertebral arteries were patent with antegrade flow.        PET MPI 2.22.18:  This is a normal perfusion study, no perfusion defects noted.  There is no evidence of ischemia.  This scan is suggestive of low risk for future cardiovascular events.  The left ventricular cavity is noted to be normal on the stress studies.  The stress left ventricular ejection fraction was calculated to be 74% and left ventricular global function is normal.  The rest left ventricular cavity is noted to be normal.  The rest left ventricular ejection fraction was  calculated to be 74% and rest left ventricular global function is michael;.  Compared with rest and stress studies the ejection fraction remains unchanged (74).          Review of Systems   Constitutional: Negative for chills and fever.   Cardiovascular: Negative for chest pain and palpitations.   Respiratory: Negative for shortness of breath.    Psychiatric/Behavioral: Negative for altered mental status.           Objective:     Vital Signs (Most Recent):  Temp: 97.5 °F (36.4 °C) (06/08/22 0740)  Pulse: 76 (06/08/22 0915)  Resp: (!) 22 (06/08/22 0915)  BP: (!) 162/74 (06/08/22 0740)  SpO2: 96 % (06/08/22 0740) Vital Signs (24h Range):  Temp:  [97.4 °F (36.3 °C)-98 °F (36.7 °C)] 97.5 °F (36.4 °C)  Pulse:  [59-76] 76  Resp:  [15-22] 22  SpO2:  [95 %-100 %] 96 %  BP: (117-175)/() 162/74     Weight: 111.5 kg (245 lb 13 oz)  Body mass index is 37.38 kg/m².    SpO2: 96 %  O2 Device (Oxygen Therapy): nasal cannula      Intake/Output Summary (Last 24 hours) at 6/8/2022 0926  Last data filed at 6/7/2022 2221  Gross per 24 hour   Intake --   Output 400 ml   Net -400 ml       Lines/Drains/Airways     Drain  Duration                Urethral Catheter 04/30/22 0700 39 days          Peripheral Intravenous Line  Duration                Peripheral IV - Single Lumen 06/05/22 0745 20 G Anterior;Proximal;Right Forearm 3 days                Significant Labs:   CMP   Recent Labs   Lab 06/07/22  0757   *   K 4.9   CO2 32*   BUN 10.8   CREATININE 0.71   CALCIUM 9.3    and CBC   Recent Labs   Lab 06/07/22  0757   WBC 8.4   HGB 10.9*   HCT 36.1*          Telemetry: SR     Physical Exam:  Physical Exam  Constitutional:       Appearance: Normal appearance.   HENT:      Head: Normocephalic and atraumatic.      Mouth/Throat:      Mouth: Mucous membranes are moist.   Eyes:      Extraocular Movements: Extraocular movements intact.   Cardiovascular:      Rate and Rhythm: Normal rate and regular rhythm.      Heart sounds: Murmur  heard.      Comments: R Groin Soft/Flat, Non-Tender, No Sign of Bleed/Infection. Right foot bandaged, right foot warm.    Pulmonary:      Effort: Pulmonary effort is normal.      Breath sounds: Decreased breath sounds present.   Abdominal:      Palpations: Abdomen is soft.   Musculoskeletal:      Cervical back: Neck supple.   Skin:     General: Skin is warm.   Neurological:      General: No focal deficit present.      Mental Status: She is alert.   Psychiatric:         Mood and Affect: Mood normal.         Behavior: Behavior normal.         Current Inpatient Medications:    Current Facility-Administered Medications:     acetaminophen tablet 650 mg, 650 mg, Oral, Q4H PRN, Claude Morillo MD    albuterol inhaler 2 puff, 2 puff, Inhalation, Q6H, Shakila Bui MD, 2 puff at 06/08/22 0915    aspirin chewable tablet, , , PRN, Claude Morillo MD, 325 mg at 06/07/22 1204    clopidogreL tablet 75 mg, 75 mg, Oral, Daily, Claude Morillo MD, 75 mg at 06/08/22 0913    clopidogreL tablet, , , PRN, Claude Morillo MD, 600 mg at 06/07/22 1204    cyanocobalamin tablet 500 mcg, 500 mcg, Oral, Daily, Shakila Bui MD, 500 mcg at 06/08/22 0913    diltiaZEM 12 hr capsule 60 mg, 60 mg, Oral, BID, Sai Sanchez MD, 60 mg at 06/08/22 0914    diphenhydrAMINE injection, , , PRN, Claude Morillo MD, 25 mg at 06/07/22 1121    donepeziL tablet 10 mg, 10 mg, Oral, Nightly, Shakila Bui MD, 10 mg at 06/07/22 2011    fentaNYL 50 mcg/mL injection, , , PRN, Claude Morillo MD, 25 mcg at 06/07/22 1202    fluticasone furoate-vilanteroL 100-25 mcg/dose diskus inhaler 1 puff, 1 puff, Inhalation, Daily, Shakila Bui MD, 1 puff at 06/06/22 0943    furosemide injection 20 mg, 20 mg, Intravenous, Q12H, Shakila Bui MD, 20 mg at 06/08/22 0619    gabapentin capsule 100 mg, 100 mg, Oral, Nightly, Shakila Bui MD, 100 mg at 06/07/22 2011    heparin (porcine) injection, , , PRN, Claude Morillo MD, 1,000 Units at 06/07/22 1154    HYDROcodone-acetaminophen  5-325 mg per tablet 1 tablet, 1 tablet, Oral, Q4H PRN, Sai Sanchez MD, 1 tablet at 06/07/22 2012    LIDOcaine HCL 20 mg/ml (2%) injection, , , PRN, Claude Morillo MD, 5 mL at 06/07/22 1102    midazolam injection, , , PRN, Claude Morillo MD, 0.5 mg at 06/07/22 1142    morphine injection 2 mg, 2 mg, Intravenous, Q4H PRN, Sai Sanchez MD    ondansetron disintegrating tablet 8 mg, 8 mg, Oral, Q8H PRN, Claude Morillo MD    ondansetron injection 4 mg, 4 mg, Intravenous, Q6H PRN, CARLOS ALBERTO Acevedo    pantoprazole EC tablet 40 mg, 40 mg, Oral, Daily, Shakila Bui MD, 40 mg at 06/08/22 0913    pramipexole tablet 1 mg, 1 mg, Oral, Nightly, Shakila Bui MD, 1 mg at 06/07/22 2011    sodium chloride 0.9% flush 10 mL, 10 mL, Intravenous, PRN, Franki Seymour, [facility]    venlafaxine 24 hr capsule 37.5 mg, 37.5 mg, Oral, Daily, Shakila Bui MD, 37.5 mg at 06/08/22 0619    white petrolatum 41 % ointment, , Topical (Top), BID, Shakila Bui MD, Given at 06/07/22 0913        Assessment:     IMPRESSION:  Acute on Chronic Diastolic CHF       - .1       - EF 55% with Grade II DD per Echo 5.24.22  Moderate Aortic Stenosis per Echo 5.24.22  CAD/stent to LAD  HTN  GERD  Hx of DVT  Hx of PE  Parkinson's Disease       Plan:     Plan:  Resume Eliquis today  Right groin precautions/activity restrictions  Plan CTA chest, ABD and Pelvis today for TAVR work-up  Will need to see CT Sx as outpatient after testing is complete  DC IV Lasix after PM dose. Resume PO Lasix in am  Continue home medications  Labs in AM: CBC, CMP,    ALICIA HernandezCNP-BC  Cardiology  [facility] - 9th Floor Med Surg  06/08/2022

## 2022-06-09 LAB
ALBUMIN SERPL-MCNC: 2.8 GM/DL (ref 3.4–4.8)
ALBUMIN/GLOB SERPL: 0.7 RATIO (ref 1.1–2)
ALP SERPL-CCNC: 81 UNIT/L (ref 40–150)
ALT SERPL-CCNC: 5 UNIT/L (ref 0–55)
AST SERPL-CCNC: 24 UNIT/L (ref 5–34)
BASOPHILS # BLD AUTO: 0.04 X10(3)/MCL (ref 0–0.2)
BASOPHILS NFR BLD AUTO: 0.4 %
BILIRUBIN DIRECT+TOT PNL SERPL-MCNC: 0.5 MG/DL
BUN SERPL-MCNC: 10.4 MG/DL (ref 9.8–20.1)
CALCIUM SERPL-MCNC: 9.4 MG/DL (ref 8.4–10.2)
CHLORIDE SERPL-SCNC: 93 MMOL/L (ref 98–107)
CO2 SERPL-SCNC: 29 MMOL/L (ref 23–31)
CREAT SERPL-MCNC: 0.68 MG/DL (ref 0.55–1.02)
EOSINOPHIL # BLD AUTO: 0.29 X10(3)/MCL (ref 0–0.9)
EOSINOPHIL NFR BLD AUTO: 3.3 %
ERYTHROCYTE [DISTWIDTH] IN BLOOD BY AUTOMATED COUNT: 13.2 % (ref 11.5–17)
GLOBULIN SER-MCNC: 4.3 GM/DL (ref 2.4–3.5)
GLUCOSE SERPL-MCNC: 88 MG/DL (ref 82–115)
HCT VFR BLD AUTO: 28 % (ref 37–47)
HGB BLD-MCNC: 8.8 GM/DL (ref 12–16)
IMM GRANULOCYTES # BLD AUTO: 0.04 X10(3)/MCL (ref 0–0.02)
IMM GRANULOCYTES NFR BLD AUTO: 0.4 % (ref 0–0.43)
LYMPHOCYTES # BLD AUTO: 1.4 X10(3)/MCL (ref 0.6–4.6)
LYMPHOCYTES NFR BLD AUTO: 15.7 %
MAGNESIUM SERPL-MCNC: 1.7 MG/DL (ref 1.6–2.6)
MCH RBC QN AUTO: 30.1 PG (ref 27–31)
MCHC RBC AUTO-ENTMCNC: 31.4 MG/DL (ref 33–36)
MCV RBC AUTO: 95.9 FL (ref 80–94)
MONOCYTES # BLD AUTO: 0.86 X10(3)/MCL (ref 0.1–1.3)
MONOCYTES NFR BLD AUTO: 9.7 %
NEUTROPHILS # BLD AUTO: 6.3 X10(3)/MCL (ref 2.1–9.2)
NEUTROPHILS NFR BLD AUTO: 70.5 %
NRBC BLD AUTO-RTO: 0 %
PLATELET # BLD AUTO: 380 X10(3)/MCL (ref 130–400)
PMV BLD AUTO: 10.2 FL (ref 9.4–12.4)
POCT GLUCOSE: 110 MG/DL (ref 70–110)
POTASSIUM SERPL-SCNC: 4.5 MMOL/L (ref 3.5–5.1)
PROT SERPL-MCNC: 7.1 GM/DL (ref 5.8–7.6)
RBC # BLD AUTO: 2.92 X10(6)/MCL (ref 4.2–5.4)
SODIUM SERPL-SCNC: 133 MMOL/L (ref 136–145)
WBC # SPEC AUTO: 8.9 X10(3)/MCL (ref 4.5–11.5)

## 2022-06-09 PROCEDURE — 25000003 PHARM REV CODE 250: Performed by: INTERNAL MEDICINE

## 2022-06-09 PROCEDURE — 85025 COMPLETE CBC W/AUTO DIFF WBC: CPT | Performed by: INTERNAL MEDICINE

## 2022-06-09 PROCEDURE — 27000221 HC OXYGEN, UP TO 24 HOURS

## 2022-06-09 PROCEDURE — 94761 N-INVAS EAR/PLS OXIMETRY MLT: CPT

## 2022-06-09 PROCEDURE — 80053 COMPREHEN METABOLIC PANEL: CPT | Performed by: INTERNAL MEDICINE

## 2022-06-09 PROCEDURE — 83735 ASSAY OF MAGNESIUM: CPT | Performed by: INTERNAL MEDICINE

## 2022-06-09 PROCEDURE — 11000001 HC ACUTE MED/SURG PRIVATE ROOM

## 2022-06-09 PROCEDURE — 97110 THERAPEUTIC EXERCISES: CPT | Mod: CQ

## 2022-06-09 PROCEDURE — 36415 COLL VENOUS BLD VENIPUNCTURE: CPT | Performed by: INTERNAL MEDICINE

## 2022-06-09 PROCEDURE — 25000003 PHARM REV CODE 250: Performed by: NURSE PRACTITIONER

## 2022-06-09 PROCEDURE — 97530 THERAPEUTIC ACTIVITIES: CPT | Mod: CQ

## 2022-06-09 RX ORDER — METOPROLOL TARTRATE 25 MG/1
25 TABLET, FILM COATED ORAL 2 TIMES DAILY
Status: DISCONTINUED | OUTPATIENT
Start: 2022-06-09 | End: 2022-06-13 | Stop reason: HOSPADM

## 2022-06-09 RX ORDER — LOSARTAN POTASSIUM 50 MG/1
100 TABLET ORAL DAILY
Status: DISCONTINUED | OUTPATIENT
Start: 2022-06-09 | End: 2022-06-13 | Stop reason: HOSPADM

## 2022-06-09 RX ORDER — ATORVASTATIN CALCIUM 40 MG/1
40 TABLET, FILM COATED ORAL NIGHTLY
Status: DISCONTINUED | OUTPATIENT
Start: 2022-06-09 | End: 2022-06-13 | Stop reason: HOSPADM

## 2022-06-09 RX ADMIN — APIXABAN 5 MG: 5 TABLET, FILM COATED ORAL at 09:06

## 2022-06-09 RX ADMIN — PRAMIPEXOLE DIHYDROCHLORIDE 1 MG: 0.25 TABLET ORAL at 09:06

## 2022-06-09 RX ADMIN — ALBUTEROL SULFATE 2 PUFF: 90 AEROSOL, METERED RESPIRATORY (INHALATION) at 12:06

## 2022-06-09 RX ADMIN — ALBUTEROL SULFATE 2 PUFF: 90 AEROSOL, METERED RESPIRATORY (INHALATION) at 05:06

## 2022-06-09 RX ADMIN — FLUTICASONE FUROATE AND VILANTEROL TRIFENATATE 1 PUFF: 100; 25 POWDER RESPIRATORY (INHALATION) at 10:06

## 2022-06-09 RX ADMIN — DILTIAZEM HYDROCHLORIDE 60 MG: 60 CAPSULE, EXTENDED RELEASE ORAL at 09:06

## 2022-06-09 RX ADMIN — VENLAFAXINE HYDROCHLORIDE 37.5 MG: 37.5 CAPSULE, EXTENDED RELEASE ORAL at 06:06

## 2022-06-09 RX ADMIN — ATORVASTATIN CALCIUM 40 MG: 40 TABLET, FILM COATED ORAL at 09:06

## 2022-06-09 RX ADMIN — HYDROCODONE BITARTRATE AND ACETAMINOPHEN 1 TABLET: 5; 325 TABLET ORAL at 12:06

## 2022-06-09 RX ADMIN — FUROSEMIDE 40 MG: 40 TABLET ORAL at 10:06

## 2022-06-09 RX ADMIN — PANTOPRAZOLE SODIUM 40 MG: 40 TABLET, DELAYED RELEASE ORAL at 10:06

## 2022-06-09 RX ADMIN — CLOPIDOGREL 75 MG: 75 TABLET, FILM COATED ORAL at 10:06

## 2022-06-09 RX ADMIN — LOSARTAN POTASSIUM 100 MG: 50 TABLET, FILM COATED ORAL at 10:06

## 2022-06-09 RX ADMIN — CYANOCOBALAMIN TAB 500 MCG 500 MCG: 500 TAB at 10:06

## 2022-06-09 RX ADMIN — METOPROLOL TARTRATE 25 MG: 25 TABLET, FILM COATED ORAL at 10:06

## 2022-06-09 RX ADMIN — GABAPENTIN 100 MG: 100 CAPSULE ORAL at 09:06

## 2022-06-09 RX ADMIN — APIXABAN 5 MG: 5 TABLET, FILM COATED ORAL at 10:06

## 2022-06-09 RX ADMIN — DONEPEZIL HYDROCHLORIDE 10 MG: 5 TABLET, FILM COATED ORAL at 09:06

## 2022-06-09 RX ADMIN — DILTIAZEM HYDROCHLORIDE 60 MG: 60 CAPSULE, EXTENDED RELEASE ORAL at 10:06

## 2022-06-09 RX ADMIN — HYDROCODONE BITARTRATE AND ACETAMINOPHEN 1 TABLET: 5; 325 TABLET ORAL at 05:06

## 2022-06-09 RX ADMIN — WHITE PETROLATUM: 1.75 OINTMENT TOPICAL at 09:06

## 2022-06-09 NOTE — PROGRESS NOTES
"[facility] Hospital Medicine Progress Note        Chief Complaint: Inpatient Follow-up for Diastolic HF, TAVR w/u     HPI: Emma Jorgensen is a 88 y.o. female with a past medical history of diastolic heart failure (LVEF 55% and grade II diastolic dysfunction from an echo on 05/24/2022),  PAF on Eliquis, moderate to severe aortic stenosis with plans for a TAVR as an outpatient with Cardiology, bilateral carotid artery stenosis, HTN, chronic dementia, previous DVT/PE, and recent surgical fixation of a closed right trimalleolar ankle fracture on 04/26/2022 who presented to [facility] on 6/3/2022 from [facility] with complaints of worsening shortness breath that began today. The daughter, who is at bedside, stated that she has been on nasal cannula 2 L/min at the facility and visited the patient yesterday where an aid reportedly informed her that she had an episode of SOB after her "oxygen tank ran out." The daughter also states that she is currently at her cognitive baseline although she is more lethargic than normal.  She denied that she was complaining of any fever, cough, chest pain, nausea, vomiting, abdominal pain, or dysuria.  She was recently admitted and discharged from the hospital at [facility] on 06/01/2022 where she received IV diuretics for acute decompensated diastolic heart failure. Her symptoms and respiratory status improved so she was transferred back to the SNF.   Her initial vital signs showed that she was afebrile with an RR 24, HR 82, BP 99/55, and SpO2 100% on nasal cannula 6 L/min.  Her labs show mild hyponatremia, mild hypochloremia, a bicarb 35, and a .1.  COVID-19 and influenza negative. CXR showed a low increase in interstitial and pulmonary vascular markings. More confluent airspace opacities in the left infrahilar region and left base infiltrate cannot be completely excluded.  Patient admitted for resp failure d/t dchf exacerbation.  Patient with severe " aortic stenosis.  [facility] taken for left heart catheterization for TAVR planning.    Interval Hx:   Elderly female, lying in bed.  Reports she just came back from CT department but she has to go back again.  Check with nurse, informed me that her IV went bad, so she was sent upstairs for a new IV and she will go back again this afternoon  No family at bedside  Discussed Cardiology has cleared her for discharge from their stand point though have requested CT surgeon to evaluate her today.  Case was discussed with patient's nurse , cardiology NP Franki Seymour Case was also discussed with the       Objective/physical exam:  General: In no acute distress, afebrile, elderly female   Chest: Clear to auscultation bilaterally anteriorly, currently on oxymask  Heart: RRR, +S1, S2, 2/6 systolic murmur noted   Abdomen: Soft, nontender, BS +  MSK: Warm,Right foot in cam boot  Neurologic: Alert and oriented x4, Cranial nerve II-XII intact, Strength 5/5 in all 4 extremities    VITAL SIGNS: 24 HRS MIN & MAX LAST   Temp  Min: 97.2 °F (36.2 °C)  Max: 98.9 °F (37.2 °C) 97.2 °F (36.2 °C)   BP  Min: 122/64  Max: 162/74 (!) 153/73   Pulse  Min: 76  Max: 86  81   Resp  Min: 17  Max: 25 20   SpO2  Min: 96 %  Max: 100 % 98 %       Recent Labs   Lab 06/06/22  0524 06/07/22  0757 06/09/22  0404   WBC 8.2 8.4 8.9   RBC 3.19* 3.56* 2.92*   HGB 9.6* 10.9* 8.8*   HCT 31.1* 36.1* 28.0*   MCV 97.5* 101.4* 95.9*   MCH 30.1 30.6 30.1   MCHC 30.9* 30.2* 31.4*   RDW 13.2 13.3 13.2    371 380   MPV 9.6 9.5 10.2       Recent Labs   Lab 06/03/22  1234 06/03/22  1513 06/04/22  0420 06/05/22  0322 06/06/22  0524 06/07/22  0757 06/07/22  0853 06/09/22  0404   *  --  134*   < > 134* 135*  --  133*   K 4.1  --  3.9   < > 3.4* 4.9  --  4.5   CO2 35*  --  36*   < > 34* 32*  --  29   BUN 13.1  --  14.1   < > 11.8 10.8  --  10.4   CREATININE 0.74  --  0.83   < > 0.60 0.71  --  0.68   CALCIUM 8.8  --  9.1   < > 9.2 9.3  --  9.4    PH  --  7.43  --   --   --   --  7.44  --    MG  --   --   --    < > 1.90 2.00  --  1.70   ALBUMIN 2.8*  --  2.7*  --   --   --   --  2.8*   ALKPHOS 82  --  78  --   --   --   --  81   ALT 8  --  6  --   --   --   --  5   AST 17  --  13  --   --   --   --  24   BILITOT 0.3  --  0.5  --   --   --   --  0.5    < > = values in this interval not displayed.          Microbiology Results (last 7 days)     Procedure Component Value Units Date/Time    Blood Culture [[id number]]  (Normal) Collected: 06/03/22 1514    Order Status: Completed Specimen: Blood from Arm, Left Updated: 06/08/22 1803     CULTURE, BLOOD ([facility]) No Growth at 5 days    Blood Culture [[id number]]  (Normal) Collected: 06/03/22 1532    Order Status: Completed Specimen: Blood from Hand, Left Updated: 06/08/22 1803     CULTURE, BLOOD ([facility]) No Growth at 5 days    Respiratory Culture [[id number]]     Order Status: Sent Specimen: Sputum, Expectorated            See below for Radiology    Scheduled Med:   albuterol  2 puff Inhalation Q6H    apixaban  5 mg Oral BID    clopidogreL  75 mg Oral Daily    cyanocobalamin  500 mcg Oral Daily    diltiaZEM  60 mg Oral BID    donepeziL  10 mg Oral Nightly    fluticasone furoate-vilanteroL  1 puff Inhalation Daily    furosemide  40 mg Oral Daily    gabapentin  100 mg Oral Nightly    pantoprazole  40 mg Oral Daily    pramipexole  1 mg Oral Nightly    venlafaxine  37.5 mg Oral Daily    white petrolatum   Topical (Top) BID        Continuous Infusions:       PRN Meds:  acetaminophen, aspirin, clopidogreL, diphenhydrAMINE, fentaNYL, heparin (porcine), HYDROcodone-acetaminophen, iopamidoL, LIDOcaine HCL 20 mg/ml (2%), midazolam, morphine, ondansetron, ondansetron, sodium chloride 0.9%       Assessment/Plan:  Acute on Chronic Diastolic CHF  Moderate Aortic Stenosis per Echo 5.24.22--> plans for a TAVR as an outpatient with Cardiology  CAD/stent to LAD  RLE pain in foot --> mild wound dehiscence at the surgical site,  Osteomyelitis ruled out   Acute on chronic hypoxemic respiratory failure, improving   Lethargic possibe hypercapnic, resolved  HTN  GERD  Hx of DVT  Hx of PE  Parkinson's Disease   Macrocytic anemia  History of PAF on Eliquis  History of bilateral carotid artery stenosis  History of chronic dementia at cognitive baseline per daughter  History of previous DVT/PE,  History of  recent surgical fixation of a closed right trimalleolar ankle fracture on 04/26/2022  Hypomagnesemia  Hypokalemia- resolved         Admitted to hospitalist service on 6/3  S/p [facility] on 6/7 , stent was placed - Cont DAPT (Plavix/ Eliquis)  Plans for CTA Chest, abdomen, pelvis for out patient TAVR.  CTS also evaluated the pt, agree with out pt procedure   Trilogy ordered, CM aware  Vitals stable on 2 L of oxygen.    CT right foot,XR tib/fib, foot reviewed- Subacute fracture/stable, orthopedic evaluated patient recommended to avoid ambulation till surgical wound heals.  Continue wound care   IV lasix transitioned to PO lasix   Cont diltiazem 60 mg b.i.d. for heart rate and blood pressure control, Losartan c40 gm daily, atorvastatin 40mg hs, lopressor 25 mg BID   Monitor daily standing weights and accurate I&Os  She recently had an echocardiogram done 05/24/2022 that revealed LVEF 55% and grade II diastolic dysfunction so no need to repeat  Morning CBC wo, BMP ordered     VTE Prophylaxis: On Eliquis for DVT prophylaxis    Patient condition:  Stable    Anticipated discharge and Disposition: Probably discharge tomorrow to [facility] once CT scans completed today       All diagnosis and differential diagnosis have been reviewed; assessment and plan has been documented; I have personally reviewed the labs and test results that are presently available; I have reviewed the patients medication list; I have reviewed the consulting providers response and recommendations. I have reviewed or attempted to review medical records based upon their  availability    All of the patient's questions have been  addressed and answered. Patient's is agreeable to the above stated plan. I will continue to monitor closely and make adjustments to medical management as needed.  _____________________________________________________________________    Nutrition Status:    Radiology:  CV Ultrasound Pseudoaneurysm Evaluation/Treatment  · No pseudoaneurysm was found in the right common femoral artery.     There was no evidence of pseudoaneurysm or hematoma in the groin of the   right lower extremity.   Cardiac catheterization  · The Mid LAD lesion was 85% stenosed with 0% stenosis post-intervention.  · A stent was successfully placed at 12 LETTY for 10 sec.  · The estimated blood loss was <50 mL.  · There was single vessel coronary artery disease.  · The PCI was successful.  · The coronary FFR was abnormal.  · The filling pressures mildly elevated.     Patient brought to catheterization lab in a fasting state.  She was placed   on table prepped and draped in usual sterile fashion.  Time-out was   completed.  Bilateral groins and some 1% lidocaine.  Seldinger technique   and ultrasound guidance along with the microcatheter a 5 French sheath was   placed into the right common femoral artery and a 7 French sheath into the   common femoral vein.  A 4 French common femoral left access was also   gained.  Right heart catheterization was then performed using a Pendleton-Robert   catheter.  No complications noted.  Next AA JR4 and a stiff angled   straight glide was then used to cross into the left ventricular cavity.    Then exchanged for a pigtail.  A 4 French pigtail was then placed from the   left groin access into the aortic root.  It was zeroed appropriately.    Hemodynamics were measured and a gradient was then measured on the LV   pullback.  All catheters were removed.  Then exchanged for a JL 4 which   was engaged in left main.  Images were taken in multiple views.  Then and   she  changed for a JR4 which was engaged RCA with images were taken in   multiple views.  A lesion was noted in the area of the mid LAD and   therefore sheath was upsized for 6 [place] system.  Subsequent heparin dose   was given.  He was engaged into the left main IFR was placed out distally   which showed an ratio of 0.84.  Intravascular ultrasound was then used.    2.5 mm pre balloon angioplasty was performed with a 3.0 x 15 mm stent   deployed successfully.  Prior to this a secondary wire was placed into the   diagonal branch.  Using a 2.5 mm of balloon in the ostium of the diagonal   was then balloon.  Final angiographic images showed excellent result with   0% residual stenosis and DAVID 3 flow in both diagonal branch and the   distal LAD.  A right common femoral artery angiogram was then performed   and a Vascade closure device was deployed successfully.  She was   transported to the postop area in stable condition.    Findings:  Left main:  Widely patent.  Confirmed with intravascular ultrasound  LAD:  Large caliber vessel with multiple diagonal branches.  Mid vessel   just past a large diagonal branch and a 60-70% lesion noted with a   positive IFR.  This was treated PCI with a 3.0 mm x 15 stent with 0%   residual stenosis.  Preservation of the large diagonal branch was noted  Circumflex:  Widely patent  RCA:  Widely patent dominant vessel    Aortic valve study:  - aortic valve area of 0.8 cm2, gradient greater than 50 mmHg.    Right heart catheterization:  Average cardiac output approximately 7 liters/minute  Moderate pulmonary hypertension with pulmonary artery pressure is   approximately 50mmHg     impression:  1. Severe aortic stenosis  2. CAD status post PCI to mid LAD  3. Moderate pulmonary hypertension  4. High cardiac output of unknown etiology    Plan:  1. Routine postop care for underlying CAD.  Aggressive medical management  2. Refer for aortic valve TAVR.    The procedure log was documented by  Documenter: Bernie Mayers RN and   verified by Claude Morillo MD.    Date: 6/7/2022  Time: 12:33 PM  CT Foot Without Contrast Right  Narrative: EXAMINATION:  CT FOOT WITHOUT CONTRAST RIGHT    CLINICAL HISTORY:  Foot swelling, diabetic, osteomyelitis suspected.  Right foot pain with suspicion for an abscess or osteomyelitis.  Recent surgical fixation of closed right trimalleolar ankle fracture on 04/26/2022.  Right lower extremity pain.    TECHNIQUE:  Axial CT slices through the right foot were obtained without the administration of contrast.  Coronal and sagittal reconstructions were created.  Automated exposure control was utilized.  Total DLP for the study is approximately 59 mGy cm.    COMPARISON:  Right ankle x-rays dated 06/07/2022 and 04/26/2022    FINDINGS:  Postoperative changes of tibiotalar and subtalar joint arthrodesis with intramedullary nail traversing the visualized distal tibia, talus, and calcaneus.  Interlocking screws within the talus and calcaneus are seen.  There appears to be evidence of cortical/trabecular fracture involving the plantar aspect of the calcaneus at the site of intramedullary nail entry.  There is a suggestion of subtle sclerosis about the fracture which may indicate subacute fracture (for example series 24 image 25).  Tiny metallic foci are seen along the plantar aspect of the calcaneus and adjacent soft tissues, likely related to micro metal from prior surgery.  There is additional linear lucency in the posterior aspect of the talus extending to the intramedullary nail (for example series 26 images 60-64 and series 24 images 4-12) extending to the level of the intramedullary nail, also suggestive of subacute fracture.    The visualized hardware allowing for streak artifact demonstrates no clear adjacent lucency suggestive of loosening.  The visualized osseous structures appear heterogeneously demineralized which could be related to disuse.  There appears to be a partially  visualized and mildly displaced posterior malleolar fracture.  There appears to be evidence of callus formation and osseous bridging across the fracture.  There appears to be evidence of mildly displaced medial malleolar fracture with partial osseous bridging, compatible with healing fracture.  Distal fibular fracture seen on prior plain films is not included in the field of imaging on the current study.  There appears to be a small bone fragment along the anterior distal tibiofibular syndesmosis with adjacent cortical irregularity in the tibia, possibly related to displaced fracture fragment, likely rising from the tibia.  There appears to be evidence of widening of the tibiofibular syndesmosis which could reflect ligamentous injury.  The background osseous demineralization lowers the sensitivity and specificity of the study for osteomyelitis.  However, there appears to be evidence of cortical osteolysis along the proximal plantar aspect of the cuboid bone (for example series 28 image 15), concerning for possible osteomyelitis.  There appears to be evidence of erosion along the base of the 5th metatarsal (for example series 28, image 11).  There are additional patchy areas of periarticular demineralization involving the tarsal bones, talus, calcaneus, and tibia which could be related to disuse, reflex sympathetic dystrophy, and/or changes of osteomyelitis.  The possibility inflammatory arthropathies resulting in the erosions is not entirely excluded.  There appears to be evidence of a soft tissue defect/wound along the plantar aspect of the hindfoot near the level of the calcaneus.  Linear tract of calcifications is seen extending from this region to the plantar aspect of the calcaneus.  Multiple small metallic foci  are seen in this region, likely related to prior surgery.  There appears to be diffuse subcutaneous soft tissue edema about the foot and included ankle which is nonspecific, possibly related to  cellulitis.  No obvious loculated and atherosclerotic vascular calcifications are noted.  Assessment for abscess is limited  Impression: 1. Postoperative changes of tibiotalar and subtalar joint arthrodesis with intramedullary nail traversing the visualized distal tibia, talus, and calcaneus are seen.  There appears to be evidence of cortical/trabecular fracture involving the plantar aspect of the calcaneus at the site of intramedullary nail and treat.  Subtle sclerosis about the fracture suggest subacute fracture.  There is also linear vertical lucency along the posterior aspect of the talus extending to the intramedullary nail suggestive of subacute fracture.  2. There is heterogeneous osseous demineralization which could be related to disuse.  This lowers the sensitivity and specificity of the study for osteomyelitis.  There appears to be evidence of cortical osteolysis along the proximal aspect of the cuboid bone which raises concern for possible osteomyelitis.  There is also evidence of erosion along the base of the 5th metatarsal.  There are additional patchy areas of periarticular demineralization involving the tarsal bones, talus, calcaneus, and distal tibia which could be related to disuse, reflex sympathetic dystrophy, and/or changes of osteomyelitis.  The possibility of inflammatory arthropathies resulting erosions is not entirely excluded.  3. There appears to be evidence of soft tissue defect/wound along the plantar aspect of the hindfoot.  There is subcutaneous soft tissue edema about the included foot and ankle which may be related to cellulitis in the setting of infection.  4. Healing medial and posterior malleolar fractures are partially visualized.  The distal fibular fracture seen on prior plain films is not included in the field of imaging.  5. Additional findings and details as above.    Electronically signed by: Zeferino Bailey MD  Date:    06/08/2022  Time:    09:41      Adithya Rothman MD    06/09/2022

## 2022-06-09 NOTE — PROGRESS NOTES
[facility] - 9th Floor Med Surg  Cardiology  Progress Note    Patient Name: Emma Jorgensen  MRN: [id number]  Admission Date: 6/3/2022  Hospital Length of Stay: 6 days  Code Status: No Order   Attending Physician: Adithya Rothman MD   Primary Care Physician: Mary Jane Dean MD  Expected Discharge Date:   Principal Problem:<principal problem not specified>    Subjective:     Brief HPI:   88 year old female known to Cassy Juarez and Raimundo with a past medical history of Aortic Stenosis, Diastolic CHF, HTN, GERD, DVT, PE, Parkinson's Disease and recent right ankle fraction after mechanical fall on 4.26.22 presented to the ER from [facility] on 6.3.22 with c/o SOB that had worsened over the previous 2 days.  Patient had a recent hospital admission at [facility] on 6.1.22 where she was diagnosed with acute decompensated diastolic heart failure and received IV diuretics.  Of note, she was scheduled to undergo LHC with possible PCI per TAVR pre-op protocol.  BNP level drawn in ER was 414.1.  [facility] has been consulted for further evaluation of CHF.    Hospital Course:   6.6.22: NAD noted. VSS. SR on tele. Denies CP/Palps/SOB. States she feels like her breathing is back to her baseline. Still on 3LPM per NC. Negative 760mL in 24hrs. Weight stable.  6.7.22: NAD noted. VSS. SR on tele. Denies CP/SOB/Palps. I&O not accurate, weight down 2.2kg from 6.4.22. NPO for LHC today.  6.8.22: NAD noted. VSS with elevated BP. Denies CP/SOB/Palps. I&O not accurate. Right groin benign.     PMH: Aortic Stenosis, Diastolic CHF, HTN, GERD, DVT, PE, Parkinson's Disease   PSH: Ankle Surgery, Cataract Surgery, Colonoscopy, Colon Resection, Toe Surgery, Hysterectomy  Family History: Father - CVA; Sister - CAD/CABG  Social History: Former Smoker.  Denies Alcohol Use.  Denies Illicit Drug Use.     Previous Cardiac Diagnostics:   RLE Arterial US 6.7.22  No pseudoaneurysm was found in the right common femoral artery.   There was no  evidence of pseudoaneurysm or hematoma in the groin of the right lower extremity.     L/RHC 6.7.22  Left main:  Widely patent.  Confirmed with intravascular ultrasound  LAD:  Large caliber vessel with multiple diagonal branches.  Mid vessel just past a large diagonal branch and a 60-70% lesion noted with a positive IFR.  This was treated PCI with a 3.0 mm x 15 stent with 0% residual stenosis.  Preservation of the large diagonal branch was noted  Circumflex:  Widely patent  RCA:  Widely patent dominant vessel  Aortic valve study:  - aortic valve area of 0.8 cm2, gradient greater than 50 mmHg.  Right heart catheterization:  Average cardiac output approximately 7 liters/minute  Moderate pulmonary hypertension with pulmonary artery pressure is approximately 50mmHg    Echocardiogram 5.24.22:  TDS due to poor acoustical windows; suboptimal doppler angles.  Mild concentric hypertrophy and normal systolic function.  The estimated ejection fraction is 55%.  Grade II left ventricular diastolic dysfunction.  Moderate aortic regurgitation.  There is moderate aortic valve stenosis.  Aortic valve area is 0.75 cm squared; peak velocity is 3.53m/s; mean gradient is 30mmHg.  Intermediate central venous pressure (8mmHg).     Carotid US 4.21.22  The right internal carotid artery demonstrated less than 50% stenosis.  The left internal carotid artery demonstrated less than 50% stenosis.  Bilateral vertebral arteries were patent with antegrade flow.        PET MPI 2.22.18:  This is a normal perfusion study, no perfusion defects noted.  There is no evidence of ischemia.  This scan is suggestive of low risk for future cardiovascular events.  The left ventricular cavity is noted to be normal on the stress studies.  The stress left ventricular ejection fraction was calculated to be 74% and left ventricular global function is normal.  The rest left ventricular cavity is noted to be normal.  The rest left ventricular ejection fraction was  calculated to be 74% and rest left ventricular global function is michael;.  Compared with rest and stress studies the ejection fraction remains unchanged (74).          Review of Systems   Constitutional: Negative for chills and fever.   Cardiovascular: Negative for chest pain and palpitations.   Respiratory: Negative for shortness of breath.    Psychiatric/Behavioral: Negative for altered mental status.           Objective:     Vital Signs (Most Recent):  Temp: 98 °F (36.7 °C) (06/09/22 0758)  Pulse: 74 (06/09/22 0758)  Resp: 20 (06/09/22 0758)  BP: (!) 156/54 (06/09/22 0758)  SpO2: 100 % (06/09/22 0758) Vital Signs (24h Range):  Temp:  [97.2 °F (36.2 °C)-98.9 °F (37.2 °C)] 98 °F (36.7 °C)  Pulse:  [74-86] 74  Resp:  [17-25] 20  SpO2:  [96 %-100 %] 100 %  BP: (122-159)/(54-81) 156/54     Weight: 111.5 kg (245 lb 13 oz)  Body mass index is 37.38 kg/m².    SpO2: 100 %  O2 Device (Oxygen Therapy): Oxymask      Intake/Output Summary (Last 24 hours) at 6/9/2022 0919  Last data filed at 6/8/2022 2156  Gross per 24 hour   Intake 480 ml   Output 1700 ml   Net -1220 ml       Lines/Drains/Airways     Drain  Duration                Urethral Catheter 04/30/22 0700 40 days          Peripheral Intravenous Line  Duration                Peripheral IV - Single Lumen 06/05/22 0745 20 G Anterior;Proximal;Right Forearm 4 days                Significant Labs:   CMP   Recent Labs   Lab 06/09/22  0404   *   K 4.5   CO2 29   BUN 10.4   CREATININE 0.68   CALCIUM 9.4   ALBUMIN 2.8*   BILITOT 0.5   ALKPHOS 81   AST 24   ALT 5    and CBC   Recent Labs   Lab 06/09/22  0404   WBC 8.9   HGB 8.8*   HCT 28.0*          Telemetry: SR     Physical Exam:  Physical Exam  Constitutional:       Appearance: Normal appearance.   HENT:      Head: Normocephalic and atraumatic.      Mouth/Throat:      Mouth: Mucous membranes are moist.   Eyes:      Extraocular Movements: Extraocular movements intact.   Cardiovascular:      Rate and Rhythm: Normal  rate and regular rhythm.      Heart sounds: Murmur heard.      Comments: R Groin Soft/Flat, Non-Tender, No Sign of Bleed/Infection. Right foot bandaged, right foot warm.    Pulmonary:      Effort: Pulmonary effort is normal.      Breath sounds: Decreased breath sounds present.   Abdominal:      Palpations: Abdomen is soft.   Musculoskeletal:      Cervical back: Neck supple.   Skin:     General: Skin is warm.   Neurological:      General: No focal deficit present.      Mental Status: She is alert.   Psychiatric:         Mood and Affect: Mood normal.         Behavior: Behavior normal.         Current Inpatient Medications:    Current Facility-Administered Medications:     acetaminophen tablet 650 mg, 650 mg, Oral, Q4H PRN, Claude Morillo MD    albuterol inhaler 2 puff, 2 puff, Inhalation, Q6H, Shakila Bui MD, 2 puff at 06/08/22 2308    apixaban tablet 5 mg, 5 mg, Oral, BID, RAHUL Hernandez-BC, 5 mg at 06/08/22 2001    aspirin chewable tablet, , , PRN, Claude Morillo MD, 325 mg at 06/07/22 1204    atorvastatin tablet 40 mg, 40 mg, Oral, QHS, RAHUL Hernandez-BC    clopidogreL tablet 75 mg, 75 mg, Oral, Daily, Claude Morillo MD, 75 mg at 06/08/22 0913    clopidogreL tablet, , , PRN, Claude Morillo MD, 600 mg at 06/07/22 1204    cyanocobalamin tablet 500 mcg, 500 mcg, Oral, Daily, Shakila Bui MD, 500 mcg at 06/08/22 0913    diltiaZEM 12 hr capsule 60 mg, 60 mg, Oral, BID, Sai Sanchez MD, 60 mg at 06/08/22 2002    diphenhydrAMINE injection, , , PRN, Claude Morillo MD, 25 mg at 06/07/22 1121    donepeziL tablet 10 mg, 10 mg, Oral, Nightly, Shakila Bui MD, 10 mg at 06/08/22 2002    fentaNYL 50 mcg/mL injection, , , PRN, Claude Morillo MD, 25 mcg at 06/07/22 1202    fluticasone furoate-vilanteroL 100-25 mcg/dose diskus inhaler 1 puff, 1 puff, Inhalation, Daily, Shakila Bui MD, 1 puff at 06/08/22 2000    furosemide tablet 40 mg, 40 mg, Oral, Daily, Franki Seymour, [facility]-BC    gabapentin  capsule 100 mg, 100 mg, Oral, Nightly, Shakila Bui MD, 100 mg at 06/08/22 2002    heparin (porcine) injection, , , PRN, Claude Morillo MD, 1,000 Units at 06/07/22 1154    HYDROcodone-acetaminophen 5-325 mg per tablet 1 tablet, 1 tablet, Oral, Q4H PRN, Sai Sanchez MD, 1 tablet at 06/08/22 2306    iopamidoL (ISOVUE-300) injection, , , PRN, Claude Morillo MD, 100 mL at 06/07/22 1215    LIDOcaine HCL 20 mg/ml (2%) injection, , , PRN, Claude Morillo MD, 5 mL at 06/07/22 1102    losartan tablet 100 mg, 100 mg, Oral, Daily, ALICIA HernandezCNP-BC    metoprolol tartrate (LOPRESSOR) tablet 25 mg, 25 mg, Oral, BID, ALICIA HernandezCNP-BC    midazolam injection, , , PRN, Claude Morillo MD, 0.5 mg at 06/07/22 1142    morphine injection 2 mg, 2 mg, Intravenous, Q4H PRN, Sai Sanchez MD    ondansetron disintegrating tablet 8 mg, 8 mg, Oral, Q8H PRN, Claude Morillo MD    ondansetron injection 4 mg, 4 mg, Intravenous, Q6H PRN, CARLOS ALBERTO Acevedo    pantoprazole EC tablet 40 mg, 40 mg, Oral, Daily, Shakila Bui MD, 40 mg at 06/08/22 0913    pramipexole tablet 1 mg, 1 mg, Oral, Nightly, Shakila Bui MD, 1 mg at 06/08/22 2002    sodium chloride 0.9% flush 10 mL, 10 mL, Intravenous, PRN, ALICIA HernandezDay [facility]    venlafaxine 24 hr capsule 37.5 mg, 37.5 mg, Oral, Daily, Shakila Bui MD, 37.5 mg at 06/09/22 0600    white petrolatum 41 % ointment, , Topical (Top), BID, Shakila Bui MD, Given at 06/08/22 2100        Assessment:     IMPRESSION:  Acute on Chronic Diastolic CHF (compensated)       - .1       - EF 55% with Grade II DD per Echo 5.24.22  Moderate Aortic Stenosis per Echo 5.24.22  CAD/stent to LAD  HTN  GERD  Hx of DVT  Hx of PE  Parkinson's Disease       Plan:     Plan:  Continue Eliquis and Plavix  Right groin precautions/activity restrictions  Plan CTA chest, ABD and Pelvis today for TAVR work-up(was not done yesterday)  Consult CT Sx for TAVR eval  Resume PO Lasix this am  Resume  Losartan 100mg daily   Start Atorvastatin 40mg daily   Start Lopressor 25mg BID  Continue home medications  F/u with Dr. Eubanks on 6.13.22 @ 1040 at [facility]  F/U with Dr. Juarez in 1-2 weeks   Will sign off. Thank you for allowing us to participate in the care of this patient. Please call us with any questions.      Franki Seymour [facility]-BC  Cardiology  [facility] - 9th Floor Med Surg  06/09/2022

## 2022-06-09 NOTE — PT/OT/SLP PROGRESS
Physical Therapy         Treatment        Emma Jorgensen   MRN: [id number]     PT Received On: 22  PT Start Time: 1352     PT Stop Time: 1415    PT Total Time (min): 23 min       Billable Minutes:  Therapeutic Activity 15 and Therapeutic Exercise 8  Total Minutes: 23    Treatment Type: Treatment  PT/PTA: PTA     PTA Visit Number: 1       General Precautions: Standard, fall, respiratory  Orthopedic Precautions: Orthopedic Precautions : RLE non weight bearing   Braces: Braces: N/A    Spiritual, Cultural Beliefs, Roman Catholic Practices, Values that Affect Care: no    Subjective:  Communicated with NSG prior to session.    Pain/Comfort  Location - Side 1: Right  Location - Orientation 1: generalized  Location 1: ankle    Objective:  Patient found up in bed HOB elevated, with Patient found with: oxygen, telemetry  Oxygen was 1.5 L  Functional Mobility:  Bed Mobility:   Supine to sit: Minimal Assistance   Sit to supine: Maximum Assistance   Rolling: Moderate Assistance   Scooting: Moderate Assistance with 4 trials performed EOB. Pt required increased time to complete.    Balance:   Static Sit: GOOD-: Takes MODERATE challenges from all directions but inconsistently  Dynamic Sit:  FAIR+: Maintains balance through MINIMAL excursions of active trunk motion      Transfer Trainin trials attempted EOB. pt unable to clear buttocks from bed at this time. Pt unable to maintain NWB status on RLE for either trial.           Additional Treatment:  Pt performed seated exercises EOB .Exercises performed LAQS, Hip Flx, Ankle Pumps. 1 set of 12 reps. Seated rest breaks in between exercises.     Activity Tolerance:  Patient limited by fatigue. Pt O2 sats varied 92-86% throughout tx session. Pt able to recover with pursed lip breathing. BP assessed 2/2 pt c/o dizziness where it was 137/67 with 71 bpm.     Patient left HOB elevated with all lines intact and call button in reach.    Assessment:  Emma Jorgensen is a 88 y.o.  female with a medical diagnosis of  severe aortic stenosis. Patient had right trimalleolar ankle fracture in April. Underwent surgical fixation on . Was then discharged to SNF, where she has been since. Prior to ankle fracture, she was independent. Per patient and daughter, still NWB RLE.  .    Rehab potential is good.    Activity tolerance: Good    Discharge recommendations: Discharge Facility/Level of Care Needs: nursing facility, skilled, rehabilitation facility     Equipment recommendations:       GOALS:   Multidisciplinary Problems     Physical Therapy Goals        Problem: Physical Therapy    Goal Priority Disciplines Outcome Goal Variances Interventions   Physical Therapy Goal     PT, PT/OT Ongoing, Progressing     Description: Goals to be met by: 22     Patient will increase functional independence with mobility by performin. Supine to sit with Moderate Assistance  2. Sit to supine with Moderate Assistance  3. Sit to stand transfer with Minimal Assistance  4. Bed to chair transfer with Moderate Assistance using Rolling Walker  5. Increased functional strength to -4/5 on LLE and +3/5 on RLE.                     PLAN:    Patient to be seen 5 x/week  to address the above listed problems via gait training, therapeutic activities, therapeutic exercises, neuromuscular re-education  Plan of Care expires: 22  Plan of Care reviewed with: patient         2022

## 2022-06-09 NOTE — PROGRESS NOTES
[facility] - [address] West Medical Telemetry  Orthopedics  Progress Note    Patient Name: Emma Jorgensen  MRN: [id number]  Admission Date: 6/3/2022  Hospital Length of Stay: 6 days  Attending Provider: Adithya Rothman MD  Primary Care Provider: Mary Jane Dean MD  Follow-up For: Procedure(s) (LRB):  Angiogram, Coronary, with Left Heart Cath (N/A)  CATHETERIZATION, HEART, BOTH LEFT AND RIGHT (N/A)  Percutaneous coronary intervention (N/A)  ULTRASOUND, INTRAVASCULAR (N/A)  Instantaneous Wave-Free Ratio (IFR) (N/A)  Valve study-aortic      Subjective:       Principal Orthopedic Problem: right trimalleolar ankle fracture    Interval History: Patient is an 88 year old female who is 6 weeks status post right hindfoot fusion nail with Dr. Krause for a trimalleolar ankle fracture. She has been readmitted to the hospital for SOB/heart failure. Orthopedics was consulted to evaluate her surgical incision to her right foot. She was evaluated by our partners Dr.Blake Boles/Bennie CARCAMO yesterday and they asked us to assume care of patient as Dr. Krause performed her original operation. She states that her right ankle is doing well. She denies any pain at this time. History is limited by her dementia, no family at bedside. Her cam boot is at the bedside. She was last evaluated in office on 5/18. Sutures were removed and she was placed in a cam boot with orders to weight bear for transfers only but no ambulation.     Review of patient's allergies indicates:  No Known Allergies    Current Facility-Administered Medications   Medication    acetaminophen tablet 650 mg    albuterol inhaler 2 puff    apixaban tablet 5 mg    aspirin chewable tablet    atorvastatin tablet 40 mg    clopidogreL tablet 75 mg    clopidogreL tablet    cyanocobalamin tablet 500 mcg    diltiaZEM 12 hr capsule 60 mg    diphenhydrAMINE injection    donepeziL tablet 10 mg    fentaNYL 50 mcg/mL injection    fluticasone furoate-vilanteroL 100-25  "mcg/dose diskus inhaler 1 puff    furosemide tablet 40 mg    gabapentin capsule 100 mg    heparin (porcine) injection    HYDROcodone-acetaminophen 5-325 mg per tablet 1 tablet    iopamidoL (ISOVUE-300) injection    LIDOcaine HCL 20 mg/ml (2%) injection    losartan tablet 100 mg    metoprolol tartrate (LOPRESSOR) tablet 25 mg    midazolam injection    morphine injection 2 mg    ondansetron disintegrating tablet 8 mg    ondansetron injection 4 mg    pantoprazole EC tablet 40 mg    pramipexole tablet 1 mg    sodium chloride 0.9% flush 10 mL    venlafaxine 24 hr capsule 37.5 mg    white petrolatum 41 % ointment     Objective:     Vital Signs (Most Recent):  Temp: 98 °F (36.7 °C) (06/09/22 0758)  Pulse: 83 (06/09/22 1017)  Resp: 18 (06/09/22 1017)  BP: (!) 156/54 (06/09/22 0758)  SpO2: 100 % (06/09/22 0758) Vital Signs (24h Range):  Temp:  [97.2 °F (36.2 °C)-98.9 °F (37.2 °C)] 98 °F (36.7 °C)  Pulse:  [74-86] 83  Resp:  [17-25] 18  SpO2:  [96 %-100 %] 100 %  BP: (122-159)/(54-81) 156/54     Weight: 111.5 kg (245 lb 13 oz)  Height: 5' 8" (172.7 cm)  Body mass index is 37.38 kg/m².      Intake/Output Summary (Last 24 hours) at 6/9/2022 1054  Last data filed at 6/8/2022 2156  Gross per 24 hour   Intake 480 ml   Output 1700 ml   Net -1220 ml       Ortho/SPM Exam   General: Awake, alert, appears comfortable in no distress.  R LE:  No calf tenderness. Mild swelling to ankle. No painful or prominent hardware. No ankle ROM due to ankle fusion. No tenderness to palpation. The incision to the plantar aspect of the foot appears to have had a superficial dehiscence and now has a stable eschar. There is to erythema, drainage, fluctuance, or open wounds. No signs of infection. All other incisions well healed with no signs of infection. Motor intact to digits. Brisk capillary refill distally.    Significant Labs:   Recent Lab Results         06/09/22  0404        Albumin/Globulin Ratio 0.7       Albumin 2.8       Alkaline Phosphatase " 81       ALT 5       AST 24       Baso # 0.04       Basophil % 0.4       BILIRUBIN TOTAL 0.5       BUN 10.4       Calcium 9.4       Chloride 93       CO2 29       Creatinine 0.68       eGFR if  >60       Eos # 0.29       Eosinophil % 3.3       Globulin, Total 4.3       Glucose 88       Hematocrit 28.0       Hemoglobin 8.8       Immature Grans (Abs) 0.04       Immature Granulocytes 0.4       Lymph # 1.40       LYMPH % 15.7       Magnesium 1.70       MCH 30.1       MCHC 31.4       MCV 95.9       Mono # 0.86       Mono % 9.7       MPV 10.2       Neut # 6.3       Neut % 70.5       nRBC 0.0       Platelets 380       Potassium 4.5       PROTEIN TOTAL 7.1       RBC 2.92       RDW 13.2       Sodium 133       WBC 8.9             All pertinent labs within the past 24 hours have been reviewed.    Significant Imaging: CT: I have reviewed all pertinent results/findings and my personal findings are:  xrays and ct scan of the right ankle/foot reviewed. Hindfoot fusion nail with no failure or loosening; alignment unchanged    Assessment/Plan:     Active Diagnoses:    Diagnosis Date Noted POA    Severe aortic stenosis [I35.0] 06/07/2022 Yes    Coronary artery disease involving native coronary artery [I25.10] 06/07/2022 Yes      Problems Resolved During this Admission:     Patient is 6 weeks out from right hindfoot fusion nail for a trimal ankle fracture. She appears to have had a small superficial dehiscence to the incision over the plantar aspect of the foot. There is now a stable eschar with no erythema, drainage, or other signs of infection. She has a normal WBC count and no fever. She does not have any clinical evidence of post operative infection or osteomyelitis. No surgical indication at this time. We will continue to monitor this site closely but expect it to heal without any invasive intervention. Continue cam boot for transfers. No ambulation for now. Once her wound is completely healed, she can begin  ambulation.    The above findings, diagnosis, and treatment plan were discussed with Dr. Miguel Krause who is in agreement.      SMITH Avelar  Orthopedics  [facility] - [address] [place] [address] have evaluated the patient and reviewed the plan of care with Marnie Ford NP and agree with her assessment with any exceptions or additions noted.     Miguel Krause MD  Orthopedic Trauma  [facility]      none

## 2022-06-09 NOTE — PT/OT/SLP PROGRESS
Attempted to see pt for PT tx. NSG stated pt getting new IV then heading back to CT. PT to f/u with pt as schedule allows.

## 2022-06-10 LAB
ANION GAP SERPL CALC-SCNC: 9 MEQ/L
BUN SERPL-MCNC: 10.7 MG/DL (ref 9.8–20.1)
CALCIUM SERPL-MCNC: 8.7 MG/DL (ref 8.4–10.2)
CHLORIDE SERPL-SCNC: 93 MMOL/L (ref 98–107)
CO2 SERPL-SCNC: 31 MMOL/L (ref 23–31)
CREAT SERPL-MCNC: 0.65 MG/DL (ref 0.55–1.02)
CREAT/UREA NIT SERPL: 16
ERYTHROCYTE [DISTWIDTH] IN BLOOD BY AUTOMATED COUNT: 13.4 % (ref 11.5–17)
GLUCOSE SERPL-MCNC: 87 MG/DL (ref 82–115)
GLUCOSE SERPL-MCNC: NORMAL MG/DL (ref 70–110)
HCT VFR BLD AUTO: 32.3 % (ref 37–47)
HGB BLD-MCNC: 10.2 GM/DL (ref 12–16)
MCH RBC QN AUTO: 30.5 PG (ref 27–31)
MCHC RBC AUTO-ENTMCNC: 31.6 MG/DL (ref 33–36)
MCV RBC AUTO: 96.7 FL (ref 80–94)
NRBC BLD AUTO-RTO: 0 %
PLATELET # BLD AUTO: 365 X10(3)/MCL (ref 130–400)
PMV BLD AUTO: 9.6 FL (ref 9.4–12.4)
POTASSIUM SERPL-SCNC: 4.5 MMOL/L (ref 3.5–5.1)
RBC # BLD AUTO: 3.34 X10(6)/MCL (ref 4.2–5.4)
SARS-COV-2 RDRP RESP QL NAA+PROBE: NEGATIVE
SODIUM SERPL-SCNC: 133 MMOL/L (ref 136–145)
WBC # SPEC AUTO: 7.9 X10(3)/MCL (ref 4.5–11.5)

## 2022-06-10 PROCEDURE — 80048 BASIC METABOLIC PNL TOTAL CA: CPT | Performed by: INTERNAL MEDICINE

## 2022-06-10 PROCEDURE — 85027 COMPLETE CBC AUTOMATED: CPT | Performed by: INTERNAL MEDICINE

## 2022-06-10 PROCEDURE — 25000003 PHARM REV CODE 250: Performed by: INTERNAL MEDICINE

## 2022-06-10 PROCEDURE — 11000001 HC ACUTE MED/SURG PRIVATE ROOM

## 2022-06-10 PROCEDURE — 36415 COLL VENOUS BLD VENIPUNCTURE: CPT | Performed by: INTERNAL MEDICINE

## 2022-06-10 PROCEDURE — 27000221 HC OXYGEN, UP TO 24 HOURS

## 2022-06-10 PROCEDURE — 25000003 PHARM REV CODE 250: Performed by: NURSE PRACTITIONER

## 2022-06-10 PROCEDURE — 97530 THERAPEUTIC ACTIVITIES: CPT | Mod: CQ

## 2022-06-10 PROCEDURE — 87635 SARS-COV-2 COVID-19 AMP PRB: CPT | Performed by: INTERNAL MEDICINE

## 2022-06-10 RX ORDER — LANOLIN ALCOHOL/MO/W.PET/CERES
400 CREAM (GRAM) TOPICAL DAILY
Status: DISCONTINUED | OUTPATIENT
Start: 2022-06-10 | End: 2022-06-13 | Stop reason: HOSPADM

## 2022-06-10 RX ADMIN — ALBUTEROL SULFATE 2 PUFF: 90 AEROSOL, METERED RESPIRATORY (INHALATION) at 05:06

## 2022-06-10 RX ADMIN — CLOPIDOGREL 75 MG: 75 TABLET, FILM COATED ORAL at 10:06

## 2022-06-10 RX ADMIN — CYANOCOBALAMIN TAB 500 MCG 500 MCG: 500 TAB at 10:06

## 2022-06-10 RX ADMIN — METOPROLOL TARTRATE 25 MG: 25 TABLET, FILM COATED ORAL at 10:06

## 2022-06-10 RX ADMIN — APIXABAN 5 MG: 5 TABLET, FILM COATED ORAL at 10:06

## 2022-06-10 RX ADMIN — ALBUTEROL SULFATE 2 PUFF: 90 AEROSOL, METERED RESPIRATORY (INHALATION) at 12:06

## 2022-06-10 RX ADMIN — FUROSEMIDE 40 MG: 40 TABLET ORAL at 10:06

## 2022-06-10 RX ADMIN — DONEPEZIL HYDROCHLORIDE 10 MG: 5 TABLET, FILM COATED ORAL at 10:06

## 2022-06-10 RX ADMIN — DILTIAZEM HYDROCHLORIDE 60 MG: 60 CAPSULE, EXTENDED RELEASE ORAL at 10:06

## 2022-06-10 RX ADMIN — WHITE PETROLATUM: 1.75 OINTMENT TOPICAL at 10:06

## 2022-06-10 RX ADMIN — PRAMIPEXOLE DIHYDROCHLORIDE 1 MG: 0.25 TABLET ORAL at 10:06

## 2022-06-10 RX ADMIN — Medication 400 MG: at 03:06

## 2022-06-10 RX ADMIN — PANTOPRAZOLE SODIUM 40 MG: 40 TABLET, DELAYED RELEASE ORAL at 10:06

## 2022-06-10 RX ADMIN — WHITE PETROLATUM: 1.75 OINTMENT TOPICAL at 09:06

## 2022-06-10 RX ADMIN — GABAPENTIN 100 MG: 100 CAPSULE ORAL at 10:06

## 2022-06-10 RX ADMIN — VENLAFAXINE HYDROCHLORIDE 37.5 MG: 37.5 CAPSULE, EXTENDED RELEASE ORAL at 05:06

## 2022-06-10 RX ADMIN — FLUTICASONE FUROATE AND VILANTEROL TRIFENATATE 1 PUFF: 100; 25 POWDER RESPIRATORY (INHALATION) at 10:06

## 2022-06-10 RX ADMIN — ATORVASTATIN CALCIUM 40 MG: 40 TABLET, FILM COATED ORAL at 10:06

## 2022-06-10 RX ADMIN — LOSARTAN POTASSIUM 100 MG: 50 TABLET, FILM COATED ORAL at 10:06

## 2022-06-10 NOTE — PLAN OF CARE
Notified by MD that patient will be ready for DC tomorrow. COVID swab ordered in preperation. Spoke with Anabel at [facility] and confirmed that they can take patient back tomorrow. Ebnoie Bunn also notified that patient will DC tomorrow to arrange Trilogy. Will notify CM on call of above.

## 2022-06-10 NOTE — PROGRESS NOTES
"Nutrition   Consult Note      Recommendations:  1- continue heart healthy diet  2- Kang BID with diet advancement for wound healing.   3- Monitor wt and labs.        Reason for Evaluation:  Consult     Diagnosis:    1. Shortness of breath    2. Acute combined systolic and diastolic congestive heart failure    3. Hypoxia          Relevant Medical History:         Past Medical History:   Diagnosis Date    Anxiety disorder, unspecified      Arthritis      Deep vein thrombosis      Depression      Irregular heart beat      Obesity, unspecified      Other pulmonary embolism without acute cor pulmonale      Parkinson's disease      Trimalleolar fracture of ankle, closed      Unspecified dementia without behavioral disturbance              Nutrition Diet History:     Factors affecting nutritional intake: none noted     Food / Uatsdin / Culture Preferences: none noted        Nutrition Prescription Ordered:     Current Diet Order:  heart healthy     Appetite:  good     PO intake: 50-75%        Labs / Medications / Procedures:     Nutrition Related Medications: Vit B12, furosemide, Zofran PRN     Nutrition Related Labs:  6/10/22: Na 133, Cl 93      Anthropometrics:  Height: 5' 8" (1.727 m)  Admit Weight:  Weight: 99.8 kg (220 lb)  Latest Weight:  129.2 kg (284 lb 13.4 oz)         Wt Readings from Last 5 Encounters:   06/03/22 129.2 kg (284 lb 13.4 oz)   05/28/22 110.5 kg (243 lb 8 oz)   05/18/22 111 kg (244 lb 11.4 oz)   04/29/22 111 kg (244 lb 11.4 oz)      IBW: 140 lbs  %IBW: 284.8%  UBW: 240 lbs (109.1 kg)  %Weight Change: +18.42%  Body mass index is 43.31 kg/m².  BMI classification:  Obese Grade III (BMI >/= 40)        Nutrition Narrative:  6/4/22: Consult for skin integrity; noted per documentation, pt has partial thickness tissue loss on right heel. Pt is currently NPO 2/2 lethargy. Pt was awake and alert during rounds. Per family member, pt normally has a great appetite and states that lethargy " occasionally is her norm. Pt denies n/v and wt loss.     6/10: pt on oral diet, tolerating, fair appetite     Monitoring and Evaluation:     Nutrition Monitoring and Evaluation:  food and beverage intake and diet order     Nutrition Risk:  Level of Nutrition Risk:  Low  Frequency of Follow up:  Dietitian will f/up within 7 days.

## 2022-06-10 NOTE — PLAN OF CARE
Call received from Anabel at [facility] asking that we delay transfer until Monday to ensure Trilogy is arranged appropriately. MD aware.

## 2022-06-10 NOTE — PT/OT/SLP PROGRESS
Physical Therapy Treatment    Patient Name:  Emma Jorgensen   MRN:  [id number]    Recommendations:     Discharge Recommendations:  nursing facility, skilled, rehabilitation facility   Discharge Equipment Recommendations: wheelchair   Barriers to discharge:      Assessment:     Emma Jorgensen is a 88 y.o. female admitted with a medical diagnosis of SOB / HF with hx of R ankle fx s/p fusion on 4/26/22.  She presents with the following impairments/functional limitations:  weakness, gait instability, decreased lower extremity function, impaired balance, pain.     Rehab Prognosis: Good; patient would benefit from acute skilled PT services to address these deficits and reach maximum level of function.    Recent Surgery: Procedure(s) (LRB):  Angiogram, Coronary, with Left Heart Cath (N/A)  CATHETERIZATION, HEART, BOTH LEFT AND RIGHT (N/A)  Percutaneous coronary intervention (N/A)  ULTRASOUND, INTRAVASCULAR (N/A)  Instantaneous Wave-Free Ratio (IFR) (N/A)  Valve study-aortic 3 Days Post-Op    Plan:     During this hospitalization, patient to be seen 5 x/week to address the identified rehab impairments via therapeutic activities, therapeutic exercises, gait training and progress toward the following goals:    · Plan of Care Expires:  07/08/22    Subjective     Chief Complaint:   Patient/Family Comments/goals:   Pain/Comfort:  · Location - Side 1: Right  · Location 1: ankle  · Pain Addressed 1: Reposition      Objective:     Communicated with NSG prior to session.  Patient found supine with telemetry, oxygen upon PT entry to room.     General Precautions: Standard, fall, respiratory   Orthopedic Precautions:RLE non weight bearing with CAM boot  Braces: N/A  Respiratory Status: 2 L/ oxymask     Functional Mobility:    Bed: MaxA supine to sit EOB     T/F with RW: MaxA with assistance to maintain NWB R LE, CAM boot donned.     Pt practiced scooting along EOB x 5 reps with ModA.         Patient left HOB elevated with  all lines intact and call button in reach..    GOALS:   Multidisciplinary Problems     Physical Therapy Goals        Problem: Physical Therapy    Goal Priority Disciplines Outcome Goal Variances Interventions   Physical Therapy Goal     PT, PT/OT Ongoing, Progressing     Description: Goals to be met by: 22     Patient will increase functional independence with mobility by performin. Supine to sit with Moderate Assistance  2. Sit to supine with Moderate Assistance  3. Sit to stand transfer with Minimal Assistance  4. Bed to chair transfer with Moderate Assistance using Rolling Walker  5. Increased functional strength to -4/5 on LLE and +3/5 on RLE.                     Time Tracking:     PT Received On:    PT Start Time: 920     PT Stop Time: 944  PT Total Time (min): 24 min     Billable Minutes: Therapeutic Activity 24    Treatment Type: Treatment  PT/PTA: PTA     PTA Visit Number: 2     06/10/2022

## 2022-06-10 NOTE — PROGRESS NOTES
"[facility] Hospital Medicine Progress Note        Chief Complaint: Inpatient Follow-up for Diastolic HF, TAVR w/u     HPI: Emma Jorgensen is a 88 y.o. female with a past medical history of diastolic heart failure (LVEF 55% and grade II diastolic dysfunction from an echo on 05/24/2022),  PAF on Eliquis, moderate to severe aortic stenosis with plans for a TAVR as an outpatient with Cardiology, bilateral carotid artery stenosis, HTN, chronic dementia, previous DVT/PE, and recent surgical fixation of a closed right trimalleolar ankle fracture on 04/26/2022 who presented to [facility] on 6/3/2022 from [facility] beds at the [facility] with complaints of worsening shortness breath that began today. The daughter, who is at bedside, stated that she has been on nasal cannula 2 L/min at the facility and visited the patient yesterday where an aid reportedly informed her that she had an episode of SOB after her "oxygen tank ran out." The daughter also states that she is currently at her cognitive baseline although she is more lethargic than normal.  She denied that she was complaining of any fever, cough, chest pain, nausea, vomiting, abdominal pain, or dysuria.  She was recently admitted and discharged from the hospital at [facility] on 06/01/2022 where she received IV diuretics for acute decompensated diastolic heart failure. Her symptoms and respiratory status improved so she was transferred back to the SNF.   Her initial vital signs showed that she was afebrile with an RR 24, HR 82, BP 99/55, and SpO2 100% on nasal cannula 6 L/min.  Her labs show mild hyponatremia, mild hypochloremia, a bicarb 35, and a .1.  COVID-19 and influenza negative. CXR showed a low increase in interstitial and pulmonary vascular markings. More confluent airspace opacities in the left infrahilar region and left base infiltrate cannot be completely excluded.  Patient admitted for resp failure d/t dchf exacerbation.  Patient with severe " aortic stenosis.  [facility] taken for left heart catheterization for TAVR planning.    Interval Hx:   Elderly female, lying in bed.  Discussed her CT scan were delayed because they did not realized that they do not have the parts need  to run that CT.  Discussed CTS also recommended out pt f/u for TAVR.  No family at bedside  Case was discussed with patient's nurse      Objective/physical exam:  General: In no acute distress, afebrile, elderly female   Chest: Clear to auscultation bilaterally anteriorly, currently on oxymask  Heart: RRR, +S1, S2, 2/6 systolic murmur noted   Abdomen: Soft, nontender, BS +  MSK: Warm,Right foot in cam boot  Neurologic: Alert and oriented x4, Cranial nerve II-XII intact, Strength 5/5 in all 4 extremities    VITAL SIGNS: 24 HRS MIN & MAX LAST   Temp  Min: 98 °F (36.7 °C)  Max: 99.3 °F (37.4 °C) 98.2 °F (36.8 °C)   BP  Min: 104/55  Max: 175/74 134/62   Pulse  Min: 62  Max: 83  78   Resp  Min: 17  Max: 20 17   SpO2  Min: 95 %  Max: 100 % 97 %       Recent Labs   Lab 06/07/22  0757 06/09/22  0404 06/10/22  0333   WBC 8.4 8.9 7.9   RBC 3.56* 2.92* 3.34*   HGB 10.9* 8.8* 10.2*   HCT 36.1* 28.0* 32.3*   .4* 95.9* 96.7*   MCH 30.6 30.1 30.5   MCHC 30.2* 31.4* 31.6*   RDW 13.3 13.2 13.4    380 365   MPV 9.5 10.2 9.6       Recent Labs   Lab 06/03/22  1234 06/03/22  1513 06/04/22  0420 06/05/22  0322 06/06/22  0524 06/07/22  0757 06/07/22  0853 06/09/22  0404 06/10/22  0333   *  --  134*   < > 134* 135*  --  133* 133*   K 4.1  --  3.9   < > 3.4* 4.9  --  4.5 4.5   CO2 35*  --  36*   < > 34* 32*  --  29 31   BUN 13.1  --  14.1   < > 11.8 10.8  --  10.4 10.7   CREATININE 0.74  --  0.83   < > 0.60 0.71  --  0.68 0.65   CALCIUM 8.8  --  9.1   < > 9.2 9.3  --  9.4 8.7   PH  --  7.43  --   --   --   --  7.44  --   --    MG  --   --   --    < > 1.90 2.00  --  1.70  --    ALBUMIN 2.8*  --  2.7*  --   --   --   --  2.8*  --    ALKPHOS 82  --  78  --   --   --   --  81  --    ALT 8   --  6  --   --   --   --  5  --    AST 17  --  13  --   --   --   --  24  --    BILITOT 0.3  --  0.5  --   --   --   --  0.5  --     < > = values in this interval not displayed.          Microbiology Results (last 7 days)     Procedure Component Value Units Date/Time    Blood Culture [[id number]]  (Normal) Collected: 06/03/22 1514    Order Status: Completed Specimen: Blood from Arm, Left Updated: 06/08/22 1803     CULTURE, BLOOD ([facility]) No Growth at 5 days    Blood Culture [[id number]]  (Normal) Collected: 06/03/22 1532    Order Status: Completed Specimen: Blood from Hand, Left Updated: 06/08/22 1803     CULTURE, BLOOD ([facility]) No Growth at 5 days    Respiratory Culture [[id number]]     Order Status: Sent Specimen: Sputum, Expectorated            See below for Radiology    Scheduled Med:   albuterol  2 puff Inhalation Q6H    apixaban  5 mg Oral BID    atorvastatin  40 mg Oral QHS    clopidogreL  75 mg Oral Daily    cyanocobalamin  500 mcg Oral Daily    diltiaZEM  60 mg Oral BID    donepeziL  10 mg Oral Nightly    fluticasone furoate-vilanteroL  1 puff Inhalation Daily    furosemide  40 mg Oral Daily    gabapentin  100 mg Oral Nightly    losartan  100 mg Oral Daily    metoprolol tartrate  25 mg Oral BID    pantoprazole  40 mg Oral Daily    pramipexole  1 mg Oral Nightly    venlafaxine  37.5 mg Oral Daily    white petrolatum   Topical (Top) BID        Continuous Infusions:       PRN Meds:  acetaminophen, aspirin, clopidogreL, diphenhydrAMINE, fentaNYL, heparin (porcine), HYDROcodone-acetaminophen, iopamidoL, LIDOcaine HCL 20 mg/ml (2%), midazolam, morphine, ondansetron, ondansetron, sodium chloride 0.9%       Assessment/Plan:  Acute on Chronic Diastolic CHF- resolved   Moderate Aortic Stenosis per Echo 5.24.22--> plans for a TAVR as an outpatient with Cardiology/ CTS  CAD/stent to LAD 6/7  RLE pain in foot --> mild wound dehiscence at the surgical site, Osteomyelitis ruled out   Acute on chronic hypoxemic  respiratory failure, improving   HTN  GERD  Hx of DVT and PE  Parkinson's Disease   Macrocytic anemia  History of PAF on Eliquis  History of bilateral carotid artery stenosis  History of chronic dementia at cognitive baseline per daughter  History of  recent surgical fixation of a closed right trimalleolar ankle fracture on 04/26/2022  Hypomagnesemia  Hypokalemia- resolved         Admitted to hospitalist service on 6/3  S/p LHC on 6/7 , stent was placed - Cont DAPT (Plavix/ Eliquis)  Plans for CTA Chest, abdomen, pelvis for out patient TAVR.  CTS also evaluated the pt, agree with out pt procedure   Trilogy ordered, CM aware  Vitals stable on 2 L of oxygen.    CT right foot,XR tib/fib, foot reviewed- Subacute fracture/stable, orthopedic evaluated patient recommended to avoid ambulation till surgical wound heals.  Continue wound care   IV lasix transitioned to PO lasix 6/9  Cont diltiazem 60 mg b.i.d. for heart rate and blood pressure control, Losartan 40 gm daily, atorvastatin 40mg hs, lopressor 25 mg BID   Monitor daily standing weights and accurate I&Os  She recently had an echocardiogram done 05/24/2022 that revealed LVEF 55% and grade II diastolic dysfunction so no need to repeat  Morning CBC wo, BMP stable     VTE Prophylaxis: On Eliquis for DVT prophylaxis    Patient condition:  Stable    Anticipated discharge and Disposition: Probably discharge tomorrow to [facility] once CT scans completed today       All diagnosis and differential diagnosis have been reviewed; assessment and plan has been documented; I have personally reviewed the labs and test results that are presently available; I have reviewed the patients medication list; I have reviewed the consulting providers response and recommendations. I have reviewed or attempted to review medical records based upon their availability    All of the patient's questions have been  addressed and answered. Patient's is agreeable to the above stated plan. I will  continue to monitor closely and make adjustments to medical management as needed.  _____________________________________________________________________      Radiology:  CV Ultrasound Pseudoaneurysm Evaluation/Treatment  · No pseudoaneurysm was found in the right common femoral artery.     There was no evidence of pseudoaneurysm or hematoma in the groin of the   right lower extremity.     Cardiac catheterization  · The Mid LAD lesion was 85% stenosed with 0% stenosis post-intervention.  · A stent was successfully placed at 12 LETTY for 10 sec.  · The estimated blood loss was <50 mL.  · There was single vessel coronary artery disease.  · The PCI was successful.  · The coronary FFR was abnormal.  · The filling pressures mildly elevated.     impression:  1. Severe aortic stenosis  2. CAD status post PCI to mid LAD  3. Moderate pulmonary hypertension  4. High cardiac output of unknown etiology    Plan:  1. Routine postop care for underlying CAD.  Aggressive medical management  2. Refer for aortic valve TAVR.    The procedure log was documented by Documenter: Bernie Mayers RN and   verified by Claude Morillo MD.    Date: 6/7/2022  Time: 12:33 PM  CT Foot Without Contrast Right  COMPARISON:  Right ankle x-rays dated 06/07/2022 and 04/26/2022  FINDINGS:  Impression: 1. Postoperative changes of tibiotalar and subtalar joint arthrodesis with intramedullary nail traversing the visualized distal tibia, talus, and calcaneus are seen.  There appears to be evidence of cortical/trabecular fracture involving the plantar aspect of the calcaneus at the site of intramedullary nail and treat.  Subtle sclerosis about the fracture suggest subacute fracture.  There is also linear vertical lucency along the posterior aspect of the talus extending to the intramedullary nail suggestive of subacute fracture.  2. There is heterogeneous osseous demineralization which could be related to disuse.  This lowers the sensitivity and specificity of the  study for osteomyelitis.  There appears to be evidence of cortical osteolysis along the proximal aspect of the cuboid bone which raises concern for possible osteomyelitis.  There is also evidence of erosion along the base of the 5th metatarsal.  There are additional patchy areas of periarticular demineralization involving the tarsal bones, talus, calcaneus, and distal tibia which could be related to disuse, reflex sympathetic dystrophy, and/or changes of osteomyelitis.  The possibility of inflammatory arthropathies resulting erosions is not entirely excluded.  3. There appears to be evidence of soft tissue defect/wound along the plantar aspect of the hindfoot.  There is subcutaneous soft tissue edema about the included foot and ankle which may be related to cellulitis in the setting of infection.  4. Healing medial and posterior malleolar fractures are partially visualized.  The distal fibular fracture seen on prior plain films is not included in the field of imaging.  5. Additional findings and details as above.    Electronically signed by: Zeferino Bailey MD  Date:    06/08/2022  Time:    09:41      Adithya Rothman MD   06/10/2022

## 2022-06-11 PROCEDURE — 25000003 PHARM REV CODE 250: Performed by: INTERNAL MEDICINE

## 2022-06-11 PROCEDURE — 25000003 PHARM REV CODE 250: Performed by: NURSE PRACTITIONER

## 2022-06-11 PROCEDURE — 11000001 HC ACUTE MED/SURG PRIVATE ROOM

## 2022-06-11 PROCEDURE — 94761 N-INVAS EAR/PLS OXIMETRY MLT: CPT

## 2022-06-11 PROCEDURE — 27000221 HC OXYGEN, UP TO 24 HOURS

## 2022-06-11 PROCEDURE — 99900035 HC TECH TIME PER 15 MIN (STAT)

## 2022-06-11 RX ADMIN — ALBUTEROL SULFATE 2 PUFF: 90 AEROSOL, METERED RESPIRATORY (INHALATION) at 06:06

## 2022-06-11 RX ADMIN — DILTIAZEM HYDROCHLORIDE 60 MG: 60 CAPSULE, EXTENDED RELEASE ORAL at 09:06

## 2022-06-11 RX ADMIN — VENLAFAXINE HYDROCHLORIDE 37.5 MG: 37.5 CAPSULE, EXTENDED RELEASE ORAL at 06:06

## 2022-06-11 RX ADMIN — PRAMIPEXOLE DIHYDROCHLORIDE 1 MG: 0.25 TABLET ORAL at 08:06

## 2022-06-11 RX ADMIN — GABAPENTIN 100 MG: 100 CAPSULE ORAL at 08:06

## 2022-06-11 RX ADMIN — Medication 400 MG: at 09:06

## 2022-06-11 RX ADMIN — HYDROCODONE BITARTRATE AND ACETAMINOPHEN 1 TABLET: 5; 325 TABLET ORAL at 02:06

## 2022-06-11 RX ADMIN — FUROSEMIDE 40 MG: 40 TABLET ORAL at 09:06

## 2022-06-11 RX ADMIN — CLOPIDOGREL 75 MG: 75 TABLET, FILM COATED ORAL at 09:06

## 2022-06-11 RX ADMIN — DILTIAZEM HYDROCHLORIDE 60 MG: 60 CAPSULE, EXTENDED RELEASE ORAL at 08:06

## 2022-06-11 RX ADMIN — ATORVASTATIN CALCIUM 40 MG: 40 TABLET, FILM COATED ORAL at 08:06

## 2022-06-11 RX ADMIN — APIXABAN 5 MG: 5 TABLET, FILM COATED ORAL at 09:06

## 2022-06-11 RX ADMIN — CYANOCOBALAMIN TAB 500 MCG 500 MCG: 500 TAB at 09:06

## 2022-06-11 RX ADMIN — FLUTICASONE FUROATE AND VILANTEROL TRIFENATATE 1 PUFF: 100; 25 POWDER RESPIRATORY (INHALATION) at 09:06

## 2022-06-11 RX ADMIN — PANTOPRAZOLE SODIUM 40 MG: 40 TABLET, DELAYED RELEASE ORAL at 09:06

## 2022-06-11 RX ADMIN — METOPROLOL TARTRATE 25 MG: 25 TABLET, FILM COATED ORAL at 09:06

## 2022-06-11 RX ADMIN — LOSARTAN POTASSIUM 100 MG: 50 TABLET, FILM COATED ORAL at 09:06

## 2022-06-11 RX ADMIN — HYDROCODONE BITARTRATE AND ACETAMINOPHEN 1 TABLET: 5; 325 TABLET ORAL at 01:06

## 2022-06-11 RX ADMIN — METOPROLOL TARTRATE 25 MG: 25 TABLET, FILM COATED ORAL at 08:06

## 2022-06-11 RX ADMIN — WHITE PETROLATUM: 1.75 OINTMENT TOPICAL at 09:06

## 2022-06-11 RX ADMIN — DONEPEZIL HYDROCHLORIDE 10 MG: 5 TABLET, FILM COATED ORAL at 08:06

## 2022-06-11 RX ADMIN — APIXABAN 5 MG: 5 TABLET, FILM COATED ORAL at 08:06

## 2022-06-11 RX ADMIN — HYDROCODONE BITARTRATE AND ACETAMINOPHEN 1 TABLET: 5; 325 TABLET ORAL at 08:06

## 2022-06-11 NOTE — PROGRESS NOTES
"[facility] Hospital Medicine Progress Note        Chief Complaint: Inpatient Follow-up for Diastolic HF, TAVR w/u     HPI: Emma Jorgensen is a 88 y.o. female with a past medical history of diastolic heart failure (LVEF 55% and grade II diastolic dysfunction from an echo on 05/24/2022),  PAF on Eliquis, moderate to severe aortic stenosis with plans for a TAVR as an outpatient with Cardiology, bilateral carotid artery stenosis, HTN, chronic dementia, previous DVT/PE, and recent surgical fixation of a closed right trimalleolar ankle fracture on 04/26/2022 who presented to [facility] on 6/3/2022 from [facility] with complaints of worsening shortness breath that began today. The daughter, who is at bedside, stated that she has been on nasal cannula 2 L/min at the facility and visited the patient yesterday where an aid reportedly informed her that she had an episode of SOB after her "oxygen tank ran out." The daughter also states that she is currently at her cognitive baseline although she is more lethargic than normal.  She denied that she was complaining of any fever, cough, chest pain, nausea, vomiting, abdominal pain, or dysuria.  She was recently admitted and discharged from the hospital at [facility] on 06/01/2022 where she received IV diuretics for acute decompensated diastolic heart failure. Her symptoms and respiratory status improved so she was transferred back to the SNF.   Her initial vital signs showed that she was afebrile with an RR 24, HR 82, BP 99/55, and SpO2 100% on nasal cannula 6 L/min.  Her labs show mild hyponatremia, mild hypochloremia, a bicarb 35, and a .1.  COVID-19 and influenza negative. CXR showed a low increase in interstitial and pulmonary vascular markings. More confluent airspace opacities in the left infrahilar region and left base infiltrate cannot be completely excluded.  Patient admitted for resp failure d/t dchf exacerbation.  Patient with severe " aortic stenosis.  [facility] taken for left heart catheterization for TAVR planning.    Interval Hx:   Elderly female, lying in bed.  Discussed her CT scan were delayed because they still do not have the parts need  to run that CT and they spoke to [facility], and they will do the imaging at their office as out patient .  Discussed [facility] requested we hold the discharge till Monday so they can arrange her TRilogy  No family at bedside  Case was discussed with patient's nurse and also with [facility] who informed me that we can discharge the pt without the CTA and they will arrange it as out pt       Objective/physical exam:  General: In no acute distress, afebrile, elderly female   Chest: Clear to auscultation bilaterally anteriorly, currently on oxymask as she is a mouth breather   Heart: RRR, +S1, S2, 2/6 systolic murmur noted   Abdomen: Soft, nontender, BS +  MSK: Warm,Right foot in cam boot  Neurologic: Alert and oriented x4, Cranial nerve II-XII intact, Strength 5/5 in all 4 extremities    VITAL SIGNS: 24 HRS MIN & MAX LAST   Temp  Min: 97.6 °F (36.4 °C)  Max: 98.7 °F (37.1 °C) 98 °F (36.7 °C)   BP  Min: 114/65  Max: 155/73 (!) 149/69   Pulse  Min: 62  Max: 87  72   Resp  Min: 17  Max: 18 18   SpO2  Min: 96 %  Max: 100 % 96 %       Recent Labs   Lab 06/07/22  0757 06/09/22  0404 06/10/22  0333   WBC 8.4 8.9 7.9   RBC 3.56* 2.92* 3.34*   HGB 10.9* 8.8* 10.2*   HCT 36.1* 28.0* 32.3*   .4* 95.9* 96.7*   MCH 30.6 30.1 30.5   MCHC 30.2* 31.4* 31.6*   RDW 13.3 13.2 13.4    380 365   MPV 9.5 10.2 9.6       Recent Labs   Lab 06/06/22  0524 06/07/22  0757 06/07/22  0853 06/09/22  0404 06/10/22  0333   * 135*  --  133* 133*   K 3.4* 4.9  --  4.5 4.5   CO2 34* 32*  --  29 31   BUN 11.8 10.8  --  10.4 10.7   CREATININE 0.60 0.71  --  0.68 0.65   CALCIUM 9.2 9.3  --  9.4 8.7   PH  --   --  7.44  --   --    MG 1.90 2.00  --  1.70  --    ALBUMIN  --   --   --  2.8*  --    ALKPHOS  --   --   --  81  --     ALT  --   --   --  5  --    AST  --   --   --  24  --    BILITOT  --   --   --  0.5  --           Microbiology Results (last 7 days)     Procedure Component Value Units Date/Time    Blood Culture [[id number]]  (Normal) Collected: 06/03/22 1514    Order Status: Completed Specimen: Blood from Arm, Left Updated: 06/08/22 1803     CULTURE, BLOOD ([facility]) No Growth at 5 days    Blood Culture [[id number]]  (Normal) Collected: 06/03/22 1532    Order Status: Completed Specimen: Blood from Hand, Left Updated: 06/08/22 1803     CULTURE, BLOOD ([facility]) No Growth at 5 days           See below for Radiology    Scheduled Med:   albuterol  2 puff Inhalation Q6H    apixaban  5 mg Oral BID    atorvastatin  40 mg Oral QHS    clopidogreL  75 mg Oral Daily    cyanocobalamin  500 mcg Oral Daily    diltiaZEM  60 mg Oral BID    donepeziL  10 mg Oral Nightly    fluticasone furoate-vilanteroL  1 puff Inhalation Daily    furosemide  40 mg Oral Daily    gabapentin  100 mg Oral Nightly    losartan  100 mg Oral Daily    magnesium oxide  400 mg Oral Daily    metoprolol tartrate  25 mg Oral BID    pantoprazole  40 mg Oral Daily    pramipexole  1 mg Oral Nightly    venlafaxine  37.5 mg Oral Daily    white petrolatum   Topical (Top) BID        Continuous Infusions:       PRN Meds:  acetaminophen, aspirin, clopidogreL, diphenhydrAMINE, fentaNYL, heparin (porcine), HYDROcodone-acetaminophen, iopamidoL, LIDOcaine HCL 20 mg/ml (2%), midazolam, morphine, ondansetron, ondansetron, sodium chloride 0.9%       Assessment/Plan:  Acute on Chronic Diastolic CHF- resolved   Moderate Aortic Stenosis per Echo 5.24.22--> plans for a TAVR as an outpatient with Cardiology/ [facility]  CAD/stent to LAD 6/7  RLE pain in foot --> mild wound dehiscence at the surgical site, Osteomyelitis ruled out   Acute on chronic hypoxemic respiratory failure, improving   HTN  GERD  Hx of DVT and PE  Parkinson's Disease   Macrocytic anemia  History of PAF on Eliquis  History  of bilateral carotid artery stenosis  History of chronic dementia at cognitive baseline per daughter  History of  recent surgical fixation of a closed right trimalleolar ankle fracture on 04/26/2022  Hypomagnesemia  Hypokalemia- resolved         Admitted to hospitalist service on 6/3  S/p [facility] on 6/7 , stent was placed - Cont DAPT (Plavix/ Eliquis)  Plans for CTA Chest, abdomen, pelvis will now be done as out patient given the part is unavailable at our CT Scan department. [facility] made this decision Friday 6/10  CTS also evaluated the pt, agree with out pt procedure   Trilogy ordered, CM aware, [facility] cat arrange before Monday  Vitals stable on 2 L of oxygen.    CT right foot,XR tib/fib, foot reviewed- Subacute fracture/stable, orthopedic evaluated patient recommended to avoid ambulation till surgical wound heals.  Continue wound care   IV lasix transitioned to PO lasix 6/9  Cont diltiazem 60 mg b.i.d. for heart rate and blood pressure control, Losartan 40 gm daily, atorvastatin 40mg hs, lopressor 25 mg BID   Monitor daily standing weights and accurate I&Os  She recently had an echocardiogram done 05/24/2022 that revealed LVEF 55% and grade II diastolic dysfunction so no need to repeat  CBC wo, BMP stable     VTE Prophylaxis: On Eliquis for DVT prophylaxis    Patient condition:  Stable    Anticipated discharge and Disposition:  Monday to [facility] as they could not arrange Trilogy over the weekend   Pt is medically cleared for discharge  [facility] to schedule out pt CTA for TAVR w/u    All diagnosis and differential diagnosis have been reviewed; assessment and plan has been documented; I have personally reviewed the labs and test results that are presently available; I have reviewed the patients medication list; I have reviewed the consulting providers response and recommendations. I have reviewed or attempted to review medical records based upon their availability    All of the patient's questions have been   addressed and answered. Patient's is agreeable to the above stated plan. I will continue to monitor closely and make adjustments to medical management as needed.  _____________________________________________________________________      Radiology:  CV Ultrasound Pseudoaneurysm Evaluation/Treatment  · No pseudoaneurysm was found in the right common femoral artery.     There was no evidence of pseudoaneurysm or hematoma in the groin of the   right lower extremity.     Cardiac catheterization  · The Mid LAD lesion was 85% stenosed with 0% stenosis post-intervention.  · A stent was successfully placed at 12 LETTY for 10 sec.  · The estimated blood loss was <50 mL.  · There was single vessel coronary artery disease.  · The PCI was successful.  · The coronary FFR was abnormal.  · The filling pressures mildly elevated.     impression:  1. Severe aortic stenosis  2. CAD status post PCI to mid LAD  3. Moderate pulmonary hypertension  4. High cardiac output of unknown etiology    Plan:  1. Routine postop care for underlying CAD.  Aggressive medical management  2. Refer for aortic valve TAVR.    The procedure log was documented by Documenter: Bernie Mayers RN and   verified by Claude Morillo MD.    Date: 6/7/2022  Time: 12:33 PM  CT Foot Without Contrast Right  COMPARISON:  Right ankle x-rays dated 06/07/2022 and 04/26/2022  FINDINGS:  Impression: 1. Postoperative changes of tibiotalar and subtalar joint arthrodesis with intramedullary nail traversing the visualized distal tibia, talus, and calcaneus are seen.  There appears to be evidence of cortical/trabecular fracture involving the plantar aspect of the calcaneus at the site of intramedullary nail and treat.  Subtle sclerosis about the fracture suggest subacute fracture.  There is also linear vertical lucency along the posterior aspect of the talus extending to the intramedullary nail suggestive of subacute fracture.  2. There is heterogeneous osseous demineralization which  could be related to disuse.  This lowers the sensitivity and specificity of the study for osteomyelitis.  There appears to be evidence of cortical osteolysis along the proximal aspect of the cuboid bone which raises concern for possible osteomyelitis.  There is also evidence of erosion along the base of the 5th metatarsal.  There are additional patchy areas of periarticular demineralization involving the tarsal bones, talus, calcaneus, and distal tibia which could be related to disuse, reflex sympathetic dystrophy, and/or changes of osteomyelitis.  The possibility of inflammatory arthropathies resulting erosions is not entirely excluded.  3. There appears to be evidence of soft tissue defect/wound along the plantar aspect of the hindfoot.  There is subcutaneous soft tissue edema about the included foot and ankle which may be related to cellulitis in the setting of infection.  4. Healing medial and posterior malleolar fractures are partially visualized.  The distal fibular fracture seen on prior plain films is not included in the field of imaging.  5. Additional findings and details as above.    Electronically signed by: Zeferino Bailey MD  Date:    06/08/2022  Time:    09:41      Adithya Rothman MD   06/11/2022

## 2022-06-12 PROBLEM — I48.0 PAF (PAROXYSMAL ATRIAL FIBRILLATION): Status: ACTIVE | Noted: 2022-06-12

## 2022-06-12 PROCEDURE — 25000003 PHARM REV CODE 250: Performed by: INTERNAL MEDICINE

## 2022-06-12 PROCEDURE — 25000003 PHARM REV CODE 250: Performed by: NURSE PRACTITIONER

## 2022-06-12 PROCEDURE — 94761 N-INVAS EAR/PLS OXIMETRY MLT: CPT

## 2022-06-12 PROCEDURE — 11000001 HC ACUTE MED/SURG PRIVATE ROOM

## 2022-06-12 PROCEDURE — 27000221 HC OXYGEN, UP TO 24 HOURS

## 2022-06-12 RX ADMIN — METOPROLOL TARTRATE 25 MG: 25 TABLET, FILM COATED ORAL at 08:06

## 2022-06-12 RX ADMIN — CLOPIDOGREL 75 MG: 75 TABLET, FILM COATED ORAL at 08:06

## 2022-06-12 RX ADMIN — LOSARTAN POTASSIUM 100 MG: 50 TABLET, FILM COATED ORAL at 08:06

## 2022-06-12 RX ADMIN — ALBUTEROL SULFATE 2 PUFF: 90 AEROSOL, METERED RESPIRATORY (INHALATION) at 12:06

## 2022-06-12 RX ADMIN — GABAPENTIN 100 MG: 100 CAPSULE ORAL at 08:06

## 2022-06-12 RX ADMIN — ATORVASTATIN CALCIUM 40 MG: 40 TABLET, FILM COATED ORAL at 08:06

## 2022-06-12 RX ADMIN — ALBUTEROL SULFATE 2 PUFF: 90 AEROSOL, METERED RESPIRATORY (INHALATION) at 05:06

## 2022-06-12 RX ADMIN — PRAMIPEXOLE DIHYDROCHLORIDE 1 MG: 0.25 TABLET ORAL at 08:06

## 2022-06-12 RX ADMIN — DILTIAZEM HYDROCHLORIDE 60 MG: 60 CAPSULE, EXTENDED RELEASE ORAL at 08:06

## 2022-06-12 RX ADMIN — APIXABAN 5 MG: 5 TABLET, FILM COATED ORAL at 08:06

## 2022-06-12 RX ADMIN — WHITE PETROLATUM: 1.75 OINTMENT TOPICAL at 08:06

## 2022-06-12 RX ADMIN — DONEPEZIL HYDROCHLORIDE 10 MG: 5 TABLET, FILM COATED ORAL at 08:06

## 2022-06-12 RX ADMIN — VENLAFAXINE HYDROCHLORIDE 37.5 MG: 37.5 CAPSULE, EXTENDED RELEASE ORAL at 05:06

## 2022-06-12 RX ADMIN — FUROSEMIDE 40 MG: 40 TABLET ORAL at 08:06

## 2022-06-12 RX ADMIN — FLUTICASONE FUROATE AND VILANTEROL TRIFENATATE 1 PUFF: 100; 25 POWDER RESPIRATORY (INHALATION) at 08:06

## 2022-06-12 RX ADMIN — PANTOPRAZOLE SODIUM 40 MG: 40 TABLET, DELAYED RELEASE ORAL at 08:06

## 2022-06-12 RX ADMIN — Medication 400 MG: at 08:06

## 2022-06-12 RX ADMIN — HYDROCODONE BITARTRATE AND ACETAMINOPHEN 1 TABLET: 5; 325 TABLET ORAL at 08:06

## 2022-06-12 RX ADMIN — CYANOCOBALAMIN TAB 500 MCG 500 MCG: 500 TAB at 08:06

## 2022-06-12 NOTE — PROGRESS NOTES
"[facility] Hospital Medicine Progress Note        Chief Complaint: Inpatient Follow-up for Diastolic HF, TAVR w/u     HPI: Emma Jorgensne is a 88 y.o. female with a past medical history of diastolic heart failure (LVEF 55% and grade II diastolic dysfunction from an echo on 05/24/2022),  PAF on Eliquis, moderate to severe aortic stenosis with plans for a TAVR as an outpatient with Cardiology, bilateral carotid artery stenosis, HTN, chronic dementia, previous DVT/PE, and recent surgical fixation of a closed right trimalleolar ankle fracture on 04/26/2022 who presented to [facility] on 6/3/2022 from [facility] with complaints of worsening shortness breath that began today. The daughter, who is at bedside, stated that she has been on nasal cannula 2 L/min at the facility and visited the patient yesterday where an aid reportedly informed her that she had an episode of SOB after her "oxygen tank ran out." The daughter also states that she is currently at her cognitive baseline although she is more lethargic than normal.  She denied that she was complaining of any fever, cough, chest pain, nausea, vomiting, abdominal pain, or dysuria.  She was recently admitted and discharged from the hospital at [facility] on 06/01/2022 where she received IV diuretics for acute decompensated diastolic heart failure. Her symptoms and respiratory status improved so she was transferred back to the SNF.   Her initial vital signs showed that she was afebrile with an RR 24, HR 82, BP 99/55, and SpO2 100% on nasal cannula 6 L/min.  Her labs show mild hyponatremia, mild hypochloremia, a bicarb 35, and a .1.  COVID-19 and influenza negative. CXR showed a low increase in interstitial and pulmonary vascular markings. More confluent airspace opacities in the left infrahilar region and left base infiltrate cannot be completely excluded.  Patient admitted for resp failure d/t dchf exacerbation.  Patient with severe " aortic stenosis.  [facility] taken for left heart catheterization for TAVR planning.    Interval Hx:   Elderly female, lying in bed.   Discussed [place] [employer] requested we hold the discharge till Monday so they can arrange her TRilogy  No family at bedside  Case was discussed with patient's nurse     Objective/physical exam:  General: In no acute distress, afebrile, elderly female   Chest: Clear to auscultation bilaterally anteriorly, currently on oxymask as she is a mouth breather   Heart: RRR, +S1, S2, 2/6 systolic murmur noted   Abdomen: Soft, nontender, BS +  MSK: Warm,Right heel with dressing  Neurologic: Alert and oriented x4, Cranial nerve II-XII intact, Strength 4/5 in B.L LE, 5/5 UE    VITAL SIGNS: 24 HRS MIN & MAX LAST   Temp  Min: 97.7 °F (36.5 °C)  Max: 98.2 °F (36.8 °C) 97.9 °F (36.6 °C)   BP  Min: 115/68  Max: 146/67 117/66   Pulse  Min: 52  Max: 82  61   Resp  Min: 16  Max: 18 17   SpO2  Min: 98 %  Max: 100 % 98 %       Recent Labs   Lab 06/07/22  0757 06/09/22  0404 06/10/22  0333   WBC 8.4 8.9 7.9   RBC 3.56* 2.92* 3.34*   HGB 10.9* 8.8* 10.2*   HCT 36.1* 28.0* 32.3*   .4* 95.9* 96.7*   MCH 30.6 30.1 30.5   MCHC 30.2* 31.4* 31.6*   RDW 13.3 13.2 13.4    380 365   MPV 9.5 10.2 9.6       Recent Labs   Lab 06/06/22  0524 06/07/22  0757 06/07/22  0853 06/09/22  0404 06/10/22  0333   * 135*  --  133* 133*   K 3.4* 4.9  --  4.5 4.5   CO2 34* 32*  --  29 31   BUN 11.8 10.8  --  10.4 10.7   CREATININE 0.60 0.71  --  0.68 0.65   CALCIUM 9.2 9.3  --  9.4 8.7   PH  --   --  7.44  --   --    MG 1.90 2.00  --  1.70  --    ALBUMIN  --   --   --  2.8*  --    ALKPHOS  --   --   --  81  --    ALT  --   --   --  5  --    AST  --   --   --  24  --    BILITOT  --   --   --  0.5  --           Microbiology Results (last 7 days)     Procedure Component Value Units Date/Time    Blood Culture [[id number]]  (Normal) Collected: 06/03/22 1514    Order Status: Completed Specimen: Blood from Arm, Left  Updated: 06/08/22 1803     CULTURE, BLOOD ([facility]) No Growth at 5 days    Blood Culture [[id number]]  (Normal) Collected: 06/03/22 1532    Order Status: Completed Specimen: Blood from Hand, Left Updated: 06/08/22 1803     CULTURE, BLOOD ([facility]) No Growth at 5 days           See below for Radiology    Scheduled Med:   albuterol  2 puff Inhalation Q6H    apixaban  5 mg Oral BID    atorvastatin  40 mg Oral QHS    clopidogreL  75 mg Oral Daily    cyanocobalamin  500 mcg Oral Daily    diltiaZEM  60 mg Oral BID    donepeziL  10 mg Oral Nightly    fluticasone furoate-vilanteroL  1 puff Inhalation Daily    furosemide  40 mg Oral Daily    gabapentin  100 mg Oral Nightly    losartan  100 mg Oral Daily    magnesium oxide  400 mg Oral Daily    metoprolol tartrate  25 mg Oral BID    pantoprazole  40 mg Oral Daily    pramipexole  1 mg Oral Nightly    venlafaxine  37.5 mg Oral Daily    white petrolatum   Topical (Top) BID        Continuous Infusions:       PRN Meds:  acetaminophen, aspirin, clopidogreL, diphenhydrAMINE, fentaNYL, heparin (porcine), HYDROcodone-acetaminophen, iopamidoL, LIDOcaine HCL 20 mg/ml (2%), midazolam, morphine, ondansetron, ondansetron, sodium chloride 0.9%       Assessment/Plan:  Acute on Chronic Diastolic CHF- resolved   Moderate Aortic Stenosis per Echo 5.24.22--> plans for a TAVR as an outpatient with Cardiology/ CTS  CAD/stent to LAD 6/7  RLE pain in foot --> mild wound dehiscence at the surgical site, Osteomyelitis ruled out   Acute on chronic hypoxemic respiratory failure, improving   HTN  GERD  Hx of DVT and PE  Parkinson's Disease   Macrocytic anemia  History of PAF on Eliquis  History of bilateral carotid artery stenosis  History of chronic dementia at cognitive baseline per daughter  History of  recent surgical fixation of a closed right trimalleolar ankle fracture on 04/26/2022  Hypomagnesemia  Hypokalemia- resolved         Admitted to hospitalist service on 6/3  S/p [facility] on 6/7 ,  stent was placed - Cont DAPT (Plavix/ Eliquis) per cards   Plans for CTA Chest, abdomen, pelvis will now be done as out patient given the part is unavailable at our CT Scan department. [facility] made this decision Friday 6/10  CTS also evaluated the pt, agree with out pt procedure   Trilogy ordered, CM aware, [facility] cat arrange before Monday  Vitals stable on 2 L of oxygen.    CT right foot,XR tib/fib, foot reviewed- Subacute fracture/stable, orthopedic evaluated patient recommended to avoid ambulation till surgical wound heals.  Continue wound care   IV lasix transitioned to PO lasix 6/9  Cont diltiazem 60 mg b.i.d. for heart rate and blood pressure control, Losartan 40 gm daily, atorvastatin 40mg hs, lopressor 25 mg BID   Monitor daily standing weights and accurate I&Os  She recently had an echocardiogram done 05/24/2022 that revealed LVEF 55% and grade II diastolic dysfunction so no need to repeat  CBC wo, BMP stable     VTE Prophylaxis: On Eliquis for DVT prophylaxis    Patient condition:  Stable    Anticipated discharge and Disposition:  Monday to [facility] as they could not arrange Trilogy over the weekend   Pt is medically cleared for discharge  [facility] to schedule out pt CTA for TAVR w/u  Patient advised to avoid ambulation till surgical wound heals    All diagnosis and differential diagnosis have been reviewed; assessment and plan has been documented; I have personally reviewed the labs and test results that are presently available; I have reviewed the patients medication list; I have reviewed the consulting providers response and recommendations. I have reviewed or attempted to review medical records based upon their availability    All of the patient's questions have been  addressed and answered. Patient's is agreeable to the above stated plan. I will continue to monitor closely and make adjustments to medical management as  needed.  _____________________________________________________________________      Radiology:  CV Ultrasound Pseudoaneurysm Evaluation/Treatment  · No pseudoaneurysm was found in the right common femoral artery.     There was no evidence of pseudoaneurysm or hematoma in the groin of the   right lower extremity.     Cardiac catheterization  · The Mid LAD lesion was 85% stenosed with 0% stenosis post-intervention.  · A stent was successfully placed at 12 LETTY for 10 sec.  · The estimated blood loss was <50 mL.  · There was single vessel coronary artery disease.  · The PCI was successful.  · The coronary FFR was abnormal.  · The filling pressures mildly elevated.     impression:  1. Severe aortic stenosis  2. CAD status post PCI to mid LAD  3. Moderate pulmonary hypertension  4. High cardiac output of unknown etiology    Plan:  1. Routine postop care for underlying CAD.  Aggressive medical management  2. Refer for aortic valve TAVR.    The procedure log was documented by Documenter: Bernie Mayers RN and   verified by Claude Morillo MD.    Date: 6/7/2022  Time: 12:33 PM  CT Foot Without Contrast Right  COMPARISON:  Right ankle x-rays dated 06/07/2022 and 04/26/2022  FINDINGS:  Impression: 1. Postoperative changes of tibiotalar and subtalar joint arthrodesis with intramedullary nail traversing the visualized distal tibia, talus, and calcaneus are seen.  There appears to be evidence of cortical/trabecular fracture involving the plantar aspect of the calcaneus at the site of intramedullary nail and treat.  Subtle sclerosis about the fracture suggest subacute fracture.  There is also linear vertical lucency along the posterior aspect of the talus extending to the intramedullary nail suggestive of subacute fracture.  2. There is heterogeneous osseous demineralization which could be related to disuse.  This lowers the sensitivity and specificity of the study for osteomyelitis.  There appears to be evidence of cortical  osteolysis along the proximal aspect of the cuboid bone which raises concern for possible osteomyelitis.  There is also evidence of erosion along the base of the 5th metatarsal.  There are additional patchy areas of periarticular demineralization involving the tarsal bones, talus, calcaneus, and distal tibia which could be related to disuse, reflex sympathetic dystrophy, and/or changes of osteomyelitis.  The possibility of inflammatory arthropathies resulting erosions is not entirely excluded.  3. There appears to be evidence of soft tissue defect/wound along the plantar aspect of the hindfoot.  There is subcutaneous soft tissue edema about the included foot and ankle which may be related to cellulitis in the setting of infection.  4. Healing medial and posterior malleolar fractures are partially visualized.  The distal fibular fracture seen on prior plain films is not included in the field of imaging.  5. Additional findings and details as above.    Electronically signed by: Zeferino Bailey MD  Date:    06/08/2022  Time:    09:41      Adithya Rothman MD   06/12/2022

## 2022-06-13 VITALS
OXYGEN SATURATION: 97 % | HEIGHT: 68 IN | SYSTOLIC BLOOD PRESSURE: 110 MMHG | HEART RATE: 58 BPM | TEMPERATURE: 99 F | WEIGHT: 245.81 LBS | BODY MASS INDEX: 37.25 KG/M2 | DIASTOLIC BLOOD PRESSURE: 62 MMHG | RESPIRATION RATE: 18 BRPM

## 2022-06-13 PROBLEM — I50.9 CHF (CONGESTIVE HEART FAILURE): Status: RESOLVED | Noted: 2022-05-27 | Resolved: 2022-06-13

## 2022-06-13 LAB
ANION GAP SERPL CALC-SCNC: 10 MEQ/L
BUN SERPL-MCNC: 13.5 MG/DL (ref 9.8–20.1)
CALCIUM SERPL-MCNC: 8.9 MG/DL (ref 8.4–10.2)
CHLORIDE SERPL-SCNC: 96 MMOL/L (ref 98–107)
CO2 SERPL-SCNC: 30 MMOL/L (ref 23–31)
CREAT SERPL-MCNC: 0.79 MG/DL (ref 0.55–1.02)
CREAT/UREA NIT SERPL: 17
ERYTHROCYTE [DISTWIDTH] IN BLOOD BY AUTOMATED COUNT: 13.5 % (ref 11.5–17)
GLUCOSE SERPL-MCNC: 80 MG/DL (ref 82–115)
GLUCOSE SERPL-MCNC: NORMAL MG/DL (ref 70–110)
HCT VFR BLD AUTO: 33.1 % (ref 37–47)
HGB BLD-MCNC: 10 GM/DL (ref 12–16)
MCH RBC QN AUTO: 30.2 PG (ref 27–31)
MCHC RBC AUTO-ENTMCNC: 30.2 MG/DL (ref 33–36)
MCV RBC AUTO: 100 FL (ref 80–94)
NRBC BLD AUTO-RTO: 0 %
PLATELET # BLD AUTO: 410 X10(3)/MCL (ref 130–400)
PMV BLD AUTO: 10.1 FL (ref 9.4–12.4)
POTASSIUM SERPL-SCNC: 4.4 MMOL/L (ref 3.5–5.1)
RBC # BLD AUTO: 3.31 X10(6)/MCL (ref 4.2–5.4)
SODIUM SERPL-SCNC: 136 MMOL/L (ref 136–145)
WBC # SPEC AUTO: 8.5 X10(3)/MCL (ref 4.5–11.5)

## 2022-06-13 PROCEDURE — 25000003 PHARM REV CODE 250: Performed by: INTERNAL MEDICINE

## 2022-06-13 PROCEDURE — 25000003 PHARM REV CODE 250: Performed by: NURSE PRACTITIONER

## 2022-06-13 PROCEDURE — 27000221 HC OXYGEN, UP TO 24 HOURS

## 2022-06-13 PROCEDURE — 80048 BASIC METABOLIC PNL TOTAL CA: CPT | Performed by: INTERNAL MEDICINE

## 2022-06-13 PROCEDURE — 36415 COLL VENOUS BLD VENIPUNCTURE: CPT | Performed by: INTERNAL MEDICINE

## 2022-06-13 PROCEDURE — 85027 COMPLETE CBC AUTOMATED: CPT | Performed by: INTERNAL MEDICINE

## 2022-06-13 RX ORDER — CLOPIDOGREL BISULFATE 75 MG/1
75 TABLET ORAL DAILY
Qty: 30 TABLET | Refills: 0 | Status: SHIPPED | OUTPATIENT
Start: 2022-06-13

## 2022-06-13 RX ORDER — ATORVASTATIN CALCIUM 40 MG/1
40 TABLET, FILM COATED ORAL NIGHTLY
Qty: 90 TABLET | Refills: 0 | Status: SHIPPED | OUTPATIENT
Start: 2022-06-13

## 2022-06-13 RX ORDER — METOPROLOL TARTRATE 25 MG/1
25 TABLET, FILM COATED ORAL 2 TIMES DAILY
Qty: 60 TABLET | Refills: 0 | Status: SHIPPED | OUTPATIENT
Start: 2022-06-13

## 2022-06-13 RX ORDER — LANOLIN ALCOHOL/MO/W.PET/CERES
400 CREAM (GRAM) TOPICAL DAILY
Refills: 0 | COMMUNITY
Start: 2022-06-13

## 2022-06-13 RX ORDER — FLUTICASONE PROPIONATE AND SALMETEROL XINAFOATE 115; 21 UG/1; UG/1
2 AEROSOL, METERED RESPIRATORY (INHALATION) 2 TIMES DAILY
Qty: 12 G | Refills: 0 | Status: SHIPPED | OUTPATIENT
Start: 2022-06-13

## 2022-06-13 RX ORDER — GABAPENTIN 100 MG/1
100 CAPSULE ORAL NIGHTLY
Qty: 30 CAPSULE | Refills: 0 | Status: SHIPPED | OUTPATIENT
Start: 2022-06-13

## 2022-06-13 RX ADMIN — CYANOCOBALAMIN TAB 500 MCG 500 MCG: 500 TAB at 09:06

## 2022-06-13 RX ADMIN — ALBUTEROL SULFATE 2 PUFF: 90 AEROSOL, METERED RESPIRATORY (INHALATION) at 11:06

## 2022-06-13 RX ADMIN — VENLAFAXINE HYDROCHLORIDE 37.5 MG: 37.5 CAPSULE, EXTENDED RELEASE ORAL at 05:06

## 2022-06-13 RX ADMIN — ALBUTEROL SULFATE 2 PUFF: 90 AEROSOL, METERED RESPIRATORY (INHALATION) at 05:06

## 2022-06-13 RX ADMIN — METOPROLOL TARTRATE 25 MG: 25 TABLET, FILM COATED ORAL at 09:06

## 2022-06-13 RX ADMIN — FLUTICASONE FUROATE AND VILANTEROL TRIFENATATE 1 PUFF: 100; 25 POWDER RESPIRATORY (INHALATION) at 09:06

## 2022-06-13 RX ADMIN — CLOPIDOGREL 75 MG: 75 TABLET, FILM COATED ORAL at 09:06

## 2022-06-13 RX ADMIN — PANTOPRAZOLE SODIUM 40 MG: 40 TABLET, DELAYED RELEASE ORAL at 09:06

## 2022-06-13 RX ADMIN — Medication 400 MG: at 09:06

## 2022-06-13 RX ADMIN — WHITE PETROLATUM: 1.75 OINTMENT TOPICAL at 09:06

## 2022-06-13 RX ADMIN — LOSARTAN POTASSIUM 100 MG: 50 TABLET, FILM COATED ORAL at 09:06

## 2022-06-13 RX ADMIN — APIXABAN 5 MG: 5 TABLET, FILM COATED ORAL at 09:06

## 2022-06-13 RX ADMIN — HYDROCODONE BITARTRATE AND ACETAMINOPHEN 1 TABLET: 5; 325 TABLET ORAL at 05:06

## 2022-06-13 RX ADMIN — DILTIAZEM HYDROCHLORIDE 60 MG: 60 CAPSULE, EXTENDED RELEASE ORAL at 09:06

## 2022-06-13 RX ADMIN — FUROSEMIDE 40 MG: 40 TABLET ORAL at 09:06

## 2022-06-13 NOTE — DISCHARGE SUMMARY
"[facility] Medicine Discharge Summary    Admit Date: 6/3/2022  Discharge Date and Time: 6/13/20229:47 AM  Admitting Physician: Hospitalist Medicine  Discharging Physician: Adithya Rothman MD.  Primary Care Physician: Mary Jane Dean MD  Consults: [facility], Ortho    Discharge Diagnoses:  Acute on Chronic Diastolic CHF- resolved   Moderate Aortic Stenosis per Echo 5.24.22--> plans for a TAVR as an outpatient with Cardiology/ CTS  CAD/stent to LAD 6/7  RLE pain in foot --> mild wound dehiscence at the surgical site, Osteomyelitis ruled out   Acute on chronic hypoxemic respiratory failure, resolved   HTN  GERD  Hx of DVT and PE  Parkinson's Disease   Macrocytic anemia  History of PAF on Eliquis  History of bilateral carotid artery stenosis  History of chronic dementia at cognitive baseline per daughter  History of  recent surgical fixation of a closed right trimalleolar ankle fracture on 04/26/2022  Hypomagnesemia  Hypokalemia- resolved     Hospital Course:   Emma Jorgensen is a 88 y.o. female with a past medical history of diastolic heart failure (LVEF 55% and grade II diastolic dysfunction from an echo on 05/24/2022),  PAF on Eliquis, moderate to severe aortic stenosis with plans for a TAVR as an outpatient with Cardiology, bilateral carotid artery stenosis, HTN, chronic dementia, previous DVT/PE, and recent surgical fixation of a closed right trimalleolar ankle fracture on 04/26/2022 who presented to [facility] with complaints of worsening shortness breath that began today. The daughter, who is at bedside, stated that she has been on nasal cannula 2 L/min at the facility and visited the patient yesterday where an aid reportedly informed her that she had an episode of SOB after her "oxygen tank ran out." The daughter also states that she is currently at her cognitive baseline although she is more lethargic than normal.  She denied that she was complaining " of any fever, cough, chest pain, nausea, vomiting, abdominal pain, or dysuria.  She was recently admitted and discharged from the hospital at [facility] on 06/01/2022 where she received IV diuretics for acute decompensated diastolic heart failure. Her symptoms and respiratory status improved so she was transferred back to the SNF.   Her initial vital signs showed that she was afebrile with an RR 24, HR 82, BP 99/55, and SpO2 100% on nasal cannula 6 L/min.  Her labs show mild hyponatremia, mild hypochloremia, a bicarb 35, and a .1.  COVID-19 and influenza negative. CXR showed a low increase in interstitial and pulmonary vascular markings. More confluent airspace opacities in the left infrahilar region and left base infiltrate cannot be completely excluded.  Patient admitted for resp failure d/t dchf exacerbation.  Patient with severe aortic stenosis.  [facility] Cardiology taken for left heart catheterization for TAVR planning.  Admitted to hospitalist service on 6/3  S/p C on 6/7 , stent was placed - Cont DAPT (Plavix/ Eliquis) per cards   Plans for CTA Chest, abdomen, pelvis will now be done as out patient given the part is unavailable at our CT Scan department. [facility] made this decision Friday 6/10  CTS also evaluated the pt, agree with out pt procedure   Trilogy ordered, CM aware, [employer] cat arrange before Monday  Vitals stable on 2 L of oxygen.    CT right foot,XR tib/fib, foot reviewed- Subacute fracture/stable, orthopedic evaluated patient recommended to avoid ambulation till surgical wound heals.  Continue wound care   IV lasix transitioned to PO lasix 6/9  Cont diltiazem 60 mg b.i.d. for heart rate and blood pressure control, Losartan 40 gm daily, atorvastatin 40mg hs, lopressor 25 mg BID   Monitor daily standing weights and accurate I&Os  She recently had an echocardiogram done 05/24/2022 that revealed LVEF 55% and grade II diastolic dysfunction so no need to repeat  At 9:45 qm, [employer] call and  confirmed acceptance of the pt back to their estate.   Pt is medically cleared for discharge     Pt was seen and examined on the day of discharge  Vitals:  VITAL SIGNS: 24 HRS MIN & MAX LAST   Temp  Min: 97.5 °F (36.4 °C)  Max: 98.7 °F (37.1 °C) 98.5 °F (36.9 °C)   BP  Min: 99/51  Max: 137/76 111/65   Pulse  Min: 54  Max: 122  (!) 57   Resp  Min: 16  Max: 22 18   SpO2  Min: 93 %  Max: 100 % 99 %       Physical Exam:  General: In no acute distress, afebrile, elderly female   Chest: Clear to auscultation bilaterally anteriorly, currently on oxymask as she is a mouth breather   Heart: RRR, +S1, S2, 2/6 systolic murmur noted   Abdomen: Soft, nontender, BS +  MSK: Warm,Right heel with dressing  Neurologic: Alert and oriented x4, Cranial nerve II-XII intact, Strength 4/5 in B.L LE, 5/5 UE    Procedures Performed: No admission procedures for hospital encounter.     Significant Diagnostic Studies: See Full reports for all details    Recent Labs   Lab 06/09/22  0404 06/10/22  0333 06/13/22  0332   WBC 8.9 7.9 8.5   RBC 2.92* 3.34* 3.31*   HGB 8.8* 10.2* 10.0*   HCT 28.0* 32.3* 33.1*   MCV 95.9* 96.7* 100.0*   MCH 30.1 30.5 30.2   MCHC 31.4* 31.6* 30.2*   RDW 13.2 13.4 13.5    365 410*   MPV 10.2 9.6 10.1       Recent Labs   Lab 06/07/22  0757 06/07/22  0853 06/09/22  0404 06/10/22  0333 06/13/22 0332   *  --  133* 133* 136   K 4.9  --  4.5 4.5 4.4   CO2 32*  --  29 31 30   BUN 10.8  --  10.4 10.7 13.5   CREATININE 0.71  --  0.68 0.65 0.79   CALCIUM 9.3  --  9.4 8.7 8.9   PH  --  7.44  --   --   --    MG 2.00  --  1.70  --   --    ALBUMIN  --   --  2.8*  --   --    ALKPHOS  --   --  81  --   --    ALT  --   --  5  --   --    AST  --   --  24  --   --    BILITOT  --   --  0.5  --   --         Microbiology Results (last 7 days)     Procedure Component Value Units Date/Time    Blood Culture [[id number]]  (Normal) Collected: 06/03/22 1514    Order Status: Completed Specimen: Blood from Arm, Left Updated: 06/08/22  1803     CULTURE, BLOOD ([facility]) No Growth at 5 days    Blood Culture [[id number]]  (Normal) Collected: 06/03/22 1532    Order Status: Completed Specimen: Blood from Hand, Left Updated: 06/08/22 1803     CULTURE, BLOOD ([facility]) No Growth at 5 days           CV Ultrasound Pseudoaneurysm Evaluation/Treatment  · No pseudoaneurysm was found in the right common femoral artery.     There was no evidence of pseudoaneurysm or hematoma in the groin of the   right lower extremity.   Cardiac catheterization  · The Mid LAD lesion was 85% stenosed with 0% stenosis post-intervention.  · A stent was successfully placed at 12 LETTY for 10 sec.  · The estimated blood loss was <50 mL.  · There was single vessel coronary artery disease.  · The PCI was successful.  · The coronary FFR was abnormal.  · The filling pressures mildly elevated.     Patient brought to catheterization lab in a fasting state.  She was placed   on table prepped and draped in usual sterile fashion.  Time-out was   completed.  Bilateral groins and some 1% lidocaine.  Seldinger technique   and ultrasound guidance along with the microcatheter a 5 Colombian sheath was   placed into the right common femoral artery and a 7 Colombian sheath into the   common femoral vein.  A 4 Colombian common femoral left access was also   gained.  Right heart catheterization was then performed using a Woodbridge-Robert   catheter.  No complications noted.  Next AA JR4 and a stiff angled   straight glide was then used to cross into the left ventricular cavity.    Then exchanged for a pigtail.  A 4 Colombian pigtail was then placed from the   left groin access into the aortic root.  It was zeroed appropriately.    Hemodynamics were measured and a gradient was then measured on the LV   pullback.  All catheters were removed.  Then exchanged for a JL 4 which   was engaged in left main.  Images were taken in multiple views.  Then and   she changed for a JR4 which was engaged RCA with images were taken in   multiple  views.  A lesion was noted in the area of the mid LAD and   therefore sheath was upsized for 6 [place]kinan system.  Subsequent heparin dose   was given.  He was engaged into the left main IFR was placed out distally   which showed an ratio of 0.84.  Intravascular ultrasound was then used.    2.5 mm pre balloon angioplasty was performed with a 3.0 x 15 mm stent   deployed successfully.  Prior to this a secondary wire was placed into the   diagonal branch.  Using a 2.5 mm of balloon in the ostium of the diagonal   was then balloon.  Final angiographic images showed excellent result with   0% residual stenosis and DAVID 3 flow in both diagonal branch and the   distal LAD.  A right common femoral artery angiogram was then performed   and a Vascade closure device was deployed successfully.  She was   transported to the postop area in stable condition.    Findings:  Left main:  Widely patent.  Confirmed with intravascular ultrasound  LAD:  Large caliber vessel with multiple diagonal branches.  Mid vessel   just past a large diagonal branch and a 60-70% lesion noted with a   positive IFR.  This was treated PCI with a 3.0 mm x 15 stent with 0%   residual stenosis.  Preservation of the large diagonal branch was noted  Circumflex:  Widely patent  RCA:  Widely patent dominant vessel    Aortic valve study:  - aortic valve area of 0.8 cm2, gradient greater than 50 mmHg.    Right heart catheterization:  Average cardiac output approximately 7 liters/minute  Moderate pulmonary hypertension with pulmonary artery pressure is   approximately 50mmHg     impression:  1. Severe aortic stenosis  2. CAD status post PCI to mid LAD  3. Moderate pulmonary hypertension  4. High cardiac output of unknown etiology    Plan:  1. Routine postop care for underlying CAD.  Aggressive medical management  2. Refer for aortic valve TAVR.    The procedure log was documented by Documenter: Bernie Mayers RN and   verified by Claude Morillo MD.    Date: 6/7/2022   Time: 12:33 PM  CT Foot Without Contrast Right  Narrative: EXAMINATION:  CT FOOT WITHOUT CONTRAST RIGHT    CLINICAL HISTORY:  Foot swelling, diabetic, osteomyelitis suspected.  Right foot pain with suspicion for an abscess or osteomyelitis.  Recent surgical fixation of closed right trimalleolar ankle fracture on 04/26/2022.  Right lower extremity pain.    TECHNIQUE:  Axial CT slices through the right foot were obtained without the administration of contrast.  Coronal and sagittal reconstructions were created.  Automated exposure control was utilized.  Total DLP for the study is approximately 59 mGy cm.    COMPARISON:  Right ankle x-rays dated 06/07/2022 and 04/26/2022    FINDINGS:  Postoperative changes of tibiotalar and subtalar joint arthrodesis with intramedullary nail traversing the visualized distal tibia, talus, and calcaneus.  Interlocking screws within the talus and calcaneus are seen.  There appears to be evidence of cortical/trabecular fracture involving the plantar aspect of the calcaneus at the site of intramedullary nail entry.  There is a suggestion of subtle sclerosis about the fracture which may indicate subacute fracture (for example series 24 image 25).  Tiny metallic foci are seen along the plantar aspect of the calcaneus and adjacent soft tissues, likely related to micro metal from prior surgery.  There is additional linear lucency in the posterior aspect of the talus extending to the intramedullary nail (for example series 26 images 60-64 and series 24 images 4-12) extending to the level of the intramedullary nail, also suggestive of subacute fracture.    The visualized hardware allowing for streak artifact demonstrates no clear adjacent lucency suggestive of loosening.  The visualized osseous structures appear heterogeneously demineralized which could be related to disuse.  There appears to be a partially visualized and mildly displaced posterior malleolar fracture.  There appears to be  evidence of callus formation and osseous bridging across the fracture.  There appears to be evidence of mildly displaced medial malleolar fracture with partial osseous bridging, compatible with healing fracture.  Distal fibular fracture seen on prior plain films is not included in the field of imaging on the current study.  There appears to be a small bone fragment along the anterior distal tibiofibular syndesmosis with adjacent cortical irregularity in the tibia, possibly related to displaced fracture fragment, likely rising from the tibia.  There appears to be evidence of widening of the tibiofibular syndesmosis which could reflect ligamentous injury.  The background osseous demineralization lowers the sensitivity and specificity of the study for osteomyelitis.  However, there appears to be evidence of cortical osteolysis along the proximal plantar aspect of the cuboid bone (for example series 28 image 15), concerning for possible osteomyelitis.  There appears to be evidence of erosion along the base of the 5th metatarsal (for example series 28, image 11).  There are additional patchy areas of periarticular demineralization involving the tarsal bones, talus, calcaneus, and tibia which could be related to disuse, reflex sympathetic dystrophy, and/or changes of osteomyelitis.  The possibility inflammatory arthropathies resulting in the erosions is not entirely excluded.  There appears to be evidence of a soft tissue defect/wound along the plantar aspect of the hindfoot near the level of the calcaneus.  Linear tract of calcifications is seen extending from this region to the plantar aspect of the calcaneus.  Multiple small metallic foci  are seen in this region, likely related to prior surgery.  There appears to be diffuse subcutaneous soft tissue edema about the foot and included ankle which is nonspecific, possibly related to cellulitis.  No obvious loculated and atherosclerotic vascular calcifications are noted.   Assessment for abscess is limited  Impression: 1. Postoperative changes of tibiotalar and subtalar joint arthrodesis with intramedullary nail traversing the visualized distal tibia, talus, and calcaneus are seen.  There appears to be evidence of cortical/trabecular fracture involving the plantar aspect of the calcaneus at the site of intramedullary nail and treat.  Subtle sclerosis about the fracture suggest subacute fracture.  There is also linear vertical lucency along the posterior aspect of the talus extending to the intramedullary nail suggestive of subacute fracture.  2. There is heterogeneous osseous demineralization which could be related to disuse.  This lowers the sensitivity and specificity of the study for osteomyelitis.  There appears to be evidence of cortical osteolysis along the proximal aspect of the cuboid bone which raises concern for possible osteomyelitis.  There is also evidence of erosion along the base of the 5th metatarsal.  There are additional patchy areas of periarticular demineralization involving the tarsal bones, talus, calcaneus, and distal tibia which could be related to disuse, reflex sympathetic dystrophy, and/or changes of osteomyelitis.  The possibility of inflammatory arthropathies resulting erosions is not entirely excluded.  3. There appears to be evidence of soft tissue defect/wound along the plantar aspect of the hindfoot.  There is subcutaneous soft tissue edema about the included foot and ankle which may be related to cellulitis in the setting of infection.  4. Healing medial and posterior malleolar fractures are partially visualized.  The distal fibular fracture seen on prior plain films is not included in the field of imaging.  5. Additional findings and details as above.    Electronically signed by: Zeferino Bailey MD  Date:    06/08/2022  Time:    09:41         Medication List      START taking these medications    atorvastatin 40 MG tablet  Commonly known as:  LIPITOR  Take 1 tablet (40 mg total) by mouth every evening.     clopidogreL 75 mg tablet  Commonly known as: PLAVIX  Take 1 tablet (75 mg total) by mouth once daily.     magnesium oxide 400 mg (241.3 mg magnesium) tablet  Commonly known as: MAG-OX  Take 1 tablet (400 mg total) by mouth once daily.     metoprolol tartrate 25 MG tablet  Commonly known as: LOPRESSOR  Take 1 tablet (25 mg total) by mouth 2 (two) times daily.     white petrolatum 41 % Oint  Apply topically 2 (two) times daily.        CHANGE how you take these medications    gabapentin 100 MG capsule  Commonly known as: NEURONTIN  Take 1 capsule (100 mg total) by mouth nightly.  What changed:   · medication strength  · how much to take        CONTINUE taking these medications    ADVAIR -21 mcg/actuation Hfaa inhaler  Generic drug: fluticasone propion-salmeterol 115-21 mcg/dose  Inhale 2 puffs into the lungs 2 (two) times daily.     albuterol 90 mcg/actuation inhaler  Commonly known as: PROVENTIL/VENTOLIN HFA     apixaban 2.5 mg Tab  Commonly known as: ELIQUIS  Take 2 tablets (5 mg total) by mouth 2 (two) times daily.     budesonide-glycopyr-formoterol 160-9-4.8 mcg/actuation Hfaa     clotrimazole-betamethasone 1-0.05% cream  Commonly known as: LOTRISONE     cyanocobalamin 500 MCG tablet  Take 1 tablet (500 mcg total) by mouth once daily.     diclofenac sodium 1 % Gel  Commonly known as: VOLTAREN     diltiaZEM 120 MG tablet  Commonly known as: CARDIZEM     donepeziL 10 MG tablet  Commonly known as: ARICEPT     furosemide 40 MG tablet  Commonly known as: LASIX  Take 1 tablet (40 mg total) by mouth once daily.     hydroCHLOROthiazide 12.5 MG Tab  Commonly known as: HYDRODIURIL     HYDROcodone-acetaminophen 5-325 mg per tablet  Commonly known as: NORCO     loratadine 10 mg tablet  Commonly known as: CLARITIN     losartan 100 MG tablet  Commonly known as: COZAAR     mupirocin 2 % ointment  Commonly known as: BACTROBAN  Apply topically once daily. Apply  to affected area at right plantar heel. After cleansing well with dermal cleanser and rinsing with saline, cover with nonadherent dressing secured with rolled gauze. Keep off loaded.     pantoprazole 40 MG tablet  Commonly known as: PROTONIX  Take 1 tablet (40 mg total) by mouth once daily.     pramipexole 1 MG tablet  Commonly known as: MIRAPEX     traZODone 50 MG tablet  Commonly known as: DESYREL  Take 1 tablet (50 mg total) by mouth every evening.     venlafaxine 37.5 MG 24 hr capsule  Commonly known as: EFFEXOR-XR        STOP taking these medications    betamethasone dipropionate 0.05 % cream     dicyclomine 20 mg tablet  Commonly known as: BENTYL     levoFLOXacin 500 MG tablet  Commonly known as: LEVAQUIN     LORazepam 1 MG tablet  Commonly known as: ATIVAN     omeprazole 20 mg Tbec           Where to Get Your Medications      These medications were sent to [facility] [address]  [address] [facility] [address]    Phone: [phone]   · ADVAIR -21 mcg/actuation Hfaa inhaler  · atorvastatin 40 MG tablet  · clopidogreL 75 mg tablet  · gabapentin 100 MG capsule  · metoprolol tartrate 25 MG tablet     You can get these medications from any pharmacy    You don't need a prescription for these medications  · magnesium oxide 400 mg (241.3 mg magnesium) tablet  · white petrolatum 41 % Oint          Explained in detail to the patient about the discharge plan, medications, and follow-up visits. Pt understands and agrees with the treatment plan  [facility]   Discharged Condition: stable  Diet-   Dietary Orders (From admission, onward)     Start     Ordered    06/09/22 1306  Diet heart healthy  Diet effective now         06/09/22 1305               Medications Per DC med rec  Activities as tolerated   Follow-up Information     Truman Eubanks MD Follow up on 6/13/2022.    Specialties: Cardiovascular  Disease, Cardiology  Why: appt @ 1040  Contact information:  [address]  [facility] [address]  [phone]             Brodie Juarez MD Follow up in 2 week(s).    Specialties: Cardiovascular Disease, Cardiology  Contact information:  [address] [facility] [address]  [phone]             Mary Jane Dean MD. Go to.    Specialty: Family Medicine  Why: as scehduled  Contact information:  [address]  [place] [address]  [phone]                       For further questions contact hospitalist office    Discharge time 33 minutes    For worsening symptoms, chest pain, shortness of breath, increased abdominal pain, high grade fever, stroke or stroke like symptoms, immediately go to the nearest Emergency Room or call 911 as soon as possible.      Adithya Russ M.D, on 6/13/2022. at 9:47 AM.

## 2022-06-13 NOTE — PLAN OF CARE
Rounded on pt and she is aware of dc to [facility]. She is in agreement and asked me to inform her dgt. Her dgt Rosales Quezada called for update and I told her she has dc orders to return to [facility] and that I spoke to someone at [facility] and they will accept her today. Spoke to Anabel at [facility] on phone, she said they will accept pt, sent dc orders, dc summary and AVS via [facility] to [facility].  Called [facility] [phone]  Spoke to Otilio and placed pt in Will Call

## 2022-06-13 NOTE — PLAN OF CARE
Spoke with Ebonie with [facility] and updated her pt is being dc to [facility] today. She stated she will have someone go to [facility] to set up the Trilogy. I left VM with Ngoc, nurse at [facility] on this info.

## 2022-06-13 NOTE — PLAN OF CARE
DC packet given to pt's nurse Kristie with info to call report to nurse Ngoc at [facility].

## 2022-06-15 DIAGNOSIS — I82.401 DEEP VEIN THROMBOSIS OF RIGHT LOWER LIMB: ICD-10-CM

## 2022-06-15 DIAGNOSIS — G57.92 MONONEUROPATHY OF LEFT LOWER LIMB: ICD-10-CM

## 2022-06-15 DIAGNOSIS — I73.9 PERIPHERAL VASCULAR DISEASE, UNSPECIFIED: ICD-10-CM

## 2022-06-15 DIAGNOSIS — I99.9 UNSPECIFIED DISORDER OF CIRCULATORY SYSTEM: Primary | ICD-10-CM

## 2022-06-15 DIAGNOSIS — I70.201 OCCLUSION OF RIGHT FEMORAL ARTERY: ICD-10-CM

## 2022-06-16 DIAGNOSIS — M79.662 PAIN IN LEFT LOWER LEG: Primary | ICD-10-CM

## 2022-06-16 DIAGNOSIS — M79.662 PAIN OF LEFT LOWER LEG: Primary | ICD-10-CM

## 2022-06-16 DIAGNOSIS — E11.59 TYPE 2 DIABETES MELLITUS WITH OTHER CIRCULATORY COMPLICATIONS: Primary | ICD-10-CM

## 2022-06-16 DIAGNOSIS — R52 SCAR PAINFUL: ICD-10-CM

## 2022-06-16 DIAGNOSIS — R60.0 LOWER EXTREMITY EDEMA: ICD-10-CM

## 2022-06-16 DIAGNOSIS — L90.5 SCAR PAINFUL: ICD-10-CM

## 2022-06-22 ENCOUNTER — HOSPITAL ENCOUNTER (OUTPATIENT)
Dept: CARDIOLOGY | Facility: HOSPITAL | Age: 87
Discharge: HOME | End: 2022-06-22
Attending: NURSE PRACTITIONER
Payer: MEDICARE

## 2022-06-22 DIAGNOSIS — M79.662 PAIN OF LEFT LOWER LEG: ICD-10-CM

## 2022-06-22 DIAGNOSIS — I73.9 PAD (PERIPHERAL ARTERY DISEASE): ICD-10-CM

## 2022-06-22 PROCEDURE — 93926 LOWER EXTREMITY STUDY: CPT | Mod: LT

## 2022-06-22 PROCEDURE — 93922 UPR/L XTREMITY ART 2 LEVELS: CPT

## 2022-06-23 LAB
LEFT ABI: 1.75
LEFT ARM BP: 144 MMHG
LEFT DORSALIS PEDIS: 254 MMHG
LEFT POSTERIOR TIBIAL: 254 MMHG
OHS CV LEFT LOWER EXTREMITY ABI (NO CALC): 1.75
RIGHT ABI: 1.06
RIGHT ARM BP: 145 MMHG
RIGHT DORSALIS PEDIS: 137 MMHG
RIGHT POSTERIOR TIBIAL: 153 MMHG

## 2022-06-28 ENCOUNTER — HOSPITAL ENCOUNTER (INPATIENT)
Facility: HOSPITAL | Age: 87
LOS: 1 days | Discharge: HOME | DRG: 202 | End: 2022-06-29
Attending: STUDENT IN AN ORGANIZED HEALTH CARE EDUCATION/TRAINING PROGRAM | Admitting: INTERNAL MEDICINE
Payer: MEDICARE

## 2022-06-28 DIAGNOSIS — J18.9 PNEUMONIA OF BOTH LUNGS DUE TO INFECTIOUS ORGANISM, UNSPECIFIED PART OF LUNG: Primary | ICD-10-CM

## 2022-06-28 DIAGNOSIS — I35.0 AORTIC VALVE STENOSIS, ETIOLOGY OF CARDIAC VALVE DISEASE UNSPECIFIED: ICD-10-CM

## 2022-06-28 DIAGNOSIS — R52 PAIN: ICD-10-CM

## 2022-06-28 DIAGNOSIS — R06.02 SHORTNESS OF BREATH: ICD-10-CM

## 2022-06-28 LAB
ALBUMIN SERPL-MCNC: 2.7 GM/DL (ref 3.4–4.8)
ALBUMIN/GLOB SERPL: 0.8 RATIO (ref 1.1–2)
ALP SERPL-CCNC: 85 UNIT/L (ref 40–150)
ALT SERPL-CCNC: 7 UNIT/L (ref 0–55)
APPEARANCE UR: CLEAR
AST SERPL-CCNC: 13 UNIT/L (ref 5–34)
BACTERIA #/AREA URNS AUTO: ABNORMAL /HPF
BASOPHILS # BLD AUTO: 0.03 X10(3)/MCL (ref 0–0.2)
BASOPHILS NFR BLD AUTO: 0.2 %
BILIRUB UR QL STRIP.AUTO: NEGATIVE MG/DL
BILIRUBIN DIRECT+TOT PNL SERPL-MCNC: 0.7 MG/DL
BNP BLD-MCNC: 330.8 PG/ML
BUN SERPL-MCNC: 14.6 MG/DL (ref 9.8–20.1)
CALCIUM SERPL-MCNC: 8.9 MG/DL (ref 8.4–10.2)
CHLORIDE SERPL-SCNC: 94 MMOL/L (ref 98–107)
CO2 SERPL-SCNC: 32 MMOL/L (ref 23–31)
COLOR UR AUTO: YELLOW
CREAT SERPL-MCNC: 0.72 MG/DL (ref 0.55–1.02)
EOSINOPHIL # BLD AUTO: 0.01 X10(3)/MCL (ref 0–0.9)
EOSINOPHIL NFR BLD AUTO: 0.1 %
ERYTHROCYTE [DISTWIDTH] IN BLOOD BY AUTOMATED COUNT: 13.9 % (ref 11.5–17)
FLUAV AG UPPER RESP QL IA.RAPID: NOT DETECTED
FLUBV AG UPPER RESP QL IA.RAPID: NOT DETECTED
GLOBULIN SER-MCNC: 3.5 GM/DL (ref 2.4–3.5)
GLUCOSE SERPL-MCNC: 121 MG/DL (ref 82–115)
GLUCOSE UR QL STRIP.AUTO: NEGATIVE MG/DL
HCT VFR BLD AUTO: 28.8 % (ref 37–47)
HGB BLD-MCNC: 9.2 GM/DL (ref 12–16)
IMM GRANULOCYTES # BLD AUTO: 0.09 X10(3)/MCL (ref 0–0.02)
IMM GRANULOCYTES NFR BLD AUTO: 0.7 % (ref 0–0.43)
KETONES UR QL STRIP.AUTO: NEGATIVE MG/DL
LACTATE SERPL-SCNC: 1.3 MMOL/L (ref 0.5–2.2)
LEUKOCYTE ESTERASE UR QL STRIP.AUTO: ABNORMAL UNIT/L
LYMPHOCYTES # BLD AUTO: 1.4 X10(3)/MCL (ref 0.6–4.6)
LYMPHOCYTES NFR BLD AUTO: 10.2 %
MAGNESIUM SERPL-MCNC: 1.8 MG/DL (ref 1.6–2.6)
MCH RBC QN AUTO: 29.6 PG (ref 27–31)
MCHC RBC AUTO-ENTMCNC: 31.9 MG/DL (ref 33–36)
MCV RBC AUTO: 92.6 FL (ref 80–94)
MONOCYTES # BLD AUTO: 1.4 X10(3)/MCL (ref 0.1–1.3)
MONOCYTES NFR BLD AUTO: 10.2 %
NEUTROPHILS # BLD AUTO: 10.8 X10(3)/MCL (ref 2.1–9.2)
NEUTROPHILS NFR BLD AUTO: 78.6 %
NITRITE UR QL STRIP.AUTO: NEGATIVE
NRBC BLD AUTO-RTO: 0 %
PH UR STRIP.AUTO: 5.5 [PH]
PLATELET # BLD AUTO: 315 X10(3)/MCL (ref 130–400)
PMV BLD AUTO: 9.9 FL (ref 9.4–12.4)
POTASSIUM SERPL-SCNC: 3.9 MMOL/L (ref 3.5–5.1)
PROT SERPL-MCNC: 6.2 GM/DL (ref 5.8–7.6)
PROT UR QL STRIP.AUTO: NEGATIVE MG/DL
RBC # BLD AUTO: 3.11 X10(6)/MCL (ref 4.2–5.4)
RBC #/AREA URNS AUTO: <5 /HPF
RBC UR QL AUTO: ABNORMAL UNIT/L
SARS-COV-2 RNA RESP QL NAA+PROBE: NOT DETECTED
SODIUM SERPL-SCNC: 132 MMOL/L (ref 136–145)
SP GR UR STRIP.AUTO: 1.02 (ref 1–1.03)
SQUAMOUS #/AREA URNS AUTO: 6 /HPF
UROBILINOGEN UR STRIP-ACNC: 0.2 MG/DL
WBC # SPEC AUTO: 13.8 X10(3)/MCL (ref 4.5–11.5)
WBC #/AREA URNS AUTO: 15 /HPF

## 2022-06-28 PROCEDURE — 83880 ASSAY OF NATRIURETIC PEPTIDE: CPT | Performed by: STUDENT IN AN ORGANIZED HEALTH CARE EDUCATION/TRAINING PROGRAM

## 2022-06-28 PROCEDURE — 63600175 PHARM REV CODE 636 W HCPCS: Performed by: STUDENT IN AN ORGANIZED HEALTH CARE EDUCATION/TRAINING PROGRAM

## 2022-06-28 PROCEDURE — 93010 EKG 12-LEAD: ICD-10-PCS | Mod: ,,, | Performed by: INTERNAL MEDICINE

## 2022-06-28 PROCEDURE — 96361 HYDRATE IV INFUSION ADD-ON: CPT

## 2022-06-28 PROCEDURE — 87040 BLOOD CULTURE FOR BACTERIA: CPT | Performed by: PHYSICIAN ASSISTANT

## 2022-06-28 PROCEDURE — 83735 ASSAY OF MAGNESIUM: CPT | Performed by: STUDENT IN AN ORGANIZED HEALTH CARE EDUCATION/TRAINING PROGRAM

## 2022-06-28 PROCEDURE — 80053 COMPREHEN METABOLIC PANEL: CPT | Performed by: PHYSICIAN ASSISTANT

## 2022-06-28 PROCEDURE — 81001 URINALYSIS AUTO W/SCOPE: CPT | Performed by: PHYSICIAN ASSISTANT

## 2022-06-28 PROCEDURE — 99284 EMERGENCY DEPT VISIT MOD MDM: CPT | Mod: 25

## 2022-06-28 PROCEDURE — 25000003 PHARM REV CODE 250: Performed by: STUDENT IN AN ORGANIZED HEALTH CARE EDUCATION/TRAINING PROGRAM

## 2022-06-28 PROCEDURE — 83605 ASSAY OF LACTIC ACID: CPT | Performed by: PHYSICIAN ASSISTANT

## 2022-06-28 PROCEDURE — 93005 ELECTROCARDIOGRAM TRACING: CPT

## 2022-06-28 PROCEDURE — 87636 SARSCOV2 & INF A&B AMP PRB: CPT | Performed by: PHYSICIAN ASSISTANT

## 2022-06-28 PROCEDURE — 96365 THER/PROPH/DIAG IV INF INIT: CPT

## 2022-06-28 PROCEDURE — 25000003 PHARM REV CODE 250: Performed by: PHYSICIAN ASSISTANT

## 2022-06-28 PROCEDURE — 85025 COMPLETE CBC W/AUTO DIFF WBC: CPT | Performed by: PHYSICIAN ASSISTANT

## 2022-06-28 PROCEDURE — 36415 COLL VENOUS BLD VENIPUNCTURE: CPT | Performed by: PHYSICIAN ASSISTANT

## 2022-06-28 PROCEDURE — 93010 ELECTROCARDIOGRAM REPORT: CPT | Mod: ,,, | Performed by: INTERNAL MEDICINE

## 2022-06-28 PROCEDURE — 11000001 HC ACUTE MED/SURG PRIVATE ROOM

## 2022-06-28 RX ORDER — ALBUTEROL SULFATE 2.5 MG/.5ML
2.5 SOLUTION RESPIRATORY (INHALATION) EVERY 4 HOURS PRN
Qty: 60 EACH | Refills: 0 | Status: SHIPPED | OUTPATIENT
Start: 2022-06-28 | End: 2023-06-28

## 2022-06-28 RX ORDER — LEVOFLOXACIN 500 MG/1
500 TABLET, FILM COATED ORAL DAILY
Qty: 5 TABLET | Refills: 0 | Status: SHIPPED | OUTPATIENT
Start: 2022-06-28 | End: 2022-07-03

## 2022-06-28 RX ADMIN — CEFTRIAXONE SODIUM 1 G: 1 INJECTION, POWDER, FOR SOLUTION INTRAMUSCULAR; INTRAVENOUS at 10:06

## 2022-06-28 RX ADMIN — SODIUM CHLORIDE 1000 ML: 9 INJECTION, SOLUTION INTRAVENOUS at 07:06

## 2022-06-28 NOTE — FIRST PROVIDER EVALUATION
Medical screening exam completed.  I have conducted a focused provider triage encounter, findings are as follows:    Brief history of present illness:  87 yo female presents to ED for increasing SOB and fever for the past day. Recently diagnosed with pneumonia. Given tylenol prior to arrival.     There were no vitals filed for this visit.    Pertinent physical exam:  Awake and alert    Brief workup plan:  Labs, EKG, CXR, blood cultures. UA    Preliminary workup initiated; this workup will be continued and followed by the physician or advanced practice provider that is assigned to the patient when roomed.

## 2022-06-28 NOTE — ED PROVIDER NOTES
Encounter Date: 6/28/2022    SCRIBE #1 NOTE: I, Myron Zhang, am scribing for, and in the presence of,  Keith Rodriguez MD. I have scribed the following portions of the note - Other sections scribed: HPI, ROS, PE.       History     Chief Complaint   Patient presents with    Shortness of Breath     SOB at [facility]. Temp of 101 prior to EMS arrival. Given Tylenol by NH staff. Temp 100.3 here in the ED.  Recently recovering from pneumonia. Given duoneb by EMS. Report feeling better after neb.     An 87 y/o female with a history of PE, DVT, HTN, recent ankle surgery, and recent stent placement presents to [facility] ED via EMS as a transfer from [place] [facility] with worsening SOB that began today. Pt reports that her SOB has improved since duoNEB treatment. Pt has had 2 recent visits to the ED about 3 weeks ago and 5 weeks ago. Pt is on O2 at [place].  Pt has an associated symptom of fever.    PCP: Mary Jane Dean MD    The history is provided by the patient and a relative.   Shortness of Breath  This is a chronic problem. Duration: today. The problem occurs continuously.The problem has been gradually improving. Associated symptoms include a fever. Pertinent negatives include no headaches, no rhinorrhea, no sore throat, no ear pain, no neck pain, no cough, no wheezing, no chest pain, no vomiting, no abdominal pain and no leg swelling. Treatments tried: duoNEB. The treatment provided moderate relief. She has had prior ED visits. Associated medical issues include PE and DVT.     Review of patient's allergies indicates:  No Known Allergies  Past Medical History:   Diagnosis Date    Anxiety disorder, unspecified     Arthritis     Deep vein thrombosis     Depression     Irregular heart beat     Obesity, unspecified     Other pulmonary embolism without acute cor pulmonale     Parkinson's disease     Trimalleolar fracture of ankle, closed     Unspecified dementia without behavioral disturbance      Past  Surgical History:   Procedure Laterality Date    ANGIOGRAM, CORONARY, WITH LEFT HEART CATHETERIZATION N/A 6/7/2022    Procedure: Angiogram, Coronary, with Left Heart Cath;  Surgeon: Claude Morillo MD;  Location: [facility] CATH LAB;  Service: Cardiology;  Laterality: N/A;    ANKLE FRACTURE SURGERY Right     CATARACT EXTRACTION      CATHETERIZATION OF BOTH LEFT AND RIGHT HEART N/A 6/7/2022    Procedure: CATHETERIZATION, HEART, BOTH LEFT AND RIGHT;  Surgeon: Claude Morillo MD;  Location: [facility] CATH LAB;  Service: Cardiology;  Laterality: N/A;    COLONOSCOPY W/ POLYPECTOMY      HYSTERECTOMY      SMALL INTESTINE SURGERY       Family History   Family history unknown: Yes     Social History     Tobacco Use    Smoking status: Former Smoker    Smokeless tobacco: Never Used   Substance Use Topics    Alcohol use: Never    Drug use: Never     Review of Systems   Constitutional: Positive for fever. Negative for chills and fatigue.   HENT: Negative for congestion, ear discharge, ear pain, nosebleeds, rhinorrhea and sore throat.    Respiratory: Positive for shortness of breath. Negative for cough, chest tightness and wheezing.    Cardiovascular: Negative for chest pain and leg swelling.   Gastrointestinal: Negative for abdominal pain, blood in stool, constipation, diarrhea, nausea and vomiting.   Genitourinary: Negative for dysuria, frequency, hematuria and urgency.   Musculoskeletal: Negative for arthralgias, myalgias and neck pain.   Skin: Positive for wound (chronic R heel wound).   Neurological: Negative for dizziness, seizures, weakness, numbness and headaches.       Physical Exam     Initial Vitals [06/28/22 1641]   BP Pulse Resp Temp SpO2   124/60 94 (!) 30 100.3 °F (37.9 °C) 97 %      MAP       --         Physical Exam    Nursing note and vitals reviewed.  Constitutional: She appears well-developed and well-nourished. She is not diaphoretic. No distress.   obese   HENT:   Head: Normocephalic and atraumatic.   Right Ear:  External ear normal.   Left Ear: External ear normal.   Nose: Nose normal.   Mouth/Throat: Oropharynx is clear and moist.   Eyes: Conjunctivae and EOM are normal. Pupils are equal, round, and reactive to light. Right eye exhibits no discharge. Left eye exhibits no discharge. No scleral icterus.   Neck: Neck supple. No tracheal deviation present.   Normal range of motion.  Cardiovascular: Normal rate, regular rhythm and intact distal pulses. Exam reveals no gallop and no friction rub.    Murmur heard.   Systolic murmur is present with a grade of 2/6.  Pt has 1+ pitting edema.    Pulmonary/Chest: Breath sounds normal. No stridor. She has no wheezes. She has no rhonchi. She has no rales. She exhibits no tenderness.   Pt is on 2 L nasal cannula chronically.   Pt's SOB improved upon duoNEB.    Abdominal: Abdomen is soft. Bowel sounds are normal. She exhibits no distension and no mass. There is no abdominal tenderness. There is no guarding.   Genitourinary:    Genitourinary Comments: Pt has a torres catheter in place.      Musculoskeletal:         General: Edema present. No tenderness. Normal range of motion.      Cervical back: Normal range of motion and neck supple.     Neurological: She is alert and oriented to person, place, and time. No cranial nerve deficit or sensory deficit.   Skin: Skin is warm and dry. Capillary refill takes less than 2 seconds. No rash noted. No erythema. No pallor.   Pt's R heel surgical site is packed with gauze.          ED Course   Procedures  Labs Reviewed   CULTURE, URINE - Abnormal; Notable for the following components:       Result Value    Urine Culture 25,000-50,000 colonies/ml Gram-negative Rods (*)     All other components within normal limits   COMPREHENSIVE METABOLIC PANEL - Abnormal; Notable for the following components:    Sodium Level 132 (*)     Chloride 94 (*)     Carbon Dioxide 32 (*)     Glucose Level 121 (*)     Albumin Level 2.7 (*)     Albumin/Globulin Ratio 0.8 (*)      All other components within normal limits   URINALYSIS, REFLEX TO URINE CULTURE - Abnormal; Notable for the following components:    Blood, UA Trace (*)     Leukocyte Esterase, UA 2+ (*)     All other components within normal limits   CBC WITH DIFFERENTIAL - Abnormal; Notable for the following components:    WBC 13.8 (*)     RBC 3.11 (*)     Hgb 9.2 (*)     Hct 28.8 (*)     MCHC 31.9 (*)     Neut # 10.8 (*)     Mono # 1.40 (*)     IG# 0.09 (*)     IG% 0.7 (*)     All other components within normal limits   URINALYSIS, MICROSCOPIC - Abnormal; Notable for the following components:    WBC, UA 15 (*)     Squamous Epithelial Cells, UA 6 (*)     All other components within normal limits   B-TYPE NATRIURETIC PEPTIDE - Abnormal; Notable for the following components:    Natriuretic Peptide 330.8 (*)     All other components within normal limits   BLOOD CULTURE OLG - Normal   BLOOD CULTURE OLG - Normal   COVID/FLU A&B PCR - Normal   LACTIC ACID, PLASMA - Normal   MAGNESIUM - Normal   CBC W/ AUTO DIFFERENTIAL    Narrative:     The following orders were created for panel order CBC auto differential.  Procedure                               Abnormality         Status                     ---------                               -----------         ------                     CBC with Differential[[id number]]        Abnormal            Final result                 Please view results for these tests on the individual orders.        ECG Results          EKG 12-lead (Final result)  Result time 06/28/22 21:56:41    Final result by Interface, Lab In [facility] (06/28/22 21:56:41)                 Narrative:    Test Reason : R06.02,    Vent. Rate : 090 BPM     Atrial Rate : 090 BPM     P-R Int : 134 ms          QRS Dur : 086 ms      QT Int : 390 ms       P-R-T Axes : 000 -15 046 degrees     QTc Int : 477 ms    Normal sinus rhythm  Minimal voltage criteria for LVH, may be normal variant ( R in aVL )  Borderline Abnormal ECG  When compared with  ECG of 07-JUN-2022 13:48,  ST no longer elevated in Lateral leads  Confirmed by Claude Cruz MD ([id number]) on 6/28/2022 9:56:30 PM    Referred By:             Confirmed By:Claude Cruz MD                            Imaging Results          X-Ray Foot Complete Right (Final result)  Result time 06/29/22 10:28:19    Final result by Sony Chavez MD (06/29/22 10:28:19)                 Impression:        1. Similar chronic alterations of the ankle and hindfoot, with no convincing evidence of acute skeletal abnormality.  2. Findings suspicious for ulceration involving the posterior plantar skin surface.      Electronically signed by: Sony Chavez  Date:    06/29/2022  Time:    10:28             Narrative:    EXAMINATION:  XR FOOT COMPLETE 3 VIEW RIGHT    CLINICAL HISTORY:  Pain, unspecified;    TECHNIQUE:  Frontal, oblique, and lateral views of the foot.    COMPARISON:  7 June 2022    FINDINGS:  There are similar posttraumatic/postoperative changes involving the distal tibia, the talus, and the calcaneus.  No evidence of osseous loosening or gross hardware disruption appreciated.  Regional bone demineralization is similar to the prior study.  No newly displaced cortical injury is identified.  There are degenerative changes of the 1st MTP joint and throughout the interphalangeal joints.  No orquidea dislocation is identified.  There is no evidence of destructive osseous process.    Widespread vascular calcification is again noted.  There is similar posterior plantar skin and subcutaneous lucency suspicious for ulceration.  No tracking soft tissue gas is identified.  Multiple retained metallic foreign bodies are similar to the prior study.                                 X-Ray Chest 1 View (Final result)  Result time 06/28/22 18:08:54    Final result by Cherri Arrieta MD (06/28/22 18:08:54)                 Impression:      Unchanged patchy bilateral airspace opacities possibly related to low lung volume and atelectatic  change      Electronically signed by: Cherri Arrieta  Date:    06/28/2022  Time:    18:08             Narrative:    EXAMINATION:  XR CHEST 1 VIEW    CLINICAL HISTORY:  Shortness of breath    TECHNIQUE:  Single frontal view of the chest was performed.    COMPARISON:  06/03/2022    FINDINGS:  LINES AND TUBES: EKG/telemetry leads overlie the chest.    MEDIASTINUM AND PETER: Cardiac silhouette is enlarged. Aortic atherosclerosis.    LUNGS: Lung volumes are low with associated atelectatic change.  Unchanged patchy bilateral airspace opacities.    PLEURA:No pleural effusion. No pneumothorax.    BONES: No acute osseous abnormality.                                 Medications   sodium chloride 0.9% bolus 1,000 mL (0 mLs Intravenous Stopped 6/28/22 2015)   cefTRIAXone (ROCEPHIN) 1 g in dextrose 5 % in water (D5W) 5 % 50 mL IVPB (MB+) (0 g Intravenous Stopped 6/28/22 2250)     Medical Decision Making:   Initial Assessment:   Patient presents with some shortness of breath improved after DuoNeb prior to arrival, on chronic O2  Differential Diagnosis:   COPD exacerbation, pneumonia, CHF exacerbation, anxiety, asthma  Clinical Tests:   Lab Tests: Ordered and Reviewed  Radiological Study: Reviewed and Ordered  Medical Tests: Ordered and Reviewed  ED Management:  Patient continues to report feeling better while here in the emergency department.  Her chest x-ray is concerning albeit not significantly changed from prior chest x-rays.  Unsure if this represents a pneumonia which removed reports patient is being treated for, chronic scarring, CHF exacerbation, or something related to her aortic stenosis.  Patient's white count is increased over prior she did have a fever upon arrival here to the emergency department.  Initial plan after discussion with family and patient was to admit her to the hospital however whenever the hospitalist speaks to them they have changed her mind like to go home.  Patient will be discharged with some  Levaquin and albuterol being as this seemed to help there prior to arrival.  Return precautions given.  Patient discharged home.          Scribe Attestation:   Scribe #1: I performed the above scribed service and the documentation accurately describes the services I performed. I attest to the accuracy of the note.    Attending Attestation:           Physician Attestation for Scribe:  Physician Attestation Statement for Scribe #1: I, Keith Rodriguez MD, reviewed documentation, as scribed by Myron Zhang in my presence, and it is both accurate and complete.             ED Course as of 06/29/22 2053 Tue Jun 28, 2022   1834 [facility] aware of pt [MM]      ED Course User Index  [MM] Keith Rodriguez MD             Clinical Impression:   Final diagnoses:  [R06.02] Shortness of breath  [R52] Pain  [J18.9] Pneumonia of both lungs due to infectious organism, unspecified part of lung (Primary)  [I35.0] Aortic valve stenosis, etiology of cardiac valve disease unspecified          ED Disposition Condition    Discharge Stable        ED Prescriptions     Medication Sig Dispense Start Date End Date Auth. Provider    levoFLOXacin (LEVAQUIN) 500 MG tablet Take 1 tablet (500 mg total) by mouth once daily. for 5 days 5 tablet 6/28/2022 7/3/2022 Keith Rodriguez MD    albuterol sulfate 2.5 mg/0.5 mL Nebu Take 2.5 mg by nebulization every 4 (four) hours as needed (dyspnea). Rescue 60 each 6/28/2022 6/28/2023 Keith Rodriguez MD        Follow-up Information     Follow up With Specialties Details Why Contact Info    Mary Jane Dean MD Family Medicine In 3 days If symptoms worsen [address]  [place] [address]  [phone]             Keith Rodriguez MD  06/29/22 2053

## 2022-06-28 NOTE — Clinical Note
Diagnosis: Shortness of breath [786.05.ICD-9-CM]   Admitting Provider:: ROBERT LOPEZ [[id number]]   Future Attending Provider: ROBERT LOPEZ [[id number]]   Reason for IP Medical Treatment  (Clinical interventions that can only be accomplished in the IP setting? ) :: PNA   Estimated Length of Stay:: 3-4 midnights   I certify that Inpatient services for greater than or equal to 2 midnights are medically necessary:: No   Plans for Post-Acute care--if anticipated (pick the single best option):: D. Skilled Nursing Placement

## 2022-06-29 VITALS
HEART RATE: 77 BPM | DIASTOLIC BLOOD PRESSURE: 66 MMHG | OXYGEN SATURATION: 100 % | SYSTOLIC BLOOD PRESSURE: 119 MMHG | TEMPERATURE: 98 F | BODY MASS INDEX: 36.49 KG/M2 | WEIGHT: 240 LBS | RESPIRATION RATE: 23 BRPM

## 2022-06-29 NOTE — ED NOTES
Pt dc'd at this time. Dr nichole t bedside and spoke c daughter. Report called to rigoberto. Transport scheduled

## 2022-06-29 NOTE — CONSULTS
[facility] Hospital Medicine History & Physical Examination       Patient Name: Emma Jorgensen  MRN: [id number]  Patient Class: IP- Inpatient   Admission Date: 6/28/2022  5:34 PM  Length of Stay: 0  Admitting Service: Hospital Medicine   Attending Physician: Kel Phelan MD   Primary Care Provider: Mary Jane Dean MD  History source: EMR, patient and/or patient's family    CHIEF COMPLAINT   Shortness of Breath (SOB at [facility]. Temp of 101 prior to EMS arrival. Given Tylenol by NH staff. Temp 100.3 here in the ED.  Recently recovering from pneumonia. Given duoneb by EMS. Report feeling better after neb.)      HISTORY OF PRESENT ILLNESS:   88-year-old  female with significant history of valvular heart disease-moderate to severe aortic stenosis awaiting TAVR, chronic diastolic heart failure, chronic dementia, HTN, previous DVT/PE,  patient had a recent trimalleolar ankle fracture late April for which she underwent surgical repair and was discharged to skilled nursing facility.  Patient was sent from her nursing home this evening for worsening dyspnea.  Apparently she is on baseline oxygen at 2 L at her nursing home which she is on here today but was in respiratory distress on arrival.  She has had previous recent admissions for similar presentation and has been found to be in decompensated diastolic heart failure.  On presentation to the ER tonight she had a low-grade fever of 100.3, was tachypneic in the 30s, maintaining adequate sats on baseline 2 L, and hemodynamically stable.  Chest x-ray showed persistent patchy bilateral opacities in comparison to prior x-rays.  Laboratory work showed a mild leukocytosis but otherwise grossly unremarkable.  Hospitalist service has been consulted for admission for further management.    PAST MEDICAL HISTORY:   Severe aortic stenosis awaiting TAVR  Chronic diastolic congestive heart failure (EF 55% TTE  05/2022)  Dementia  Hypertension  Paroxysmal atrial fibrillation on Eliquis  Bilateral carotid artery disease  Peripheral arterial disease  Previous DVT/PE on chronic anticoagulation    PAST SURGICAL HISTORY:   Ankle fracture repair, right  Cataract surgery  Hysterectomy  Small intestine surgery    ALLERGIES:   Patient has no known allergies.    FAMILY HISTORY:   Reviewed and non-contributory     SOCIAL HISTORY:   Former smoker denies current tobacco drug or alcohol use    HOME MEDICATIONS:     albuterol (PROVENTIL/VENTOLIN HFA) 90 mcg/actuation inhaler Inhale 2 puffs into the lungs every 6 (six) hours.   apixaban (ELIQUIS) 2.5 mg Tab Take 2 tablets (5 mg total) by mouth 2 (two) times daily.   atorvastatin (LIPITOR) 40 MG tablet Take 1 tablet (40 mg total) by mouth every evening.   budesonide-glycopyr-formoterol 160-9-4.8 mcg/actuation HFAA Inhale 2 puffs into the lungs 2 (two) times a day.   clopidogreL (PLAVIX) 75 mg tablet Take 1 tablet (75 mg total) by mouth once daily.   clotrimazole-betamethasone 1-0.05% (LOTRISONE) cream Apply 1 application topically 2 (two) times daily.   cyanocobalamin 500 MCG tablet Take 1 tablet (500 mcg total) by mouth once daily.   diclofenac sodium (VOLTAREN) 1 % Gel Apply 1 application topically 4 (four) times daily.   diltiaZEM (CARDIZEM) 120 MG tablet Take 1 tablet by mouth once daily at 6am.   donepeziL (ARICEPT) 10 MG tablet Take 1 tablet by mouth nightly.   fluticasone propion-salmeterol 115-21 mcg/dose (ADVAIR HFA) 115-21 mcg/actuation HFAA inhaler Inhale 2 puffs into the lungs 2 (two) times daily.   furosemide (LASIX) 40 MG tablet Take 1 tablet (40 mg total) by mouth once daily.   gabapentin (NEURONTIN) 100 MG capsule Take 1 capsule (100 mg total) by mouth nightly.   hydroCHLOROthiazide (HYDRODIURIL) 12.5 MG Tab Take 1 tablet by mouth once daily at 6am.   HYDROcodone-acetaminophen (NORCO) 5-325 mg per tablet Take 1 tablet by mouth every 6 (six) hours as needed for Pain.    loratadine (CLARITIN) 10 mg tablet Take 1 tablet by mouth once daily at 6am.   losartan (COZAAR) 100 MG tablet Take 1 tablet by mouth once daily.   magnesium oxide (MAG-OX) 400 mg (241.3 mg magnesium) tablet Take 1 tablet (400 mg total) by mouth once daily.   metoprolol tartrate (LOPRESSOR) 25 MG tablet Take 1 tablet (25 mg total) by mouth 2 (two) times daily.   pantoprazole (PROTONIX) 40 MG tablet Take 1 tablet (40 mg total) by mouth once daily.   pramipexole (MIRAPEX) 1 MG tablet Take 1 tablet by mouth nightly.   traZODone (DESYREL) 50 MG tablet Take 1 tablet (50 mg total) by mouth every evening.   venlafaxine (EFFEXOR-XR) 37.5 MG 24 hr capsule Take 1 tablet by mouth once daily at 6am.   white petrolatum 41 % Oint Apply topically 2 (two) times daily.   betamethasone dipropionate 0.05 % cream Apply 1 application topically 2 (two) times daily.   LORazepam (ATIVAN) 1 MG tablet Take 1 tablet by mouth nightly as needed.   omeprazole 20 mg TbEC Take 1 tablet by mouth once daily at 6am.       REVIEW OF SYSTEMS:   Except as documented, all other systems reviewed and negative     PHYSICAL EXAM:   T 98 °F (36.7 °C)   /67   P 79   RR 15   O2 98 %  GENERAL: awake, alert, oriented and in no acute distress, non-toxic appearing   HEENT: normocephalic atraumatic   NECK: supple   LUNGS:  Clear without respiratory distress, breathing comfortably, no accessory muscle use   CVS: Regular rate and rhythm, normal peripheral perfusion  ABD: Soft, non-tender, non-distended, bowel sounds present  EXTREMITIES: no clubbing or cyanosis, right foot heel wound packed without purulent drainage  SKIN: Warm, dry.   NEURO: alert and oriented, grossly without focal deficits   PSYCHIATRIC: Cooperative    LABS AND IMAGING:     Recent Labs     06/28/22 1828   WBC 13.8*   RBC 3.11*   HGB 9.2*   HCT 28.8*   MCV 92.6   MCH 29.6   MCHC 31.9*   RDW 13.9        Recent Labs     06/28/22 1828   LACTIC 1.3      Recent Labs     06/28/22 1828    *   K 3.9   CHLORIDE 94*   CO2 32*   BUN 14.6   CREATININE 0.72   GLUCOSE 121*   CALCIUM 8.9   MG 1.80   ALBUMIN 2.7*   GLOBULIN 3.5   ALKPHOS 85   ALT 7   AST 13   BILITOT 0.7     Recent Labs     06/28/22  1828   .8*        X-Ray Chest 1 View  Impression: Unchanged patchy bilateral airspace opacities possibly related to low lung volume and atelectatic change        ASSESSMENT & PLAN:   Acute bronchitis  Chronic hypoxia stable on 2 L nasal cannula  Severe aortic stenosis awaiting TAVR  Chronic diastolic congestive heart failure (EF 55% TTE 05/2022)  Dementia  Hypertension  Paroxysmal atrial fibrillation on Eliquis  Bilateral carotid artery disease  Peripheral arterial disease  Previous DVT/PE on chronic anticoagulation    - recommend Levaquin 500 mg orally x5 days for acute bronchitis  - recommend prescribing albuterol nebulizer treatments to be used at the nursing home as this significantly improved patient's symptoms  - recommend transitioning patient back to regular diet as she has tolerated this well in the past and was cleared by speech  - Daugherty catheter was changed in the ER   - no indication for inpatient treatment at this time  - daughter at bedside is agreement with plan of care is about    Kel Phelan MD   [facility] Medicine

## 2022-06-29 NOTE — DISCHARGE INSTRUCTIONS
Return to the emergency department if you develop worsening symptoms including: fever, chills, chest pain, shortness of breath, weakness, numbness, tingling, nausea, vomiting, inability to eat, drink, or take your medication.      Please take antibiotics as prescribed.    Please follow the primary care physician return emergency department if you do not feel better after 48-72 hours of antibiotics.

## 2022-06-29 NOTE — ED NOTES
Pt departed issa hinds. Iv removed. Wound to r foot dressed with family request. Clinical summary sent c prescriptions

## 2022-06-30 LAB — BACTERIA UR CULT: ABNORMAL

## 2022-07-03 LAB
BACTERIA BLD CULT: NORMAL
BACTERIA BLD CULT: NORMAL

## 2022-07-14 ENCOUNTER — OFFICE VISIT (OUTPATIENT)
Dept: ORTHOPEDICS | Facility: CLINIC | Age: 87
End: 2022-07-14
Payer: MEDICARE

## 2022-07-14 ENCOUNTER — HOSPITAL ENCOUNTER (OUTPATIENT)
Dept: RADIOLOGY | Facility: CLINIC | Age: 87
Discharge: HOME | End: 2022-07-14
Attending: NURSE PRACTITIONER
Payer: MEDICARE

## 2022-07-14 VITALS
HEART RATE: 77 BPM | BODY MASS INDEX: 36.38 KG/M2 | RESPIRATION RATE: 18 BRPM | DIASTOLIC BLOOD PRESSURE: 69 MMHG | WEIGHT: 240.06 LBS | HEIGHT: 68 IN | SYSTOLIC BLOOD PRESSURE: 119 MMHG

## 2022-07-14 DIAGNOSIS — S82.854D: ICD-10-CM

## 2022-07-14 DIAGNOSIS — T81.31XA DEHISCENCE OF OPERATIVE WOUND, INITIAL ENCOUNTER: ICD-10-CM

## 2022-07-14 DIAGNOSIS — L89.619 PRESSURE INJURY OF SKIN OF RIGHT HEEL, UNSPECIFIED INJURY STAGE: Primary | ICD-10-CM

## 2022-07-14 PROCEDURE — 99024 PR POST-OP FOLLOW-UP VISIT: ICD-10-PCS | Mod: POP,,, | Performed by: NURSE PRACTITIONER

## 2022-07-14 PROCEDURE — 73610 XR ANKLE COMPLETE 3 VIEW RIGHT: ICD-10-PCS | Mod: RT,,, | Performed by: NURSE PRACTITIONER

## 2022-07-14 PROCEDURE — 99024 POSTOP FOLLOW-UP VISIT: CPT | Mod: POP,,, | Performed by: NURSE PRACTITIONER

## 2022-07-14 PROCEDURE — 73610 X-RAY EXAM OF ANKLE: CPT | Mod: RT,,, | Performed by: NURSE PRACTITIONER

## 2022-07-14 NOTE — PROGRESS NOTES
Subjective:       Patient ID: Emma Jorgensen is a 88 y.o. female.    Chief Complaint   Patient presents with    Right Ankle - Injury     10 WEEK F/U RIGHT TRIMALLEOLAR ANKLE FX, S/P HINDFOOT NAILING.  HAS WOUND TO BOTTOM OF HEEL.          Patient is here today for a follow up evaluation 10 weeks out from right hindfoot fusion nail for a right trimalleolar ankle fracture. Her daughter reports 2 wounds to the foot. She is unsure how long they have been present. She is currently in a SNF. Daughter states that a nurse has been applying betadine and Santyl and packing/dressing the wounds. Patient has not had any fever or drainage. She has been non weight bearing due to her wounds. She comes in with her cam boot today. She was last seen about 1 month ago while inpatient at [facility] for heart failure exacerbation. At that time she had a stable eschar to the plantar aspect of the foot but no open wounds or pressure injury and no signs of infection.       Review of Systems   Constitutional: Negative for chills and fever.   HENT: Negative for congestion and hearing loss.    Eyes: Negative for visual disturbance.   Cardiovascular: Negative for chest pain and syncope.   Respiratory: Negative for cough and shortness of breath.    Hematologic/Lymphatic: Does not bruise/bleed easily.   Skin: Positive for poor wound healing. Negative for color change and rash.   Gastrointestinal: Negative for abdominal pain, nausea and vomiting.   Genitourinary: Negative for dysuria and hematuria.   Neurological: Negative for numbness, sensory change and weakness.   Psychiatric/Behavioral: Negative for altered mental status.        Current Outpatient Medications on File Prior to Visit   Medication Sig Dispense Refill    albuterol (PROVENTIL/VENTOLIN HFA) 90 mcg/actuation inhaler Inhale 2 puffs into the lungs every 6 (six) hours.      albuterol sulfate 2.5 mg/0.5 mL Nebu Take 2.5 mg by nebulization every 4 (four) hours as needed  (dyspnea). Rescue 60 each 0    apixaban (ELIQUIS) 2.5 mg Tab Take 2 tablets (5 mg total) by mouth 2 (two) times daily.      atorvastatin (LIPITOR) 40 MG tablet Take 1 tablet (40 mg total) by mouth every evening. 90 tablet 0    budesonide-glycopyr-formoterol 160-9-4.8 mcg/actuation HFAA Inhale 2 puffs into the lungs 2 (two) times a day.      clopidogreL (PLAVIX) 75 mg tablet Take 1 tablet (75 mg total) by mouth once daily. 30 tablet 0    clotrimazole-betamethasone 1-0.05% (LOTRISONE) cream Apply 1 application topically 2 (two) times daily.      cyanocobalamin 500 MCG tablet Take 1 tablet (500 mcg total) by mouth once daily. 30 tablet 0    diclofenac sodium (VOLTAREN) 1 % Gel Apply 1 application topically 4 (four) times daily.      diltiaZEM (CARDIZEM) 120 MG tablet Take 1 tablet by mouth once daily at 6am.      donepeziL (ARICEPT) 10 MG tablet Take 1 tablet by mouth nightly.      fluticasone propion-salmeterol 115-21 mcg/dose (ADVAIR HFA) 115-21 mcg/actuation HFAA inhaler Inhale 2 puffs into the lungs 2 (two) times daily. 12 g 0    furosemide (LASIX) 40 MG tablet Take 1 tablet (40 mg total) by mouth once daily. 30 tablet 11    gabapentin (NEURONTIN) 100 MG capsule Take 1 capsule (100 mg total) by mouth nightly. 30 capsule 0    hydroCHLOROthiazide (HYDRODIURIL) 12.5 MG Tab Take 1 tablet by mouth once daily at 6am.      HYDROcodone-acetaminophen (NORCO) 5-325 mg per tablet Take 1 tablet by mouth every 6 (six) hours as needed for Pain.      loratadine (CLARITIN) 10 mg tablet Take 1 tablet by mouth once daily at 6am.      losartan (COZAAR) 100 MG tablet Take 1 tablet by mouth once daily.      magnesium oxide (MAG-OX) 400 mg (241.3 mg magnesium) tablet Take 1 tablet (400 mg total) by mouth once daily.  0    metoprolol tartrate (LOPRESSOR) 25 MG tablet Take 1 tablet (25 mg total) by mouth 2 (two) times daily. 60 tablet 0    mupirocin (BACTROBAN) 2 % ointment Apply topically once daily. Apply to affected  "area at right plantar heel. After cleansing well with dermal cleanser and rinsing with saline, cover with nonadherent dressing secured with rolled gauze. Keep off loaded. 2 g 0    pantoprazole (PROTONIX) 40 MG tablet Take 1 tablet (40 mg total) by mouth once daily. 30 tablet 11    pramipexole (MIRAPEX) 1 MG tablet Take 1 tablet by mouth nightly.      traZODone (DESYREL) 50 MG tablet Take 1 tablet (50 mg total) by mouth every evening. 30 tablet 0    venlafaxine (EFFEXOR-XR) 37.5 MG 24 hr capsule Take 1 tablet by mouth once daily at 6am.      white petrolatum 41 % Oint Apply topically 2 (two) times daily.      [DISCONTINUED] betamethasone dipropionate 0.05 % cream Apply 1 application topically 2 (two) times daily.      [DISCONTINUED] LORazepam (ATIVAN) 1 MG tablet Take 1 tablet by mouth nightly as needed.      [DISCONTINUED] omeprazole 20 mg TbEC Take 1 tablet by mouth once daily at 6am.       No current facility-administered medications on file prior to visit.          Objective:      /69   Pulse 77   Resp 18   Ht 5' 8" (1.727 m)   Wt 108.9 kg (240 lb 1.3 oz)   LMP  (LMP Unknown)   BMI 36.50 kg/m²   Physical Exam  Musculoskeletal:      Comments: Right lower extremity: no calf swelling or tenderness. No obvious swelling to the ankle. No painful or prominent hardware. No ROM to the ankle due to ankle fusion. She has a pressure sore to her heel with some fibrinous exudate noted. She has an open wound to the plantar aspect of the foot at the site of her surgical incision. It is approximately 2x2x1 cm in size. There is no exposed hardware or bone. There is no purulence expressed. There is no erythema. Wound bed with fibrinous exudate noted. BCR to digits. She can flex and extend her digits.         Body mass index is 36.5 kg/m².    Radiology:   3 view xray right ankle: hardware intact without failure or loosening. Alignment unchanged. Good interval bone healing.       Assessment:         1. Pressure " injury of skin of right heel, unspecified injury stage  Ambulatory referral/consult to Wound Clinic   2. Closed nondisplaced trimalleolar fracture of right ankle with routine healing, subsequent encounter  X-Ray Ankle Complete Right    Ambulatory referral/consult to Wound Clinic   3. Dehiscence of operative wound, initial encounter             Plan:       Patient is 10 weeks out from a right hindfoot fusion nail. Her fracture is healing appropriately, but unfortunately she has developed a surgical wound dehiscence to the plantar incision as well as a pressure sore to her heel. She has no signs of acute infection or any exposed hardware that would necessitate urgent operative intervention. We will refer her to our wound care clinic for evaluation and treatment. Remain non weight bearing to protect soft tissues. Discontinue CAM boot. I have written orders for a PRAFO boot while in bed. We will see her back in 3 weeks. Her daughter understands that she is to let us know immediately if there is any worsening in her condition such as redness, purulent drainage, fever, or exposed hardware. She understands that she could require future surgery should this occur. All question and concerns were addressed.     The above findings, diagnosis, and treatment plan were discussed with Dr. Miguel Krause who is in agreement.    Follow up in about 3 weeks (around 8/4/2022).    Pressure injury of skin of right heel, unspecified injury stage  -     Ambulatory referral/consult to Wound Clinic; Future; Expected date: 07/21/2022    Closed nondisplaced trimalleolar fracture of right ankle with routine healing, subsequent encounter  -     X-Ray Ankle Complete Right; Future; Expected date: 07/14/2022  -     Ambulatory referral/consult to Wound Clinic; Future; Expected date: 07/21/2022    Dehiscence of operative wound, initial encounter              Orders Placed This Encounter   Procedures    X-Ray Ankle Complete Right     Standing Status:    Future     Number of Occurrences:   1     Standing Expiration Date:   7/13/2023     Order Specific Question:   May the Radiologist modify the order per protocol to meet the clinical needs of the patient?     Answer:   Yes     Order Specific Question:   Release to patient     Answer:   Immediate    Ambulatory referral/consult to Wound Clinic     Standing Status:   Future     Standing Expiration Date:   8/21/2022     Referral Priority:   Urgent     Referral Type:   Consultation     Referral Reason:   Specialty Services Required     Requested Specialty:   Wound Care     Number of Visits Requested:   1       Future Appointments   Date Time Provider Department Center   8/9/2022 10:30 AM [facility] [place]

## 2022-07-26 ENCOUNTER — LAB REQUISITION (OUTPATIENT)
Dept: LAB | Facility: HOSPITAL | Age: 87
End: 2022-07-26
Payer: MEDICARE

## 2022-07-26 DIAGNOSIS — R79.9 ABNORMAL FINDING OF BLOOD CHEMISTRY, UNSPECIFIED: ICD-10-CM

## 2022-07-26 LAB
BASOPHILS # BLD AUTO: 0.02 X10(3)/MCL (ref 0–0.2)
BASOPHILS NFR BLD AUTO: 0.3 %
EOSINOPHIL # BLD AUTO: 0.28 X10(3)/MCL (ref 0–0.9)
EOSINOPHIL NFR BLD AUTO: 3.7 %
ERYTHROCYTE [DISTWIDTH] IN BLOOD BY AUTOMATED COUNT: 15.3 % (ref 11.5–17)
HCT VFR BLD AUTO: 25.7 % (ref 37–47)
HGB BLD-MCNC: 7.6 GM/DL (ref 12–16)
IMM GRANULOCYTES # BLD AUTO: 0.03 X10(3)/MCL (ref 0–0.04)
IMM GRANULOCYTES NFR BLD AUTO: 0.4 %
LYMPHOCYTES # BLD AUTO: 0.96 X10(3)/MCL (ref 0.6–4.6)
LYMPHOCYTES NFR BLD AUTO: 12.5 %
MCH RBC QN AUTO: 28.9 PG (ref 27–31)
MCHC RBC AUTO-ENTMCNC: 29.6 MG/DL (ref 33–36)
MCV RBC AUTO: 97.7 FL (ref 80–94)
MONOCYTES # BLD AUTO: 0.77 X10(3)/MCL (ref 0.1–1.3)
MONOCYTES NFR BLD AUTO: 10.1 %
NEUTROPHILS # BLD AUTO: 5.6 X10(3)/MCL (ref 2.1–9.2)
NEUTROPHILS NFR BLD AUTO: 73 %
NRBC BLD AUTO-RTO: 0 %
PLATELET # BLD AUTO: 308 X10(3)/MCL (ref 130–400)
PMV BLD AUTO: 10.4 FL (ref 7.4–10.4)
RBC # BLD AUTO: 2.63 X10(6)/MCL (ref 4.2–5.4)
WBC # SPEC AUTO: 7.7 X10(3)/MCL (ref 4.5–11.5)

## 2022-07-26 PROCEDURE — 85025 COMPLETE CBC W/AUTO DIFF WBC: CPT | Performed by: NURSE PRACTITIONER

## 2022-07-28 DIAGNOSIS — I35.0 NODULAR CALCIFIC AORTIC VALVE STENOSIS: Primary | ICD-10-CM

## 2022-07-30 ENCOUNTER — HOSPITAL ENCOUNTER (INPATIENT)
Facility: HOSPITAL | Age: 87
LOS: 12 days | Discharge: NURSING, REHAB OR LONG-TERM CARE | DRG: 987 | End: 2022-08-11
Attending: EMERGENCY MEDICINE | Admitting: INTERNAL MEDICINE
Payer: MEDICARE

## 2022-07-30 DIAGNOSIS — I73.9 PAD (PERIPHERAL ARTERY DISEASE): ICD-10-CM

## 2022-07-30 DIAGNOSIS — R04.0 EPISTAXIS: ICD-10-CM

## 2022-07-30 DIAGNOSIS — R06.00 DYSPNEA: ICD-10-CM

## 2022-07-30 DIAGNOSIS — I48.0 PAF (PAROXYSMAL ATRIAL FIBRILLATION): ICD-10-CM

## 2022-07-30 DIAGNOSIS — D64.9 ANEMIA: ICD-10-CM

## 2022-07-30 DIAGNOSIS — L89.614 PRESSURE ULCER OF RIGHT HEEL, STAGE 4: Primary | ICD-10-CM

## 2022-07-30 LAB
ABO + RH BLD: NORMAL
ALBUMIN SERPL-MCNC: 2.4 GM/DL (ref 3.4–4.8)
ALBUMIN/GLOB SERPL: 0.6 RATIO (ref 1.1–2)
ALP SERPL-CCNC: 100 UNIT/L (ref 40–150)
ALT SERPL-CCNC: 9 UNIT/L (ref 0–55)
APTT PPP: 27.4 SECONDS (ref 23.4–33.9)
AST SERPL-CCNC: 14 UNIT/L (ref 5–34)
BASOPHILS # BLD AUTO: 0.04 X10(3)/MCL (ref 0–0.2)
BASOPHILS NFR BLD AUTO: 0.3 %
BILIRUBIN DIRECT+TOT PNL SERPL-MCNC: 0.4 MG/DL
BLD PROD TYP BPU: NORMAL
BLOOD UNIT EXPIRATION DATE: NORMAL
BLOOD UNIT TYPE CODE: 7300
BUN SERPL-MCNC: 17.1 MG/DL (ref 9.8–20.1)
CALCIUM SERPL-MCNC: 9.1 MG/DL (ref 8.4–10.2)
CHLORIDE SERPL-SCNC: 97 MMOL/L (ref 98–107)
CO2 SERPL-SCNC: 34 MMOL/L (ref 23–31)
CREAT SERPL-MCNC: 0.62 MG/DL (ref 0.55–1.02)
CROSSMATCH INTERPRETATION: NORMAL
DISPENSE STATUS: NORMAL
EOSINOPHIL # BLD AUTO: 0.15 X10(3)/MCL (ref 0–0.9)
EOSINOPHIL NFR BLD AUTO: 1.3 %
ERYTHROCYTE [DISTWIDTH] IN BLOOD BY AUTOMATED COUNT: 15.4 % (ref 11.5–17)
GLOBULIN SER-MCNC: 3.7 GM/DL (ref 2.4–3.5)
GLUCOSE SERPL-MCNC: 129 MG/DL (ref 82–115)
GROUP & RH: NORMAL
HCT VFR BLD AUTO: 20.1 % (ref 37–47)
HGB BLD-MCNC: 6.2 GM/DL (ref 12–16)
IMM GRANULOCYTES # BLD AUTO: 0.11 X10(3)/MCL (ref 0–0.04)
IMM GRANULOCYTES NFR BLD AUTO: 1 %
INDIRECT COOMBS GEL: NORMAL
INR BLD: 1.31 (ref 2–3)
LYMPHOCYTES # BLD AUTO: 1.45 X10(3)/MCL (ref 0.6–4.6)
LYMPHOCYTES NFR BLD AUTO: 12.6 %
MCH RBC QN AUTO: 28.8 PG (ref 27–31)
MCHC RBC AUTO-ENTMCNC: 30.8 MG/DL (ref 33–36)
MCV RBC AUTO: 93.5 FL (ref 80–94)
MONOCYTES # BLD AUTO: 0.9 X10(3)/MCL (ref 0.1–1.3)
MONOCYTES NFR BLD AUTO: 7.8 %
NEUTROPHILS # BLD AUTO: 8.8 X10(3)/MCL (ref 2.1–9.2)
NEUTROPHILS NFR BLD AUTO: 77 %
NRBC BLD AUTO-RTO: 0 %
PLATELET # BLD AUTO: 323 X10(3)/MCL (ref 130–400)
PMV BLD AUTO: 9.3 FL (ref 7.4–10.4)
POTASSIUM SERPL-SCNC: 4.3 MMOL/L (ref 3.5–5.1)
PROT SERPL-MCNC: 6.1 GM/DL (ref 5.8–7.6)
PROTHROMBIN TIME: 16 SECONDS (ref 11.7–14.5)
RBC # BLD AUTO: 2.15 X10(6)/MCL (ref 4.2–5.4)
SODIUM SERPL-SCNC: 139 MMOL/L (ref 136–145)
UNIT NUMBER: NORMAL
WBC # SPEC AUTO: 11.5 X10(3)/MCL (ref 4.5–11.5)

## 2022-07-30 PROCEDURE — 80053 COMPREHEN METABOLIC PANEL: CPT | Performed by: EMERGENCY MEDICINE

## 2022-07-30 PROCEDURE — 86920 COMPATIBILITY TEST SPIN: CPT | Performed by: EMERGENCY MEDICINE

## 2022-07-30 PROCEDURE — 25000003 PHARM REV CODE 250: Performed by: EMERGENCY MEDICINE

## 2022-07-30 PROCEDURE — 93005 ELECTROCARDIOGRAM TRACING: CPT

## 2022-07-30 PROCEDURE — 85025 COMPLETE CBC W/AUTO DIFF WBC: CPT | Performed by: EMERGENCY MEDICINE

## 2022-07-30 PROCEDURE — 85730 THROMBOPLASTIN TIME PARTIAL: CPT | Performed by: EMERGENCY MEDICINE

## 2022-07-30 PROCEDURE — 85610 PROTHROMBIN TIME: CPT | Performed by: EMERGENCY MEDICINE

## 2022-07-30 PROCEDURE — 93010 ELECTROCARDIOGRAM REPORT: CPT | Mod: ,,, | Performed by: INTERNAL MEDICINE

## 2022-07-30 PROCEDURE — P9016 RBC LEUKOCYTES REDUCED: HCPCS | Performed by: EMERGENCY MEDICINE

## 2022-07-30 PROCEDURE — 25000003 PHARM REV CODE 250: Performed by: NURSE PRACTITIONER

## 2022-07-30 PROCEDURE — 36415 COLL VENOUS BLD VENIPUNCTURE: CPT | Performed by: EMERGENCY MEDICINE

## 2022-07-30 PROCEDURE — 99292 CRITICAL CARE ADDL 30 MIN: CPT | Mod: 25

## 2022-07-30 PROCEDURE — 96374 THER/PROPH/DIAG INJ IV PUSH: CPT

## 2022-07-30 PROCEDURE — 11000001 HC ACUTE MED/SURG PRIVATE ROOM

## 2022-07-30 PROCEDURE — 63600175 PHARM REV CODE 636 W HCPCS: Mod: JG | Performed by: EMERGENCY MEDICINE

## 2022-07-30 PROCEDURE — 27000221 HC OXYGEN, UP TO 24 HOURS

## 2022-07-30 PROCEDURE — 93010 EKG 12-LEAD: ICD-10-PCS | Mod: ,,, | Performed by: INTERNAL MEDICINE

## 2022-07-30 PROCEDURE — 86850 RBC ANTIBODY SCREEN: CPT | Performed by: EMERGENCY MEDICINE

## 2022-07-30 PROCEDURE — 30905 CONTROL OF NOSEBLEED: CPT | Mod: LT

## 2022-07-30 PROCEDURE — P9016 RBC LEUKOCYTES REDUCED: HCPCS | Performed by: INTERNAL MEDICINE

## 2022-07-30 PROCEDURE — 86920 COMPATIBILITY TEST SPIN: CPT | Performed by: INTERNAL MEDICINE

## 2022-07-30 PROCEDURE — P9035 PLATELET PHERES LEUKOREDUCED: HCPCS | Performed by: INTERNAL MEDICINE

## 2022-07-30 PROCEDURE — 99291 CRITICAL CARE FIRST HOUR: CPT | Mod: 25

## 2022-07-30 RX ORDER — HYDRALAZINE HYDROCHLORIDE 20 MG/ML
10 INJECTION INTRAMUSCULAR; INTRAVENOUS
Status: ACTIVE | OUTPATIENT
Start: 2022-07-30 | End: 2022-07-30

## 2022-07-30 RX ORDER — HYDROCODONE BITARTRATE AND ACETAMINOPHEN 500; 5 MG/1; MG/1
TABLET ORAL
Status: DISCONTINUED | OUTPATIENT
Start: 2022-07-30 | End: 2022-07-31

## 2022-07-30 RX ORDER — ACETAMINOPHEN 325 MG/1
650 TABLET ORAL EVERY 8 HOURS PRN
Status: DISCONTINUED | OUTPATIENT
Start: 2022-07-30 | End: 2022-08-11 | Stop reason: HOSPADM

## 2022-07-30 RX ORDER — HYDROCODONE BITARTRATE AND ACETAMINOPHEN 5; 325 MG/1; MG/1
1 TABLET ORAL EVERY 6 HOURS PRN
Status: DISCONTINUED | OUTPATIENT
Start: 2022-07-30 | End: 2022-08-11 | Stop reason: HOSPADM

## 2022-07-30 RX ORDER — ACETAMINOPHEN 325 MG/1
650 TABLET ORAL EVERY 4 HOURS PRN
Status: DISCONTINUED | OUTPATIENT
Start: 2022-07-30 | End: 2022-08-11 | Stop reason: HOSPADM

## 2022-07-30 RX ORDER — SODIUM CHLORIDE 0.9 % (FLUSH) 0.9 %
10 SYRINGE (ML) INJECTION
Status: DISCONTINUED | OUTPATIENT
Start: 2022-07-30 | End: 2022-08-11 | Stop reason: HOSPADM

## 2022-07-30 RX ORDER — OXYMETAZOLINE HCL 0.05 %
1 SPRAY, NON-AEROSOL (ML) NASAL
Status: COMPLETED | OUTPATIENT
Start: 2022-07-30 | End: 2022-07-30

## 2022-07-30 RX ORDER — ONDANSETRON 2 MG/ML
4 INJECTION INTRAMUSCULAR; INTRAVENOUS EVERY 8 HOURS PRN
Status: DISCONTINUED | OUTPATIENT
Start: 2022-07-30 | End: 2022-08-11 | Stop reason: HOSPADM

## 2022-07-30 RX ADMIN — OXYMETAZOLINE HCL 1 SPRAY: 0.05 SPRAY NASAL at 03:07

## 2022-07-30 RX ADMIN — ACETAMINOPHEN 325MG 650 MG: 325 TABLET ORAL at 02:07

## 2022-07-30 RX ADMIN — Medication 2000 UNITS: at 05:07

## 2022-07-30 RX ADMIN — HYDROCODONE BITARTRATE AND ACETAMINOPHEN 1 TABLET: 5; 325 TABLET ORAL at 05:07

## 2022-07-30 NOTE — CONSULTS
"Nutrition   Progress Note      Recommendations:  1. Diet as per MD, Recommend goal diet: Regular  2. Add Boost Breeze Orange with meals while on liquid diet.   3. When diet is advanced, recommend to add Kang BID to assist with wound healing.       Reason for Evaluation:  Consult    Diagnosis:    1. Anemia    2. Epistaxis        Relevant Medical History:    Past Medical History:   Diagnosis Date    Anxiety disorder, unspecified     Arthritis     Deep vein thrombosis     Depression     Irregular heart beat     Obesity, unspecified     Other pulmonary embolism without acute cor pulmonale     Parkinson's disease     Trimalleolar fracture of ankle, closed     Unspecified dementia without behavioral disturbance          Nutrition Diet History:    Factors affecting nutritional intake: NPO and Nose bleed    Food / Bahai / Culture Preferences:  n/a      Nutrition Prescription Ordered:    Current Diet Order: Clear liquid    Appetite:  Other:  NPO    PO intake: 0 - 25 %      Labs / Medications / Procedures:    Nutrition Related Medications: Hydralazine    Nutrition Related Labs:  7/30: H/h-6.2/20.1, Gluc-129      Anthropometrics:  Height: 5' 6" (1.676 m)  Admit Weight:  Weight: 115 kg (253 lb 8.5 oz)  Latest Weight:  115 kg (253 lb 8.5 oz)    Wt Readings from Last 5 Encounters:   07/30/22 115 kg (253 lb 8.5 oz)   07/14/22 108.9 kg (240 lb 1.3 oz)   06/28/22 108.9 kg (240 lb)   06/07/22 111.5 kg (245 lb 13 oz)   05/28/22 110.5 kg (243 lb 8 oz)     IBW: 59.0kg  %IBW: 195%  UBW: 111kg  %Weight Change: stable  Body mass index is 40.92 kg/m².  BMI classification:  Obese Grade III (BMI >/= 40)      Nutrition Narrative:  7/30: Pt has been NPO from nose bleed. Has improved and clear liquid diet has been ordered. Rt heel fissure with partial thickness tissue lost has been noted. Consulted to assist with wounds. Will add Boost Breeze Orange while on liquids. Once diet advanced to solids, recommend Kang BID to assist with " wound healing.     Monitoring and Evaluation:    Nutrition Monitoring and Evaluation:  food and beverage intake    Nutrition Risk:  Level of Nutrition Risk:  Low  Frequency of Follow up:  Dietitian will f/up within 7 days.

## 2022-07-30 NOTE — H&P
[facility] Hospital Medicine History & Physical Examination       Patient Name: Emma Jorgensen  MRN: [id number]  Patient Class: IP- Inpatient   Admission Date: 07/30/2022   Admitting Physician: Dr. Moore  Length of Stay: 0  Attending Physician: Dr. Moore  Primary Care Provider: Mary Jane Dean MD  Face-to-Face encounter date: 07/30/2022  Code Status: Full  Chief Complaint: Epistaxis    Source of Information: Patient. Medical Records      HISTORY OF PRESENT ILLNESS:   Emma Jorgensen is a 88 y.o. female with a PMHx of diastolic heart failure (LVEF 55% and grade II diastolic dysfunction from an echo on 05/24/2022),  PAF on Eliquis, moderate to severe aortic stenosis with plans for a TAVR outpatient per Cards, bilateral carotid artery stenosis, HTN, chronic dementia, previous DVT/PE, and recent surgical fixation of a closed right trimalleolar ankle fracture on 04/26/2022 who presented to [facility] ED from [facility] on 7/30/2022 with c/o epistaxis that began x1 day ago. Reported she is on 2L O2 chronically for hypoxia and occasionally get nose bleeds. She began bleeding from the left nare and felt blood draining in the back of her throat so she was referred to ED for further evaluation. Rhino Rocket was placed. Labs were notable for hgb 6.2, hct 20.1, chloride 97, CO2 34, INR 1.31. She was given KCentra, and transfused platelets. She began bleeding again from the left nare and was transferred to [facility] ED. ENT was consulted. 1 unit PRBC was ordered for transfusion. She was admitted to hospital medicine services for management of care.     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    FAMILY HISTORY:   Reviewed and negative    SOCIAL HISTORY:   Denied alcohol, tobacco or illicit drug use    HOME MEDICATIONS:     Prior to Admission medications    Medication Sig Start Date End Date Taking? Authorizing Provider   albuterol (PROVENTIL/VENTOLIN HFA) 90 mcg/actuation inhaler Inhale 2 puffs into the lungs every 6 (six) hours. 3/24/22   Historical Provider   albuterol sulfate 2.5 mg/0.5 mL Nebu Take 2.5 mg by nebulization every 4 (four) hours as needed (dyspnea). Rescue 6/28/22 6/28/23  Keith Rodriguez MD   apixaban (ELIQUIS) 2.5 mg Tab Take 2 tablets (5 mg total) by mouth 2 (two) times daily. 5/3/22   Demarcus Bailey MD   atorvastatin (LIPITOR) 40 MG tablet Take 1 tablet (40 mg total) by mouth every evening. 6/13/22   Adithya Rothman MD   budesonide-glycopyr-formoterol 160-9-4.8 mcg/actuation HFAA Inhale 2 puffs into the lungs 2 (two) times a day. 3/24/22   Historical Provider   clopidogreL (PLAVIX) 75 mg tablet Take 1 tablet (75 mg total) by mouth once daily. 6/13/22   Adithya Rothman MD   clotrimazole-betamethasone 1-0.05% (LOTRISONE) cream Apply 1 application topically 2 (two) times daily. 12/6/21   Historical Provider   cyanocobalamin 500 MCG tablet Take 1 tablet (500 mcg total) by mouth once daily. 5/3/22   Demarcus Bailey MD   diclofenac sodium (VOLTAREN) 1 % Gel Apply 1 application topically 4 (four) times daily. 12/6/21   Historical Provider   diltiaZEM (CARDIZEM) 120 MG tablet Take 1 tablet by mouth once daily at 6am. 12/6/21   Historical Provider   donepeziL (ARICEPT) 10 MG tablet Take 1 tablet by mouth nightly. 12/6/21   Historical Provider   fluticasone propion-salmeterol 115-21 mcg/dose (ADVAIR HFA) 115-21 mcg/actuation HFAA inhaler Inhale 2 puffs into the lungs 2 (two) times daily. 6/13/22   Adithya Rothman MD   furosemide (LASIX) 40 MG tablet Take 1 tablet (40 mg total) by mouth once daily. 5/27/22 5/27/23   Sariah Moore MD   gabapentin (NEURONTIN) 100 MG capsule Take 1 capsule (100 mg total) by mouth nightly. 6/13/22   Adithya Rothman MD   hydroCHLOROthiazide (HYDRODIURIL) 12.5 MG Tab Take 1 tablet by mouth once daily at 6am. 12/6/21   Historical Provider   HYDROcodone-acetaminophen (NORCO) 5-325 mg per tablet Take 1 tablet by mouth every 6 (six) hours as needed for Pain.    Historical Provider   loratadine (CLARITIN) 10 mg tablet Take 1 tablet by mouth once daily at 6am. 1/4/22   Historical Provider   losartan (COZAAR) 100 MG tablet Take 1 tablet by mouth once daily. 12/30/21   Historical Provider   magnesium oxide (MAG-OX) 400 mg (241.3 mg magnesium) tablet Take 1 tablet (400 mg total) by mouth once daily. 6/13/22   Adithya Rothman MD   metoprolol tartrate (LOPRESSOR) 25 MG tablet Take 1 tablet (25 mg total) by mouth 2 (two) times daily. 6/13/22   Adithya Rothman MD   mupirocin (BACTROBAN) 2 % ointment Apply topically once daily. Apply to affected area at right plantar heel. After cleansing well with dermal cleanser and rinsing with saline, cover with nonadherent dressing secured with rolled gauze. Keep off loaded. 6/1/22   Jag Villalta MD   pantoprazole (PROTONIX) 40 MG tablet Take 1 tablet (40 mg total) by mouth once daily. 5/3/22 5/3/23  Demarcus Bailey MD   pramipexole (MIRAPEX) 1 MG tablet Take 1 tablet by mouth nightly. 12/6/21   Historical Provider   traZODone (DESYREL) 50 MG tablet Take 1 tablet (50 mg total) by mouth every evening. 5/3/22   Demarcus Bailey MD   venlafaxine (EFFEXOR-XR) 37.5 MG 24 hr capsule Take 1 tablet by mouth once daily at 6am. 12/6/21   Historical Provider   white petrolatum 41 % Oint Apply topically 2 (two) times daily. 6/13/22   Adithya Rothman MD   betamethasone dipropionate 0.05 % cream Apply 1 application topically 2 (two) times daily. 1/4/22 5/3/22  Historical Provider   LORazepam (ATIVAN) 1 MG tablet Take 1 tablet by mouth nightly as needed. 1/4/22 6/13/22  Historical Provider   omeprazole 20 mg  TbEC Take 1 tablet by mouth once daily at 6am. 12/6/21 5/3/22  Historical Provider       REVIEW OF SYSTEMS:   Except as documented, all other systems reviewed and negative     PHYSICAL EXAM:     VITAL SIGNS: 24 HRS MIN & MAX LAST   Temp  Min: 97.5 °F (36.4 °C)  Max: 98.4 °F (36.9 °C) 97.7 °F (36.5 °C)   BP  Min: 117/66  Max: 156/61 (!) 140/73   Pulse  Min: 77  Max: 82  77   Resp  Min: 18  Max: 20 18   SpO2  Min: 92 %  Max: 100 % 100 %       General appearance: Well-developed female in no apparent distress.  HENT: Atraumatic head. Moist mucous membranes of oral cavity. Left rhinorocket in place   Eyes: Normal extraocular movements.   Neck: Supple.   Lungs: Clear to auscultation bilaterally. No wheezing present.   Heart: Regular rate and rhythm. S1 and S2 present. No pedal edema.  Abdomen: Soft, non-distended, non-tender. Obese  Extremities: No cyanosis, clubbing, or edema.  Skin: R foot wound  Neuro: Motor and sensory exams grossly intact.   Psych/mental status: Appropriate mood and affect. Responds appropriately to questions.     LABS AND IMAGING:     Recent Labs   Lab 07/26/22  0500 07/30/22  0509   WBC 7.7 11.5   RBC 2.63* 2.15*   HGB 7.6* 6.2*   HCT 25.7* 20.1*   MCV 97.7* 93.5   MCH 28.9 28.8   MCHC 29.6* 30.8*   RDW 15.3 15.4    323   MPV 10.4 9.3       Recent Labs   Lab 07/30/22  0508      K 4.3   CO2 34*   BUN 17.1   CREATININE 0.62   CALCIUM 9.1   ALBUMIN 2.4*   ALKPHOS 100   ALT 9   AST 14   BILITOT 0.4       Microbiology Results (last 7 days)     ** No results found for the last 168 hours. **           X-Ray Ankle Complete Right  See Provider Notes for results.     IMPRESSION: Please see provider office/clinic notes for radiology   interpretation    This procedure was auto-finalized by: Virtual Radiologist        ASSESSMENT & PLAN:   Epistaxis  Acute on Chronic Normocytic Anemia  PAF on Eliquis  Previous DVT/PE on chronic anticoagulation  Chronic hypoxia stable on 2 L nasal cannula  Severe  aortic stenosis awaiting TAVR  Chronic diastolic congestive heart failure (EF 55% TTE 05/2022)  Dementia  Hypertension  Bilateral carotid artery disease  Peripheral arterial disease    Plan:  ENT consulted, appreciate recommendations  L Rhinorocket in placed  Transfuse 1 unit PRBC as ordered in ED  Hold Eliquis x7 days  Resume other appropriate home medications  Labs in AM          VTE Prophylaxis: will be placed on SCD for DVT prophylaxis and will be advised to be as mobile as possible and sit in a chair as tolerated    __________________________________________________________________________  INPATIENT LIST OF MEDICATIONS     Current Facility-Administered Medications:     0.9%  NaCl infusion (for blood administration), , Intravenous, Q24H PRN, Sari Wright MD    acetaminophen tablet 650 mg, 650 mg, Oral, Q8H PRN, FABIAN TamP-BC    acetaminophen tablet 650 mg, 650 mg, Oral, Q4H PRN, RAHUL Tam-BC    hydrALAZINE injection 10 mg, 10 mg, Intravenous, ED 1 Time, Sari Wright MD    ondansetron injection 4 mg, 4 mg, Intravenous, Q8H PRN, RAHUL Tam-BC    sodium chloride 0.9% flush 10 mL, 10 mL, Intravenous, PRN, Sari Wright MD    Current Outpatient Medications:     albuterol (PROVENTIL/VENTOLIN HFA) 90 mcg/actuation inhaler, Inhale 2 puffs into the lungs every 6 (six) hours., Disp: , Rfl:     albuterol sulfate 2.5 mg/0.5 mL Nebu, Take 2.5 mg by nebulization every 4 (four) hours as needed (dyspnea). Rescue, Disp: 60 each, Rfl: 0    apixaban (ELIQUIS) 2.5 mg Tab, Take 2 tablets (5 mg total) by mouth 2 (two) times daily., Disp: , Rfl:     atorvastatin (LIPITOR) 40 MG tablet, Take 1 tablet (40 mg total) by mouth every evening., Disp: 90 tablet, Rfl: 0    budesonide-glycopyr-formoterol 160-9-4.8 mcg/actuation HFAA, Inhale 2 puffs into the lungs 2 (two) times a day., Disp: , Rfl:     clopidogreL (PLAVIX) 75 mg tablet, Take 1 tablet (75 mg total) by mouth once daily.,  Disp: 30 tablet, Rfl: 0    clotrimazole-betamethasone 1-0.05% (LOTRISONE) cream, Apply 1 application topically 2 (two) times daily., Disp: , Rfl:     cyanocobalamin 500 MCG tablet, Take 1 tablet (500 mcg total) by mouth once daily., Disp: 30 tablet, Rfl: 0    diclofenac sodium (VOLTAREN) 1 % Gel, Apply 1 application topically 4 (four) times daily., Disp: , Rfl:     diltiaZEM (CARDIZEM) 120 MG tablet, Take 1 tablet by mouth once daily at 6am., Disp: , Rfl:     donepeziL (ARICEPT) 10 MG tablet, Take 1 tablet by mouth nightly., Disp: , Rfl:     fluticasone propion-salmeterol 115-21 mcg/dose (ADVAIR HFA) 115-21 mcg/actuation HFAA inhaler, Inhale 2 puffs into the lungs 2 (two) times daily., Disp: 12 g, Rfl: 0    furosemide (LASIX) 40 MG tablet, Take 1 tablet (40 mg total) by mouth once daily., Disp: 30 tablet, Rfl: 11    gabapentin (NEURONTIN) 100 MG capsule, Take 1 capsule (100 mg total) by mouth nightly., Disp: 30 capsule, Rfl: 0    hydroCHLOROthiazide (HYDRODIURIL) 12.5 MG Tab, Take 1 tablet by mouth once daily at 6am., Disp: , Rfl:     HYDROcodone-acetaminophen (NORCO) 5-325 mg per tablet, Take 1 tablet by mouth every 6 (six) hours as needed for Pain., Disp: , Rfl:     loratadine (CLARITIN) 10 mg tablet, Take 1 tablet by mouth once daily at 6am., Disp: , Rfl:     losartan (COZAAR) 100 MG tablet, Take 1 tablet by mouth once daily., Disp: , Rfl:     magnesium oxide (MAG-OX) 400 mg (241.3 mg magnesium) tablet, Take 1 tablet (400 mg total) by mouth once daily., Disp: , Rfl: 0    metoprolol tartrate (LOPRESSOR) 25 MG tablet, Take 1 tablet (25 mg total) by mouth 2 (two) times daily., Disp: 60 tablet, Rfl: 0    mupirocin (BACTROBAN) 2 % ointment, Apply topically once daily. Apply to affected area at right plantar heel. After cleansing well with dermal cleanser and rinsing with saline, cover with nonadherent dressing secured with rolled gauze. Keep off loaded., Disp: 2 g, Rfl: 0    pantoprazole (PROTONIX) 40  MG tablet, Take 1 tablet (40 mg total) by mouth once daily., Disp: 30 tablet, Rfl: 11    pramipexole (MIRAPEX) 1 MG tablet, Take 1 tablet by mouth nightly., Disp: , Rfl:     traZODone (DESYREL) 50 MG tablet, Take 1 tablet (50 mg total) by mouth every evening., Disp: 30 tablet, Rfl: 0    venlafaxine (EFFEXOR-XR) 37.5 MG 24 hr capsule, Take 1 tablet by mouth once daily at 6am., Disp: , Rfl:     white petrolatum 41 % Oint, Apply topically 2 (two) times daily., Disp: , Rfl:       Scheduled Meds:   hydrALAZINE  10 mg Intravenous ED 1 Time     Continuous Infusions:  PRN Meds:.sodium chloride, acetaminophen, acetaminophen, ondansetron, sodium chloride 0.9%      Discharge Planning and Disposition: TBD    I, Christy Perdue, NP have reviewed and discussed the case with Dr. Roca.   Please see the following addendum for further assessment and plan from the attending MD.    Christy Perdue, [facility]  07/30/2022    ________________________________________________________________________________    MD Addendum:  I, Dr.priyanka roca assumed care of this patient today at 10.30 am  For the patient encounter, I performed the substantive portion of the visit, I reviewed the NP/PA documentation, treatment plan, and medical decision making.  I had face to face time with this patient       88-year-old  female with past medical history of diastolic heart failure, paroxysmal AFib, moderate to severe AS, previous history of DVT and PE recent right ankle fracture presented to ER with complaints of epistaxis apparently patient had a rhino rocket placed was given Kcentra and was transfused platelets but she again started bleeding so she was transferred to oral C ENT was consulted patient will be given 1 unit of packed RBC and she has been admitted to hospitalist service for further management and care    General alert awake oriented, rhino rocket in the left naris  Chest clear to auscultate  Abdomen soft  nontender    Hemoglobin this morning is 6.1 will be given 1 unit of packed RBC  Will keep a close watch on patient's H&H  Eliquis on hold  Reviewed ENTs note and the recommending holding off on Eliquis for at least 7 days  Repeat blood work in a.m.          All diagnosis and differential diagnosis have been reviewed; assessment and plan has been documented; I have personally reviewed the labs and test results that are presently available; I have reviewed the patients medication list; I have reviewed the consulting providers response and recommendations. I have reviewed or attempted to review medical records based upon their availability.    All of the patient and family questions have been addressed and answered. Patient's is agreeable to the above stated plan. I will continue to monitor closely and make adjustments to medical management as needed.      07/30/2022

## 2022-07-30 NOTE — CONSULTS
[facility] - Emergency Dept  Otorhinolaryngology-Head & Neck Surgery  Consult Note    Patient Name: Emma Jorgensen  MRN: [id number]  Code Status: Full Code  Admission Date: 7/30/2022  Hospital Length of Stay: 0 days  Attending Physician: Sudeep Humphries MD  Primary Care Provider: Mary Jane Dean MD    Consults  Subjective:     Chief Complaint/Reason for Admission:Epistaxis    History of Present Illness: L epistaxis, on Eliquis, on NC.  Rhinorocket placed on left, bleed a bit after.  Resolved en route to [facility].    Chronically anemic, hemoglobin down to 6.2  Today.    Medications:  Continuous Infusions:  Scheduled Meds:   hydrALAZINE  10 mg Intravenous ED 1 Time     PRN Meds:sodium chloride, sodium chloride 0.9%     No current facility-administered medications on file prior to encounter.     Current Outpatient Medications on File Prior to Encounter   Medication Sig    albuterol (PROVENTIL/VENTOLIN HFA) 90 mcg/actuation inhaler Inhale 2 puffs into the lungs every 6 (six) hours.    albuterol sulfate 2.5 mg/0.5 mL Nebu Take 2.5 mg by nebulization every 4 (four) hours as needed (dyspnea). Rescue    apixaban (ELIQUIS) 2.5 mg Tab Take 2 tablets (5 mg total) by mouth 2 (two) times daily.    atorvastatin (LIPITOR) 40 MG tablet Take 1 tablet (40 mg total) by mouth every evening.    budesonide-glycopyr-formoterol 160-9-4.8 mcg/actuation HFAA Inhale 2 puffs into the lungs 2 (two) times a day.    clopidogreL (PLAVIX) 75 mg tablet Take 1 tablet (75 mg total) by mouth once daily.    clotrimazole-betamethasone 1-0.05% (LOTRISONE) cream Apply 1 application topically 2 (two) times daily.    cyanocobalamin 500 MCG tablet Take 1 tablet (500 mcg total) by mouth once daily.    diclofenac sodium (VOLTAREN) 1 % Gel Apply 1 application topically 4 (four) times daily.    diltiaZEM (CARDIZEM) 120 MG tablet Take 1 tablet by mouth once daily at 6am.    donepeziL (ARICEPT) 10 MG tablet Take 1 tablet by mouth  nightly.    fluticasone propion-salmeterol 115-21 mcg/dose (ADVAIR HFA) 115-21 mcg/actuation HFAA inhaler Inhale 2 puffs into the lungs 2 (two) times daily.    furosemide (LASIX) 40 MG tablet Take 1 tablet (40 mg total) by mouth once daily.    gabapentin (NEURONTIN) 100 MG capsule Take 1 capsule (100 mg total) by mouth nightly.    hydroCHLOROthiazide (HYDRODIURIL) 12.5 MG Tab Take 1 tablet by mouth once daily at 6am.    HYDROcodone-acetaminophen (NORCO) 5-325 mg per tablet Take 1 tablet by mouth every 6 (six) hours as needed for Pain.    loratadine (CLARITIN) 10 mg tablet Take 1 tablet by mouth once daily at 6am.    losartan (COZAAR) 100 MG tablet Take 1 tablet by mouth once daily.    magnesium oxide (MAG-OX) 400 mg (241.3 mg magnesium) tablet Take 1 tablet (400 mg total) by mouth once daily.    metoprolol tartrate (LOPRESSOR) 25 MG tablet Take 1 tablet (25 mg total) by mouth 2 (two) times daily.    mupirocin (BACTROBAN) 2 % ointment Apply topically once daily. Apply to affected area at right plantar heel. After cleansing well with dermal cleanser and rinsing with saline, cover with nonadherent dressing secured with rolled gauze. Keep off loaded.    pantoprazole (PROTONIX) 40 MG tablet Take 1 tablet (40 mg total) by mouth once daily.    pramipexole (MIRAPEX) 1 MG tablet Take 1 tablet by mouth nightly.    traZODone (DESYREL) 50 MG tablet Take 1 tablet (50 mg total) by mouth every evening.    venlafaxine (EFFEXOR-XR) 37.5 MG 24 hr capsule Take 1 tablet by mouth once daily at 6am.    white petrolatum 41 % Oint Apply topically 2 (two) times daily.    [DISCONTINUED] betamethasone dipropionate 0.05 % cream Apply 1 application topically 2 (two) times daily.    [DISCONTINUED] LORazepam (ATIVAN) 1 MG tablet Take 1 tablet by mouth nightly as needed.    [DISCONTINUED] omeprazole 20 mg TbEC Take 1 tablet by mouth once daily at 6am.       Review of patient's allergies indicates:  No Known Allergies    Past  Medical History:   Diagnosis Date    Anxiety disorder, unspecified     Arthritis     Deep vein thrombosis     Depression     Irregular heart beat     Obesity, unspecified     Other pulmonary embolism without acute cor pulmonale     Parkinson's disease     Trimalleolar fracture of ankle, closed     Unspecified dementia without behavioral disturbance      Past Surgical History:   Procedure Laterality Date    ANGIOGRAM, CORONARY, WITH LEFT HEART CATHETERIZATION N/A 6/7/2022    Procedure: Angiogram, Coronary, with Left Heart Cath;  Surgeon: Claude Morillo MD;  Location: [facility] CATH LAB;  Service: Cardiology;  Laterality: N/A;    ANKLE FRACTURE SURGERY Right     CATARACT EXTRACTION      CATHETERIZATION OF BOTH LEFT AND RIGHT HEART N/A 6/7/2022    Procedure: CATHETERIZATION, HEART, BOTH LEFT AND RIGHT;  Surgeon: Claude Morillo MD;  Location: [facility] CATH LAB;  Service: Cardiology;  Laterality: N/A;    COLONOSCOPY W/ POLYPECTOMY      HYSTERECTOMY      SMALL INTESTINE SURGERY       Family History     Family history is unknown by patient.        Tobacco Use    Smoking status: Former Smoker    Smokeless tobacco: Never Used   Substance and Sexual Activity    Alcohol use: Never    Drug use: Never    Sexual activity: Not on file     Review of Systems  Objective:     Vital Signs (Most Recent):  Temp: 97.7 °F (36.5 °C) (07/30/22 0830)  Pulse: 77 (07/30/22 0830)  Resp: 18 (07/30/22 0830)  BP: (!) 140/73 (07/30/22 0830)  SpO2: 100 % (07/30/22 0830) Vital Signs (24h Range):  Temp:  [97.5 °F (36.4 °C)-98.4 °F (36.9 °C)] 97.7 °F (36.5 °C)  Pulse:  [77-82] 77  Resp:  [18-20] 18  SpO2:  [92 %-100 %] 100 %  BP: (117-156)/(61-73) 140/73     Weight: 115 kg (253 lb 8.5 oz)  Body mass index is 40.92 kg/m².        Physical Exam  ENT:  Left rhinorocket in place, no bleeding.  R naris clear.  Oropharynx clear.    Significant Labs:  All pertinent labs from the last 24 hours have been reviewed.        Assessment/Plan:   #1  Epistaxis    She has had packing in place in the past.  Usually keeps x 3 days (remove Monday).  Has been instructed by previous physician to come off Eliquis x 7 days then reintroduce.  Continue to monitory hgb.  At this point, no changes to be made from ENT perspective given nonbleeding exam.  Ok to allow PO intake.    Thank you for your consult.     Cosme Hu Jr., MD  Otorhinolaryngology-Head & Neck Surgery  [facility] - Emergency Dept

## 2022-07-30 NOTE — PROVIDER PROGRESS NOTES - EMERGENCY DEPT.
Encounter Date: 7/30/2022    ED Physician Progress Notes             Ms. Jorgensen arrived here as ED-to-ED transfer from the [facility] after epistaxis, now w/ rhino rocket in place and s/p KCentra/4 factor PCC for reversal of her anticoagulation. She has become more anemic since arrival in the ED w/ Hgb 6.2 on labs this AM. Transfusion ordered.     On my exam, rhino rocket in place w/ no apparent active bleeding, patient states does not feel like she is swallowing blood anymore either.     ENT consultation placed (prior facility MD had spoken with the ENT on call and our RN calling now to inform that patient has arrived).     I have informed the hospitalist service that the patient is here as well.

## 2022-07-30 NOTE — Clinical Note
Diagnosis: Epistaxis [784.7.ICD-9-CM]   Admitting Provider:: ROOSEVELT MCDOWELL [[id number]]   Future Attending Provider: ROOSEVELT MCDOWELL [[id number]]   Reason for IP Medical Treatment  (Clinical interventions that can only be accomplished in the IP setting? ) :: blood transfusion, ENT consultation   Estimated Length of Stay:: 2 midnights   I certify that Inpatient services for greater than or equal to 2 midnights are medically necessary:: Yes   Plans for Post-Acute care--if anticipated (pick the single best option):: A. No post acute care anticipated at this time

## 2022-07-30 NOTE — ED PROVIDER NOTES
Encounter Date: 7/30/2022       History     Chief Complaint   Patient presents with    Epistaxis     Patient is an 87 yo F from [facility] who is presenting with epistaxis, re-routed from [facility] ED. Patient is on 2 L NC chronically but frequently not humidified and she gets nosebleeds on occasion. Started to have one earlier today from the left nare, intermittently throughout the day with some sensation of it going down her throat. No trauma to the region. She is on eliquis and clopidogrel for a DVT per her daughter but this was years ago. She was sent to the ED due to the bleeding becoming persistent. She has otherwise been at her baseline health.         Review of patient's allergies indicates:  No Known Allergies  Past Medical History:   Diagnosis Date    Anxiety disorder, unspecified     Arthritis     Deep vein thrombosis     Depression     Irregular heart beat     Obesity, unspecified     Other pulmonary embolism without acute cor pulmonale     Parkinson's disease     Trimalleolar fracture of ankle, closed     Unspecified dementia without behavioral disturbance      Past Surgical History:   Procedure Laterality Date    ANGIOGRAM, CORONARY, WITH LEFT HEART CATHETERIZATION N/A 6/7/2022    Procedure: Angiogram, Coronary, with Left Heart Cath;  Surgeon: Claude Morillo MD;  Location: [facility] CATH LAB;  Service: Cardiology;  Laterality: N/A;    ANKLE FRACTURE SURGERY Right     CATARACT EXTRACTION      CATHETERIZATION OF BOTH LEFT AND RIGHT HEART N/A 6/7/2022    Procedure: CATHETERIZATION, HEART, BOTH LEFT AND RIGHT;  Surgeon: Claude Morillo MD;  Location: [facility] CATH LAB;  Service: Cardiology;  Laterality: N/A;    COLONOSCOPY W/ POLYPECTOMY      HYSTERECTOMY      SMALL INTESTINE SURGERY       Family History   Family history unknown: Yes     Social History     Tobacco Use    Smoking status: Former Smoker    Smokeless tobacco: Never Used   Substance Use Topics    Alcohol use:  Never    Drug use: Never     Review of Systems   Constitutional: Negative for fever.   HENT: Positive for nosebleeds.    Respiratory: Positive for choking. Negative for cough.    Cardiovascular: Negative for chest pain.       Physical Exam     Initial Vitals [07/30/22 0247]   BP Pulse Resp Temp SpO2   (!) 156/61 78 18 98.4 °F (36.9 °C) (!) 92 %      MAP       --         Physical Exam    Nursing note and vitals reviewed.  Constitutional: She appears well-developed and well-nourished.   HENT:   Head: Normocephalic and atraumatic.   Patient coughing and frequently spitting up blood. There is brisk bleeding from the left nare without a clear source on inspection. Unable to get a clear view of the posterior oropharynx   Eyes: Conjunctivae are normal. Pupils are equal, round, and reactive to light.   Neck: Neck supple.   Cardiovascular: Normal rate, regular rhythm and normal heart sounds.   No murmur heard.  Pulmonary/Chest: Breath sounds normal. No respiratory distress. She has no wheezes. She has no rhonchi.   Abdominal: Abdomen is soft. Bowel sounds are normal. She exhibits no distension. There is no abdominal tenderness. There is no rebound and no guarding.   Musculoskeletal:         General: No edema. Normal range of motion.      Cervical back: Neck supple.     Neurological: She is alert and oriented to person, place, and time.   Skin: Skin is warm and dry.   Psychiatric: She has a normal mood and affect. Thought content normal.         ED Course   Epistaxis Mgmt    Date/Time: 7/30/2022 6:14 AM  Performed by: Sari Wright MD  Authorized by: Sari Wright MD   Consent Done: Emergent Situation    Anesthesia:  Local anesthetic: atomized lidocaine with afrin in the left nare.  Anesthetic total: 2 mL    Patient sedated: no  Treatment site: left anterior and left posterior  Repair method: Anterior/Posterior nasal rocket inserted into the left nare.  Post-procedure assessment: bleeding decreased  Recurrence:  recurrence of recent bleed  Comments: Patient initially continued to have bleeding after the initial rhino rocket placement, intermittent in severity. Hydralazine ordered for BP control but pressures have been labile and it was held then cancelled when it wasn't required. K centra given to reverse eliquis. After bleeding has improved.     Critical Care    Date/Time: 7/30/2022 6:55 AM  Performed by: Sari Wright MD  Authorized by: Sari Wright MD   Direct patient critical care time: 40 minutes  Additional history critical care time: 4 minutes  Ordering / reviewing critical care time: 5 minutes  Documentation critical care time: 8 minutes  Consulting other physicians critical care time: 10 minutes  Consult with family critical care time: 10 minutes  Total critical care time (exclusive of procedural time) : 77 minutes  Critical care time was exclusive of separately billable procedures and treating other patients.  Critical care was necessary to treat or prevent imminent or life-threatening deterioration of the following conditions: respiratory failure and shock.  Critical care was time spent personally by me on the following activities: ordering and review of laboratory studies, evaluation of patient's response to treatment, examination of patient, interpretation of cardiac output measurements, development of treatment plan with patient or surrogate, blood draw for specimens, discussions with consultants, pulse oximetry, review of old charts, vascular access procedures, re-evaluation of patient's condition and ordering and performing treatments and interventions.        Labs Reviewed   COMPREHENSIVE METABOLIC PANEL - Abnormal; Notable for the following components:       Result Value    Chloride 97 (*)     Carbon Dioxide 34 (*)     Glucose Level 129 (*)     Albumin Level 2.4 (*)     Globulin 3.7 (*)     Albumin/Globulin Ratio 0.6 (*)     All other components within normal limits   PROTIME-INR - Abnormal; Notable  for the following components:    PT 16.0 (*)     INR 1.31 (*)     All other components within normal limits   CBC WITH DIFFERENTIAL - Abnormal; Notable for the following components:    RBC 2.15 (*)     Hgb 6.2 (*)     Hct 20.1 (*)     MCHC 30.8 (*)     IG# 0.11 (*)     All other components within normal limits   APTT - Normal   CBC W/ AUTO DIFFERENTIAL    Narrative:     The following orders were created for panel order CBC auto differential.  Procedure                               Abnormality         Status                     ---------                               -----------         ------                     CBC with Differential[[id number]]        Abnormal            Final result                 Please view results for these tests on the individual orders.   PATHOLOGIST INTERPRETATION   TYPE & SCREEN   PREPARE RBC SOFT   PREPARE PLATELETS (DOSE) SOFT     EKG Readings: (Independently Interpreted)   EKG Interpretation 6:32 AM    Rate: 80  Rhythm: NSR  QRS: WNL  Qtc: WNL  No signs of ischemia  Nonspecific T wave abnormalities present         Imaging Results    None          Medications   hydrALAZINE injection 10 mg (10 mg Intravenous Not Given 7/30/22 0430)   sodium chloride 0.9% flush 10 mL (has no administration in time range)   ondansetron injection 4 mg (has no administration in time range)   acetaminophen tablet 650 mg (650 mg Oral Given 7/30/22 1457)   acetaminophen tablet 650 mg (has no administration in time range)   HYDROcodone-acetaminophen 5-325 mg per tablet 1 tablet (1 tablet Oral Given 7/31/22 2109)   atorvastatin tablet 40 mg (40 mg Oral Given 7/31/22 2103)   cyanocobalamin tablet 500 mcg (500 mcg Oral Given 7/31/22 0912)   donepeziL tablet 10 mg (10 mg Oral Given 7/31/22 2103)   furosemide tablet 40 mg (40 mg Oral Given 7/31/22 0913)   gabapentin capsule 100 mg (100 mg Oral Given 7/31/22 2103)   HYDROcodone-acetaminophen 5-325 mg per tablet 1 tablet (has no administration in time range)   losartan  tablet 100 mg (100 mg Oral Given 7/31/22 0913)   magnesium oxide tablet 400 mg (400 mg Oral Given 7/31/22 0913)   metoprolol tartrate (LOPRESSOR) tablet 25 mg (25 mg Oral Given 7/31/22 2104)   mupirocin 2 % ointment ( Topical (Top) Given 7/31/22 1027)   traZODone tablet 50 mg (50 mg Oral Given 7/31/22 2103)   hydrALAZINE injection 20 mg (has no administration in time range)   oxymetazoline 0.05 % nasal spray 1 spray (1 spray Each Nostril Given 7/30/22 0330)   human prothrombin complex (PCC) (KCENTRA) 500 unit (400-620 unit) injection 2,000 Units (2,000 Units Intravenous Given 7/30/22 0513)   vancomycin 1.5 g in dextrose 5 % 250 mL IVPB (ready to mix) (0 mg Intravenous Stopped 7/31/22 1145)     Medical Decision Making:   ED Management:  Patient wishes to be DNR but family disagrees and they do not have official paperwork documenting this. Rehab where she came from has her listed as full code. IV access and lab work delayed due to difficultly obtaining IV access. Using US guidance, a twin cath was placed in the R AC. Due to a period of recurrent intermittnet bleeding, K-centra given to reverse eliquis. Bleeding stopped for a period and Type and cross for PRBCs and platelets ordered and patient was admitted to hospital medicine with ENT consult ordered. Prior to shift change, patient had recurrence of bleed that was down the posterior oropharynx. Decision was made to transfer ED to ED for ENT consult. She is managing airway and vitally stable with oxymask on (on 2 L NC at baseline). At 714AM, appears bleeding has stopped again right at time of transfer. As such, have not placed secondary nasal rocket into the R nare. ENT paged to notify of patient. EMS here for transport. Spoke to Dr. Hu with ENT @ 2442 so he is aware of patient.              ED Course as of 08/01/22 0102   Sat Jul 30, 2022   0340 Patient on oxygen at baseline. A/P rhinorocket placed in the left nare after application of afrin and lidocane.  [GM]    0652 Patient started to have bleeding again around the packing. Dr. Dalton accepted as ED to ED transfer for ENT consult.  [GM]      ED Course User Index  [GM] Sari Wright MD             Clinical Impression:   Final diagnoses:  [D64.9] Anemia  [R04.0] Epistaxis          ED Disposition Condition    Admit               Sari Wright MD  08/01/22 0103       Sari Wright MD  08/01/22 0107       Sari Wright MD  08/01/22 0107

## 2022-07-31 ENCOUNTER — HOSPITAL ENCOUNTER (OUTPATIENT)
Dept: RADIOLOGY | Facility: HOSPITAL | Age: 87
Discharge: HOME | End: 2022-07-31
Attending: INTERNAL MEDICINE
Payer: MEDICARE

## 2022-07-31 LAB
ALBUMIN SERPL-MCNC: 2.5 GM/DL (ref 3.4–4.8)
ALBUMIN/GLOB SERPL: 0.8 RATIO (ref 1.1–2)
ALP SERPL-CCNC: 91 UNIT/L (ref 40–150)
ALT SERPL-CCNC: 11 UNIT/L (ref 0–55)
AST SERPL-CCNC: 17 UNIT/L (ref 5–34)
BASOPHILS # BLD AUTO: 0.04 X10(3)/MCL (ref 0–0.2)
BASOPHILS NFR BLD AUTO: 0.3 %
BILIRUBIN DIRECT+TOT PNL SERPL-MCNC: 1.4 MG/DL
BUN SERPL-MCNC: 15.2 MG/DL (ref 9.8–20.1)
CALCIUM SERPL-MCNC: 8.6 MG/DL (ref 8.4–10.2)
CHLORIDE SERPL-SCNC: 99 MMOL/L (ref 98–107)
CO2 SERPL-SCNC: 28 MMOL/L (ref 23–31)
CREAT SERPL-MCNC: 0.59 MG/DL (ref 0.55–1.02)
EOSINOPHIL # BLD AUTO: 0.14 X10(3)/MCL (ref 0–0.9)
EOSINOPHIL NFR BLD AUTO: 1.2 %
ERYTHROCYTE [DISTWIDTH] IN BLOOD BY AUTOMATED COUNT: 16.7 % (ref 11.5–17)
GLOBULIN SER-MCNC: 3.3 GM/DL (ref 2.4–3.5)
GLUCOSE SERPL-MCNC: 96 MG/DL (ref 82–115)
HCT VFR BLD AUTO: 25.3 % (ref 37–47)
HGB BLD-MCNC: 7.8 GM/DL (ref 12–16)
IMM GRANULOCYTES # BLD AUTO: 0.1 X10(3)/MCL (ref 0–0.04)
IMM GRANULOCYTES NFR BLD AUTO: 0.8 %
LYMPHOCYTES # BLD AUTO: 1.14 X10(3)/MCL (ref 0.6–4.6)
LYMPHOCYTES NFR BLD AUTO: 9.5 %
MCH RBC QN AUTO: 29 PG (ref 27–31)
MCHC RBC AUTO-ENTMCNC: 30.8 MG/DL (ref 33–36)
MCV RBC AUTO: 94.1 FL (ref 80–94)
MONOCYTES # BLD AUTO: 1.23 X10(3)/MCL (ref 0.1–1.3)
MONOCYTES NFR BLD AUTO: 10.2 %
MRSA PCR SCRN (OHS): NOT DETECTED
NEUTROPHILS # BLD AUTO: 9.4 X10(3)/MCL (ref 2.1–9.2)
NEUTROPHILS NFR BLD AUTO: 78 %
NRBC BLD AUTO-RTO: 0 %
PLATELET # BLD AUTO: 283 X10(3)/MCL (ref 130–400)
PMV BLD AUTO: 9.9 FL (ref 7.4–10.4)
POTASSIUM SERPL-SCNC: 3.7 MMOL/L (ref 3.5–5.1)
PROT SERPL-MCNC: 5.8 GM/DL (ref 5.8–7.6)
RBC # BLD AUTO: 2.69 X10(6)/MCL (ref 4.2–5.4)
SODIUM SERPL-SCNC: 135 MMOL/L (ref 136–145)
WBC # SPEC AUTO: 12 X10(3)/MCL (ref 4.5–11.5)

## 2022-07-31 PROCEDURE — 80053 COMPREHEN METABOLIC PANEL: CPT | Performed by: NURSE PRACTITIONER

## 2022-07-31 PROCEDURE — 71045 X-RAY EXAM CHEST 1 VIEW: CPT | Mod: TC

## 2022-07-31 PROCEDURE — 51702 INSERT TEMP BLADDER CATH: CPT

## 2022-07-31 PROCEDURE — 25000003 PHARM REV CODE 250: Performed by: INTERNAL MEDICINE

## 2022-07-31 PROCEDURE — 25000003 PHARM REV CODE 250: Performed by: NURSE PRACTITIONER

## 2022-07-31 PROCEDURE — 85025 COMPLETE CBC W/AUTO DIFF WBC: CPT | Performed by: NURSE PRACTITIONER

## 2022-07-31 PROCEDURE — 63600175 PHARM REV CODE 636 W HCPCS: Performed by: INTERNAL MEDICINE

## 2022-07-31 PROCEDURE — 97163 PT EVAL HIGH COMPLEX 45 MIN: CPT

## 2022-07-31 PROCEDURE — 11000001 HC ACUTE MED/SURG PRIVATE ROOM

## 2022-07-31 PROCEDURE — 36415 COLL VENOUS BLD VENIPUNCTURE: CPT | Performed by: NURSE PRACTITIONER

## 2022-07-31 PROCEDURE — 87641 MR-STAPH DNA AMP PROBE: CPT | Performed by: INTERNAL MEDICINE

## 2022-07-31 RX ORDER — FUROSEMIDE 40 MG/1
40 TABLET ORAL DAILY
Status: DISCONTINUED | OUTPATIENT
Start: 2022-07-31 | End: 2022-08-01

## 2022-07-31 RX ORDER — LOSARTAN POTASSIUM 50 MG/1
100 TABLET ORAL DAILY
Status: DISCONTINUED | OUTPATIENT
Start: 2022-07-31 | End: 2022-08-11 | Stop reason: HOSPADM

## 2022-07-31 RX ORDER — HYDROCODONE BITARTRATE AND ACETAMINOPHEN 5; 325 MG/1; MG/1
1 TABLET ORAL EVERY 6 HOURS PRN
Status: DISCONTINUED | OUTPATIENT
Start: 2022-07-31 | End: 2022-08-11 | Stop reason: HOSPADM

## 2022-07-31 RX ORDER — METOPROLOL TARTRATE 25 MG/1
25 TABLET, FILM COATED ORAL 2 TIMES DAILY
Status: DISCONTINUED | OUTPATIENT
Start: 2022-07-31 | End: 2022-08-10

## 2022-07-31 RX ORDER — GABAPENTIN 100 MG/1
100 CAPSULE ORAL NIGHTLY
Status: DISCONTINUED | OUTPATIENT
Start: 2022-07-31 | End: 2022-08-11 | Stop reason: HOSPADM

## 2022-07-31 RX ORDER — MUPIROCIN 20 MG/G
OINTMENT TOPICAL DAILY
Status: DISCONTINUED | OUTPATIENT
Start: 2022-07-31 | End: 2022-08-11 | Stop reason: HOSPADM

## 2022-07-31 RX ORDER — ATORVASTATIN CALCIUM 40 MG/1
40 TABLET, FILM COATED ORAL NIGHTLY
Status: DISCONTINUED | OUTPATIENT
Start: 2022-07-31 | End: 2022-08-11 | Stop reason: HOSPADM

## 2022-07-31 RX ORDER — HYDROCHLOROTHIAZIDE 12.5 MG/1
12.5 TABLET ORAL DAILY
Status: DISCONTINUED | OUTPATIENT
Start: 2022-08-01 | End: 2022-07-31

## 2022-07-31 RX ORDER — HYDRALAZINE HYDROCHLORIDE 20 MG/ML
20 INJECTION INTRAMUSCULAR; INTRAVENOUS EVERY 4 HOURS PRN
Status: DISCONTINUED | OUTPATIENT
Start: 2022-07-31 | End: 2022-08-11 | Stop reason: HOSPADM

## 2022-07-31 RX ORDER — LANOLIN ALCOHOL/MO/W.PET/CERES
400 CREAM (GRAM) TOPICAL DAILY
Status: DISCONTINUED | OUTPATIENT
Start: 2022-07-31 | End: 2022-08-11 | Stop reason: HOSPADM

## 2022-07-31 RX ORDER — TRAZODONE HYDROCHLORIDE 50 MG/1
50 TABLET ORAL NIGHTLY
Status: DISCONTINUED | OUTPATIENT
Start: 2022-07-31 | End: 2022-08-11 | Stop reason: HOSPADM

## 2022-07-31 RX ORDER — DONEPEZIL HYDROCHLORIDE 5 MG/1
10 TABLET, FILM COATED ORAL NIGHTLY
Status: DISCONTINUED | OUTPATIENT
Start: 2022-07-31 | End: 2022-08-11 | Stop reason: HOSPADM

## 2022-07-31 RX ORDER — UBIDECARENONE 75 MG
500 CAPSULE ORAL DAILY
Status: DISCONTINUED | OUTPATIENT
Start: 2022-07-31 | End: 2022-08-11 | Stop reason: HOSPADM

## 2022-07-31 RX ADMIN — TRAZODONE HYDROCHLORIDE 50 MG: 50 TABLET ORAL at 09:07

## 2022-07-31 RX ADMIN — FUROSEMIDE 40 MG: 40 TABLET ORAL at 09:07

## 2022-07-31 RX ADMIN — HYDROCODONE BITARTRATE AND ACETAMINOPHEN 1 TABLET: 5; 325 TABLET ORAL at 12:07

## 2022-07-31 RX ADMIN — DONEPEZIL HYDROCHLORIDE 10 MG: 5 TABLET, FILM COATED ORAL at 09:07

## 2022-07-31 RX ADMIN — MUPIROCIN: 20 OINTMENT TOPICAL at 10:07

## 2022-07-31 RX ADMIN — Medication 400 MG: at 09:07

## 2022-07-31 RX ADMIN — GABAPENTIN 100 MG: 100 CAPSULE ORAL at 09:07

## 2022-07-31 RX ADMIN — CYANOCOBALAMIN TAB 500 MCG 500 MCG: 500 TAB at 09:07

## 2022-07-31 RX ADMIN — HYDROCODONE BITARTRATE AND ACETAMINOPHEN 1 TABLET: 5; 325 TABLET ORAL at 09:07

## 2022-07-31 RX ADMIN — ATORVASTATIN CALCIUM 40 MG: 40 TABLET, FILM COATED ORAL at 09:07

## 2022-07-31 RX ADMIN — LOSARTAN POTASSIUM 100 MG: 50 TABLET, FILM COATED ORAL at 09:07

## 2022-07-31 RX ADMIN — METOPROLOL TARTRATE 25 MG: 25 TABLET, FILM COATED ORAL at 09:07

## 2022-07-31 RX ADMIN — VANCOMYCIN HYDROCHLORIDE 1500 MG: 1.5 INJECTION, POWDER, LYOPHILIZED, FOR SOLUTION INTRAVENOUS at 10:07

## 2022-07-31 NOTE — PROGRESS NOTES
[facility]  Hospital Medicine Progress Note        Chief Complaint: Epistaxis     Source of Information: Patient. Medical Records        HISTORY OF PRESENT ILLNESS:   Emma Jorgensen is a 88 y.o. female with a PMHx of diastolic heart failure (LVEF 55% and grade II diastolic dysfunction from an echo on 05/24/2022),  PAF on Eliquis, moderate to severe aortic stenosis with plans for a TAVR outpatient per Cards, bilateral carotid artery stenosis, HTN, chronic dementia, previous DVT/PE, and recent surgical fixation of a closed right trimalleolar ankle fracture on 04/26/2022 who presented to [facility] ED from [facility] on 7/30/2022 with c/o epistaxis that began x1 day ago. Reported she is on 2L O2 chronically for hypoxia and occasionally get nose bleeds. She began bleeding from the left nare and felt blood draining in the back of her throat so she was referred to ED for further evaluation. Rhino Rocket was placed. Labs were notable for hgb 6.2, hct 20.1, chloride 97, CO2 34, INR 1.31. She was given KCentra, and transfused platelets. She began bleeding again from the left nare and was transferred to [facility] ED. ENT was consulted. 1 unit PRBC was ordered for transfusion. She was admitted to hospital medicine services for management of care.       Patient currently admitted and being managed for left-sided epistaxis, ENT on board has a rhino rockets in her left nose rule    Today's information  Patient seen and examined at bedside  Bleeding from nose will have subsided.  Hemoglobin levels trended up after unit of blood on 07/30  Labs reviewed mild leukocytosis noted  Check MRSA PCR, vancomycin x1 dose  Trend CBC  Continue blood pressure control  Appreciate ENT recommendations  Patient visibly struggling to breathe at bedside get a chest x-ray, may require a dose of Lasix, continue oxygen      Exam   General appearance: Well-developed female in no apparent distress. Oxygen   HENT:  Atraumatic head. Moist mucous membranes of oral cavity. Left rhinorocket in place   Eyes: Normal extraocular movements.   Neck: Supple.   Lungs: Clear to auscultation bilaterally. No wheezing present.   Heart: Regular rate and rhythm. S1 and S2 present. No pedal edema.  Abdomen: Soft, non-distended, non-tender. Obese  Extremities: No cyanosis, clubbing, or edema.  Skin: R foot wound  Neuro: Motor and sensory exams grossly intact.   Psych/mental status: Appropriate mood and affect. Responds appropriately to questions.        ASSESSMENT & PLAN:   Epistaxis, stable   Acute on Chronic Normocytic Anemia  - s/p 1 unit of prbcs   Leucocytosis, mild  Asymptomatic bacteriuria   PAF on Eliquis  Previous DVT/PE on chronic anticoagulation  Chronic hypoxia stable on 2 L nasal cannula  Severe aortic stenosis awaiting TAVR  Chronic diastolic congestive heart failure (EF 55% TTE 05/2022)  Dementia  Hypertension  Bilateral carotid artery disease  Peripheral arterial disease     Plan:  Bleeding from nose will have subsided.  Hemoglobin levels trended up after unit of blood on 07/30  Labs reviewed mild leukocytosis noted  Check MRSA PCR, vancomycin x1 dose  Trend CBC  Continue blood pressure control  Appreciate ENT recommendations  Patient visibly struggling to breathe at bedside get a chest x-ray, may require a dose of Lasix, continue oxygen  Hold Eliquis x7 days  Resume other appropriate home medications  Labs in AM       VTE Prophylaxis: will be placed on SCD for DVT prophylaxis and will be advised to be as mobile as possible and sit in a chair as tolerated     Critical care diagnosis epistaxis, hypoxia   Critical care time greater than 35 minutes    VITAL SIGNS: 24 HRS MIN & MAX LAST   Temp  Min: 97.6 °F (36.4 °C)  Max: 99.4 °F (37.4 °C) 97.7 °F (36.5 °C)   BP  Min: 111/63  Max: 184/74 (!) 165/64   Pulse  Min: 73  Max: 106  73   Resp  Min: 18  Max: 22 20   SpO2  Min: 91 %  Max: 100 % 100 %       Recent Labs   Lab 07/26/22  0500  07/30/22  0509 07/31/22  0422   WBC 7.7 11.5 12.0*   RBC 2.63* 2.15* 2.69*   HGB 7.6* 6.2* 7.8*   HCT 25.7* 20.1* 25.3*   MCV 97.7* 93.5 94.1*   MCH 28.9 28.8 29.0   MCHC 29.6* 30.8* 30.8*   RDW 15.3 15.4 16.7    323 283   MPV 10.4 9.3 9.9       Recent Labs   Lab 07/30/22  0508 07/31/22  0422    135*   K 4.3 3.7   CO2 34* 28   BUN 17.1 15.2   CREATININE 0.62 0.59   CALCIUM 9.1 8.6   ALBUMIN 2.4* 2.5*   ALKPHOS 100 91   ALT 9 11   AST 14 17   BILITOT 0.4 1.4          Microbiology Results (last 7 days)     ** No results found for the last 168 hours. **           See below for Radiology    Scheduled Med:   atorvastatin  40 mg Oral QHS    cyanocobalamin  500 mcg Oral Daily    donepeziL  10 mg Oral Nightly    furosemide  40 mg Oral Daily    gabapentin  100 mg Oral Nightly    losartan  100 mg Oral Daily    magnesium oxide  400 mg Oral Daily    metoprolol tartrate  25 mg Oral BID    mupirocin   Topical (Top) Daily    traZODone  50 mg Oral QHS        Continuous Infusions:       PRN Meds:  acetaminophen, acetaminophen, hydrALAZINE, HYDROcodone-acetaminophen, HYDROcodone-acetaminophen, ondansetron, sodium chloride 0.9%       Assessment/Plan:      VTE prophylaxis:     Patient condition:  Stable/Fair/Guarded/ Serious/ Critical    Anticipated discharge and Disposition:         All diagnosis and differential diagnosis have been reviewed; assessment and plan has been documented; I have personally reviewed the labs and test results that are presently available; I have reviewed the patients medication list; I have reviewed the consulting providers response and recommendations. I have reviewed or attempted to review medical records based upon their availability    All of the patient's questions have been  addressed and answered. Patient's is agreeable to the above stated plan. I will continue to monitor closely and make adjustments to medical management as  needed.  _____________________________________________________________________    Nutrition Status:    Radiology:  X-Ray Ankle Complete Right  See Provider Notes for results.     IMPRESSION: Please see provider office/clinic notes for radiology   interpretation    This procedure was auto-finalized by: Virtual Radiologist      Sai Sanchez MD   07/31/2022

## 2022-07-31 NOTE — PLAN OF CARE
Problem: Infection  Goal: Absence of Infection Signs and Symptoms  Outcome: Ongoing, Progressing     Problem: Adult Inpatient Plan of Care  Goal: Plan of Care Review  Outcome: Ongoing, Progressing  Flowsheets (Taken 7/31/2022 0239)  Plan of Care Reviewed With: patient  Goal: Patient-Specific Goal (Individualized)  Outcome: Ongoing, Progressing  Goal: Absence of Hospital-Acquired Illness or Injury  Outcome: Ongoing, Progressing  Intervention: Identify and Manage Fall Risk  Flowsheets (Taken 7/31/2022 0239)  Safety Promotion/Fall Prevention:   side rails raised x 2   nonskid shoes/socks when out of bed   assistive device/personal item within reach  Intervention: Prevent Skin Injury  Flowsheets (Taken 7/31/2022 0239)  Body Position:   turned   right  Intervention: Prevent and Manage VTE (Venous Thromboembolism) Risk  Flowsheets (Taken 7/31/2022 0239)  Activity Management: Ankle pumps - L1  Intervention: Prevent Infection  Flowsheets (Taken 7/31/2022 0239)  Infection Prevention:   rest/sleep promoted   hand hygiene promoted  Goal: Optimal Comfort and Wellbeing  Outcome: Ongoing, Progressing  Intervention: Monitor Pain and Promote Comfort  Flowsheets (Taken 7/31/2022 0239)  Pain Management Interventions:   medication offered   pillow support provided   quiet environment facilitated  Goal: Readiness for Transition of Care  Outcome: Ongoing, Progressing     Problem: Bariatric Environmental Safety  Goal: Safety Maintained with Care  Outcome: Ongoing, Progressing     Problem: Fluid Imbalance (Pneumonia)  Goal: Fluid Balance  Outcome: Ongoing, Progressing     Problem: Infection (Pneumonia)  Goal: Resolution of Infection Signs and Symptoms  Outcome: Ongoing, Progressing     Problem: Respiratory Compromise (Pneumonia)  Goal: Effective Oxygenation and Ventilation  Outcome: Ongoing, Progressing     Problem: Impaired Wound Healing  Goal: Optimal Wound Healing  Intervention: Promote Wound Healing  Flowsheets (Taken 7/31/2022  0239)  Activity Management: Ankle pumps - L1  Pain Management Interventions:   medication offered   pillow support provided   quiet environment facilitated     Problem: Fall Injury Risk  Goal: Absence of Fall and Fall-Related Injury  Outcome: Ongoing, Progressing  Intervention: Promote Injury-Free Environment  Flowsheets (Taken 7/31/2022 0239)  Safety Promotion/Fall Prevention:   side rails raised x 2   nonskid shoes/socks when out of bed   assistive device/personal item within reach     Problem: Skin Injury Risk Increased  Goal: Skin Health and Integrity  Outcome: Ongoing, Progressing

## 2022-07-31 NOTE — PT/OT/SLP EVAL
Physical Therapy Evaluation    Patient Name:  Emma Jorgensen   MRN:  [id number]    Recommendations:     Discharge Recommendations:  nursing facility, basic   Discharge Equipment Recommendations: none   Barriers to discharge: decreased fxnl mobility    Assessment:     Emma Jorgensen is a 88 y.o. female admitted with a medical diagnosis of <principal problem not specified>.  She presents with the following impairments/functional limitations:  weakness, impaired functional mobility .    Rehab Prognosis: Fair; patient would benefit from acute skilled PT services to address these deficits and reach maximum level of function.    Recent Surgery: * No surgery found *      Plan:     During this hospitalization, patient to be seen 5 x/week to address the identified rehab impairments via therapeutic activities, therapeutic exercises and progress toward the following goals:    · Plan of Care Expires:  08/07/22    Subjective     Chief Complaint: sob  Patient/Family Comments/goals:  Pain/Comfort:  · Pain Rating 1: 0/10    Patients cultural, spiritual, Jewish conflicts given the current situation:      Living Environment:  Lives in NH  Prior to admission, patients level of function was dependent.  Equipment used at home: wheelchair.  DME owned (not currently used): .  Upon discharge, patient will have assistance from nh staff    Objective:     Communicated with  Nurse prior to session.  Patient found supine with    upon PT entry to room.    General Precautions: Standard,     Orthopedic Precautions:    Braces:    Respiratory Status: Nasal cannula, flow   L/min    Exams:  · RLE ROM: limited at hip and ankle  · RLE Strength: 2+/5 hip, 3-/5 knee   · LLE ROM:  limited at hip and knee  · LLE Strength: 3-/5 hip 3/5 knee    Functional Mobility:  · Bed Mobility:     · Rolling Left:  moderate assistance  · Rolling Right: moderate assistance  · Scooting: moderate assistance  · Supine to Sit: maximal assistance  · Sit to  Supine: maximal assistance  · Transfers:     · Sit to Stand:  maximal assistance with no AD    Therapeutic Activities and Exercises:       AM-PAC 6 CLICK MOBILITY  Total Score:10     Patient left supine with call button in reach.    GOALS:   Multidisciplinary Problems     Physical Therapy Goals        Problem: Physical Therapy    Goal Priority Disciplines Outcome Goal Variances Interventions   Physical Therapy Goal     PT, PT/OT      Description: LTGs   1. Pt will be jeremy with bed mobility  2. Pt will be jeremy with sit to stand                   History:     Past Medical History:   Diagnosis Date    Anxiety disorder, unspecified     Arthritis     Deep vein thrombosis     Depression     Irregular heart beat     Obesity, unspecified     Other pulmonary embolism without acute cor pulmonale     Parkinson's disease     Trimalleolar fracture of ankle, closed     Unspecified dementia without behavioral disturbance        Past Surgical History:   Procedure Laterality Date    ANGIOGRAM, CORONARY, WITH LEFT HEART CATHETERIZATION N/A 6/7/2022    Procedure: Angiogram, Coronary, with Left Heart Cath;  Surgeon: Claude Morillo MD;  Location: [facility] CATH LAB;  Service: Cardiology;  Laterality: N/A;    ANKLE FRACTURE SURGERY Right     CATARACT EXTRACTION      CATHETERIZATION OF BOTH LEFT AND RIGHT HEART N/A 6/7/2022    Procedure: CATHETERIZATION, HEART, BOTH LEFT AND RIGHT;  Surgeon: Claude Morillo MD;  Location: [facility] CATH LAB;  Service: Cardiology;  Laterality: N/A;    COLONOSCOPY W/ POLYPECTOMY      HYSTERECTOMY      SMALL INTESTINE SURGERY         Time Tracking:     PT Received On:    PT Start Time: 1035     PT Stop Time: 1100  PT Total Time (min): 25 min     Billable Minutes: Evaluation 25 07/31/2022

## 2022-08-01 ENCOUNTER — HOSPITAL ENCOUNTER (OUTPATIENT)
Dept: RADIOLOGY | Facility: HOSPITAL | Age: 87
Discharge: HOME | DRG: 987 | End: 2022-08-01
Attending: INTERNAL MEDICINE
Payer: MEDICARE

## 2022-08-01 DIAGNOSIS — I35.0 NODULAR CALCIFIC AORTIC VALVE STENOSIS: ICD-10-CM

## 2022-08-01 PROBLEM — L89.614 PRESSURE ULCER OF RIGHT HEEL, STAGE 4: Status: ACTIVE | Noted: 2022-08-01

## 2022-08-01 PROBLEM — T81.32XA DISRUPTION OF INTERNAL OPERATION (SURGICAL) WOUND, NOT ELSEWHERE CLASSIFIED, INITIAL ENCOUNTER: Status: ACTIVE | Noted: 2022-08-01

## 2022-08-01 LAB
ANION GAP SERPL CALC-SCNC: 7 MEQ/L
BASOPHILS # BLD AUTO: 0.04 X10(3)/MCL (ref 0–0.2)
BASOPHILS NFR BLD AUTO: 0.3 %
BUN SERPL-MCNC: 9.1 MG/DL (ref 9.8–20.1)
CALCIUM SERPL-MCNC: 8.4 MG/DL (ref 8.4–10.2)
CHLORIDE SERPL-SCNC: 97 MMOL/L (ref 98–107)
CO2 SERPL-SCNC: 29 MMOL/L (ref 23–31)
CREAT SERPL-MCNC: 0.55 MG/DL (ref 0.55–1.02)
CREAT/UREA NIT SERPL: 17
EOSINOPHIL # BLD AUTO: 0.28 X10(3)/MCL (ref 0–0.9)
EOSINOPHIL NFR BLD AUTO: 2.2 %
ERYTHROCYTE [DISTWIDTH] IN BLOOD BY AUTOMATED COUNT: 16.2 % (ref 11.5–17)
FERRITIN SERPL-MCNC: 130.33 NG/ML (ref 4.63–204)
GLUCOSE SERPL-MCNC: 103 MG/DL (ref 82–115)
HCT VFR BLD AUTO: 25.1 % (ref 37–47)
HGB BLD-MCNC: 7.7 GM/DL (ref 12–16)
IMM GRANULOCYTES # BLD AUTO: 0.13 X10(3)/MCL (ref 0–0.04)
IMM GRANULOCYTES NFR BLD AUTO: 1 %
IRON SATN MFR SERPL: 10 % (ref 20–50)
IRON SERPL-MCNC: 17 UG/DL (ref 50–170)
LYMPHOCYTES # BLD AUTO: 1.46 X10(3)/MCL (ref 0.6–4.6)
LYMPHOCYTES NFR BLD AUTO: 11.3 %
MCH RBC QN AUTO: 28.8 PG (ref 27–31)
MCHC RBC AUTO-ENTMCNC: 30.7 MG/DL (ref 33–36)
MCV RBC AUTO: 94 FL (ref 80–94)
MONOCYTES # BLD AUTO: 1.26 X10(3)/MCL (ref 0.1–1.3)
MONOCYTES NFR BLD AUTO: 9.7 %
NEUTROPHILS # BLD AUTO: 9.8 X10(3)/MCL (ref 2.1–9.2)
NEUTROPHILS NFR BLD AUTO: 75.5 %
NRBC BLD AUTO-RTO: 0 %
PLATELET # BLD AUTO: 295 X10(3)/MCL (ref 130–400)
PMV BLD AUTO: 9.6 FL (ref 7.4–10.4)
POTASSIUM SERPL-SCNC: 3.8 MMOL/L (ref 3.5–5.1)
RBC # BLD AUTO: 2.67 X10(6)/MCL (ref 4.2–5.4)
SODIUM SERPL-SCNC: 133 MMOL/L (ref 136–145)
TIBC SERPL-MCNC: 158 UG/DL (ref 70–310)
TIBC SERPL-MCNC: 175 UG/DL (ref 250–450)
TRANSFERRIN SERPL-MCNC: 166 MG/DL
WBC # SPEC AUTO: 13 X10(3)/MCL (ref 4.5–11.5)

## 2022-08-01 PROCEDURE — 36415 COLL VENOUS BLD VENIPUNCTURE: CPT | Performed by: INTERNAL MEDICINE

## 2022-08-01 PROCEDURE — 97110 THERAPEUTIC EXERCISES: CPT | Mod: CQ

## 2022-08-01 PROCEDURE — 25000003 PHARM REV CODE 250: Performed by: NURSE PRACTITIONER

## 2022-08-01 PROCEDURE — 83540 ASSAY OF IRON: CPT | Performed by: INTERNAL MEDICINE

## 2022-08-01 PROCEDURE — 25000003 PHARM REV CODE 250: Performed by: INTERNAL MEDICINE

## 2022-08-01 PROCEDURE — 80048 BASIC METABOLIC PNL TOTAL CA: CPT | Performed by: INTERNAL MEDICINE

## 2022-08-01 PROCEDURE — 11000001 HC ACUTE MED/SURG PRIVATE ROOM

## 2022-08-01 PROCEDURE — 82728 ASSAY OF FERRITIN: CPT | Performed by: INTERNAL MEDICINE

## 2022-08-01 PROCEDURE — 85025 COMPLETE CBC W/AUTO DIFF WBC: CPT | Performed by: INTERNAL MEDICINE

## 2022-08-01 PROCEDURE — 71275 CT ANGIOGRAPHY CHEST: CPT | Mod: TC

## 2022-08-01 PROCEDURE — 25500020 PHARM REV CODE 255: Performed by: INTERNAL MEDICINE

## 2022-08-01 PROCEDURE — 74174 CTA ABD&PLVS W/CONTRAST: CPT | Mod: TC

## 2022-08-01 PROCEDURE — 97530 THERAPEUTIC ACTIVITIES: CPT | Mod: CQ

## 2022-08-01 PROCEDURE — 63600175 PHARM REV CODE 636 W HCPCS: Performed by: INTERNAL MEDICINE

## 2022-08-01 RX ORDER — FUROSEMIDE 10 MG/ML
20 INJECTION INTRAMUSCULAR; INTRAVENOUS
Status: COMPLETED | OUTPATIENT
Start: 2022-08-01 | End: 2022-08-01

## 2022-08-01 RX ADMIN — METOPROLOL TARTRATE 25 MG: 25 TABLET, FILM COATED ORAL at 09:08

## 2022-08-01 RX ADMIN — CYANOCOBALAMIN TAB 500 MCG 500 MCG: 500 TAB at 09:08

## 2022-08-01 RX ADMIN — FUROSEMIDE 20 MG: 10 INJECTION, SOLUTION INTRAMUSCULAR; INTRAVENOUS at 09:08

## 2022-08-01 RX ADMIN — LOSARTAN POTASSIUM 100 MG: 50 TABLET, FILM COATED ORAL at 09:08

## 2022-08-01 RX ADMIN — HYDROCODONE BITARTRATE AND ACETAMINOPHEN 1 TABLET: 5; 325 TABLET ORAL at 05:08

## 2022-08-01 RX ADMIN — GABAPENTIN 100 MG: 100 CAPSULE ORAL at 10:08

## 2022-08-01 RX ADMIN — ATORVASTATIN CALCIUM 40 MG: 40 TABLET, FILM COATED ORAL at 10:08

## 2022-08-01 RX ADMIN — HYDROCODONE BITARTRATE AND ACETAMINOPHEN 1 TABLET: 5; 325 TABLET ORAL at 12:08

## 2022-08-01 RX ADMIN — IOPAMIDOL 100 ML: 755 INJECTION, SOLUTION INTRAVENOUS at 11:08

## 2022-08-01 RX ADMIN — METOPROLOL TARTRATE 25 MG: 25 TABLET, FILM COATED ORAL at 10:08

## 2022-08-01 RX ADMIN — FUROSEMIDE 20 MG: 10 INJECTION, SOLUTION INTRAMUSCULAR; INTRAVENOUS at 10:08

## 2022-08-01 RX ADMIN — CEFTRIAXONE SODIUM 1 G: 1 INJECTION, POWDER, FOR SOLUTION INTRAMUSCULAR; INTRAVENOUS at 04:08

## 2022-08-01 RX ADMIN — MUPIROCIN: 20 OINTMENT TOPICAL at 09:08

## 2022-08-01 RX ADMIN — DONEPEZIL HYDROCHLORIDE 10 MG: 5 TABLET, FILM COATED ORAL at 10:08

## 2022-08-01 RX ADMIN — HYDROCODONE BITARTRATE AND ACETAMINOPHEN 1 TABLET: 5; 325 TABLET ORAL at 06:08

## 2022-08-01 RX ADMIN — Medication 400 MG: at 09:08

## 2022-08-01 RX ADMIN — TRAZODONE HYDROCHLORIDE 50 MG: 50 TABLET ORAL at 10:08

## 2022-08-01 NOTE — PROGRESS NOTES
[facility] Hospital Medicine Progress Note        Chief Complaint: Epistaxis     Source of Information: Patient. Medical Records        HISTORY OF PRESENT ILLNESS:   Emma Jorgensen is a 88 y.o. female with a PMHx of diastolic heart failure (LVEF 55% and grade II diastolic dysfunction from an echo on 05/24/2022),  PAF on Eliquis, moderate to severe aortic stenosis with plans for a TAVR outpatient per Cards, bilateral carotid artery stenosis, HTN, chronic dementia, previous DVT/PE, and recent surgical fixation of a closed right trimalleolar ankle fracture on 04/26/2022 who presented to [facility] ED from [facility] on 7/30/2022 with c/o epistaxis that began x1 day ago. Reported she is on 2L O2 chronically for hypoxia and occasionally get nose bleeds. She began bleeding from the left nare and felt blood draining in the back of her throat so she was referred to ED for further evaluation. Rhino Rocket was placed. Labs were notable for hgb 6.2, hct 20.1, chloride 97, CO2 34, INR 1.31. She was given KCentra, and transfused platelets. She began bleeding again from the left nare and was transferred to [facility] ED. ENT was consulted. 1 unit PRBC was ordered for transfusion. She was admitted to hospital medicine services for management of care.         Patient currently admitted and being managed for left-sided epistaxis, ENT on board has a rhino rockets in her left nose rule     Today's information  Patient seen and examined at bedside  Epistaxis remains stable.  Not bleeding.  Hemoglobin is stable.  7.7 grams/dL  Leukocytosis slightly trending upwards, probably source the right foot with wounds that are draining and off N/C  Consult Podiatry, get x-ray of the right foot, begin IV antibiotics  Chest x-ray reviewed with pulmonary congestion begin IV Lasix 20 mg b.i.d., suspend home dose p.o. Lasix 40 mg daily  Continue to trend H and H, send for anemia workup  Continue with ENT  recommendations  Daughter at bedside reports patient was supposed to have a CT of the chest in preparation for TAVR-will clarify with [facility] what exactly they will need an ordered this while she is inpatient    Exam   General appearance: Well-developed female in no apparent distress. Oxygen   HENT: Atraumatic head. Moist mucous membranes of oral cavity. Left rhinorocket in place   Eyes: Normal extraocular movements.   Neck: Supple.   Lungs: Clear to auscultation bilaterally. No wheezing present.   Heart: Regular rate and rhythm. S1 and S2 present. No pedal edema.  Abdomen: Soft, non-distended, non-tender. Obese  Extremities: No cyanosis, clubbing, or edema.  Skin: R foot wound  Neuro: Motor and sensory exams grossly intact.   Psych/mental status: Appropriate mood and affect. Responds appropriately to questions.         ASSESSMENT & PLAN:   Acute on chronic respiratory failure  Acute pulmonary edema  Epistaxis, stable   Acute on Chronic Normocytic Anemia, stable   - s/p 1 unit of prbcs   Infected right foot wounds  Leucocytosis, worse   - source likely infected right foot wounds   Asymptomatic bacteriuria   PAF on Eliquis  Previous DVT/PE on chronic anticoagulation  Chronic hypoxia stable on 2 L nasal cannula  Severe aortic stenosis awaiting TAVR  Chronic diastolic congestive heart failure (EF 55% TTE 05/2022)  Dementia  Hypertension  Bilateral carotid artery disease  Peripheral arterial disease     Plan:  Epistaxis remains stable.  Not bleeding.  Hemoglobin is stable.  7.7 grams/dL  F/up ENT recs   Leukocytosis slightly trending upwards, probably source the right foot with wounds that are draining   Consult Podiatry, get x-ray of the right foot, begin IV antibiotics- ceftriaxone 1g q24 qd  Chest x-ray reviewed with pulmonary congestion begin IV Lasix 20 mg b.i.d., suspend home dose p.o. Lasix 40 mg daily  Continue to trend H and H, send for anemia workup  Daughter at bedside reports patient was supposed to have a CT of  the chest in preparation for TAVR-will clarify with [facility] what exactly they will need an ordered this while she is inpatient  Continue blood pressure control  Hold Eliquis x7 days  Resume other appropriate home medications  Labs in AM        VTE Prophylaxis: will be placed on SCD for DVT prophylaxis and will be advised to be as mobile as possible and sit in a chair as tolerated     Critical care diagnosis epistaxis, hypoxia , pulm congestion on iv lasix   Critical care time greater than 35 minutes      VITAL SIGNS: 24 HRS MIN & MAX LAST   Temp  Min: 97.7 °F (36.5 °C)  Max: 98.7 °F (37.1 °C) 98.6 °F (37 °C)   BP  Min: 96/59  Max: 161/67 (!) 96/59   Pulse  Min: 70  Max: 88  72   Resp  Min: 13  Max: 20 20   SpO2  Min: 92 %  Max: 96 % (!) 92 %       Recent Labs   Lab 07/30/22  0509 07/31/22  0422 08/01/22  0403   WBC 11.5 12.0* 13.0*   RBC 2.15* 2.69* 2.67*   HGB 6.2* 7.8* 7.7*   HCT 20.1* 25.3* 25.1*   MCV 93.5 94.1* 94.0   MCH 28.8 29.0 28.8   MCHC 30.8* 30.8* 30.7*   RDW 15.4 16.7 16.2    283 295   MPV 9.3 9.9 9.6       Recent Labs   Lab 07/30/22  0508 07/31/22  0422 08/01/22  0403    135* 133*   K 4.3 3.7 3.8   CO2 34* 28 29   BUN 17.1 15.2 9.1*   CREATININE 0.62 0.59 0.55   CALCIUM 9.1 8.6 8.4   ALBUMIN 2.4* 2.5*  --    ALKPHOS 100 91  --    ALT 9 11  --    AST 14 17  --    BILITOT 0.4 1.4  --           Microbiology Results (last 7 days)     ** No results found for the last 168 hours. **           See below for Radiology    Scheduled Med:   atorvastatin  40 mg Oral QHS    [START ON 8/2/2022] collagenase   Topical (Top) Daily    cyanocobalamin  500 mcg Oral Daily    donepeziL  10 mg Oral Nightly    furosemide (LASIX) injection  20 mg Intravenous Q12H    gabapentin  100 mg Oral Nightly    losartan  100 mg Oral Daily    magnesium oxide  400 mg Oral Daily    metoprolol tartrate  25 mg Oral BID    mupirocin   Topical (Top) Daily    traZODone  50 mg Oral QHS        Continuous Infusions:       PRN  Meds:  acetaminophen, acetaminophen, hydrALAZINE, HYDROcodone-acetaminophen, HYDROcodone-acetaminophen, ondansetron, sodium chloride 0.9%       VTE prophylaxis:     Patient condition:  Stable/Fair/Guarded/ Serious/ Critical    Anticipated discharge and Disposition:         All diagnosis and differential diagnosis have been reviewed; assessment and plan has been documented; I have personally reviewed the labs and test results that are presently available; I have reviewed the patients medication list; I have reviewed the consulting providers response and recommendations. I have reviewed or attempted to review medical records based upon their availability    All of the patient's questions have been  addressed and answered. Patient's is agreeable to the above stated plan. I will continue to monitor closely and make adjustments to medical management as needed.  _____________________________________________________________________    Nutrition Status:    Radiology:  X-Ray Chest 1 View  Narrative: EXAMINATION:  XR CHEST 1 VIEW    CLINICAL HISTORY:  shortness of breath;    TECHNIQUE:  Frontal view(s) of the chest.    COMPARISON:  Radiography 06/28/2022    FINDINGS:  Lungs are hypoinflated with patchy bilateral opacities, predominantly interstitial.  Possible small left pleural effusion.  No pneumothorax.  Cardiac silhouette within normal limits.  Impression: Small left pleural effusion.  Patchy bilateral opacities have relatively similar appearance since prior exam, favor vascular congestion or chronic interstitial changes.  Pneumonia felt less likely.    Electronically signed by: Manny Nava  Date:    07/31/2022  Time:    12:40      Sai Sanchez MD   08/01/2022

## 2022-08-01 NOTE — PLAN OF CARE
Problem: Adult Inpatient Plan of Care  Goal: Plan of Care Review  Outcome: Ongoing, Progressing  Goal: Patient-Specific Goal (Individualized)  Outcome: Ongoing, Progressing  Goal: Absence of Hospital-Acquired Illness or Injury  Outcome: Ongoing, Progressing     Problem: Fall Injury Risk  Goal: Absence of Fall and Fall-Related Injury  Outcome: Ongoing, Progressing     Problem: Skin Injury Risk Increased  Goal: Skin Health and Integrity  Outcome: Ongoing, Progressing     Problem: Pain Acute  Goal: Acceptable Pain Control and Functional Ability  Outcome: Ongoing, Progressing

## 2022-08-01 NOTE — SUBJECTIVE & OBJECTIVE
Scheduled Meds:   atorvastatin  40 mg Oral QHS    cefTRIAXone (ROCEPHIN) IVPB  1 g Intravenous Q24H    [START ON 8/2/2022] collagenase   Topical (Top) Daily    cyanocobalamin  500 mcg Oral Daily    donepeziL  10 mg Oral Nightly    furosemide (LASIX) injection  20 mg Intravenous Q12H    gabapentin  100 mg Oral Nightly    losartan  100 mg Oral Daily    magnesium oxide  400 mg Oral Daily    metoprolol tartrate  25 mg Oral BID    mupirocin   Topical (Top) Daily    traZODone  50 mg Oral QHS     Continuous Infusions:  PRN Meds:acetaminophen, acetaminophen, hydrALAZINE, HYDROcodone-acetaminophen, HYDROcodone-acetaminophen, ondansetron, sodium chloride 0.9%    Review of patient's allergies indicates:  No Known Allergies     Past Medical History:   Diagnosis Date    Anxiety disorder, unspecified     Arthritis     Deep vein thrombosis     Depression     Irregular heart beat     Obesity, unspecified     Other pulmonary embolism without acute cor pulmonale     Parkinson's disease     Trimalleolar fracture of ankle, closed     Unspecified dementia without behavioral disturbance      Past Surgical History:   Procedure Laterality Date    ANGIOGRAM, CORONARY, WITH LEFT HEART CATHETERIZATION N/A 6/7/2022    Procedure: Angiogram, Coronary, with Left Heart Cath;  Surgeon: Claude Morillo MD;  Location: [facility] CATH LAB;  Service: Cardiology;  Laterality: N/A;    ANKLE FRACTURE SURGERY Right     CATARACT EXTRACTION      CATHETERIZATION OF BOTH LEFT AND RIGHT HEART N/A 6/7/2022    Procedure: CATHETERIZATION, HEART, BOTH LEFT AND RIGHT;  Surgeon: Claude Morillo MD;  Location: [facility] CATH LAB;  Service: Cardiology;  Laterality: N/A;    COLONOSCOPY W/ POLYPECTOMY      HYSTERECTOMY      SMALL INTESTINE SURGERY         Family History       Family history is unknown by patient.          Tobacco Use    Smoking status: Former Smoker    Smokeless tobacco: Never Used   Substance and Sexual Activity    Alcohol use: Never    Drug use: Never    Sexual  activity: Not on file     Review of Systems    Objective:     Vital Signs (Most Recent):  Temp: 98.6 °F (37 °C) (08/01/22 1206)  Pulse: 72 (08/01/22 1206)  Resp: 20 (08/01/22 1222)  BP: (!) 96/59 (08/01/22 1206)  SpO2: (!) 92 % (08/01/22 1206)   Vital Signs (24h Range):  Temp:  [97.7 °F (36.5 °C)-98.7 °F (37.1 °C)] 98.6 °F (37 °C)  Pulse:  [70-88] 72  Resp:  [13-20] 20  SpO2:  [92 %-96 %] 92 %  BP: ()/(57-67) 96/59     Weight: 114.8 kg (253 lb)  Body mass index is 40.84 kg/m².  Physical Exam  Constitutional:       Comments: Seems tired; o2 noted via oxymask; no distress   Cardiovascular:      Pulses:           Dorsalis pedis pulses are detected w/ Doppler on the right side and detected w/ Doppler on the left side.        Posterior tibial pulses are detected w/ Doppler on the right side and detected w/ Doppler on the left side.      Comments: Foot feels normal to touch; not cold or pale  Musculoskeletal:      Right lower leg: No edema.      Left lower leg: No edema.        Feet:    Neurological:      Mental Status: She is alert.             Laboratory:  CBC:   Recent Labs   Lab 08/01/22  0403   WBC 13.0*   RBC 2.67*   HGB 7.7*   HCT 25.1*      MCV 94.0   MCH 28.8   MCHC 30.7*     CMP:   Recent Labs   Lab 07/31/22  0422 08/01/22  0403   CALCIUM 8.6 8.4   ALBUMIN 2.5*  --    * 133*   K 3.7 3.8   CO2 28 29   BUN 15.2 9.1*   CREATININE 0.59 0.55   ALKPHOS 91  --    ALT 11  --    AST 17  --    BILITOT 1.4  --        Diagnostic Results:  Xray and arterial US pending

## 2022-08-01 NOTE — PT/OT/SLP PROGRESS
Physical Therapy         Treatment        Emma Jorgensen   MRN: [id number]     PT Received On: 08/01/22  PT Start Time: 1405     PT Stop Time: 1434    PT Total Time (min): 29 min       Billable Minutes:  Therapeutic Activity 19 and Therapeutic Exercise 10  Total Minutes: 29    Treatment Type: Treatment  PT/PTA: PTA     PTA Visit Number: 1       General Precautions: Standard, fall  Orthopedic Precautions:     Braces:           Subjective:  Communicated with NSG prior to session.    Pain/Comfort  Location - Side 1: Right  Location 1: foot  Pain Addressed 1: Reposition, Distraction    Objective:  Patient found in bed with HOB elevated.  Functional Mobility:  Bed Mobility:   Supine to sit: Moderate Assistance   Sit to supine: Maximum Assistance   Rolling: Moderate Assistance. Assisted pt in rolling to her L to place wedge.    Scooting: Moderate Assistance    Balance:   Static Sit: GOOD: Takes MODERATE challenges from all directions  Dynamic Sit:  GOOD-: Incosistently Maintains balance through MODERATE excursions of active trunk movement,    Pt sat EOB while reaching outside GOYO and across midline to touch therapist hand in front. 10 reps BUE for 3 trials. Pt required rest between trials.       Additional Treatment:  BLE AROM: ankle pumps, knee flex/ext, hip flex, hip add/abd. 12 reps BLE    Activity Tolerance:  Patient tolerated treatment well and Patient limited by fatigue    Patient left HOB elevated with all lines intact and call button in reach.    Assessment:  Emma Jorgensen is a 88 y.o. female with a medical diagnosis of Epistaxis, stable   Acute on Chronic Normocytic Anemia  - s/p 1 unit of prbcs   Leucocytosis, mild  Asymptomatic bacteriuria   PAF on Eliquis  Previous DVT/PE on chronic anticoagulation  Chronic hypoxia stable on 2 L nasal cannula  Severe aortic stenosis awaiting TAVR  Chronic diastolic congestive heart failure (EF 55% TTE 05/2022)  Dementia  Hypertension  Bilateral carotid artery  disease  Peripheral arterial disease     Pt declined to attempt standing trial today 2/2 feeling fatigued.     Rehab potential is good.    Activity tolerance: Good    Discharge recommendations: Discharge Facility/Level of Care Needs: nursing facility, skilled     Equipment recommendations:       GOALS:   Multidisciplinary Problems     Physical Therapy Goals        Problem: Physical Therapy    Goal Priority Disciplines Outcome Goal Variances Interventions   Physical Therapy Goal     PT, PT/OT      Description: LTGs   1. Pt will be jeremy with bed mobility  2. Pt will be jeremy with sit to stand                   PLAN:    Patient to be seen 5 x/week  to address the above listed problems via therapeutic exercises, therapeutic activities  Plan of Care expires: 08/07/22  Plan of Care reviewed with: patient         8/1/2022

## 2022-08-01 NOTE — CONSULTS
[facility]                       [address] [place] [id number]    PATIENT NAME:       VIPIN JORGENSEN  YOB: 1934  CSN:                [id number]   MRN:                [id number]  ADMIT DATE:         07/30/2022 02:43:00  PHYSICIAN:          Rosales Redmond DPM                            CONSULTATION    DATE OF CONSULT:  08/01/2022 00:00:00    HISTORY OF PRESENT ILLNESS:  Ms. Jorgensen is an 88-year-old    female transferred from [facility] several days ago with epistaxis.  She has been   seen by ENT.  She has multiple medical problems.  Earlier this year, she   underwent an ORIF of a trimalleolar fracture of the right ankle with a   retrograde nail.  She was last seen by Orthopedics several weeks ago.  X-rays   are reported as stable.  Unfortunately, she has developed wounds on the   posterior and plantar aspects of the heel.  She has been under the care of the   nursing facility for this.  Orders have been placed.  Has been seen by wound   care at this facility.  I have now been asked to see her concerning evaluation.    She apparently has had maybe local debridement, but that is it of these sites.    They are unknown as to peripheral artery disease.    PAST MEDICAL HISTORY:  Notable for AFib, aortic stenosis, hypertension,   dementia, history of previous DVT, PE, on chronic anticoagulation and obesity.    SURGICAL HISTORY:  Recent right ankle fracture repair, cataracts, hysterectomy,   small intestine surgery.  The tentative plans for a TAVR.    MEDICATIONS:  As per the chart.    ALLERGIES:  NO KNOWN FOOD ALLERGIES.     FAMILY HISTORY:  Noncontributory at this point.    SOCIAL HISTORY:  Denies tobacco, alcohol, or IV drug abuse.    PHYSICAL EXAMINATION:  GENERAL:  Reveals an elderly female, currently lying in bed.    CURRENT VITAL SIGNS:  Stable and she is afebrile.  HEART:  Deferred.  LUNGS:   Deferred.  ABDOMEN:  Deferred.  EXTREMITIES:  Feet and legs are warm.  Diminished pedal pulses.  Elevation   dependent tests are deferred.  NEUROLOGIC:  She does perceive some touch to the feet.  Reflexes deferred.  MUSCULOSKELETAL:  Both extremities are plantigrade without contracture.  DERMATOLOGIC:  From a derm perspective, she has a fiber necrotic wound to the   posterior aspect of the right heel with a more viable wound plantarly, both   sites at entry points of the distal retention screw in the calcaneus and the   entry point for the retrograde nail plantarly.    ASSESSMENT:    1. Nonhealing right lower extremity wounds.  2. Suspected peripheral artery disease.  3. Coronary disease.  4. Atrial fibrillation.    PLAN:  The patient and the daughter, who was present, were instructed as to the   findings of physical exam.  Unfortunately, she has some fairly substantial   wounds over the hardware sites.  These areas are probably going to require some   extensive debridement.  Unfortunately, the posterior wound is going to have some   compromise to the Achilles area.  Inferior is the entry point for the   retrograde nail.  Dakin's has been ordered along with antibiotics.  I will   continue with such.  Check some inflammatory markers.  Biggest concern would be   some issues with evolving hardware and osteomyelitic type infection.  Hardware,   at this point, appears to be stable without evidence of loosening.  I am going   to get an arterial ultrasound for further evaluation.  The anticoagulation is   currently on hold.      Thanks for the consultation.        ______________________________  LOR Duran/ANTONY  DD:  08/01/2022  Time:  03:52PM  DT:  08/01/2022  Time:  04:25PM  Job #:  [id number]      CONSULTATION

## 2022-08-01 NOTE — HPI
87 y/o BF currently in the hospital for epistaxis on 7/30/22.  The wound care clinic has been consulted to see the patient by the WOCN because pt was scheduled to come in here later the week for first evaluation of chronic wounds to her right plantar foot and heel.    She has a history Parkinson's, dementia, hypertension, heart failure, prior DVT/PE, atrial fibrillation on chronic anticoagulation, and PAD who fell in April of 2022 resulting in a right trimalleolar fracture.  She underwent a hindfoot fusion by Dr. Boles on April 26, 2022. She was then sent to a skilled nursing facility on 5/3/22. She has had a multitude of admissions since then for SOB and hypoxia. She has been treated for  decompensated diastolic heart failure along with moderate to severe aortic stenosis.  She ended up with [facility] workup: LAD stent and plans for TAVR in the future.    Pt is nonambulatory and has been in a CAM walker boot since surgery. Daughter at bedside says pt developed a plantar hindfoot surgical wound since along with a right posterior plantar heel pressure ulcer several months ago.  Daughter states that a nurse at  [facility] was taking care of it.  Patient recently saw orthopedics a couple of weeks ago in mid July 2022 who noted both wounds to right foot and ordered to stop the CAM walker and replace with Prafo boot.  She was scheduled to come to clinic for first visit later this week.  We are seeing pt up in her room, [phone]. She is tired because 'had a lot of tests' and says also just had PT.  Right foot in offloading boot.

## 2022-08-01 NOTE — CONSULTS
[facility] - Oncology Acute  Wound Care  Consult Note    Patient Name: Emma Jorgensen  MRN: [id number]  Admission Date: 7/30/2022  Hospital Length of Stay: 2 days  Attending Physician: Kandice Moore MD  Primary Care Provider: Mary Jane Dean MD     Inpatient consult to Wound Care Physician  Consult performed by: Nathalie Johnson MD  Consult ordered by: Kandice Moore MD        Subjective:     History of Present Illness:  89 y/o BF currently in the hospital for epistaxis on 7/30/22.  The wound care clinic has been consulted to see the patient by the WO because pt was scheduled to come in here later the week for first evaluation of chronic wounds to her right plantar foot and heel.    She has a history Parkinson's, dementia, hypertension, heart failure, prior DVT/PE, atrial fibrillation on chronic anticoagulation, and PAD who fell in April of 2022 resulting in a right trimalleolar fracture.  She underwent a hindfoot fusion by Dr. Boles on April 26, 2022. She was then sent to a skilled nursing facility on 5/3/22. She has had a multitude of admissions since then for SOB and hypoxia. She has been treated for  decompensated diastolic heart failure along with moderate to severe aortic stenosis.  She ended up with CIS workup: LAD stent and plans for TAVR in the future.    Pt is nonambulatory and has been in a CAM walker boot since surgery. Daughter at bedside says pt developed a plantar hindfoot surgical wound since along with a right posterior plantar heel pressure ulcer several months ago.  Daughter states that a nurse at  [facility] was taking care of it.  Patient recently saw orthopedics a couple of weeks ago in mid July 2022 who noted both wounds to right foot and ordered to stop the CAM walker and replace with Prafo boot.  She was scheduled to come to clinic for first visit later this week.  We are seeing pt up in her room, 863. She is tired because 'had a lot of tests' and says also  just had PT.  Right foot in offloading boot.      Scheduled Meds:   atorvastatin  40 mg Oral QHS    cefTRIAXone (ROCEPHIN) IVPB  1 g Intravenous Q24H    [START ON 8/2/2022] collagenase   Topical (Top) Daily    cyanocobalamin  500 mcg Oral Daily    donepeziL  10 mg Oral Nightly    furosemide (LASIX) injection  20 mg Intravenous Q12H    gabapentin  100 mg Oral Nightly    losartan  100 mg Oral Daily    magnesium oxide  400 mg Oral Daily    metoprolol tartrate  25 mg Oral BID    mupirocin   Topical (Top) Daily    traZODone  50 mg Oral QHS     Continuous Infusions:  PRN Meds:acetaminophen, acetaminophen, hydrALAZINE, HYDROcodone-acetaminophen, HYDROcodone-acetaminophen, ondansetron, sodium chloride 0.9%    Review of patient's allergies indicates:  No Known Allergies     Past Medical History:   Diagnosis Date    Anxiety disorder, unspecified     Arthritis     Deep vein thrombosis     Depression     Irregular heart beat     Obesity, unspecified     Other pulmonary embolism without acute cor pulmonale     Parkinson's disease     Trimalleolar fracture of ankle, closed     Unspecified dementia without behavioral disturbance      Past Surgical History:   Procedure Laterality Date    ANGIOGRAM, CORONARY, WITH LEFT HEART CATHETERIZATION N/A 6/7/2022    Procedure: Angiogram, Coronary, with Left Heart Cath;  Surgeon: Claude Morillo MD;  Location: [facility] CATH LAB;  Service: Cardiology;  Laterality: N/A;    ANKLE FRACTURE SURGERY Right     CATARACT EXTRACTION      CATHETERIZATION OF BOTH LEFT AND RIGHT HEART N/A 6/7/2022    Procedure: CATHETERIZATION, HEART, BOTH LEFT AND RIGHT;  Surgeon: Claude Morillo MD;  Location: [facility] CATH LAB;  Service: Cardiology;  Laterality: N/A;    COLONOSCOPY W/ POLYPECTOMY      HYSTERECTOMY      SMALL INTESTINE SURGERY         Family History       Family history is unknown by patient.          Tobacco Use    Smoking status: Former Smoker    Smokeless tobacco: Never Used    Substance and Sexual Activity    Alcohol use: Never    Drug use: Never    Sexual activity: Not on file     Review of Systems    Objective:     Vital Signs (Most Recent):  Temp: 98.6 °F (37 °C) (08/01/22 1206)  Pulse: 72 (08/01/22 1206)  Resp: 20 (08/01/22 1222)  BP: (!) 96/59 (08/01/22 1206)  SpO2: (!) 92 % (08/01/22 1206)   Vital Signs (24h Range):  Temp:  [97.7 °F (36.5 °C)-98.7 °F (37.1 °C)] 98.6 °F (37 °C)  Pulse:  [70-88] 72  Resp:  [13-20] 20  SpO2:  [92 %-96 %] 92 %  BP: ()/(57-67) 96/59     Weight: 114.8 kg (253 lb)  Body mass index is 40.84 kg/m².  Physical Exam  Constitutional:       Comments: Seems tired; o2 noted via oxymask; no distress   Cardiovascular:      Pulses:           Dorsalis pedis pulses are detected w/ Doppler on the right side and detected w/ Doppler on the left side.        Posterior tibial pulses are detected w/ Doppler on the right side and detected w/ Doppler on the left side.      Comments: Foot feels normal to touch; not cold or pale  Musculoskeletal:      Right lower leg: No edema.      Left lower leg: No edema.        Feet:    Neurological:      Mental Status: She is alert.             Laboratory:  CBC:   Recent Labs   Lab 08/01/22  0403   WBC 13.0*   RBC 2.67*   HGB 7.7*   HCT 25.1*      MCV 94.0   MCH 28.8   MCHC 30.7*     CMP:   Recent Labs   Lab 07/31/22  0422 08/01/22  0403   CALCIUM 8.6 8.4   ALBUMIN 2.5*  --    * 133*   K 3.7 3.8   CO2 28 29   BUN 15.2 9.1*   CREATININE 0.59 0.55   ALKPHOS 91  --    ALT 11  --    AST 17  --    BILITOT 1.4  --        Diagnostic Results:  Xray and arterial US pending        Assessment/Plan:     1. Right plantar hindfoot postsurgical wound disruption, prior hindfoot fusion April 2022 for   Trimalleolar fracture, present on admission  2.  Right posterior heel pressure injury, stage IV, present on admission  3. PAD  4. Anemia  5. Chronic anticoagulation for atrial fibrillation  6.  Coronary artery disease with diastolic  heart failure and aortic stenosis   7. Deconditiong      PLAN:    1. Chart reviewed, patient examined and wounds assessed. Spoke to daughter in room  2. Noted 2 longstanding wounds to right foot:  one postsurgical wound on the plantar hindfoot and a pressure injury to right posterior heel.  Has been cared for by the skilled nursing facility per report. The WOCN here consulted me because pt was scheduled to see us later this week.  3. Both of these wounds especially the right posterior  heel will need debridement.  The hospitalist has consulted Dr. Redmond. Await his eval.  4. Arterial US and xrays ordered by  Him; may need her [facility]IS docs to ensure arterial flow optimized for healing  5. Offloading of heel is a must at all times: use the  EHOB TruVue that was present on patient: This needs to be reinforced by every staff nurse caring for patient on every shift of every day as well as attendings rounding on the patient every day.  6. Today , I placed silver alginate dressings in both wounds since this dressings was at the beside. This needs to be reinforced by every staff nurse caring for patient on every shift of every day as well as attendings rounding on the patient every day.  7. Nutrition: Recommend aggressive nutritional support, protein supplementation as renal functions allows, and vitamin / mineral supplements to support wound healing; rec to check prealbumin  8. Will follow    Nathalie Johnson MD, [facility]          The time spent including preparing to see the patient, obtaining patient history and assessment, evaluation of the plan of care, patient/caregiver counseling and education, orders, documentation, coordination of care, and other professional medical management activities for today's encounter was  70 minutes          Nathalie Johnson MD  Wound Care  [facility]  Oncology [facility]

## 2022-08-02 ENCOUNTER — HOSPITAL ENCOUNTER (OUTPATIENT)
Dept: RADIOLOGY | Facility: HOSPITAL | Age: 87
Discharge: HOME | End: 2022-08-02
Payer: MEDICARE

## 2022-08-02 LAB
ABO + RH BLD: NORMAL
ABO + RH BLD: NORMAL
ANION GAP SERPL CALC-SCNC: 5 MEQ/L
BASOPHILS # BLD AUTO: 0.04 X10(3)/MCL (ref 0–0.2)
BASOPHILS NFR BLD AUTO: 0.4 %
BLD PROD TYP BPU: NORMAL
BLD PROD TYP BPU: NORMAL
BLOOD UNIT EXPIRATION DATE: NORMAL
BLOOD UNIT EXPIRATION DATE: NORMAL
BLOOD UNIT TYPE CODE: 7300
BLOOD UNIT TYPE CODE: 7300
BUN SERPL-MCNC: 7.2 MG/DL (ref 9.8–20.1)
CALCIUM SERPL-MCNC: 8.5 MG/DL (ref 8.4–10.2)
CHLORIDE SERPL-SCNC: 97 MMOL/L (ref 98–107)
CO2 SERPL-SCNC: 32 MMOL/L (ref 23–31)
CREAT SERPL-MCNC: 0.59 MG/DL (ref 0.55–1.02)
CREAT/UREA NIT SERPL: 12
CROSSMATCH INTERPRETATION: NORMAL
CROSSMATCH INTERPRETATION: NORMAL
DISPENSE STATUS: NORMAL
DISPENSE STATUS: NORMAL
EOSINOPHIL # BLD AUTO: 0.17 X10(3)/MCL (ref 0–0.9)
EOSINOPHIL NFR BLD AUTO: 1.5 %
ERYTHROCYTE [DISTWIDTH] IN BLOOD BY AUTOMATED COUNT: 16 % (ref 11.5–17)
GLUCOSE SERPL-MCNC: 98 MG/DL (ref 82–115)
GROUP & RH: NORMAL
HCT VFR BLD AUTO: 24.3 % (ref 37–47)
HGB BLD-MCNC: 7.2 GM/DL (ref 12–16)
IMM GRANULOCYTES # BLD AUTO: 0.11 X10(3)/MCL (ref 0–0.04)
IMM GRANULOCYTES NFR BLD AUTO: 1 %
INDIRECT COOMBS GEL: NORMAL
LYMPHOCYTES # BLD AUTO: 1.44 X10(3)/MCL (ref 0.6–4.6)
LYMPHOCYTES NFR BLD AUTO: 12.7 %
MAGNESIUM SERPL-MCNC: 1.9 MG/DL (ref 1.6–2.6)
MCH RBC QN AUTO: 29 PG (ref 27–31)
MCHC RBC AUTO-ENTMCNC: 29.6 MG/DL (ref 33–36)
MCV RBC AUTO: 98 FL (ref 80–94)
MONOCYTES # BLD AUTO: 1.15 X10(3)/MCL (ref 0.1–1.3)
MONOCYTES NFR BLD AUTO: 10.1 %
NEUTROPHILS # BLD AUTO: 8.4 X10(3)/MCL (ref 2.1–9.2)
NEUTROPHILS NFR BLD AUTO: 74.3 %
NRBC BLD AUTO-RTO: 0 %
PLATELET # BLD AUTO: 289 X10(3)/MCL (ref 130–400)
PMV BLD AUTO: 9.6 FL (ref 7.4–10.4)
POTASSIUM SERPL-SCNC: 3.9 MMOL/L (ref 3.5–5.1)
RBC # BLD AUTO: 2.48 X10(6)/MCL (ref 4.2–5.4)
SODIUM SERPL-SCNC: 134 MMOL/L (ref 136–145)
UNIT NUMBER: NORMAL
UNIT NUMBER: NORMAL
WBC # SPEC AUTO: 11.4 X10(3)/MCL (ref 4.5–11.5)

## 2022-08-02 PROCEDURE — P9016 RBC LEUKOCYTES REDUCED: HCPCS | Performed by: INTERNAL MEDICINE

## 2022-08-02 PROCEDURE — 25000003 PHARM REV CODE 250: Performed by: NURSE PRACTITIONER

## 2022-08-02 PROCEDURE — 85025 COMPLETE CBC W/AUTO DIFF WBC: CPT | Performed by: INTERNAL MEDICINE

## 2022-08-02 PROCEDURE — C1751 CATH, INF, PER/CENT/MIDLINE: HCPCS

## 2022-08-02 PROCEDURE — 25500020 PHARM REV CODE 255: Performed by: INTERNAL MEDICINE

## 2022-08-02 PROCEDURE — 36430 TRANSFUSION BLD/BLD COMPNT: CPT

## 2022-08-02 PROCEDURE — 86920 COMPATIBILITY TEST SPIN: CPT | Performed by: INTERNAL MEDICINE

## 2022-08-02 PROCEDURE — 36410 VNPNXR 3YR/> PHY/QHP DX/THER: CPT

## 2022-08-02 PROCEDURE — 11000001 HC ACUTE MED/SURG PRIVATE ROOM

## 2022-08-02 PROCEDURE — 83735 ASSAY OF MAGNESIUM: CPT | Performed by: INTERNAL MEDICINE

## 2022-08-02 PROCEDURE — 75635 CT ANGIO ABDOMINAL ARTERIES: CPT | Mod: TC

## 2022-08-02 PROCEDURE — 97530 THERAPEUTIC ACTIVITIES: CPT | Mod: CQ

## 2022-08-02 PROCEDURE — 25000003 PHARM REV CODE 250: Performed by: INTERNAL MEDICINE

## 2022-08-02 PROCEDURE — 36415 COLL VENOUS BLD VENIPUNCTURE: CPT | Performed by: INTERNAL MEDICINE

## 2022-08-02 PROCEDURE — 63600175 PHARM REV CODE 636 W HCPCS: Performed by: INTERNAL MEDICINE

## 2022-08-02 PROCEDURE — 86901 BLOOD TYPING SEROLOGIC RH(D): CPT | Performed by: INTERNAL MEDICINE

## 2022-08-02 PROCEDURE — 27000221 HC OXYGEN, UP TO 24 HOURS

## 2022-08-02 PROCEDURE — 76937 US GUIDE VASCULAR ACCESS: CPT

## 2022-08-02 PROCEDURE — 80048 BASIC METABOLIC PNL TOTAL CA: CPT | Performed by: INTERNAL MEDICINE

## 2022-08-02 RX ORDER — CLOPIDOGREL BISULFATE 75 MG/1
75 TABLET ORAL DAILY
Status: DISCONTINUED | OUTPATIENT
Start: 2022-08-03 | End: 2022-08-11 | Stop reason: HOSPADM

## 2022-08-02 RX ORDER — FUROSEMIDE 40 MG/1
40 TABLET ORAL DAILY
Status: DISCONTINUED | OUTPATIENT
Start: 2022-08-02 | End: 2022-08-05

## 2022-08-02 RX ORDER — OXYMETAZOLINE HCL 0.05 %
2 SPRAY, NON-AEROSOL (ML) NASAL ONCE
Status: COMPLETED | OUTPATIENT
Start: 2022-08-02 | End: 2022-08-02

## 2022-08-02 RX ORDER — HYDROCODONE BITARTRATE AND ACETAMINOPHEN 500; 5 MG/1; MG/1
TABLET ORAL
Status: DISCONTINUED | OUTPATIENT
Start: 2022-08-02 | End: 2022-08-04

## 2022-08-02 RX ADMIN — CEFTRIAXONE SODIUM 1 G: 1 INJECTION, POWDER, FOR SOLUTION INTRAMUSCULAR; INTRAVENOUS at 09:08

## 2022-08-02 RX ADMIN — METOPROLOL TARTRATE 25 MG: 25 TABLET, FILM COATED ORAL at 09:08

## 2022-08-02 RX ADMIN — CYANOCOBALAMIN TAB 500 MCG 500 MCG: 500 TAB at 09:08

## 2022-08-02 RX ADMIN — Medication 400 MG: at 09:08

## 2022-08-02 RX ADMIN — DONEPEZIL HYDROCHLORIDE 10 MG: 5 TABLET, FILM COATED ORAL at 09:08

## 2022-08-02 RX ADMIN — COLLAGENASE SANTYL: 250 OINTMENT TOPICAL at 03:08

## 2022-08-02 RX ADMIN — GABAPENTIN 100 MG: 100 CAPSULE ORAL at 09:08

## 2022-08-02 RX ADMIN — HYDROCODONE BITARTRATE AND ACETAMINOPHEN 1 TABLET: 5; 325 TABLET ORAL at 10:08

## 2022-08-02 RX ADMIN — IOPAMIDOL 150 ML: 755 INJECTION, SOLUTION INTRAVENOUS at 11:08

## 2022-08-02 RX ADMIN — OXYMETAZOLINE HCL 2 SPRAY: 0.05 SPRAY NASAL at 03:08

## 2022-08-02 RX ADMIN — HYDROCODONE BITARTRATE AND ACETAMINOPHEN 1 TABLET: 5; 325 TABLET ORAL at 09:08

## 2022-08-02 RX ADMIN — TRAZODONE HYDROCHLORIDE 50 MG: 50 TABLET ORAL at 09:08

## 2022-08-02 RX ADMIN — HYDROCODONE BITARTRATE AND ACETAMINOPHEN 1 TABLET: 5; 325 TABLET ORAL at 03:08

## 2022-08-02 RX ADMIN — ATORVASTATIN CALCIUM 40 MG: 40 TABLET, FILM COATED ORAL at 09:08

## 2022-08-02 RX ADMIN — LOSARTAN POTASSIUM 100 MG: 50 TABLET, FILM COATED ORAL at 09:08

## 2022-08-02 RX ADMIN — MUPIROCIN: 20 OINTMENT TOPICAL at 03:08

## 2022-08-02 RX ADMIN — FUROSEMIDE 40 MG: 40 TABLET ORAL at 09:08

## 2022-08-02 RX ADMIN — HYDROCODONE BITARTRATE AND ACETAMINOPHEN 1 TABLET: 5; 325 TABLET ORAL at 02:08

## 2022-08-02 NOTE — PLAN OF CARE
SW sent clinical updates only via [employer]. BEVERLY also contacted [facility] ([phone]) regarding patient's return and was informed that patient will need a covid-19 swab. The nursing facility will transport patient at discharge.

## 2022-08-02 NOTE — PROGRESS NOTES
[facility] Hospital Medicine Progress Note      Chief Complaint: Epistaxis     Source of Information: Patient. Medical Records        HISTORY OF PRESENT ILLNESS:   Emma Jorgensen is a 88 y.o. female with a PMHx of diastolic heart failure (LVEF 55% and grade II diastolic dysfunction from an echo on 05/24/2022),  PAF on Eliquis, moderate to severe aortic stenosis with plans for a TAVR outpatient per Cards, bilateral carotid artery stenosis, HTN, chronic dementia, previous DVT/PE, and recent surgical fixation of a closed right trimalleolar ankle fracture on 04/26/2022 who presented to [facility] ED from [facility] on 7/30/2022 with c/o epistaxis that began x1 day ago. Reported she is on 2L O2 chronically for hypoxia and occasionally get nose bleeds. She began bleeding from the left nare and felt blood draining in the back of her throat so she was referred to ED for further evaluation. Rhino Rocket was placed. Labs were notable for hgb 6.2, hct 20.1, chloride 97, CO2 34, INR 1.31. She was given KCentra, and transfused platelets. She began bleeding again from the left nare and was transferred to [facility] ED. ENT was consulted. 1 unit PRBC was ordered for transfusion. She was admitted to hospital medicine services for management of care.         Patient currently admitted and being managed for left-sided epistaxis, ENT on board has a rhino rockets in her left nostril  Epistaxis remains stable  Leukocytosis slightly trending upwards, probably source the right foot with wounds that are draining and off N/C  Consult Podiatry, get x-ray of the right foot, begin IV antibiotics  Daughter at bedside reports patient was supposed to have a CT of the chest in preparation for TAVR-will clarify with [facility] what exactly they will need an ordered this while she is inpatient       Today's information  Patient seen and examined at bedside  Patient complaining of right foot twitching  Vitals reviewed blood  pressure slightly elevated.  Hemoglobin trickling down at 7.2, transfused with 2 units of PRBCs,  Arterial ultrasound of the right leg reviewed shows moderate to severe disease, consult patient's cardiologist for further evaluation  Podiatry on board appreciate plans for debridement  Plans to remove rhino rockets today, ENT on board  Discontinue IV Lasix labs showing contraction alkalosis, resume home does oral Lasix  Continue IV ceftriaxone for right foot infected wound  Get PT to get patient out of bed in a recliner chair.     Exam   General appearance: Well-developed female in no apparent distress. Oxygen   HENT: Atraumatic head. Moist mucous membranes of oral cavity. Left rhinorocket in place   Eyes: Normal extraocular movements.   Neck: Supple.   Lungs: Clear to auscultation bilaterally. No wheezing present.   Heart: Regular rate and rhythm. S1 and S2 present. No pedal edema.  Abdomen: Soft, non-distended, non-tender. Obese  Extremities: No cyanosis, clubbing, or edema.  Skin: R foot wound  Neuro: Motor and sensory exams grossly intact.   Psych/mental status: Appropriate mood and affect. Responds appropriately to questions.         ASSESSMENT & PLAN:   Acute on chronic respiratory failure  - severe AS   Acute pulmonary edema  Epistaxis, stable   Acute on Chronic Normocytic Anemia, worse  Infected right foot wounds  Leucocytosis, worse   - source likely infected right foot wounds   Asymptomatic bacteriuria   PAF on Eliquis  Previous DVT/PE on chronic anticoagulation  Chronic hypoxia stable on 2 L nasal cannula  Severe aortic stenosis awaiting TAVR  Chronic diastolic congestive heart failure (EF 55% TTE 05/2022)  Dementia  Hypertension  Bilateral carotid artery disease  Peripheral arterial disease, mod- severe on right   contraction alkalosis        Plan:  transfused with 2 units of PRBCs,  Arterial ultrasound of the right leg reviewed shows moderate to severe disease, consult patient's cardiologist for further  evaluation  Podiatry on board appreciate plans for debridement  Plans to remove rhino rockets today, ENT on board  Discontinue IV Lasix labs showing contraction alkalosis, resume home does oral Lasix  Continue IV ceftriaxone for right foot infected wound  Get PT to get patient out of bed in a recliner chair.  Daughter at bedside reports patient was supposed to have a CT of the chest in preparation for TAVR-will clarify with [facility] what exactly they will need an ordered this while she is inpatient  Continue blood pressure control  Hold Eliquis x7 days  Resume other appropriate home medications  Labs in AM        VTE Prophylaxis: will be placed on SCD for DVT prophylaxis and will be advised to be as mobile as possible and sit in a chair as tolerated     Critical care diagnosis anemia requiring blood transfusions  Critical care time greater than 35 minutes         VITAL SIGNS: 24 HRS MIN & MAX LAST   Temp  Min: 97.6 °F (36.4 °C)  Max: 99.3 °F (37.4 °C) 98.5 °F (36.9 °C)   BP  Min: 92/43  Max: 153/67 (!) 92/43   Pulse  Min: 66  Max: 103  70   Resp  Min: 16  Max: 20 19   SpO2  Min: 95 %  Max: 100 % 95 %       Recent Labs   Lab 07/31/22  0422 08/01/22  0403 08/02/22  0508   WBC 12.0* 13.0* 11.4   RBC 2.69* 2.67* 2.48*   HGB 7.8* 7.7* 7.2*   HCT 25.3* 25.1* 24.3*   MCV 94.1* 94.0 98.0*   MCH 29.0 28.8 29.0   MCHC 30.8* 30.7* 29.6*   RDW 16.7 16.2 16.0    295 289   MPV 9.9 9.6 9.6       Recent Labs   Lab 07/30/22  0508 07/31/22  0422 08/01/22  0403 08/02/22  0508    135* 133* 134*   K 4.3 3.7 3.8 3.9   CO2 34* 28 29 32*   BUN 17.1 15.2 9.1* 7.2*   CREATININE 0.62 0.59 0.55 0.59   CALCIUM 9.1 8.6 8.4 8.5   MG  --   --   --  1.90   ALBUMIN 2.4* 2.5*  --   --    ALKPHOS 100 91  --   --    ALT 9 11  --   --    AST 14 17  --   --    BILITOT 0.4 1.4  --   --           Microbiology Results (last 7 days)     ** No results found for the last 168 hours. **           See below for Radiology    Scheduled Med:   atorvastatin   40 mg Oral QHS    cefTRIAXone (ROCEPHIN) IVPB  1 g Intravenous Q24H    collagenase   Topical (Top) Daily    cyanocobalamin  500 mcg Oral Daily    donepeziL  10 mg Oral Nightly    furosemide  40 mg Oral Daily    gabapentin  100 mg Oral Nightly    losartan  100 mg Oral Daily    magnesium oxide  400 mg Oral Daily    metoprolol tartrate  25 mg Oral BID    mupirocin   Topical (Top) Daily    oxymetazoline  2 spray Each Nostril Once    traZODone  50 mg Oral QHS        Continuous Infusions:       PRN Meds:  sodium chloride, acetaminophen, acetaminophen, hydrALAZINE, HYDROcodone-acetaminophen, HYDROcodone-acetaminophen, ondansetron, sodium chloride 0.9%       VTE prophylaxis:     Patient condition:  Stable/Fair/Guarded/ Serious/ Critical    Anticipated discharge and Disposition:         All diagnosis and differential diagnosis have been reviewed; assessment and plan has been documented; I have personally reviewed the labs and test results that are presently available; I have reviewed the patients medication list; I have reviewed the consulting providers response and recommendations. I have reviewed or attempted to review medical records based upon their availability    All of the patient's questions have been  addressed and answered. Patient's is agreeable to the above stated plan. I will continue to monitor closely and make adjustments to medical management as needed.  _____________________________________________________________________    Nutrition Status:    Radiology:  X-Ray Calcaneus 2 View Right  Narrative: EXAMINATION:  XR CALCANEUS 2 VIEW RIGHT    CLINICAL HISTORY:  wound;    TECHNIQUE:  Tangential and lateral views of the right calcaneus were performed.    COMPARISON:  None    FINDINGS:  Postsurgical changes are seen in the calcaneus.  There is a orthopedic screw seen in the calcaneus and the intramedullary niyah seen in the distal tibia extending through the talus to the calcaneus.  The bones are  demineralized.  There is a soft tissue ulceration seen along the heel of the foot.  There is some fragmentation seen on the posterior aspect of the calcaneus.  Impression: Postsurgical changes seen in the bones as outlined above are slight fragmentation along the posterior aspect of calcaneus    Large soft tissue wound seen at the heel of the foot    Electronically signed by: Monica Padron  Date:    08/01/2022  Time:    16:43  X-Ray Foot Complete Right  Narrative: EXAMINATION:  XR FOOT COMPLETE 3 VIEW RIGHT    CLINICAL HISTORY:  right wounds and drainage;    COMPARISON:  None.    FINDINGS:  Postoperative changes are again identified with an intramedullary niyah that extends from the tibia into the talus and into the calcaneus with the screw identified in the calcaneus position and alignment of the visualized osseous structures is close to anatomical no definite fractures or dislocations are identified    No acute displaced fractures or dislocations.    There are some degenerative changes of the interphalangeal joints as well as some degenerative changes of the tarsal rows    A posterior plantar skin and subcutaneous defect is again identified likely representing an ulceration.    On the provided images there is no evidence of primary and/or secondary signs to suggest the presence of osteomyelitis, however, these might be lacking on plain films and therefore if clinically indicated other imaging modalities might prove helpful for further assessment  Impression: Significant degenerative changes and chronic changes of the fluid.    Ulceration in the plantar aspect of the foot.    No clear evidence of osteomyelitis other imaging modalities might prove helpful for further assessment.    Electronically signed by: Billy Salas  Date:    08/01/2022  Time:    16:05  CTA Chest Aorta Non Coronary  Narrative: EXAMINATION:  CTA ABDOMEN AND PELVIS; CTA CHEST AORTA NON CORONARY    CLINICAL HISTORY:  i35.0;  Nontraumatic  aortic (valve) stenosis    TECHNIQUE:  Multiple axial images were obtained from the thoracic inlet to the pubis symphysis after contrast administration and CT angiography protocol was followed.  Multiplanar MIPS reconstructions were performed and sagittal and coronal reconstructions were performed as well. Automatic exposure control (AEC) is utilized to reduce patient radiation exposure.    COMPARISON:  None    FINDINGS:  The thoracic aorta has some moderate atherosclerotic changes associated with it.  No dissection is seen.  There is mild prominence of the aortic root and proximal ascending aorta has maximum dimension of 4 cm.  There are S some atherosclerotic some calcifications seen within the aortic valve which is a known finding.    Descending thoracic aorta is normal in caliber.    The abdominal aorta has some atherosclerotic plaque associated with the.  No stenosis is seen.  There is some calcification seen at the celiac axis at the ostium.  Some atherosclerotic plaque is also seen at the origin of the SMA with a 50% stenosis seen at the proximal superior mesenteric artery.  The renal arteries are patent bilaterally.  There is a smaller calcification at the ostium of the right renal artery but no significant stenosis is seen.  The VINCE is not patent.    Some atherosclerotic plaque is seen within the common iliac arteries bilaterally but no significant stenosis is seen.  External iliac arteries however a small amount of plaque at the proximal portion but no stenosis is seen.  Mild plaque is seen within the common femoral arteries bilaterally but no significant stenosis is seen.    Source images: There is atelectasis in the lung bases bilaterally and small pleural effusions bilaterally.  The heart is normal in size.  No abnormal mediastinal lymphadenopathy seen.    There is a cyst seen within the liver.  The patient is status post cholecystectomy.  Pancreas appears unremarkable.  Spleen appears normal.    Insert  renal nodule seen on the right side that does show enhancement.  It measures 1.7 x 1.7 cm.  The nodule is stable since 2013.  Kidneys appear grossly unremarkable.  Visualized portion of the bowel appears grossly unremarkable.  No acute bony abnormality is seen..  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  Multiplanar MIPS reconstructions were performed and sagittal and coronal reconstructions were performed as well. Automatic exposure control (AEC) is utilized to reduce patient radiation exposure.    COMPARISON:  None    FINDINGS:  The thoracic aorta has some moderate atherosclerotic changes associated with it.  No dissection is seen.  There is mild prominence of the aortic root and proximal ascending aorta has maximum dimension of 4 cm.  There are S some atherosclerotic some calcifications seen within the aortic valve which is a known finding.    Descending thoracic aorta is normal in caliber.    The abdominal aorta has some atherosclerotic plaque associated with the.  No stenosis is seen.  There is some calcification seen at the celiac axis at the ostium.  Some atherosclerotic plaque is also seen at the origin of the SMA  with a 50% stenosis seen at the proximal superior mesenteric artery.  The renal arteries are patent bilaterally.  There is a smaller calcification at the ostium of the right renal artery but no significant stenosis is seen.  The VINCE is not patent.    Some atherosclerotic plaque is seen within the common iliac arteries bilaterally but no significant stenosis is seen.  External iliac arteries however a small amount of plaque at the proximal portion but no stenosis is seen.  Mild plaque is seen within the common femoral arteries bilaterally but no significant stenosis is seen.    Source images: There is atelectasis in the lung bases bilaterally and small pleural effusions bilaterally.  The heart is normal in size.  No abnormal mediastinal lymphadenopathy seen.    There is a cyst seen within the liver.  The patient is status post cholecystectomy.  Pancreas appears unremarkable.  Spleen appears normal.    Insert renal nodule seen on the right side that does show enhancement.  It measures 1.7 x 1.7 cm.  The nodule is stable since 2013.  Kidneys appear grossly unremarkable.  Visualized portion of the bowel appears grossly unremarkable.  No acute bony abnormality is seen..  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

## 2022-08-02 NOTE — PLAN OF CARE
08/02/22 0851   Discharge Assessment   Assessment Type Discharge Planning Assessment   Confirmed/corrected address, phone number and insurance Yes   Confirmed Demographics Correct on Facesheet   Source of Information family   Communicated KATARZYNA with patient/caregiver Yes   Reason For Admission Epistaxis, Anemia   Lives With child(jacquie), adult   Facility Arrived From: [facility]   Do you expect to return to your current living situation? Yes   Do you have help at home or someone to help you manage your care at home? Yes   Who are your caregiver(s) and their phone number(s)? Patient's daughter, Triny Erickson ([phone]) resides with patient in her home.   Prior to hospitilization cognitive status: Unable to Assess   Current cognitive status: Alert/Oriented   Walking or Climbing Stairs Difficulty ambulation difficulty, assistance 1 person;ambulation difficulty, requires equipment   Dressing/Bathing Difficulty bathing difficulty, assistance 1 person   Home Accessibility wheelchair accessible   Home Layout Able to live on 1st floor   Equipment Currently Used at Home none   Readmission within 30 days? No   Is the pt/caregiver preference to resume services with current agency Yes   Do you take prescription medications? Yes   Do you have prescription coverage? Yes   Do you have any problems affording any of your prescribed medications? No   Is the patient taking medications as prescribed? yes   Who is going to help you get home at discharge? Patient with family support from her adult children. Patient plan to return to [facility] folling discharge.   How do you get to doctors appointments? family or friend will provide   Are you on dialysis? No   Do you take coumadin? No   Discharge Plan A Return to nursing home   Discharge Plan B Home with family   DME Needed Upon Discharge  none   Discharge Plan discussed with: Patient;Adult children   Discharge Barriers Identified None   Relationship/Environment   Name(s) of  Who Lives With Patient Patient's daughter, Triny Erickson: [phone]      completed discharge assessment with patient's son, Brian Jorgensen at bedside. Patient is expected to return to [facility] at discharge.

## 2022-08-02 NOTE — PT/OT/SLP PROGRESS
Physical Therapy         Treatment        Emma Jorgensen   MRN: [id number]     PT Received On: 08/02/22  PT Start Time: 1359     PT Stop Time: 1413    PT Total Time (min): 14 min       Billable Minutes:  Therapeutic Activity 14  Total Minutes: 14    Treatment Type: Treatment  PT/PTA: PTA     PTA Visit Number: 2       General Precautions: Standard, fall  Orthopedic Precautions: Orthopedic Precautions : RLE non weight bearing (waiting to get clarification on WB status on RLE. treat as NWB)   Braces:           Subjective:  Communicated with NSG prior to session.    Pain/Comfort  Pain Rating 1: 0/10    Objective:  Patient found in L sidelying,  Patient found with: peripheral IV, receiving blood    Functional Mobility:  Bed Mobility:      Rolling: Minimal Assistance. Had pt roll R/L 3x each direction to adjust/replace chuy pads. Also placed wedge.        Additional Treatment:      Activity Tolerance:  Patient tolerated treatment well    Patient left right sidelying with all lines intact and call button in reach.    Assessment:  Emma Jorgensen is a 88 y.o. female with a medical diagnosis of Epistaxis, stable   Acute on Chronic Normocytic Anemia  - s/p 1 unit of prbcs   Leucocytosis, mild  Asymptomatic bacteriuria   PAF on Eliquis  Previous DVT/PE on chronic anticoagulation  Chronic hypoxia stable on 2 L nasal cannula  Severe aortic stenosis awaiting TAVR  Chronic diastolic congestive heart failure (EF 55% TTE 05/2022)  Dementia  Hypertension  Bilateral carotid artery disease  Peripheral arterial disease    Pt declined to sit EOB at this time 2/2 feeling very weak/tired. Pt currently receiving blood and NSG reported pt with low H&H.   Pt requested to be readjusted in bed while therapist present.     Rehab potential is good.    Activity tolerance: Good    Discharge recommendations: Discharge Facility/Level of Care Needs: nursing facility, skilled     Equipment recommendations:       GOALS:   Multidisciplinary  Problems     Physical Therapy Goals        Problem: Physical Therapy    Goal Priority Disciplines Outcome Goal Variances Interventions   Physical Therapy Goal     PT, PT/OT      Description: LTGs   1. Pt will be jeremy with bed mobility  2. Pt will be jeremy with sit to stand                   PLAN:    Patient to be seen 5 x/week  to address the above listed problems via therapeutic activities, therapeutic exercises  Plan of Care expires: 08/07/22  Plan of Care reviewed with: patient, daughter         8/2/2022

## 2022-08-02 NOTE — CONSULTS
Inpatient consult to Cardiology  Consult performed by: SMITH Shane  Consult ordered by: Sai Sanchez MD        [facility] Acute  Cardiology  Consult Note    Patient Name: Emma Jorgensen  MRN: [id number]  Admission Date: 7/30/2022  Hospital Length of Stay: 3 days  Code Status: Full Code   Attending Provider: Kandice Moore MD   Consulting Provider: SMITH Shane  Primary Care Physician: Mary Jane Dean MD  Principal Problem:<principal problem not specified>    Patient information was obtained from patient and ER records.     Subjective:     Chief Complaint:       HPI:     HPI: 88 year old female known to Dr. Eubanks with a past medical history of Aortic Stenosis, Diastolic CHF, HTN, GERD, DVT, PE, Parkinson's Disease and right ankle fracture.  Patient had a recent hospital admission at [facility] on 6.1.22 where she was diagnosed with acute decompensated diastolic heart failure and received IV diuretics. She presented to [facility] on 7.30.22 with c/o nose bleed. She had rhino rocket placed, and was transfused with 1 unit of PRBC's, platelets, and received K centra. Upon workup, she was found to have a non healing wound to her right foot. Arterial US revealed moderately severe arterial flow reduction primarily in the popliteal, posterior tibial and dorsalis pedia arteries, consistent with multilevel disease. [facility] is being consulted for PAD.         PMH: Aortic Stenosis, Diastolic CHF, HTN, GERD, DVT, PE, Parkinson's Disease   PSH: Ankle Surgery, Cataract Surgery, Colonoscopy, Colon Resection, Toe Surgery, Hysterectomy  Family History: Father - CVA; Sister - CAD/CABG  Social History: Former Smoker.  Denies Alcohol Use.  Denies Illicit Drug Use.     Previous Cardiac Diagnostics:   [facility] 6.7.22  The Mid LAD lesion was 85% stenosed with 0% stenosis post-intervention.  A stent was successfully placed at 12 LETTY for 10 sec.  The estimated blood loss was <50 mL.  There was single  vessel coronary artery disease.  The PCI was successful.  The coronary FFR was abnormal.  The filling pressures mildly elevated        Echocardiogram 5.24.22:  TDS due to poor acoustical windows; suboptimal doppler angles.  Mild concentric hypertrophy and normal systolic function.  The estimated ejection fraction is 55%.  Grade II left ventricular diastolic dysfunction.  Moderate aortic regurgitation.  There is moderate aortic valve stenosis.  Aortic valve area is 0.75 cm squared; peak velocity is 3.53m/s; mean gradient is 30mmHg.  Intermediate central venous pressure (8mmHg).     Carotid US 4.21.22  The right internal carotid artery demonstrated less than 50% stenosis.  The left internal carotid artery demonstrated less than 50% stenosis.  Bilateral vertebral arteries were patent with antegrade flow.        PET MPI 2.22.18:  This is a normal perfusion study, no perfusion defects noted.  There is no evidence of ischemia.  This scan is suggestive of low risk for future cardiovascular events.  The left ventricular cavity is noted to be normal on the stress studies.  The stress left ventricular ejection fraction was calculated to be 74% and left ventricular global function is normal.  The rest left ventricular cavity is noted to be normal.  The rest left ventricular ejection fraction was calculated to be 74% and rest left ventricular global function is michael;.  Compared with rest and stress studies the ejection fraction remains unchanged (74).         Past Medical History:   Diagnosis Date    Anxiety disorder, unspecified     Arthritis     Deep vein thrombosis     Depression     Irregular heart beat     Obesity, unspecified     Other pulmonary embolism without acute cor pulmonale     Parkinson's disease     Trimalleolar fracture of ankle, closed     Unspecified dementia without behavioral disturbance        Past Surgical History:   Procedure Laterality Date    ANGIOGRAM, CORONARY, WITH LEFT HEART CATHETERIZATION N/A  6/7/2022    Procedure: Angiogram, Coronary, with Left Heart Cath;  Surgeon: Claude Morillo MD;  Location: [facility] CATH LAB;  Service: Cardiology;  Laterality: N/A;    ANKLE FRACTURE SURGERY Right     CATARACT EXTRACTION      CATHETERIZATION OF BOTH LEFT AND RIGHT HEART N/A 6/7/2022    Procedure: CATHETERIZATION, HEART, BOTH LEFT AND RIGHT;  Surgeon: Claude Morillo MD;  Location: [facility] CATH LAB;  Service: Cardiology;  Laterality: N/A;    COLONOSCOPY W/ POLYPECTOMY      HYSTERECTOMY      SMALL INTESTINE SURGERY         Review of patient's allergies indicates:  No Known Allergies    No current facility-administered medications on file prior to encounter.     Current Outpatient Medications on File Prior to Encounter   Medication Sig    albuterol (PROVENTIL/VENTOLIN HFA) 90 mcg/actuation inhaler Inhale 2 puffs into the lungs every 6 (six) hours.    albuterol sulfate 2.5 mg/0.5 mL Nebu Take 2.5 mg by nebulization every 4 (four) hours as needed (dyspnea). Rescue    apixaban (ELIQUIS) 2.5 mg Tab Take 2 tablets (5 mg total) by mouth 2 (two) times daily.    atorvastatin (LIPITOR) 40 MG tablet Take 1 tablet (40 mg total) by mouth every evening.    budesonide-glycopyr-formoterol 160-9-4.8 mcg/actuation HFAA Inhale 2 puffs into the lungs 2 (two) times a day.    clopidogreL (PLAVIX) 75 mg tablet Take 1 tablet (75 mg total) by mouth once daily.    clotrimazole-betamethasone 1-0.05% (LOTRISONE) cream Apply 1 application topically 2 (two) times daily.    cyanocobalamin 500 MCG tablet Take 1 tablet (500 mcg total) by mouth once daily.    diclofenac sodium (VOLTAREN) 1 % Gel Apply 1 application topically 4 (four) times daily.    diltiaZEM (CARDIZEM) 120 MG tablet Take 1 tablet by mouth once daily at 6am.    donepeziL (ARICEPT) 10 MG tablet Take 1 tablet by mouth nightly.    fluticasone propion-salmeterol 115-21 mcg/dose (ADVAIR HFA) 115-21 mcg/actuation HFAA inhaler Inhale 2 puffs into the lungs 2 (two) times daily.    furosemide (LASIX) 40  MG tablet Take 1 tablet (40 mg total) by mouth once daily.    gabapentin (NEURONTIN) 100 MG capsule Take 1 capsule (100 mg total) by mouth nightly.    hydroCHLOROthiazide (HYDRODIURIL) 12.5 MG Tab Take 1 tablet by mouth once daily at 6am.    HYDROcodone-acetaminophen (NORCO) 5-325 mg per tablet Take 1 tablet by mouth every 6 (six) hours as needed for Pain.    loratadine (CLARITIN) 10 mg tablet Take 1 tablet by mouth once daily at 6am.    losartan (COZAAR) 100 MG tablet Take 1 tablet by mouth once daily.    magnesium oxide (MAG-OX) 400 mg (241.3 mg magnesium) tablet Take 1 tablet (400 mg total) by mouth once daily.    metoprolol tartrate (LOPRESSOR) 25 MG tablet Take 1 tablet (25 mg total) by mouth 2 (two) times daily.    mupirocin (BACTROBAN) 2 % ointment Apply topically once daily. Apply to affected area at right plantar heel. After cleansing well with dermal cleanser and rinsing with saline, cover with nonadherent dressing secured with rolled gauze. Keep off loaded.    pantoprazole (PROTONIX) 40 MG tablet Take 1 tablet (40 mg total) by mouth once daily.    pramipexole (MIRAPEX) 1 MG tablet Take 1 tablet by mouth nightly.    traZODone (DESYREL) 50 MG tablet Take 1 tablet (50 mg total) by mouth every evening.    venlafaxine (EFFEXOR-XR) 37.5 MG 24 hr capsule Take 1 tablet by mouth once daily at 6am.    white petrolatum 41 % Oint Apply topically 2 (two) times daily.    [DISCONTINUED] betamethasone dipropionate 0.05 % cream Apply 1 application topically 2 (two) times daily.    [DISCONTINUED] LORazepam (ATIVAN) 1 MG tablet Take 1 tablet by mouth nightly as needed.    [DISCONTINUED] omeprazole 20 mg TbEC Take 1 tablet by mouth once daily at 6am.     Family History       Family history is unknown by patient.          Tobacco Use    Smoking status: Former Smoker    Smokeless tobacco: Never Used   Substance and Sexual Activity    Alcohol use: Never    Drug use: Never    Sexual activity: Not on file       Review of Systems    Constitutional: Positive for fatigue.   Skin: Positive for wound.   Neurological: Positive for weakness.       Objective:     Vital Signs (Most Recent):  Temp: 99.4 °F (37.4 °C) (08/02/22 1317)  Pulse: 83 (08/02/22 1317)  Resp: 20 (08/02/22 1444)  BP: 108/61 (08/02/22 1317)  SpO2: 95 % (08/02/22 1120) Vital Signs (24h Range):  Temp:  [97.6 °F (36.4 °C)-99.4 °F (37.4 °C)] 99.4 °F (37.4 °C)  Pulse:  [] 83  Resp:  [16-20] 20  SpO2:  [95 %-100 %] 95 %  BP: ()/(43-67) 108/61     Weight: 114.8 kg (253 lb)  Body mass index is 40.84 kg/m².    SpO2: 95 %  O2 Device (Oxygen Therapy): Oxymask      Intake/Output Summary (Last 24 hours) at 8/2/2022 1517  Last data filed at 8/2/2022 1300  Gross per 24 hour   Intake 480 ml   Output 2000 ml   Net -1520 ml       Lines/Drains/Airways       Drain  Duration                  Urethral Catheter 07/31/22 1430 Silicone 16 Fr. 2 days              Peripheral Intravenous Line  Duration                  Peripheral IV - Single Lumen 07/1935 20 G Anterior;Left Upper Arm 2 days                    Significant Labs:  Recent Results (from the past 72 hour(s))   Comprehensive Metabolic Panel    Collection Time: 07/31/22  4:22 AM   Result Value Ref Range    Sodium Level 135 (L) 136 - 145 mmol/L    Potassium Level 3.7 3.5 - 5.1 mmol/L    Chloride 99 98 - 107 mmol/L    Carbon Dioxide 28 23 - 31 mmol/L    Glucose Level 96 82 - 115 mg/dL    Blood Urea Nitrogen 15.2 9.8 - 20.1 mg/dL    Creatinine 0.59 0.55 - 1.02 mg/dL    Calcium Level Total 8.6 8.4 - 10.2 mg/dL    Protein Total 5.8 5.8 - 7.6 gm/dL    Albumin Level 2.5 (L) 3.4 - 4.8 gm/dL    Globulin 3.3 2.4 - 3.5 gm/dL    Albumin/Globulin Ratio 0.8 (L) 1.1 - 2.0 ratio    Bilirubin Total 1.4 <=1.5 mg/dL    Alkaline Phosphatase 91 40 - 150 unit/L    Alanine Aminotransferase 11 0 - 55 unit/L    Aspartate Aminotransferase 17 5 - 34 unit/L    Estimated GFR- >60 mls/min/1.73/m2   CBC with Differential    Collection Time:  07/31/22  4:22 AM   Result Value Ref Range    WBC 12.0 (H) 4.5 - 11.5 x10(3)/mcL    RBC 2.69 (L) 4.20 - 5.40 x10(6)/mcL    Hgb 7.8 (L) 12.0 - 16.0 gm/dL    Hct 25.3 (L) 37.0 - 47.0 %    MCV 94.1 (H) 80.0 - 94.0 fL    MCH 29.0 27.0 - 31.0 pg    MCHC 30.8 (L) 33.0 - 36.0 mg/dL    RDW 16.7 11.5 - 17.0 %    Platelet 283 130 - 400 x10(3)/mcL    MPV 9.9 7.4 - 10.4 fL    Neut % 78.0 %    Lymph % 9.5 %    Mono % 10.2 %    Eos % 1.2 %    Basophil % 0.3 %    Lymph # 1.14 0.6 - 4.6 x10(3)/mcL    Neut # 9.4 (H) 2.1 - 9.2 x10(3)/mcL    Mono # 1.23 0.1 - 1.3 x10(3)/mcL    Eos # 0.14 0 - 0.9 x10(3)/mcL    Baso # 0.04 0 - 0.2 x10(3)/mcL    IG# 0.10 (H) 0 - 0.04 x10(3)/mcL    IG% 0.8 %    NRBC% 0.0 %   MRSA PCR    Collection Time: 07/31/22 10:59 AM   Result Value Ref Range    MRSA PCR SCRN ([facility]) Not Detected Not Detected   Basic Metabolic Panel    Collection Time: 08/01/22  4:03 AM   Result Value Ref Range    Sodium Level 133 (L) 136 - 145 mmol/L    Potassium Level 3.8 3.5 - 5.1 mmol/L    Chloride 97 (L) 98 - 107 mmol/L    Carbon Dioxide 29 23 - 31 mmol/L    Glucose Level 103 82 - 115 mg/dL    Blood Urea Nitrogen 9.1 (L) 9.8 - 20.1 mg/dL    Creatinine 0.55 0.55 - 1.02 mg/dL    BUN/Creatinine Ratio 17     Calcium Level Total 8.4 8.4 - 10.2 mg/dL    Estimated GFR- >60 mls/min/1.73/m2    Anion Gap 7.0 mEq/L   CBC with Differential    Collection Time: 08/01/22  4:03 AM   Result Value Ref Range    WBC 13.0 (H) 4.5 - 11.5 x10(3)/mcL    RBC 2.67 (L) 4.20 - 5.40 x10(6)/mcL    Hgb 7.7 (L) 12.0 - 16.0 gm/dL    Hct 25.1 (L) 37.0 - 47.0 %    MCV 94.0 80.0 - 94.0 fL    MCH 28.8 27.0 - 31.0 pg    MCHC 30.7 (L) 33.0 - 36.0 mg/dL    RDW 16.2 11.5 - 17.0 %    Platelet 295 130 - 400 x10(3)/mcL    MPV 9.6 7.4 - 10.4 fL    Neut % 75.5 %    Lymph % 11.3 %    Mono % 9.7 %    Eos % 2.2 %    Basophil % 0.3 %    Lymph # 1.46 0.6 - 4.6 x10(3)/mcL    Neut # 9.8 (H) 2.1 - 9.2 x10(3)/mcL    Mono # 1.26 0.1 - 1.3 x10(3)/mcL    Eos # 0.28 0 - 0.9  x10(3)/mcL    Baso # 0.04 0 - 0.2 x10(3)/mcL    IG# 0.13 (H) 0 - 0.04 x10(3)/mcL    IG% 1.0 %    NRBC% 0.0 %   Iron and TIBC    Collection Time: 08/01/22  4:03 AM   Result Value Ref Range    Iron Binding Capacity Unsaturated 158 70 - 310 ug/dL    Iron Level 17 (L) 50 - 170 ug/dL    Transferrin 166 mg/dL    Iron Binding Capacity Total 175 (L) 250 - 450 ug/dL    Iron Saturation 10 (L) 20 - 50 %   Ferritin    Collection Time: 08/01/22  4:03 AM   Result Value Ref Range    Ferritin Level 130.33 4.63 - 204.00 ng/mL   CV Ultrasound doppler arterial legs bilat    Collection Time: 08/01/22  9:01 PM   Result Value Ref Range    Left FRANCISCA 0.82     Right FRANCISCA 0.63    Basic Metabolic Panel    Collection Time: 08/02/22  5:08 AM   Result Value Ref Range    Sodium Level 134 (L) 136 - 145 mmol/L    Potassium Level 3.9 3.5 - 5.1 mmol/L    Chloride 97 (L) 98 - 107 mmol/L    Carbon Dioxide 32 (H) 23 - 31 mmol/L    Glucose Level 98 82 - 115 mg/dL    Blood Urea Nitrogen 7.2 (L) 9.8 - 20.1 mg/dL    Creatinine 0.59 0.55 - 1.02 mg/dL    BUN/Creatinine Ratio 12     Calcium Level Total 8.5 8.4 - 10.2 mg/dL    Estimated GFR- >60 mls/min/1.73/m2    Anion Gap 5.0 mEq/L   Magnesium    Collection Time: 08/02/22  5:08 AM   Result Value Ref Range    Magnesium Level 1.90 1.60 - 2.60 mg/dL   CBC with Differential    Collection Time: 08/02/22  5:08 AM   Result Value Ref Range    WBC 11.4 4.5 - 11.5 x10(3)/mcL    RBC 2.48 (L) 4.20 - 5.40 x10(6)/mcL    Hgb 7.2 (L) 12.0 - 16.0 gm/dL    Hct 24.3 (L) 37.0 - 47.0 %    MCV 98.0 (H) 80.0 - 94.0 fL    MCH 29.0 27.0 - 31.0 pg    MCHC 29.6 (L) 33.0 - 36.0 mg/dL    RDW 16.0 11.5 - 17.0 %    Platelet 289 130 - 400 x10(3)/mcL    MPV 9.6 7.4 - 10.4 fL    Neut % 74.3 %    Lymph % 12.7 %    Mono % 10.1 %    Eos % 1.5 %    Basophil % 0.4 %    Lymph # 1.44 0.6 - 4.6 x10(3)/mcL    Neut # 8.4 2.1 - 9.2 x10(3)/mcL    Mono # 1.15 0.1 - 1.3 x10(3)/mcL    Eos # 0.17 0 - 0.9 x10(3)/mcL    Baso # 0.04 0 - 0.2  x10(3)/mcL    IG# 0.11 (H) 0 - 0.04 x10(3)/mcL    IG% 1.0 %    NRBC% 0.0 %   Type & Screen    Collection Time: 08/02/22  9:21 AM   Result Value Ref Range    Group & Rh B POS     Indirect Muriel GEL NEG        Significant Imaging:  Imaging Results    None         EKG:    Results for orders placed or performed during the hospital encounter of 07/30/22   EKG 12-lead    Narrative    Test Reason : D64.9,    Vent. Rate : 080 BPM     Atrial Rate : 080 BPM     P-R Int : 126 ms          QRS Dur : 088 ms      QT Int : 398 ms       P-R-T Axes : 001 -03 004 degrees     QTc Int : 459 ms    Normal sinus rhythm  Minimal voltage criteria for LVH, may be normal variant  Borderline Abnormal ECG  When compared with ECG of 28-JUN-2022 16:37,  T wave inversion now evident in Inferior leads    Referred By: [email or web address]   SELF           Confirmed By:        Telemetry:  NSR    Physical Exam  Vitals reviewed.   Constitutional:       Appearance: Normal appearance.   Cardiovascular:      Rate and Rhythm: Normal rate and regular rhythm.      Pulses: Normal pulses.      Heart sounds: Normal heart sounds.      Comments: Positive DP/PT doppler pulses to bilateral feet.  Pulmonary:      Effort: Pulmonary effort is normal.      Breath sounds: Normal breath sounds.   Abdominal:      General: Abdomen is flat. Bowel sounds are normal.      Palpations: Abdomen is soft.   Musculoskeletal:         General: Normal range of motion.   Skin:     General: Skin is warm and dry.      Capillary Refill: Capillary refill takes less than 2 seconds.      Findings: Lesion present.          Neurological:      General: No focal deficit present.      Mental Status: She is alert.         Home Medications:   No current facility-administered medications on file prior to encounter.     Current Outpatient Medications on File Prior to Encounter   Medication Sig Dispense Refill    albuterol (PROVENTIL/VENTOLIN HFA) 90 mcg/actuation inhaler Inhale 2 puffs into the lungs every 6  (six) hours.      albuterol sulfate 2.5 mg/0.5 mL Nebu Take 2.5 mg by nebulization every 4 (four) hours as needed (dyspnea). Rescue 60 each 0    apixaban (ELIQUIS) 2.5 mg Tab Take 2 tablets (5 mg total) by mouth 2 (two) times daily.      atorvastatin (LIPITOR) 40 MG tablet Take 1 tablet (40 mg total) by mouth every evening. 90 tablet 0    budesonide-glycopyr-formoterol 160-9-4.8 mcg/actuation HFAA Inhale 2 puffs into the lungs 2 (two) times a day.      clopidogreL (PLAVIX) 75 mg tablet Take 1 tablet (75 mg total) by mouth once daily. 30 tablet 0    clotrimazole-betamethasone 1-0.05% (LOTRISONE) cream Apply 1 application topically 2 (two) times daily.      cyanocobalamin 500 MCG tablet Take 1 tablet (500 mcg total) by mouth once daily. 30 tablet 0    diclofenac sodium (VOLTAREN) 1 % Gel Apply 1 application topically 4 (four) times daily.      diltiaZEM (CARDIZEM) 120 MG tablet Take 1 tablet by mouth once daily at 6am.      donepeziL (ARICEPT) 10 MG tablet Take 1 tablet by mouth nightly.      fluticasone propion-salmeterol 115-21 mcg/dose (ADVAIR HFA) 115-21 mcg/actuation HFAA inhaler Inhale 2 puffs into the lungs 2 (two) times daily. 12 g 0    furosemide (LASIX) 40 MG tablet Take 1 tablet (40 mg total) by mouth once daily. 30 tablet 11    gabapentin (NEURONTIN) 100 MG capsule Take 1 capsule (100 mg total) by mouth nightly. 30 capsule 0    hydroCHLOROthiazide (HYDRODIURIL) 12.5 MG Tab Take 1 tablet by mouth once daily at 6am.      HYDROcodone-acetaminophen (NORCO) 5-325 mg per tablet Take 1 tablet by mouth every 6 (six) hours as needed for Pain.      loratadine (CLARITIN) 10 mg tablet Take 1 tablet by mouth once daily at 6am.      losartan (COZAAR) 100 MG tablet Take 1 tablet by mouth once daily.      magnesium oxide (MAG-OX) 400 mg (241.3 mg magnesium) tablet Take 1 tablet (400 mg total) by mouth once daily.  0    metoprolol tartrate (LOPRESSOR) 25 MG tablet Take 1 tablet (25 mg total) by mouth 2 (two) times daily.  60 tablet 0    mupirocin (BACTROBAN) 2 % ointment Apply topically once daily. Apply to affected area at right plantar heel. After cleansing well with dermal cleanser and rinsing with saline, cover with nonadherent dressing secured with rolled gauze. Keep off loaded. 2 g 0    pantoprazole (PROTONIX) 40 MG tablet Take 1 tablet (40 mg total) by mouth once daily. 30 tablet 11    pramipexole (MIRAPEX) 1 MG tablet Take 1 tablet by mouth nightly.      traZODone (DESYREL) 50 MG tablet Take 1 tablet (50 mg total) by mouth every evening. 30 tablet 0    venlafaxine (EFFEXOR-XR) 37.5 MG 24 hr capsule Take 1 tablet by mouth once daily at 6am.      white petrolatum 41 % Oint Apply topically 2 (two) times daily.      [DISCONTINUED] betamethasone dipropionate 0.05 % cream Apply 1 application topically 2 (two) times daily.      [DISCONTINUED] LORazepam (ATIVAN) 1 MG tablet Take 1 tablet by mouth nightly as needed.      [DISCONTINUED] omeprazole 20 mg TbEC Take 1 tablet by mouth once daily at 6am.         Current Inpatient Medications:    Current Facility-Administered Medications:     0.9%  NaCl infusion (for blood administration), , Intravenous, Q24H PRN, Sai Sanchez MD    acetaminophen tablet 650 mg, 650 mg, Oral, Q8H PRN, ALAINA Tam, 650 mg at 07/30/22 1457    acetaminophen tablet 650 mg, 650 mg, Oral, Q4H PRN, ALAINA Tam    atorvastatin tablet 40 mg, 40 mg, Oral, QHS, Sai Sanchez MD, 40 mg at 08/01/22 2212    cefTRIAXone (ROCEPHIN) 1 g in dextrose 5 % in water (D5W) 5 % 50 mL IVPB (MB+), 1 g, Intravenous, Q24H, Sai Sanchez MD, Stopped at 08/01/22 1659    collagenase ointment, , Topical (Top), Daily, Kandice Moore MD    cyanocobalamin tablet 500 mcg, 500 mcg, Oral, Daily, Sai Sanchez MD, 500 mcg at 08/02/22 0952    donepeziL tablet 10 mg, 10 mg, Oral, Nightly, Sai Sanchez MD, 10 mg at 08/01/22 2212    furosemide tablet 40 mg, 40 mg, Oral, Daily, Sai Sanchez MD, 40 mg at  08/02/22 0951    gabapentin capsule 100 mg, 100 mg, Oral, Nightly, Sai Sanchez MD, 100 mg at 08/01/22 2212    hydrALAZINE injection 20 mg, 20 mg, Intravenous, Q4H PRN, Sai Sanchez MD    HYDROcodone-acetaminophen 5-325 mg per tablet 1 tablet, 1 tablet, Oral, Q6H PRN, ALAINA Tam, 1 tablet at 08/02/22 1012    HYDROcodone-acetaminophen 5-325 mg per tablet 1 tablet, 1 tablet, Oral, Q6H PRN, Sai Sanchez MD, 1 tablet at 08/02/22 1444    losartan tablet 100 mg, 100 mg, Oral, Daily, Sai Sanchez MD, 100 mg at 08/02/22 0951    magnesium oxide tablet 400 mg, 400 mg, Oral, Daily, Sai Sanchez MD, 400 mg at 08/02/22 0951    metoprolol tartrate (LOPRESSOR) tablet 25 mg, 25 mg, Oral, BID, Sai Sanchez MD, 25 mg at 08/02/22 0952    mupirocin 2 % ointment, , Topical (Top), Daily, Sai Sanchez MD, Given at 08/01/22 0900    ondansetron injection 4 mg, 4 mg, Intravenous, Q8H PRN, ALAINA Tam    oxymetazoline 0.05 % nasal spray 2 spray, 2 spray, Each Nostril, Once, Sai Sanchez MD    sodium chloride 0.9% flush 10 mL, 10 mL, Intravenous, PRN, Sari Wright MD    traZODone tablet 50 mg, 50 mg, Oral, QHS, Sai Sanchez MD, 50 mg at 08/01/22 2213         VTE Risk Mitigation (From admission, onward)           Ordered     IP VTE HIGH RISK PATIENT  Once         07/30/22 0956     Place sequential compression device  Until discontinued         07/30/22 0956                    Assessment:   PAD   -moderately severe arterial flow reduction primarily in the popliteal, posterior tibial and dorsalis pedia arteries, consistent with multilevel disease. (Arterial US 8.1.22)  Nonhealing foot wound  CAD   -s/p coronary stent to the LAD  Epistaxis  Anemia  Chronic Diastolic CHF       - EF 55% with Grade II DD per Echo 5.24.22  Moderate Aortic Stenosis per Echo 5.24.22  HTN  GERD  Hx of DVT  Hx of PE  Parkinson's Disease     Plan:     Continue home meds:Plavix/Eliquis/Lasix/losartan  Stop aspirin  to avoid triple therapy due to epistaxis  Rt DL and PTA were detected by Doppler  CTA of the Lower extremities  Will follow up in the am  Watch closely nose bleed and H/H.         Thank you for your consult.     SMITH Michelle  Cardiology  [facility]  08/02/2022 3:17 PM

## 2022-08-02 NOTE — PROGRESS NOTES
[facility]                       [address] [place] [id number]    PATIENT NAME:       VIPIN JORGENSEN  YOB: 1934  CSN:                [id number]   MRN:                [id number]  ADMIT DATE:         07/30/2022 02:43:00  PHYSICIAN:          Rosales Redmond DPM                            PROGRESS NOTE    DATE:  08/02/2022 00:00:00    SUBJECTIVE:  Ms. Jorgensen is seen today.  Currently being transfused.      Her arterial ultrasound revealed severe arterial flow reduction in the right   lower extremity, less so in the left.  The patient was evaluated by Cardiology.    Plans are noted as per the chart.  Plan for a CTA.    PHYSICAL EXAM:  VITAL SIGNS:  Stable.  Currently afebrile.  EXTREMITIES:  Necrotic wound to the right heel, is open wound plantar aspect.    Relatively unchanged.  Some drainage.  No odor.  No fluctuance.  No crepitation.    Bone is notable subjacent the fibrotic debris in the posterior heel.  No   palpable pulses.  Mild edema.  Heel lift boots are in place.    ASSESSMENT:    1. Nonhealing wounds, right lower extremity.  2. Status post open reduction and internal fixation, trimalleolar ankle   fracture, with retrograde nail.    PLAN:   X-rays were evaluated.  There does appear to be a little bit of some   washout at the posterior calcaneus and noted wound in the area of the retained   anchor screw within the posterior calcaneus.  Also, soft tissue defect plantarly   at the entrance point of the retrograde nail.  Unfortunately, the patient is   definitely going to need some debridement of these areas, but is harboring some   arterial disease.  We will see whether or not Cardiology needs to provide any   sort of intervention to improve the flow.  My objective would be to prevent   having to remove any of the hardware, although that posterior screw may be an   issue.  Also at issue is going to  be substantial compromise to the Achilles.    This probably will not come into play if the niyah is maintained.  Will continue   to follow.  Will reassess her tomorrow.        ______________________________  LOR Duran/ANTONY  DD:  08/02/2022  Time:  04:34PM  DT:  08/02/2022  Time:  04:47PM  Job #:  [id number]      PROGRESS NOTE

## 2022-08-03 LAB
ANION GAP SERPL CALC-SCNC: 5 MEQ/L
BASOPHILS # BLD AUTO: 0.05 X10(3)/MCL (ref 0–0.2)
BASOPHILS NFR BLD AUTO: 0.5 %
BUN SERPL-MCNC: 8.3 MG/DL (ref 9.8–20.1)
CALCIUM SERPL-MCNC: 8.5 MG/DL (ref 8.4–10.2)
CHLORIDE SERPL-SCNC: 95 MMOL/L (ref 98–107)
CO2 SERPL-SCNC: 33 MMOL/L (ref 23–31)
CREAT SERPL-MCNC: 0.56 MG/DL (ref 0.55–1.02)
CREAT/UREA NIT SERPL: 15
EOSINOPHIL # BLD AUTO: 0.14 X10(3)/MCL (ref 0–0.9)
EOSINOPHIL NFR BLD AUTO: 1.4 %
ERYTHROCYTE [DISTWIDTH] IN BLOOD BY AUTOMATED COUNT: 16.5 % (ref 11.5–17)
GLUCOSE SERPL-MCNC: 112 MG/DL (ref 82–115)
HCT VFR BLD AUTO: 25.9 % (ref 37–47)
HGB BLD-MCNC: 8 GM/DL (ref 12–16)
IMM GRANULOCYTES # BLD AUTO: 0.08 X10(3)/MCL (ref 0–0.04)
IMM GRANULOCYTES NFR BLD AUTO: 0.8 %
LYMPHOCYTES # BLD AUTO: 1.4 X10(3)/MCL (ref 0.6–4.6)
LYMPHOCYTES NFR BLD AUTO: 13.8 %
MCH RBC QN AUTO: 30 PG (ref 27–31)
MCHC RBC AUTO-ENTMCNC: 30.9 MG/DL (ref 33–36)
MCV RBC AUTO: 97 FL (ref 80–94)
MONOCYTES # BLD AUTO: 1.13 X10(3)/MCL (ref 0.1–1.3)
MONOCYTES NFR BLD AUTO: 11.2 %
NEUTROPHILS # BLD AUTO: 7.3 X10(3)/MCL (ref 2.1–9.2)
NEUTROPHILS NFR BLD AUTO: 72.3 %
NRBC BLD AUTO-RTO: 0 %
PLATELET # BLD AUTO: 352 X10(3)/MCL (ref 130–400)
PMV BLD AUTO: 9.7 FL (ref 7.4–10.4)
POTASSIUM SERPL-SCNC: 4.2 MMOL/L (ref 3.5–5.1)
RBC # BLD AUTO: 2.67 X10(6)/MCL (ref 4.2–5.4)
SODIUM SERPL-SCNC: 133 MMOL/L (ref 136–145)
WBC # SPEC AUTO: 10.1 X10(3)/MCL (ref 4.5–11.5)

## 2022-08-03 PROCEDURE — 85025 COMPLETE CBC W/AUTO DIFF WBC: CPT | Performed by: INTERNAL MEDICINE

## 2022-08-03 PROCEDURE — 63600175 PHARM REV CODE 636 W HCPCS: Performed by: INTERNAL MEDICINE

## 2022-08-03 PROCEDURE — 36415 COLL VENOUS BLD VENIPUNCTURE: CPT | Performed by: INTERNAL MEDICINE

## 2022-08-03 PROCEDURE — 25000003 PHARM REV CODE 250: Performed by: INTERNAL MEDICINE

## 2022-08-03 PROCEDURE — 97530 THERAPEUTIC ACTIVITIES: CPT | Mod: CQ

## 2022-08-03 PROCEDURE — 25500020 PHARM REV CODE 255: Performed by: INTERNAL MEDICINE

## 2022-08-03 PROCEDURE — 11000001 HC ACUTE MED/SURG PRIVATE ROOM

## 2022-08-03 PROCEDURE — 25000003 PHARM REV CODE 250: Performed by: NURSE PRACTITIONER

## 2022-08-03 PROCEDURE — 63600175 PHARM REV CODE 636 W HCPCS: Performed by: NURSE PRACTITIONER

## 2022-08-03 PROCEDURE — 97110 THERAPEUTIC EXERCISES: CPT | Mod: CQ

## 2022-08-03 PROCEDURE — 27000221 HC OXYGEN, UP TO 24 HOURS

## 2022-08-03 PROCEDURE — 80048 BASIC METABOLIC PNL TOTAL CA: CPT | Performed by: INTERNAL MEDICINE

## 2022-08-03 RX ADMIN — METOPROLOL TARTRATE 25 MG: 25 TABLET, FILM COATED ORAL at 08:08

## 2022-08-03 RX ADMIN — GABAPENTIN 100 MG: 100 CAPSULE ORAL at 09:08

## 2022-08-03 RX ADMIN — Medication 400 MG: at 08:08

## 2022-08-03 RX ADMIN — COLLAGENASE SANTYL: 250 OINTMENT TOPICAL at 08:08

## 2022-08-03 RX ADMIN — SODIUM CHLORIDE 125 MG: 9 INJECTION, SOLUTION INTRAVENOUS at 10:08

## 2022-08-03 RX ADMIN — TRAZODONE HYDROCHLORIDE 50 MG: 50 TABLET ORAL at 09:08

## 2022-08-03 RX ADMIN — FUROSEMIDE 40 MG: 40 TABLET ORAL at 08:08

## 2022-08-03 RX ADMIN — CYANOCOBALAMIN TAB 500 MCG 500 MCG: 500 TAB at 08:08

## 2022-08-03 RX ADMIN — CEFTRIAXONE SODIUM 1 G: 1 INJECTION, POWDER, FOR SOLUTION INTRAMUSCULAR; INTRAVENOUS at 09:08

## 2022-08-03 RX ADMIN — MUPIROCIN: 20 OINTMENT TOPICAL at 08:08

## 2022-08-03 RX ADMIN — DONEPEZIL HYDROCHLORIDE 10 MG: 5 TABLET, FILM COATED ORAL at 09:08

## 2022-08-03 RX ADMIN — ONDANSETRON 4 MG: 2 INJECTION INTRAMUSCULAR; INTRAVENOUS at 01:08

## 2022-08-03 RX ADMIN — LOSARTAN POTASSIUM 100 MG: 50 TABLET, FILM COATED ORAL at 09:08

## 2022-08-03 RX ADMIN — METOPROLOL TARTRATE 25 MG: 25 TABLET, FILM COATED ORAL at 09:08

## 2022-08-03 RX ADMIN — ATORVASTATIN CALCIUM 40 MG: 40 TABLET, FILM COATED ORAL at 09:08

## 2022-08-03 RX ADMIN — CLOPIDOGREL 75 MG: 75 TABLET, FILM COATED ORAL at 08:08

## 2022-08-03 NOTE — PLAN OF CARE
Problem: Infection  Goal: Absence of Infection Signs and Symptoms  Outcome: Ongoing, Progressing     Problem: Adult Inpatient Plan of Care  Goal: Plan of Care Review  Outcome: Ongoing, Progressing  Goal: Patient-Specific Goal (Individualized)  Outcome: Ongoing, Progressing  Goal: Absence of Hospital-Acquired Illness or Injury  Outcome: Ongoing, Progressing  Goal: Optimal Comfort and Wellbeing  Outcome: Ongoing, Progressing  Goal: Readiness for Transition of Care  Outcome: Ongoing, Progressing     Problem: Bariatric Environmental Safety  Goal: Safety Maintained with Care  Outcome: Ongoing, Progressing     Problem: Fluid Imbalance (Pneumonia)  Goal: Fluid Balance  Outcome: Ongoing, Progressing     Problem: Infection (Pneumonia)  Goal: Resolution of Infection Signs and Symptoms  Outcome: Ongoing, Progressing     Problem: Respiratory Compromise (Pneumonia)  Goal: Effective Oxygenation and Ventilation  Outcome: Ongoing, Progressing     Problem: Impaired Wound Healing  Goal: Optimal Wound Healing  Outcome: Ongoing, Progressing     Problem: Fall Injury Risk  Goal: Absence of Fall and Fall-Related Injury  Outcome: Ongoing, Progressing     Problem: Skin Injury Risk Increased  Goal: Skin Health and Integrity  Outcome: Ongoing, Progressing     Problem: Pain Acute  Goal: Acceptable Pain Control and Functional Ability  Outcome: Ongoing, Progressing

## 2022-08-03 NOTE — PROGRESS NOTES
[facility]                       [address] [place] [id number]    PATIENT NAME:       VIPIN NEVAREZ  YOB: 1934  CSN:                [id number]   MRN:                [id number]  ADMIT DATE:         07/30/2022 02:43:00  PHYSICIAN:          Rosales Redmond DPM                            PROGRESS NOTE    DATE:  08/03/2022 00:00:00    Patient was seen today.  Family is not present.  Patient is not voicing any   complaints other than having to use O2.  I did review JEFFY followup notes.  No   plan for intervention at this time.  They will work on her as an outpatient.    She does have 2-vessel runoff to the foot by CTA.    PHYSICAL EXAM:  VITAL SIGNS:  Stable, and she is currently afebrile.  RIGHT FOOT:  No major changes in the wound.  Still fibro necrotic tissue to the   posterior aspect of the heel, and this encompasses quite a bit of the insertion   of the Achilles.  Screw is palpable subjacent, but not visible.  There is no   visible hardware at the plantar wound site.    LABS REVIEWED:  White cell count is 10.1.  Again, CTA results noted.    ASSESSMENT:  Nonhealing right lower extremity wounds with history of open   reduction and internal fixation, right ankle fracture.    PLAN:  We are going to go ahead and continue with current wound care.  I am   going to tentatively put her on the schedule for Friday to debride these   particular areas.  I did speak with Dr. Krause concerning that distal screw, and   he has no problems with removing this.  She does have some other anchor screws   distally.  Will discuss with daughter, also.  Continue nonweightbearing status.        ______________________________  Rosales Redmond DPM    GAS/AQS  DD:  08/03/2022  Time:  02:26PM  DT:  08/03/2022  Time:  02:38PM  Job #:  [id number]      PROGRESS NOTE

## 2022-08-03 NOTE — PROGRESS NOTES
[facility]  Cardiology  Progress Note    Patient Name: Emma Jorgensen  MRN: [id number]  Admission Date: 7/30/2022  Hospital Length of Stay: 4 days  Code Status: Full Code   Attending Physician: Kandice Moore MD   Primary Care Physician: Mary Jane Dean MD  Expected Discharge Date:   Principal Problem:<principal problem not specified>    Subjective:     Brief HPI:   HPI: 88 year old female known to Dr. Eubanks with a past medical history of Aortic Stenosis, Diastolic CHF, HTN, GERD, DVT, PE, Parkinson's Disease and right ankle fracture.  Patient had a recent hospital admission at [facility] on 6.1.22 where she was diagnosed with acute decompensated diastolic heart failure and received IV diuretics. She presented to [facility] on 7.30.22 with c/o nose bleed. She had rhino rocket placed, and was transfused with 1 unit of PRBC's, platelets, and received K centra. Upon workup, she was found to have a non healing wound to her right foot. Arterial US revealed moderately severe arterial flow reduction primarily in the popliteal, posterior tibial and dorsalis pedia arteries, consistent with multilevel disease. [facility] is being consulted for PAD.     8.3.22: NAD. Wound care being done at present. Patient denies SOB/claudication. VSS.    Hospital Course:   PMH: Aortic Stenosis, Diastolic CHF, HTN, GERD, DVT, PE, Parkinson's Disease   PSH: Ankle Surgery, Cataract Surgery, Colonoscopy, Colon Resection, Toe Surgery, Hysterectomy  Family History: Father - CVA; Sister - CAD/CABG  Social History: Former Smoker.  Denies Alcohol Use.  Denies Illicit Drug Use.     Previous Cardiac Diagnostics:   [facility] 6.7.22  The Mid LAD lesion was 85% stenosed with 0% stenosis post-intervention.  A stent was successfully placed at 12 LETTY for 10 sec.  The estimated blood loss was <50 mL.  There was single vessel coronary artery disease.  The PCI was successful.  The coronary FFR was abnormal.  The filling pressures mildly  elevated           Echocardiogram 5.24.22:  TDS due to poor acoustical windows; suboptimal doppler angles.  Mild concentric hypertrophy and normal systolic function.  The estimated ejection fraction is 55%.  Grade II left ventricular diastolic dysfunction.  Moderate aortic regurgitation.  There is moderate aortic valve stenosis.  Aortic valve area is 0.75 cm squared; peak velocity is 3.53m/s; mean gradient is 30mmHg.  Intermediate central venous pressure (8mmHg).     Carotid US 4.21.22  The right internal carotid artery demonstrated less than 50% stenosis.  The left internal carotid artery demonstrated less than 50% stenosis.  Bilateral vertebral arteries were patent with antegrade flow.        PET MPI 2.22.18:  This is a normal perfusion study, no perfusion defects noted.  There is no evidence of ischemia.  This scan is suggestive of low risk for future cardiovascular events.  The left ventricular cavity is noted to be normal on the stress studies.  The stress left ventricular ejection fraction was calculated to be 74% and left ventricular global function is normal.  The rest left ventricular cavity is noted to be normal.  The rest left ventricular ejection fraction was calculated to be 74% and rest left ventricular global function is michael;.  Compared with rest and stress studies the ejection fraction remains unchanged (74).       Review of Systems   Constitutional: Positive for malaise/fatigue.   Skin: Positive for poor wound healing.       Objective:     Vital Signs (Most Recent):  Temp: 100.2 °F (37.9 °C) (08/03/22 0735)  Pulse: 91 (08/03/22 0735)  Resp: 20 (08/03/22 0735)  BP: (!) 147/71 (08/03/22 0735)  SpO2: 99 % (08/03/22 0735) Vital Signs (24h Range):  Temp:  [97.8 °F (36.6 °C)-100.2 °F (37.9 °C)] 100.2 °F (37.9 °C)  Pulse:  [70-93] 91  Resp:  [18-23] 20  SpO2:  [95 %-100 %] 99 %  BP: ()/(43-72) 147/71     Weight: 114.8 kg (253 lb)  Body mass index is 40.84 kg/m².    SpO2: 99 %  O2 Device (Oxygen  Therapy): Oxymask      Intake/Output Summary (Last 24 hours) at 8/3/2022 1011  Last data filed at 8/3/2022 0500  Gross per 24 hour   Intake 867.08 ml   Output 950 ml   Net -82.92 ml       Lines/Drains/Airways     Drain  Duration                Urethral Catheter 07/31/22 1430 Silicone 16 Fr. 2 days          Peripheral Intravenous Line  Duration                Midline Catheter Insertion/Assessment  - Single Lumen 08/02/22 2033 Right brachial vein other (see comments) <1 day                Significant Labs:   Recent Results (from the past 72 hour(s))   MRSA PCR    Collection Time: 07/31/22 10:59 AM   Result Value Ref Range    MRSA PCR SCRN ([facility]) Not Detected Not Detected   Basic Metabolic Panel    Collection Time: 08/01/22  4:03 AM   Result Value Ref Range    Sodium Level 133 (L) 136 - 145 mmol/L    Potassium Level 3.8 3.5 - 5.1 mmol/L    Chloride 97 (L) 98 - 107 mmol/L    Carbon Dioxide 29 23 - 31 mmol/L    Glucose Level 103 82 - 115 mg/dL    Blood Urea Nitrogen 9.1 (L) 9.8 - 20.1 mg/dL    Creatinine 0.55 0.55 - 1.02 mg/dL    BUN/Creatinine Ratio 17     Calcium Level Total 8.4 8.4 - 10.2 mg/dL    Estimated GFR- >60 mls/min/1.73/m2    Anion Gap 7.0 mEq/L   CBC with Differential    Collection Time: 08/01/22  4:03 AM   Result Value Ref Range    WBC 13.0 (H) 4.5 - 11.5 x10(3)/mcL    RBC 2.67 (L) 4.20 - 5.40 x10(6)/mcL    Hgb 7.7 (L) 12.0 - 16.0 gm/dL    Hct 25.1 (L) 37.0 - 47.0 %    MCV 94.0 80.0 - 94.0 fL    MCH 28.8 27.0 - 31.0 pg    MCHC 30.7 (L) 33.0 - 36.0 mg/dL    RDW 16.2 11.5 - 17.0 %    Platelet 295 130 - 400 x10(3)/mcL    MPV 9.6 7.4 - 10.4 fL    Neut % 75.5 %    Lymph % 11.3 %    Mono % 9.7 %    Eos % 2.2 %    Basophil % 0.3 %    Lymph # 1.46 0.6 - 4.6 x10(3)/mcL    Neut # 9.8 (H) 2.1 - 9.2 x10(3)/mcL    Mono # 1.26 0.1 - 1.3 x10(3)/mcL    Eos # 0.28 0 - 0.9 x10(3)/mcL    Baso # 0.04 0 - 0.2 x10(3)/mcL    IG# 0.13 (H) 0 - 0.04 x10(3)/mcL    IG% 1.0 %    NRBC% 0.0 %   Iron and TIBC    Collection  Time: 08/01/22  4:03 AM   Result Value Ref Range    Iron Binding Capacity Unsaturated 158 70 - 310 ug/dL    Iron Level 17 (L) 50 - 170 ug/dL    Transferrin 166 mg/dL    Iron Binding Capacity Total 175 (L) 250 - 450 ug/dL    Iron Saturation 10 (L) 20 - 50 %   Ferritin    Collection Time: 08/01/22  4:03 AM   Result Value Ref Range    Ferritin Level 130.33 4.63 - 204.00 ng/mL   CV Ultrasound doppler arterial legs bilat    Collection Time: 08/01/22  9:01 PM   Result Value Ref Range    Left FRANCISCA 0.82     Right FRANCISCA 0.63    Basic Metabolic Panel    Collection Time: 08/02/22  5:08 AM   Result Value Ref Range    Sodium Level 134 (L) 136 - 145 mmol/L    Potassium Level 3.9 3.5 - 5.1 mmol/L    Chloride 97 (L) 98 - 107 mmol/L    Carbon Dioxide 32 (H) 23 - 31 mmol/L    Glucose Level 98 82 - 115 mg/dL    Blood Urea Nitrogen 7.2 (L) 9.8 - 20.1 mg/dL    Creatinine 0.59 0.55 - 1.02 mg/dL    BUN/Creatinine Ratio 12     Calcium Level Total 8.5 8.4 - 10.2 mg/dL    Estimated GFR- >60 mls/min/1.73/m2    Anion Gap 5.0 mEq/L   Magnesium    Collection Time: 08/02/22  5:08 AM   Result Value Ref Range    Magnesium Level 1.90 1.60 - 2.60 mg/dL   CBC with Differential    Collection Time: 08/02/22  5:08 AM   Result Value Ref Range    WBC 11.4 4.5 - 11.5 x10(3)/mcL    RBC 2.48 (L) 4.20 - 5.40 x10(6)/mcL    Hgb 7.2 (L) 12.0 - 16.0 gm/dL    Hct 24.3 (L) 37.0 - 47.0 %    MCV 98.0 (H) 80.0 - 94.0 fL    MCH 29.0 27.0 - 31.0 pg    MCHC 29.6 (L) 33.0 - 36.0 mg/dL    RDW 16.0 11.5 - 17.0 %    Platelet 289 130 - 400 x10(3)/mcL    MPV 9.6 7.4 - 10.4 fL    Neut % 74.3 %    Lymph % 12.7 %    Mono % 10.1 %    Eos % 1.5 %    Basophil % 0.4 %    Lymph # 1.44 0.6 - 4.6 x10(3)/mcL    Neut # 8.4 2.1 - 9.2 x10(3)/mcL    Mono # 1.15 0.1 - 1.3 x10(3)/mcL    Eos # 0.17 0 - 0.9 x10(3)/mcL    Baso # 0.04 0 - 0.2 x10(3)/mcL    IG# 0.11 (H) 0 - 0.04 x10(3)/mcL    IG% 1.0 %    NRBC% 0.0 %   Type & Screen    Collection Time: 08/02/22  9:21 AM   Result Value  Ref Range    Group & Rh B POS     Indirect Muriel GEL NEG    Basic Metabolic Panel    Collection Time: 08/03/22  3:56 AM   Result Value Ref Range    Sodium Level 133 (L) 136 - 145 mmol/L    Potassium Level 4.2 3.5 - 5.1 mmol/L    Chloride 95 (L) 98 - 107 mmol/L    Carbon Dioxide 33 (H) 23 - 31 mmol/L    Glucose Level 112 82 - 115 mg/dL    Blood Urea Nitrogen 8.3 (L) 9.8 - 20.1 mg/dL    Creatinine 0.56 0.55 - 1.02 mg/dL    BUN/Creatinine Ratio 15     Calcium Level Total 8.5 8.4 - 10.2 mg/dL    Estimated GFR- >60 mls/min/1.73/m2    Anion Gap 5.0 mEq/L   CBC with Differential    Collection Time: 08/03/22  3:57 AM   Result Value Ref Range    WBC 10.1 4.5 - 11.5 x10(3)/mcL    RBC 2.67 (L) 4.20 - 5.40 x10(6)/mcL    Hgb 8.0 (L) 12.0 - 16.0 gm/dL    Hct 25.9 (L) 37.0 - 47.0 %    MCV 97.0 (H) 80.0 - 94.0 fL    MCH 30.0 27.0 - 31.0 pg    MCHC 30.9 (L) 33.0 - 36.0 mg/dL    RDW 16.5 11.5 - 17.0 %    Platelet 352 130 - 400 x10(3)/mcL    MPV 9.7 7.4 - 10.4 fL    Neut % 72.3 %    Lymph % 13.8 %    Mono % 11.2 %    Eos % 1.4 %    Basophil % 0.5 %    Lymph # 1.40 0.6 - 4.6 x10(3)/mcL    Neut # 7.3 2.1 - 9.2 x10(3)/mcL    Mono # 1.13 0.1 - 1.3 x10(3)/mcL    Eos # 0.14 0 - 0.9 x10(3)/mcL    Baso # 0.05 0 - 0.2 x10(3)/mcL    IG# 0.08 (H) 0 - 0.04 x10(3)/mcL    IG% 0.8 %    NRBC% 0.0 %       Telemetry:  SR    Physical Exam  Vitals reviewed.   Cardiovascular:      Rate and Rhythm: Normal rate and regular rhythm.      Pulses: Normal pulses.      Heart sounds: Normal heart sounds.      Comments: Doppler pulses positive to bilateral DP's and PT's.  Skin:     General: Skin is warm and dry.      Capillary Refill: Capillary refill takes less than 2 seconds.      Comments: Wound to plantar surface of right foot.   Neurological:      Mental Status: She is oriented to person, place, and time.         Current Inpatient Medications:    Current Facility-Administered Medications:     0.9%  NaCl infusion (for blood administration), ,  Intravenous, Q24H PRN, Sai Sanchez MD    acetaminophen tablet 650 mg, 650 mg, Oral, Q8H PRN, ALAINA Tam, 650 mg at 07/30/22 1457    acetaminophen tablet 650 mg, 650 mg, Oral, Q4H PRN, ALAINA Tam    atorvastatin tablet 40 mg, 40 mg, Oral, QHS, Sai Sanchez MD, 40 mg at 08/02/22 2107    cefTRIAXone (ROCEPHIN) 1 g in dextrose 5 % in water (D5W) 5 % 50 mL IVPB (MB+), 1 g, Intravenous, Q24H, Sai Sanchez MD, Stopped at 08/02/22 2141    clopidogreL tablet 75 mg, 75 mg, Oral, Daily, Helen NIMO Lebas, FNP, 75 mg at 08/03/22 0825    collagenase ointment, , Topical (Top), Daily, Kandice Moore MD, Given at 08/03/22 0826    cyanocobalamin tablet 500 mcg, 500 mcg, Oral, Daily, Sai Sanchez MD, 500 mcg at 08/03/22 0825    donepeziL tablet 10 mg, 10 mg, Oral, Nightly, Sai Sanchez MD, 10 mg at 08/02/22 2107    ferric gluconate (FERRLECIT) 125 mg in sodium chloride 0.9% 100 mL IVPB, 125 mg, Intravenous, Daily, Sai Sanchez MD, Last Rate: 100 mL/hr at 08/03/22 1007, 125 mg at 08/03/22 1007    furosemide tablet 40 mg, 40 mg, Oral, Daily, Sai Sanchez MD, 40 mg at 08/03/22 0826    gabapentin capsule 100 mg, 100 mg, Oral, Nightly, Sai Sanchez MD, 100 mg at 08/02/22 2107    hydrALAZINE injection 20 mg, 20 mg, Intravenous, Q4H PRN, Sai Sanchez MD    HYDROcodone-acetaminophen 5-325 mg per tablet 1 tablet, 1 tablet, Oral, Q6H PRN, ALAINA Tam, 1 tablet at 08/02/22 2107    HYDROcodone-acetaminophen 5-325 mg per tablet 1 tablet, 1 tablet, Oral, Q6H PRN, Sai Sanchez MD, 1 tablet at 08/02/22 1444    losartan tablet 100 mg, 100 mg, Oral, Daily, Sai Sanchez MD, 100 mg at 08/03/22 0900    magnesium oxide tablet 400 mg, 400 mg, Oral, Daily, Sai Sanchez MD, 400 mg at 08/03/22 0825    metoprolol tartrate (LOPRESSOR) tablet 25 mg, 25 mg, Oral, BID, Sai Sanchez MD, 25 mg at 08/03/22 0824    mupirocin 2 % ointment, , Topical (Top), Daily,  Sai Sanchez MD, Given at 08/03/22 0826    ondansetron injection 4 mg, 4 mg, Intravenous, Q8H PRN, Christy Perdue, [facility]    sodium chloride 0.9% flush 10 mL, 10 mL, Intravenous, PRN, Sari Wright MD    traZODone tablet 50 mg, 50 mg, Oral, QHS, Sai Sanchez MD, 50 mg at 08/02/22 2107    VTE Risk Mitigation (From admission, onward)         Ordered     IP VTE HIGH RISK PATIENT  Once         07/30/22 0956     Place sequential compression device  Until discontinued         07/30/22 0956                Assessment:     PAD   -Bilateral SFA, popliteal and infrapopliteal arterial disease (CTA 8.2.22)-chronic (no CLI)                   -moderately severe arterial flow reduction primarily in the popliteal, posterior tibial and dorsalis pedia arteries, consistent with multilevel disease. (Arterial US 8.1.22)  Nonhealing foot wound  CAD                   -s/p coronary stent to the LAD-(6.22.22)  Epistaxis  Anemia  Chronic Diastolic CHF       - EF 55% with Grade II DD per Echo 5.24.22  Moderate Aortic Stenosis per Echo 5.24.22  HTN  GERD  Hx of DVT  Hx of PE  Parkinson's Disease     Plan:     Continue home meds:Plavix/Eliquis/Lasix/losartan  Stop aspirin to avoid triple therapy due to epistaxis  Rt DL and PTA were detected by Doppler  CTA of the Lower extremities revealed 2 vessel run off to the right foot  Watch closely nose bleed and H/H.  Peripheral angiogram as an outpatient once medically optimized.  Follow up with Dr Eubanks in 2 weeks    FRANK MichelleP  Cardiology  [facility] - Oncology Acute  08/03/2022

## 2022-08-03 NOTE — PROCEDURES
"Emma Jorgensen is a 88 y.o. female patient.    Temp: 97.8 °F (36.6 °C) (08/02/22 1934)  Pulse: 93 (08/02/22 1934)  Resp: (!) 22 (08/02/22 1856)  BP: 122/60 (08/02/22 1934)  SpO2: 95 % (08/02/22 1934)  Weight: 114.8 kg (253 lb) (07/30/22 1551)  Height: 5' 6" (167.6 cm) (07/30/22 1551)    PICC  Date/Time: 8/2/2022 8:33 PM  Performed by: Sami Rosales RN  Consent Done: Yes  Time out: Immediately prior to procedure a time out was called to verify the correct patient, procedure, equipment, support staff and site/side marked as required  Indications: med administration  Anesthesia: local infiltration  Local anesthetic: lidocaine 1% without epinephrine  Anesthetic Total (mL): 5  Preparation: skin prepped with ChloraPrep  Skin prep agent dried: skin prep agent completely dried prior to procedure  Sterile barriers: all five maximum sterile barriers used - cap, mask, sterile gown, sterile gloves, and large sterile sheet  Hand hygiene: hand hygiene performed prior to central venous catheter insertion  Location details: right brachial  Catheter type: single lumen  Catheter size: 4 Fr  Catheter Length: 13cm    Ultrasound guidance: yes  Vessel Caliber: large and patent, compressibility normal  Vascular Doppler: not done  Needle advanced into vessel with real time Ultrasound guidance.  Guidewire confirmed in vessel.  Sterile sheath used.  no esophageal manometryNumber of attempts: 1  Post-procedure: blood return through all ports    Assessment: successful placement          Sami Rosales RN  8/2/2022  "

## 2022-08-03 NOTE — PROGRESS NOTES
[facility] Hospital Medicine Progress Note        Chief Complaint: Epistaxis     Source of Information: Patient. Medical Records        HISTORY OF PRESENT ILLNESS:   Emma Jorgensen is a 88 y.o. female with a PMHx of diastolic heart failure (LVEF 55% and grade II diastolic dysfunction from an echo on 05/24/2022),  PAF on Eliquis, moderate to severe aortic stenosis with plans for a TAVR outpatient per Cards, bilateral carotid artery stenosis, HTN, chronic dementia, previous DVT/PE, and recent surgical fixation of a closed right trimalleolar ankle fracture on 04/26/2022 who presented to [facility] ED from [facility] on 7/30/2022 with c/o epistaxis that began x1 day ago. Reported she is on 2L O2 chronically for hypoxia and occasionally get nose bleeds. She began bleeding from the left nare and felt blood draining in the back of her throat so she was referred to ED for further evaluation. Rhino Rocket was placed. Labs were notable for hgb 6.2, hct 20.1, chloride 97, CO2 34, INR 1.31. She was given KCentra, and transfused platelets. She began bleeding again from the left nare and was transferred to [facility] ED. ENT was consulted. 1 unit PRBC was ordered for transfusion. She was admitted to hospital medicine services for management of care.         Patient currently admitted and being managed for left-sided epistaxis, ENT on board has a rhino rockets in her left nostril  Epistaxis remains stable, rhino rocket removed   Leukocytosis slightly trending upwards, probably source the right foot with wounds that are draining and off N/C  Consult Podiatry, get x-ray of the right foot, begin IV antibiotics  Daughter at bedside reports patient was supposed to have a CT of the chest in preparation for TAVR-will clarify with [facility] what exactly they will need an ordered this while she is inpatient  Arterial ultrasound of the right leg reviewed shows moderate to severe disease, consult patient's  cardiologist for further evaluation  Podiatry on board appreciate plans for debridement        Today's information  Vitals are stable patient remains on 3 L oxygen, uses oxygen at the nursing home  Labs reviewed hemoglobin trended upwards to 8 grams/dL  Begin IV iron a 1/5  X-ray of the right foot reviewed  Podiatry planning for debridement  Follow-up with [facility] recs  Discontinue aspirin to avoid triple therapy  Eliquis currently on hold for 7 days due to recent nosebleed    Exam   General appearance: Well-developed female in no apparent distress. Oxygen   HENT: Atraumatic head. Moist mucous membranes of oral cavity.  Eyes: Normal extraocular movements.   Neck: Supple.   Lungs: Clear to auscultation bilaterally. No wheezing present.   Heart: Regular rate and rhythm. S1 and S2 present. No pedal edema.  Abdomen: Soft, non-distended, non-tender. Obese  Extremities: No cyanosis, clubbing, or edema.  Skin: R foot wound  Neuro: Motor and sensory exams grossly intact.   Psych/mental status: Appropriate mood and affect. Responds appropriately to questions.         ASSESSMENT & PLAN:   Acute on chronic respiratory failure, stable   - severe AS   Acute pulmonary edema, improved   Epistaxis, stable   Acute on Chronic Normocytic Anemia, worse  Infected right foot wounds  Leucocytosis, worse   - source likely infected right foot wounds   Asymptomatic bacteriuria   PAF on Eliquis  Previous DVT/PE on chronic anticoagulation  Chronic hypoxia stable on 2 L nasal cannula  Severe aortic stenosis awaiting TAVR  Chronic diastolic congestive heart failure (EF 55% TTE 05/2022)  Dementia  Hypertension  Bilateral carotid artery disease  Peripheral arterial disease, mod- severe on right   contraction alkalosis         Plan:  hemoglobin trended upwards to 8 grams/dL  X-ray of the right foot reviewed  Podiatry planning for debridement  Follow-up with [facility] recs  Discontinue aspirin to avoid triple therapy  Eliquis currently on hold for 7 days due  to recent nosebleed  Begin IV iron a 1/5  Arterial ultrasound of the right leg reviewed shows moderate to severe disease, Podiatry on board appreciate plans for debridement  S/p rhino rocket removed   Continue IV ceftriaxone for right foot infected wound  Get PT to get patient out of bed in a recliner chair.  Daughter at bedside reports patient was supposed to have a CT of the chest in preparation for TAVR-will clarify with [facility] what exactly they will need an ordered this while she is inpatient  Continue blood pressure control  Hold Eliquis x7 days, day 4 of 7  Resume other appropriate home medications  Labs in AM        VTE Prophylaxis: will be placed on SCD for DVT prophylaxis and will be advised to be as mobile as possible and sit in a chair as tolerated         VITAL SIGNS: 24 HRS MIN & MAX LAST   Temp  Min: 97.8 °F (36.6 °C)  Max: 100.2 °F (37.9 °C) 99.1 °F (37.3 °C)   BP  Min: 108/61  Max: 165/58 (!) 122/51   Pulse  Min: 76  Max: 93  78   Resp  Min: 18  Max: 23 19   SpO2  Min: 95 %  Max: 100 % 99 %       Recent Labs   Lab 08/01/22  0403 08/02/22  0508 08/03/22  0357   WBC 13.0* 11.4 10.1   RBC 2.67* 2.48* 2.67*   HGB 7.7* 7.2* 8.0*   HCT 25.1* 24.3* 25.9*   MCV 94.0 98.0* 97.0*   MCH 28.8 29.0 30.0   MCHC 30.7* 29.6* 30.9*   RDW 16.2 16.0 16.5    289 352   MPV 9.6 9.6 9.7       Recent Labs   Lab 07/30/22  0508 07/31/22  0422 08/01/22  0403 08/02/22  0508 08/03/22  0356    135* 133* 134* 133*   K 4.3 3.7 3.8 3.9 4.2   CO2 34* 28 29 32* 33*   BUN 17.1 15.2 9.1* 7.2* 8.3*   CREATININE 0.62 0.59 0.55 0.59 0.56   CALCIUM 9.1 8.6 8.4 8.5 8.5   MG  --   --   --  1.90  --    ALBUMIN 2.4* 2.5*  --   --   --    ALKPHOS 100 91  --   --   --    ALT 9 11  --   --   --    AST 14 17  --   --   --    BILITOT 0.4 1.4  --   --   --           Microbiology Results (last 7 days)     ** No results found for the last 168 hours. **           See below for Radiology    Scheduled Med:   atorvastatin  40 mg Oral QHS     cefTRIAXone (ROCEPHIN) IVPB  1 g Intravenous Q24H    clopidogreL  75 mg Oral Daily    collagenase   Topical (Top) Daily    cyanocobalamin  500 mcg Oral Daily    donepeziL  10 mg Oral Nightly    ferric gluconate (FERRLECIT) IVPB  125 mg Intravenous Daily    furosemide  40 mg Oral Daily    gabapentin  100 mg Oral Nightly    losartan  100 mg Oral Daily    magnesium oxide  400 mg Oral Daily    metoprolol tartrate  25 mg Oral BID    mupirocin   Topical (Top) Daily    traZODone  50 mg Oral QHS        Continuous Infusions:       PRN Meds:  sodium chloride, acetaminophen, acetaminophen, hydrALAZINE, HYDROcodone-acetaminophen, HYDROcodone-acetaminophen, ondansetron, sodium chloride 0.9%       VTE prophylaxis:     Patient condition:  Stable/Fair/Guarded/ Serious/ Critical    Anticipated discharge and Disposition:         All diagnosis and differential diagnosis have been reviewed; assessment and plan has been documented; I have personally reviewed the labs and test results that are presently available; I have reviewed the patients medication list; I have reviewed the consulting providers response and recommendations. I have reviewed or attempted to review medical records based upon their availability    All of the patient's questions have been  addressed and answered. Patient's is agreeable to the above stated plan. I will continue to monitor closely and make adjustments to medical management as needed.  _____________________________________________________________________    Nutrition Status:    Radiology:  CTA Runoff ABD PEL Bilat Lower Ext  Narrative: EXAMINATION:  CTA RUNOFF ABD PEL BILAT LOWER EXT    CLINICAL HISTORY:  Peripheral arterial disease    TECHNIQUE:  Axial CT images were obtained through the abdomen, pelvis and lower extremities before and after IV contrast.  150 mL Isovue 370. coronal and sagittal reconstructions submitted and interpreted.  Automated exposure control, dose radiation lowering  technique, was utilized.  Maximum intensity pixel reconstructions were performed.    COMPARISON:  CTA chest, abdomen and pelvis from 08/01/2022 and lower extremity arterial Doppler from 08/01/2022    FINDINGS:  CTA:    Patent thoracic aorta with widely patent runoff to the aortic bifurcation and patent runoff from the aortic bifurcation the both groins.  There is mild to moderate atherosclerotic plaque.  No aneurysm.  Patent internal iliac arteries.  Deep femoral arteries are patent bilaterally.    Patent renal arteries bilaterally.  There is calcified atherosclerotic plaque at the origins of the celiac trunk and SMA.  Mild to moderate degrees of stenosis.  Origin of the VINCE is not well seen.  This may be secondary to stenosis or the diminutive size of the vessel.    Right-side:    Patent common femoral artery and deep femoral artery.  There is scattered calcified atherosclerotic plaque along the length of the SFA.  There is approximately 60% stenosis at the proximal popliteal artery (image 407, series 20).  At the very distal segment of the popliteal artery, there is a 2nd area of stenosis with approximately 60% narrowing.    Patent popliteal trifurcation with scattered atherosclerotic plaque along the length of the infrapopliteal arterial vasculature.  There is a 2 vessel runoff to the ankle via the peroneal and posterior tibial artery.  Mid to distal segments of the anterior tibial artery are occluded.  Two vessel runoff to the foot via the posterior tibial and reconstituted anterior tibial artery.  Small-vessel vascular calcifications makes differentiation of contrast bolus and small-vessel vessel calcifications difficult.    Left-side:    Patent common femoral artery and profundus femora.  There are multiple segments of narrowing at the mid and distal segments of the SFA with 50-60% luminal narrowing at the most narrowed segments.  Patent runoff to the popliteal artery.  Popliteal artery is narrowed at the neg  segment by approximately 60-70%.    Pain popliteal trifurcation with scattered atherosclerotic plaque along the length of the infrapopliteal arterial vasculature.  Small-vessel vascular calcifications makes differentiation of contrast bolus and small vessel calcification difficult.  Primary blood supply to the foot appears to be via collaterals and the peroneal artery.    OTHER:    Heart size is at the upper limits of normal.  Small bilateral pleural effusions are present with compressive atelectasis.  Cirrhotic morphology to the liver.  Gallbladder is surgically absent.  Spleen, pancreas and adrenal glands appear normal.  The bowel is nonobstructed.  A normal appendix is present.  Daugherty catheter is present within a decompressed bladder.  Chronic appearing compression deformities are present at the lower thoracic spine and mid lumbar spine.  Index compression fracture with 50% loss of height at T8 is unchanged compared to 04/29/2022.  Impression: 1. Bilateral SFA, popliteal and infrapopliteal arterial disease as described above.  2. Additional findings include: Small bilateral pleural effusions and cirrhotic morphology to the liver    Electronically signed by: Kel Hodge MD  Date:    08/03/2022  Time:    09:55      Sai Sanchez MD   08/03/2022

## 2022-08-03 NOTE — PT/OT/SLP PROGRESS
Physical Therapy         Treatment        Emma Jorgensen   MRN: [id number]     PT Received On: 08/03/22  PT Start Time: 1052     PT Stop Time: 1117    PT Total Time (min): 25 min       Billable Minutes:  Therapeutic Activity 15 and Therapeutic Exercise 10  Total Minutes: 25    Treatment Type: Treatment  PT/PTA: PTA     PTA Visit Number: 3       General Precautions: Standard, fall  Orthopedic Precautions: Orthopedic Precautions : RLE non weight bearing   Braces:           Subjective:  Communicated with NSG prior to session.    Pain/Comfort  Pain Rating 1: 0/10    Objective:  Patient found in bed with HOB elevated, with Patient found with: peripheral IV, oxygen    O2: %, 3L oxymask    Functional Mobility:  Bed Mobility:   Supine to sit: Minimal Assistance   Sit to supine: Moderate Assistance   Rolling: Minimal Assistance   Scooting: Moderate Assistance    Balance:   Static Sit: NORMAL: No deviations seen in posture held statically  Dynamic Sit:  NORMAL: No deviations seen in posture held dynamically. Pt sat EOB while reaching outside GOYO and across midline to touch therapist hand in front. 10 reps BUE for 3 trials.         Additional Treatment:  BLE AROM: ankle pumps, knee flex/ext, alt marching, hip add/abd. 15 reps BLE. Pt required rest breaks between sets.     Activity Tolerance:  Patient tolerated treatment well    Patient left left sidelying with all lines intact and call button in reach.    Assessment:  Emma Jorgensen is a 88 y.o. female with a medical diagnosis of Epistaxis, stable   Acute on Chronic Normocytic Anemia  - s/p 1 unit of prbcs   Leucocytosis, mild  Asymptomatic bacteriuria   PAF on Eliquis  Previous DVT/PE on chronic anticoagulation  Chronic hypoxia stable on 2 L nasal cannula  Severe aortic stenosis awaiting TAVR  Chronic diastolic congestive heart failure (EF 55% TTE 05/2022)  Dementia  Hypertension  Bilateral carotid artery disease  Peripheral arterial disease    Encouragement  pt to start sitting EOB with NSG staff for meals if possible 2/2 her being Jeremy. Spoke with NS as well who agreed to start getting pt up to EOB more often.     Rehab potential is good.    Activity tolerance: Good    Discharge recommendations: Discharge Facility/Level of Care Needs: nursing facility, skilled     Equipment recommendations:       GOALS:   Multidisciplinary Problems     Physical Therapy Goals        Problem: Physical Therapy    Goal Priority Disciplines Outcome Goal Variances Interventions   Physical Therapy Goal     PT, PT/OT      Description: LTGs   1. Pt will be jeremy with bed mobility  2. Pt will be jeremy with sit to stand                   PLAN:    Patient to be seen 5 x/week  to address the above listed problems via gait training, therapeutic activities  Plan of Care expires: 08/07/22  Plan of Care reviewed with: patient         8/3/2022

## 2022-08-03 NOTE — PLAN OF CARE
Problem: Infection  Goal: Absence of Infection Signs and Symptoms  Outcome: Ongoing, Progressing     Problem: Adult Inpatient Plan of Care  Goal: Plan of Care Review  Outcome: Ongoing, Progressing  Flowsheets (Taken 8/3/2022 0156)  Plan of Care Reviewed With:   patient   spouse  Goal: Patient-Specific Goal (Individualized)  Outcome: Ongoing, Progressing  Goal: Absence of Hospital-Acquired Illness or Injury  Outcome: Ongoing, Progressing  Intervention: Identify and Manage Fall Risk  Flowsheets (Taken 8/3/2022 0156)  Safety Promotion/Fall Prevention:   assistive device/personal item within reach   side rails raised x 2   lighting adjusted   nonskid shoes/socks when out of bed  Intervention: Prevent Skin Injury  Flowsheets (Taken 8/3/2022 0156)  Body Position: supine  Skin Protection:   adhesive use limited   tubing/devices free from skin contact  Goal: Optimal Comfort and Wellbeing  Outcome: Ongoing, Progressing  Intervention: Monitor Pain and Promote Comfort  Flowsheets (Taken 8/3/2022 0156)  Pain Management Interventions:   quiet environment facilitated   medication offered  Goal: Readiness for Transition of Care  Outcome: Ongoing, Progressing     Problem: Bariatric Environmental Safety  Goal: Safety Maintained with Care  Outcome: Ongoing, Progressing     Problem: Fluid Imbalance (Pneumonia)  Goal: Fluid Balance  Outcome: Ongoing, Progressing     Problem: Infection (Pneumonia)  Goal: Resolution of Infection Signs and Symptoms  Outcome: Ongoing, Progressing     Problem: Respiratory Compromise (Pneumonia)  Goal: Effective Oxygenation and Ventilation  Outcome: Ongoing, Progressing     Problem: Impaired Wound Healing  Goal: Optimal Wound Healing  Outcome: Ongoing, Progressing  Intervention: Promote Wound Healing  Flowsheets (Taken 8/3/2022 0156)  Sleep/Rest Enhancement: regular sleep/rest pattern promoted  Pain Management Interventions:   quiet environment facilitated   medication offered     Problem: Fall Injury  Risk  Goal: Absence of Fall and Fall-Related Injury  Outcome: Ongoing, Progressing  Intervention: Identify and Manage Contributors  Flowsheets (Taken 8/3/2022 0156)  Medication Review/Management: medications reviewed     Problem: Skin Injury Risk Increased  Goal: Skin Health and Integrity  Outcome: Ongoing, Progressing  Intervention: Optimize Skin Protection  Flowsheets (Taken 8/3/2022 0156)  Pressure Reduction Techniques: weight shift assistance provided  Pressure Reduction Devices: heel offloading device utilized  Skin Protection:   adhesive use limited   tubing/devices free from skin contact     Problem: Pain Acute  Goal: Acceptable Pain Control and Functional Ability  Outcome: Ongoing, Progressing

## 2022-08-04 LAB
ABO + RH BLD: NORMAL
ABO + RH BLD: NORMAL
BLD PROD TYP BPU: NORMAL
BLD PROD TYP BPU: NORMAL
BLOOD UNIT EXPIRATION DATE: NORMAL
BLOOD UNIT EXPIRATION DATE: NORMAL
BLOOD UNIT TYPE CODE: 7300
BLOOD UNIT TYPE CODE: 7300
CROSSMATCH INTERPRETATION: NORMAL
CROSSMATCH INTERPRETATION: NORMAL
DISPENSE STATUS: NORMAL
DISPENSE STATUS: NORMAL
HCT VFR BLD AUTO: 24.7 % (ref 37–47)
HGB BLD-MCNC: 7.3 GM/DL (ref 12–16)
UNIT NUMBER: NORMAL
UNIT NUMBER: NORMAL
VIEW PATHOLOGY REPORT (RELIAPATH): NORMAL

## 2022-08-04 PROCEDURE — 63600175 PHARM REV CODE 636 W HCPCS: Performed by: INTERNAL MEDICINE

## 2022-08-04 PROCEDURE — 27000221 HC OXYGEN, UP TO 24 HOURS

## 2022-08-04 PROCEDURE — 25000003 PHARM REV CODE 250: Performed by: NURSE PRACTITIONER

## 2022-08-04 PROCEDURE — 85014 HEMATOCRIT: CPT | Performed by: INTERNAL MEDICINE

## 2022-08-04 PROCEDURE — P9016 RBC LEUKOCYTES REDUCED: HCPCS | Performed by: INTERNAL MEDICINE

## 2022-08-04 PROCEDURE — 25000003 PHARM REV CODE 250: Performed by: INTERNAL MEDICINE

## 2022-08-04 PROCEDURE — 94761 N-INVAS EAR/PLS OXIMETRY MLT: CPT

## 2022-08-04 PROCEDURE — 11000001 HC ACUTE MED/SURG PRIVATE ROOM

## 2022-08-04 PROCEDURE — 36415 COLL VENOUS BLD VENIPUNCTURE: CPT | Performed by: INTERNAL MEDICINE

## 2022-08-04 PROCEDURE — C9113 INJ PANTOPRAZOLE SODIUM, VIA: HCPCS | Performed by: INTERNAL MEDICINE

## 2022-08-04 RX ORDER — PANTOPRAZOLE SODIUM 40 MG/10ML
40 INJECTION, POWDER, LYOPHILIZED, FOR SOLUTION INTRAVENOUS 2 TIMES DAILY
Status: DISCONTINUED | OUTPATIENT
Start: 2022-08-04 | End: 2022-08-07

## 2022-08-04 RX ORDER — HYDROCODONE BITARTRATE AND ACETAMINOPHEN 500; 5 MG/1; MG/1
TABLET ORAL
Status: DISCONTINUED | OUTPATIENT
Start: 2022-08-04 | End: 2022-08-05

## 2022-08-04 RX ADMIN — METOPROLOL TARTRATE 25 MG: 25 TABLET, FILM COATED ORAL at 08:08

## 2022-08-04 RX ADMIN — PANTOPRAZOLE SODIUM 40 MG: 40 INJECTION, POWDER, FOR SOLUTION INTRAVENOUS at 08:08

## 2022-08-04 RX ADMIN — CLOPIDOGREL 75 MG: 75 TABLET, FILM COATED ORAL at 09:08

## 2022-08-04 RX ADMIN — HYDROCODONE BITARTRATE AND ACETAMINOPHEN 1 TABLET: 5; 325 TABLET ORAL at 08:08

## 2022-08-04 RX ADMIN — PANTOPRAZOLE SODIUM 40 MG: 40 INJECTION, POWDER, FOR SOLUTION INTRAVENOUS at 09:08

## 2022-08-04 RX ADMIN — CYANOCOBALAMIN TAB 500 MCG 500 MCG: 500 TAB at 09:08

## 2022-08-04 RX ADMIN — MUPIROCIN: 20 OINTMENT TOPICAL at 09:08

## 2022-08-04 RX ADMIN — Medication 400 MG: at 09:08

## 2022-08-04 RX ADMIN — LOSARTAN POTASSIUM 100 MG: 50 TABLET, FILM COATED ORAL at 09:08

## 2022-08-04 RX ADMIN — DONEPEZIL HYDROCHLORIDE 10 MG: 5 TABLET, FILM COATED ORAL at 08:08

## 2022-08-04 RX ADMIN — TRAZODONE HYDROCHLORIDE 50 MG: 50 TABLET ORAL at 08:08

## 2022-08-04 RX ADMIN — CEFTRIAXONE SODIUM 1 G: 1 INJECTION, POWDER, FOR SOLUTION INTRAMUSCULAR; INTRAVENOUS at 08:08

## 2022-08-04 RX ADMIN — FUROSEMIDE 40 MG: 40 TABLET ORAL at 09:08

## 2022-08-04 RX ADMIN — METOPROLOL TARTRATE 25 MG: 25 TABLET, FILM COATED ORAL at 09:08

## 2022-08-04 RX ADMIN — COLLAGENASE SANTYL: 250 OINTMENT TOPICAL at 09:08

## 2022-08-04 RX ADMIN — GABAPENTIN 100 MG: 100 CAPSULE ORAL at 08:08

## 2022-08-04 RX ADMIN — SODIUM CHLORIDE 125 MG: 9 INJECTION, SOLUTION INTRAVENOUS at 09:08

## 2022-08-04 RX ADMIN — ATORVASTATIN CALCIUM 40 MG: 40 TABLET, FILM COATED ORAL at 08:08

## 2022-08-04 RX ADMIN — HYDROCODONE BITARTRATE AND ACETAMINOPHEN 1 TABLET: 5; 325 TABLET ORAL at 10:08

## 2022-08-04 NOTE — PROGRESS NOTES
[facility] Hospital Medicine Progress Note        Chief Complaint: Epistaxis     Source of Information: Patient. Medical Records        HISTORY OF PRESENT ILLNESS:   Emma Jorgensen is a 88 y.o. female with a PMHx of diastolic heart failure (LVEF 55% and grade II diastolic dysfunction from an echo on 05/24/2022),  PAF on Eliquis, moderate to severe aortic stenosis with plans for a TAVR outpatient per Cards, bilateral carotid artery stenosis, HTN, chronic dementia, previous DVT/PE, and recent surgical fixation of a closed right trimalleolar ankle fracture on 04/26/2022 who presented to [facility] ED from [facility] on 7/30/2022 with c/o epistaxis that began x1 day ago. Reported she is on 2L O2 chronically for hypoxia and occasionally get nose bleeds. She began bleeding from the left nare and felt blood draining in the back of her throat so she was referred to ED for further evaluation. Rhino Rocket was placed. Labs were notable for hgb 6.2, hct 20.1, chloride 97, CO2 34, INR 1.31. She was given KCentra, and transfused platelets. She began bleeding again from the left nare and was transferred to [facility] ED. ENT was consulted. 1 unit PRBC was ordered for transfusion. She was admitted to hospital medicine services for management of care.         Patient currently admitted and being managed for left-sided epistaxis, ENT on board has a rhino rockets in her left nostril  Epistaxis remains stable, rhino rocket removed   Leukocytosis slightly trending upwards, probably source the right foot with wounds that are draining and off N/C  Consult Podiatry, get x-ray of the right foot, begin IV antibiotics  Daughter at bedside reports patient was supposed to have a CT of the chest in preparation for TAVR-will clarify with [facility] what exactly they will need an ordered this while she is inpatient  Arterial ultrasound of the right leg reviewed shows moderate to severe disease, consult patient's  cardiologist for further evaluation   X-ray of the right foot reviewed  Podiatry planning for debridement  Discontinue aspirin to avoid triple therapy  Eliquis currently on hold for 7 days due to recent nosebleed     Today's information  Vitals are stable patient remains on 3 L oxygen, uses oxygen at the nursing home  Hemoglobin levels trickling down again.  Check FOBT x3, begin pantoprazole twice daily, transfuse 2 units of PRBCs  Consult GI for recurrent anemia and  Podiatry planning for Friday  Begin IV iron a 2/5       Exam   General appearance: Well-developed female in no apparent distress. Oxygen   HENT: Atraumatic head. Moist mucous membranes of oral cavity.  Eyes: Normal extraocular movements.   Neck: Supple.   Lungs: Clear to auscultation bilaterally. No wheezing present.   Heart: Regular rate and rhythm. S1 and S2 present. No pedal edema.  Abdomen: Soft, non-distended, non-tender. Obese  Extremities: No cyanosis, clubbing, or edema.  Skin: R foot wound  Neuro: Motor and sensory exams grossly intact.   Psych/mental status: Appropriate mood and affect. Responds appropriately to questions.         ASSESSMENT & PLAN:   Acute on Chronic Normocytic Anemia, worse  Acute on chronic respiratory failure, stable   - severe AS   Acute pulmonary edema, improved   Epistaxis, stable   Infected right foot wounds  Leucocytosis, worse   - source likely infected right foot wounds   Asymptomatic bacteriuria   PAF on Eliquis  Previous DVT/PE on chronic anticoagulation  Chronic hypoxia stable on 2 L nasal cannula  Severe aortic stenosis awaiting TAVR  Chronic diastolic congestive heart failure (EF 55% TTE 05/2022)  Dementia  Hypertension  Bilateral carotid artery disease  Peripheral arterial disease, mod- severe on right   contraction alkalosis         Plan:  Vitals are stable patient remains on 3 L oxygen, uses oxygen at the nursing home  Hemoglobin levels trickling down again.  Check FOBT x3, begin pantoprazole twice daily,  transfuse 2 units of PRBCs  Consult GI for recurrent anemia   Podiatry planning for Friday   IV iron a 2/5  X-ray of the right foot reviewed  Follow-up with [facility] recs  Discontinue aspirin to avoid triple therapy  Eliquis currently on hold for 7 days due to recent nosebleed  Arterial ultrasound of the right leg reviewed shows moderate to severe disease, Podiatry on board appreciate plans for debridement  S/p rhino rocket removed   Continue IV ceftriaxone for right foot infected wound  Get PT to get patient out of bed in a recliner chair.  Daughter at bedside reports patient was supposed to have a CT of the chest in preparation for TAVR-will clarify with [facility] what exactly they will need an ordered this while she is inpatient  Continue blood pressure control  Hold Eliquis x7 days, day 5 of 7  Resume other appropriate home medications  Labs in AM        VTE Prophylaxis: will be placed on SCD for DVT prophylaxis and will be advised to be as mobile as possible and sit in a chair as tolerated          Critical Care diagnoses acute on chronic anemia required transfusion  Critical care time greater than 35 minutes      VITAL SIGNS: 24 HRS MIN & MAX LAST   Temp  Min: 96.5 °F (35.8 °C)  Max: 99.1 °F (37.3 °C) 97.8 °F (36.6 °C)   BP  Min: 114/72  Max: 154/74 (!) 152/66   Pulse  Min: 70  Max: 88  76   Resp  Min: 17  Max: 22 18   SpO2  Min: 98 %  Max: 99 % 98 %       Recent Labs   Lab 08/01/22  0403 08/02/22  0508 08/03/22  0357 08/04/22  0332   WBC 13.0* 11.4 10.1  --    RBC 2.67* 2.48* 2.67*  --    HGB 7.7* 7.2* 8.0* 7.3*   HCT 25.1* 24.3* 25.9* 24.7*   MCV 94.0 98.0* 97.0*  --    MCH 28.8 29.0 30.0  --    MCHC 30.7* 29.6* 30.9*  --    RDW 16.2 16.0 16.5  --     289 352  --    MPV 9.6 9.6 9.7  --        Recent Labs   Lab 07/30/22  0508 07/31/22  0422 08/01/22  0403 08/02/22  0508 08/03/22  0356    135* 133* 134* 133*   K 4.3 3.7 3.8 3.9 4.2   CO2 34* 28 29 32* 33*   BUN 17.1 15.2 9.1* 7.2* 8.3*   CREATININE 0.62 0.59  0.55 0.59 0.56   CALCIUM 9.1 8.6 8.4 8.5 8.5   MG  --   --   --  1.90  --    ALBUMIN 2.4* 2.5*  --   --   --    ALKPHOS 100 91  --   --   --    ALT 9 11  --   --   --    AST 14 17  --   --   --    BILITOT 0.4 1.4  --   --   --           Microbiology Results (last 7 days)     ** No results found for the last 168 hours. **           See below for Radiology    Scheduled Med:   atorvastatin  40 mg Oral QHS    cefTRIAXone (ROCEPHIN) IVPB  1 g Intravenous Q24H    clopidogreL  75 mg Oral Daily    collagenase   Topical (Top) Daily    cyanocobalamin  500 mcg Oral Daily    donepeziL  10 mg Oral Nightly    ferric gluconate (FERRLECIT) IVPB  125 mg Intravenous Daily    furosemide  40 mg Oral Daily    gabapentin  100 mg Oral Nightly    losartan  100 mg Oral Daily    magnesium oxide  400 mg Oral Daily    metoprolol tartrate  25 mg Oral BID    mupirocin   Topical (Top) Daily    pantoprazole  40 mg Intravenous BID    traZODone  50 mg Oral QHS        Continuous Infusions:       PRN Meds:  sodium chloride, acetaminophen, acetaminophen, hydrALAZINE, HYDROcodone-acetaminophen, HYDROcodone-acetaminophen, ondansetron, sodium chloride 0.9%         VTE prophylaxis:     Patient condition:  Stable/Fair/Guarded/ Serious/ Critical    Anticipated discharge and Disposition:         All diagnosis and differential diagnosis have been reviewed; assessment and plan has been documented; I have personally reviewed the labs and test results that are presently available; I have reviewed the patients medication list; I have reviewed the consulting providers response and recommendations. I have reviewed or attempted to review medical records based upon their availability    All of the patient's questions have been  addressed and answered. Patient's is agreeable to the above stated plan. I will continue to monitor closely and make adjustments to medical management as  needed.  _____________________________________________________________________    Nutrition Status:    Radiology:  CTA Runoff ABD PEL Bilat Lower Ext  Narrative: EXAMINATION:  CTA RUNOFF ABD PEL BILAT LOWER EXT    CLINICAL HISTORY:  Peripheral arterial disease    TECHNIQUE:  Axial CT images were obtained through the abdomen, pelvis and lower extremities before and after IV contrast.  150 mL Isovue 370. coronal and sagittal reconstructions submitted and interpreted.  Automated exposure control, dose radiation lowering technique, was utilized.  Maximum intensity pixel reconstructions were performed.    COMPARISON:  CTA chest, abdomen and pelvis from 08/01/2022 and lower extremity arterial Doppler from 08/01/2022    FINDINGS:  CTA:    Patent thoracic aorta with widely patent runoff to the aortic bifurcation and patent runoff from the aortic bifurcation the both groins.  There is mild to moderate atherosclerotic plaque.  No aneurysm.  Patent internal iliac arteries.  Deep femoral arteries are patent bilaterally.    Patent renal arteries bilaterally.  There is calcified atherosclerotic plaque at the origins of the celiac trunk and SMA.  Mild to moderate degrees of stenosis.  Origin of the VINCE is not well seen.  This may be secondary to stenosis or the diminutive size of the vessel.    Right-side:    Patent common femoral artery and deep femoral artery.  There is scattered calcified atherosclerotic plaque along the length of the SFA.  There is approximately 60% stenosis at the proximal popliteal artery (image 407, series 20).  At the very distal segment of the popliteal artery, there is a 2nd area of stenosis with approximately 60% narrowing.    Patent popliteal trifurcation with scattered atherosclerotic plaque along the length of the infrapopliteal arterial vasculature.  There is a 2 vessel runoff to the ankle via the peroneal and posterior tibial artery.  Mid to distal segments of the anterior tibial artery are  occluded.  Two vessel runoff to the foot via the posterior tibial and reconstituted anterior tibial artery.  Small-vessel vascular calcifications makes differentiation of contrast bolus and small-vessel vessel calcifications difficult.    Left-side:    Patent common femoral artery and profundus femora.  There are multiple segments of narrowing at the mid and distal segments of the SFA with 50-60% luminal narrowing at the most narrowed segments.  Patent runoff to the popliteal artery.  Popliteal artery is narrowed at the neg segment by approximately 60-70%.    Pain popliteal trifurcation with scattered atherosclerotic plaque along the length of the infrapopliteal arterial vasculature.  Small-vessel vascular calcifications makes differentiation of contrast bolus and small vessel calcification difficult.  Primary blood supply to the foot appears to be via collaterals and the peroneal artery.    OTHER:    Heart size is at the upper limits of normal.  Small bilateral pleural effusions are present with compressive atelectasis.  Cirrhotic morphology to the liver.  Gallbladder is surgically absent.  Spleen, pancreas and adrenal glands appear normal.  The bowel is nonobstructed.  A normal appendix is present.  Daugherty catheter is present within a decompressed bladder.  Chronic appearing compression deformities are present at the lower thoracic spine and mid lumbar spine.  Index compression fracture with 50% loss of height at T8 is unchanged compared to 04/29/2022.  Impression: 1. Bilateral SFA, popliteal and infrapopliteal arterial disease as described above.  2. Additional findings include: Small bilateral pleural effusions and cirrhotic morphology to the liver    Electronically signed by: Kel Hodge MD  Date:    08/03/2022  Time:    09:55      Sai Sanchez MD   08/04/2022

## 2022-08-04 NOTE — CONSULTS
"Gastroenterology Consultation Note    Reason for Consult:  The primary encounter diagnosis was Pressure ulcer of right heel, stage 4. Diagnoses of Anemia, Epistaxis, and PAD (peripheral artery disease) were also pertinent to this visit.    PCP:   Mary Jane Dean MD    Referring MD:  Sai Sanchez MD    Hospital Day: 5     Initial History of Present Illness (HPI):  This is a 88 y.o. female presented to ER on 7/30/22 with epistaxis. She was in rehab post ankle fx repair with hardware in April 2022. Daughter states that her nose bleed for about 8 hours without stopping despite application of ice, holding anticoagulant and packing the nostril. ENT saw the patient and agreed with continued packing and holding Eliquis. She has a history of recurrent epistaxis.   Initially her Hgb was 6.2gm. BUN was WNL. She has received 5 units of PRBCs since and her Hgb was 7.3gm this am. She is currently being transfused and iron is being supplemented.  Nose bleeds have stopped.  Had stool today. Per Nursing staff it was " not black like coal but it was dark."  Daughter reports anorexia.  No nausea, no vomiting, no abdominal pain  We are consulted for anemia.  Risk factor: Eliquis use for A fib (held), Plavix use   She is on protonix at the rehab facility.    Seen in our practice in the past by Dr. Reza Hand for rectal bleeding.  2018: Colonoscopy: Normal terminal ileum. Normal colon without polyps.  Had ischemic small bowel about 15 years ago and had small bowel resection.    Review of Systems   Constitutional: Positive for malaise/fatigue.   Musculoskeletal: Positive for joint pain.   Neurological: Positive for weakness.   Endo/Heme/Allergies: Bruises/bleeds easily.   Psychiatric/Behavioral: Positive for depression.   All other systems reviewed and are negative.      Medical History:   Past Medical History:   Diagnosis Date    Anxiety disorder, unspecified     Arthritis     Deep vein thrombosis     Depression     " Irregular heart beat     Obesity, unspecified     Other pulmonary embolism without acute cor pulmonale     Parkinson's disease     Peripheral artery disease     Trimalleolar fracture of ankle, closed     Unspecified dementia without behavioral disturbance        Surgical History:   Past Surgical History:   Procedure Laterality Date    ANGIOGRAM, CORONARY, WITH LEFT HEART CATHETERIZATION N/A 6/7/2022    Procedure: Angiogram, Coronary, with Left Heart Cath;  Surgeon: Claude Morillo MD;  Location: [facility] CATH LAB;  Service: Cardiology;  Laterality: N/A;    ANKLE FRACTURE SURGERY Right     CATARACT EXTRACTION      CATHETERIZATION OF BOTH LEFT AND RIGHT HEART N/A 6/7/2022    Procedure: CATHETERIZATION, HEART, BOTH LEFT AND RIGHT;  Surgeon: Claude Morillo MD;  Location: [facility] CATH LAB;  Service: Cardiology;  Laterality: N/A;    COLONOSCOPY W/ POLYPECTOMY      HYSTERECTOMY      SMALL INTESTINE SURGERY         Family History:   Family History   Family history unknown: Yes   .     Social History:   Social History     Tobacco Use    Smoking status: Former Smoker    Smokeless tobacco: Never Used   Substance Use Topics    Alcohol use: Never       Allergies:  Review of patient's allergies indicates:  No Known Allergies    Medications Prior to Admission   Medication Sig Dispense Refill Last Dose    albuterol (PROVENTIL/VENTOLIN HFA) 90 mcg/actuation inhaler Inhale 2 puffs into the lungs every 6 (six) hours.       albuterol sulfate 2.5 mg/0.5 mL Nebu Take 2.5 mg by nebulization every 4 (four) hours as needed (dyspnea). Rescue 60 each 0     apixaban (ELIQUIS) 2.5 mg Tab Take 2 tablets (5 mg total) by mouth 2 (two) times daily.       atorvastatin (LIPITOR) 40 MG tablet Take 1 tablet (40 mg total) by mouth every evening. 90 tablet 0     budesonide-glycopyr-formoterol 160-9-4.8 mcg/actuation HFAA Inhale 2 puffs into the lungs 2 (two) times a day.       clopidogreL (PLAVIX) 75 mg tablet Take 1 tablet (75 mg total) by  mouth once daily. 30 tablet 0     clotrimazole-betamethasone 1-0.05% (LOTRISONE) cream Apply 1 application topically 2 (two) times daily.       cyanocobalamin 500 MCG tablet Take 1 tablet (500 mcg total) by mouth once daily. 30 tablet 0     diclofenac sodium (VOLTAREN) 1 % Gel Apply 1 application topically 4 (four) times daily.       diltiaZEM (CARDIZEM) 120 MG tablet Take 1 tablet by mouth once daily at 6am.       donepeziL (ARICEPT) 10 MG tablet Take 1 tablet by mouth nightly.       fluticasone propion-salmeterol 115-21 mcg/dose (ADVAIR HFA) 115-21 mcg/actuation HFAA inhaler Inhale 2 puffs into the lungs 2 (two) times daily. 12 g 0     furosemide (LASIX) 40 MG tablet Take 1 tablet (40 mg total) by mouth once daily. 30 tablet 11     gabapentin (NEURONTIN) 100 MG capsule Take 1 capsule (100 mg total) by mouth nightly. 30 capsule 0     hydroCHLOROthiazide (HYDRODIURIL) 12.5 MG Tab Take 1 tablet by mouth once daily at 6am.       HYDROcodone-acetaminophen (NORCO) 5-325 mg per tablet Take 1 tablet by mouth every 6 (six) hours as needed for Pain.       loratadine (CLARITIN) 10 mg tablet Take 1 tablet by mouth once daily at 6am.       losartan (COZAAR) 100 MG tablet Take 1 tablet by mouth once daily.       magnesium oxide (MAG-OX) 400 mg (241.3 mg magnesium) tablet Take 1 tablet (400 mg total) by mouth once daily.  0     metoprolol tartrate (LOPRESSOR) 25 MG tablet Take 1 tablet (25 mg total) by mouth 2 (two) times daily. 60 tablet 0     mupirocin (BACTROBAN) 2 % ointment Apply topically once daily. Apply to affected area at right plantar heel. After cleansing well with dermal cleanser and rinsing with saline, cover with nonadherent dressing secured with rolled gauze. Keep off loaded. 2 g 0     pantoprazole (PROTONIX) 40 MG tablet Take 1 tablet (40 mg total) by mouth once daily. 30 tablet 11     pramipexole (MIRAPEX) 1 MG tablet Take 1 tablet by mouth nightly.       traZODone (DESYREL) 50 MG tablet Take  "1 tablet (50 mg total) by mouth every evening. 30 tablet 0     venlafaxine (EFFEXOR-XR) 37.5 MG 24 hr capsule Take 1 tablet by mouth once daily at 6am.       white petrolatum 41 % Oint Apply topically 2 (two) times daily.            Objective Findings:    Vital Signs:  BP (!) 172/73   Pulse 90   Temp 97.9 °F (36.6 °C) (Oral)   Resp 18   Ht 5' 6" (1.676 m)   Wt 114.8 kg (253 lb)   LMP  (LMP Unknown)   SpO2 98%   BMI 40.84 kg/m²   Body mass index is 40.84 kg/m².    Physical Exam  Vitals reviewed. Exam conducted with a chaperone present.   Constitutional:       General: She is not in acute distress.     Appearance: She is obese. She is not toxic-appearing.   HENT:      Head: Normocephalic and atraumatic.   Eyes:      General: No scleral icterus.     Extraocular Movements: Extraocular movements intact.      Conjunctiva/sclera: Conjunctivae normal.   Cardiovascular:      Rate and Rhythm: Normal rate and regular rhythm.      Heart sounds: Murmur heard.   Pulmonary:      Breath sounds: Normal breath sounds.   Abdominal:      General: Bowel sounds are normal.      Palpations: Abdomen is soft.      Tenderness: There is no abdominal tenderness.       Musculoskeletal:      Cervical back: Normal range of motion and neck supple.   Feet:      Right foot:      Skin integrity: Skin breakdown present.      Comments: Dressing to right foot  Skin:     General: Skin is warm and dry.   Psychiatric:         Behavior: Behavior normal.         Labs:  Recent Results (from the past 24 hour(s))   Hemoglobin and Hematocrit    Collection Time: 08/04/22  3:32 AM   Result Value Ref Range    Hgb 7.3 (L) 12.0 - 16.0 gm/dL    Hct 24.7 (L) 37.0 - 47.0 %     Imaging:  CTA ABDOMEN AND PELVIS; CTA CHEST AORTA NON CORONARY      TECHNIQUE:  Multiple axial images were obtained from the thoracic inlet to the pubis symphysis after contrast administration and CT angiography protocol was followed.   FINDINGS:  The thoracic aorta has some moderate " atherosclerotic changes associated with it.  No dissection is seen.  There is mild prominence of the aortic root and proximal ascending aorta has maximum dimension of 4 cm.  There are S some atherosclerotic some calcifications seen within the aortic valve which is a known finding.     Descending thoracic aorta is normal in caliber.     The abdominal aorta has some atherosclerotic plaque associated with the.  No stenosis is seen.  There is some calcification seen at the celiac axis at the ostium.  Some atherosclerotic plaque is also seen at the origin of the SMA with a 50% stenosis seen at the proximal superior mesenteric artery.  The renal arteries are patent bilaterally.  There is a smaller calcification at the ostium of the right renal artery but no significant stenosis is seen.  The VINCE is not patent.     Some atherosclerotic plaque is seen within the common iliac arteries bilaterally but no significant stenosis is seen.  External iliac arteries however a small amount of plaque at the proximal portion but no stenosis is seen.  Mild plaque is seen within the common femoral arteries bilaterally but no significant stenosis is seen.     Source images: There is atelectasis in the lung bases bilaterally and small pleural effusions bilaterally.  The heart is normal in size.  No abnormal mediastinal lymphadenopathy seen.     There is a cyst seen within the liver.  The patient is status post cholecystectomy.  Pancreas appears unremarkable.  Spleen appears normal.     Insert renal nodule seen on the right side that does show enhancement.  It measures 1.7 x 1.7 cm.  The nodule is stable since 2013.  Kidneys appear grossly unremarkable.  Visualized portion of the bowel appears grossly unremarkable.  No acute bony abnormality is seen..     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   Consider EGD   Continue PPI  Iron deficiency  Plavix use, current  PAD  A fib, Eliquis on hold since 7/30/22  Fx of right ankle in April 2022 needing debridement    Thank you for allowing us to participate in the care of Emma Jorgensen.    Tamiko Phelan, NP, DBS acting as a scribe for Adam Bartholomew MD  Gastroenterology  [address]

## 2022-08-04 NOTE — PROGRESS NOTES
[facility]                       [address] [place] [id number]    PATIENT NAME:       VIPIN JORGENSEN  YOB: 1934  CSN:                [id number]   MRN:                [id number]  ADMIT DATE:         07/30/2022 02:43:00  PHYSICIAN:          Rosales Redmond DPM                            PROGRESS NOTE    DATE:  08/04/2022 00:00:00    Ms. Jorgensen is seen today.  Her daughter is present.  Patient remains on   antibiotics.  Overall status remains about the same.  She is tentatively   scheduled for surgery tomorrow for debridement of her right heel wounds.    PHYSICAL EXAM:  VITAL SIGNS:  Stable, and she is afebrile.  EXTREMITIES:  The extremity remains unchanged.  Fibrotic, necrotic wounds to the   posterior aspect of the heel.  The plantar wound site much more clean.  No pus   to any of these areas.  No substantial odor.  Some drainage.  Majority of the   Achilles in the area is exposed and nonviable.    LAB:  Reviewed.  H and H are down to 7.3 and 24.7.    ASSESSMENT:  Infected, necrotic right heel wounds with history of previous open   reduction, internal fixation right ankle fracture with retrograde niyah.    PLAN:  A long conversation with the patient and the daughter.  I have   recommended proceeding with debridement tomorrow to remove all nonviable tissue,   at least to allow some early healing.  May or may not require removal of   hardware and some debridement of the surrounding bone, depending on the current   findings.  She will still require nonweightbearing status.  All questions were   answered fully and no guarantees given or implied.  We will see review all this   again tomorrow prior to surgery, as they are aware that she will probably lose   the Achilles with this surgery based upon the extent of the wound.        ______________________________  Rosales Redmond DPM    GAS/AQS  DD:  08/04/2022   Time:  05:27PM  DT:  08/04/2022  Time:  05:43PM  Job #:  [id number]      PROGRESS NOTE

## 2022-08-04 NOTE — PHYSICIAN QUERY
PT Name: Emma Jorgensen  MR #: [id number]    DOCUMENTATION CLARIFICATION     CDS/: Tamera TERRY, RN              Contact information: [email or web address]    This form is a permanent document in the medical record.     Query Date: August 4, 2022    By submitting this query, we are merely seeking further clarification of documentation. Please utilize your independent clinical judgment when addressing the question(s) below.    Supporting Clinical Findings Location in Medical Record      Epistaxis   PAF on Eliquis     Plan:    ENT consulted, appreciate recommendations    L Rhinorocket in placed    Transfuse 1 unit PRBC as ordered in ED    Hold Eliquis x7 days      [facility] Medicine H&P 7/30/22      Epistaxis remains stable, rhino rocket removed      Eliquis currently on hold for 7 days due to recent nosebleed     Discontinue aspirin to avoid triple therapy      Hospital Medicine progress note  8/4/22       Provider, please clarify if there is any clinical correlation between ___Epistaxis____ and ___Eliquis and Aspirin____.           Are the conditions:      [  ] Due to or associated with each other   [  ] Unrelated to each other   [ Xxx epistaxis , worsened by use of anticoagulants and aspirin ] Other explanation (Please Specify): ______________   [  ] Clinically Undetermined                                                                               Please document in your progress notes daily for the duration of treatment until resolved and include in your discharge summary.

## 2022-08-04 NOTE — PT/OT/SLP PROGRESS
Physical Therapy      Patient Name:  Emma Jorgensen   MRN:  [id number]    Patient not seen today secondary to Nurse asks to hold 2/2 pt receiving blood  . Will follow-up tomorrow.

## 2022-08-05 ENCOUNTER — ANESTHESIA (OUTPATIENT)
Dept: SURGERY | Facility: HOSPITAL | Age: 87
End: 2022-08-05
Payer: MEDICARE

## 2022-08-05 ENCOUNTER — ANESTHESIA EVENT (OUTPATIENT)
Dept: SURGERY | Facility: HOSPITAL | Age: 87
End: 2022-08-05

## 2022-08-05 LAB
ANION GAP SERPL CALC-SCNC: 6 MEQ/L
BASOPHILS # BLD AUTO: 0.03 X10(3)/MCL (ref 0–0.2)
BASOPHILS NFR BLD AUTO: 0.3 %
BUN SERPL-MCNC: 9.6 MG/DL (ref 9.8–20.1)
CALCIUM SERPL-MCNC: 8.6 MG/DL (ref 8.4–10.2)
CHLORIDE SERPL-SCNC: 95 MMOL/L (ref 98–107)
CO2 SERPL-SCNC: 33 MMOL/L (ref 23–31)
COLOR STL: ABNORMAL
CONSISTENCY STL: ABNORMAL
CREAT SERPL-MCNC: 0.55 MG/DL (ref 0.55–1.02)
CREAT/UREA NIT SERPL: 17
EOSINOPHIL # BLD AUTO: 0.14 X10(3)/MCL (ref 0–0.9)
EOSINOPHIL NFR BLD AUTO: 1.3 %
ERYTHROCYTE [DISTWIDTH] IN BLOOD BY AUTOMATED COUNT: 16 % (ref 11.5–17)
GLUCOSE SERPL-MCNC: 106 MG/DL (ref 82–115)
HCT VFR BLD AUTO: 33.1 % (ref 37–47)
HEMOCCULT SP1 STL QL: POSITIVE
HGB BLD-MCNC: 10 GM/DL (ref 12–16)
IMM GRANULOCYTES # BLD AUTO: 0.05 X10(3)/MCL (ref 0–0.04)
IMM GRANULOCYTES NFR BLD AUTO: 0.5 %
LYMPHOCYTES # BLD AUTO: 0.86 X10(3)/MCL (ref 0.6–4.6)
LYMPHOCYTES NFR BLD AUTO: 8.2 %
MAGNESIUM SERPL-MCNC: 1.9 MG/DL (ref 1.6–2.6)
MCH RBC QN AUTO: 29.2 PG (ref 27–31)
MCHC RBC AUTO-ENTMCNC: 30.2 MG/DL (ref 33–36)
MCV RBC AUTO: 96.5 FL (ref 80–94)
MONOCYTES # BLD AUTO: 1.04 X10(3)/MCL (ref 0.1–1.3)
MONOCYTES NFR BLD AUTO: 10 %
NEUTROPHILS # BLD AUTO: 8.3 X10(3)/MCL (ref 2.1–9.2)
NEUTROPHILS NFR BLD AUTO: 79.7 %
NRBC BLD AUTO-RTO: 0 %
PLATELET # BLD AUTO: 318 X10(3)/MCL (ref 130–400)
PMV BLD AUTO: 9.3 FL (ref 7.4–10.4)
POTASSIUM SERPL-SCNC: 3.7 MMOL/L (ref 3.5–5.1)
RBC # BLD AUTO: 3.43 X10(6)/MCL (ref 4.2–5.4)
SODIUM SERPL-SCNC: 134 MMOL/L (ref 136–145)
WBC # SPEC AUTO: 10.4 X10(3)/MCL (ref 4.5–11.5)

## 2022-08-05 PROCEDURE — C9113 INJ PANTOPRAZOLE SODIUM, VIA: HCPCS | Performed by: INTERNAL MEDICINE

## 2022-08-05 PROCEDURE — 63600175 PHARM REV CODE 636 W HCPCS: Performed by: NURSE ANESTHETIST, CERTIFIED REGISTERED

## 2022-08-05 PROCEDURE — 25000003 PHARM REV CODE 250: Performed by: INTERNAL MEDICINE

## 2022-08-05 PROCEDURE — 85025 COMPLETE CBC W/AUTO DIFF WBC: CPT | Performed by: INTERNAL MEDICINE

## 2022-08-05 PROCEDURE — 63600175 PHARM REV CODE 636 W HCPCS: Performed by: PODIATRIST

## 2022-08-05 PROCEDURE — 25000003 PHARM REV CODE 250: Performed by: PODIATRIST

## 2022-08-05 PROCEDURE — C9113 INJ PANTOPRAZOLE SODIUM, VIA: HCPCS | Performed by: PODIATRIST

## 2022-08-05 PROCEDURE — 94761 N-INVAS EAR/PLS OXIMETRY MLT: CPT

## 2022-08-05 PROCEDURE — 37000008 HC ANESTHESIA 1ST 15 MINUTES: Performed by: PODIATRIST

## 2022-08-05 PROCEDURE — 25000003 PHARM REV CODE 250: Performed by: NURSE PRACTITIONER

## 2022-08-05 PROCEDURE — 36000707: Performed by: PODIATRIST

## 2022-08-05 PROCEDURE — 36415 COLL VENOUS BLD VENIPUNCTURE: CPT | Performed by: INTERNAL MEDICINE

## 2022-08-05 PROCEDURE — 97164 PT RE-EVAL EST PLAN CARE: CPT

## 2022-08-05 PROCEDURE — 82272 OCCULT BLD FECES 1-3 TESTS: CPT | Performed by: INTERNAL MEDICINE

## 2022-08-05 PROCEDURE — 37000009 HC ANESTHESIA EA ADD 15 MINS: Performed by: PODIATRIST

## 2022-08-05 PROCEDURE — 36000706: Performed by: PODIATRIST

## 2022-08-05 PROCEDURE — 27000221 HC OXYGEN, UP TO 24 HOURS

## 2022-08-05 PROCEDURE — 97530 THERAPEUTIC ACTIVITIES: CPT

## 2022-08-05 PROCEDURE — C1751 CATH, INF, PER/CENT/MIDLINE: HCPCS

## 2022-08-05 PROCEDURE — 25000003 PHARM REV CODE 250: Performed by: ANESTHESIOLOGY

## 2022-08-05 PROCEDURE — 83735 ASSAY OF MAGNESIUM: CPT | Performed by: INTERNAL MEDICINE

## 2022-08-05 PROCEDURE — 11000001 HC ACUTE MED/SURG PRIVATE ROOM

## 2022-08-05 PROCEDURE — 36410 VNPNXR 3YR/> PHY/QHP DX/THER: CPT

## 2022-08-05 PROCEDURE — 80048 BASIC METABOLIC PNL TOTAL CA: CPT | Performed by: INTERNAL MEDICINE

## 2022-08-05 PROCEDURE — 63600175 PHARM REV CODE 636 W HCPCS: Performed by: INTERNAL MEDICINE

## 2022-08-05 RX ORDER — BUPIVACAINE HYDROCHLORIDE 5 MG/ML
INJECTION, SOLUTION EPIDURAL; INTRACAUDAL
Status: DISCONTINUED | OUTPATIENT
Start: 2022-08-05 | End: 2022-08-05 | Stop reason: HOSPADM

## 2022-08-05 RX ORDER — PROPOFOL 10 MG/ML
VIAL (ML) INTRAVENOUS
Status: DISCONTINUED | OUTPATIENT
Start: 2022-08-05 | End: 2022-08-05

## 2022-08-05 RX ORDER — TALC
3 POWDER (GRAM) TOPICAL NIGHTLY PRN
Status: DISCONTINUED | OUTPATIENT
Start: 2022-08-05 | End: 2022-08-11 | Stop reason: HOSPADM

## 2022-08-05 RX ORDER — FENTANYL CITRATE 50 UG/ML
INJECTION, SOLUTION INTRAMUSCULAR; INTRAVENOUS
Status: DISCONTINUED | OUTPATIENT
Start: 2022-08-05 | End: 2022-08-05

## 2022-08-05 RX ORDER — FUROSEMIDE 10 MG/ML
40 INJECTION INTRAMUSCULAR; INTRAVENOUS ONCE
Status: COMPLETED | OUTPATIENT
Start: 2022-08-05 | End: 2022-08-05

## 2022-08-05 RX ADMIN — MELATONIN TAB 3 MG 3 MG: 3 TAB at 11:08

## 2022-08-05 RX ADMIN — PANTOPRAZOLE SODIUM 40 MG: 40 INJECTION, POWDER, FOR SOLUTION INTRAVENOUS at 09:08

## 2022-08-05 RX ADMIN — DONEPEZIL HYDROCHLORIDE 10 MG: 5 TABLET, FILM COATED ORAL at 08:08

## 2022-08-05 RX ADMIN — METOPROLOL TARTRATE 25 MG: 25 TABLET, FILM COATED ORAL at 09:08

## 2022-08-05 RX ADMIN — GABAPENTIN 100 MG: 100 CAPSULE ORAL at 08:08

## 2022-08-05 RX ADMIN — PROPOFOL 20 MG: 10 INJECTION, EMULSION INTRAVENOUS at 03:08

## 2022-08-05 RX ADMIN — PANTOPRAZOLE SODIUM 40 MG: 40 INJECTION, POWDER, FOR SOLUTION INTRAVENOUS at 08:08

## 2022-08-05 RX ADMIN — HYDROCODONE BITARTRATE AND ACETAMINOPHEN 1 TABLET: 5; 325 TABLET ORAL at 06:08

## 2022-08-05 RX ADMIN — PROPOFOL 30 MG: 10 INJECTION, EMULSION INTRAVENOUS at 03:08

## 2022-08-05 RX ADMIN — FENTANYL CITRATE 25 MCG: 50 INJECTION, SOLUTION INTRAMUSCULAR; INTRAVENOUS at 03:08

## 2022-08-05 RX ADMIN — CEFTRIAXONE SODIUM 1 G: 1 INJECTION, POWDER, FOR SOLUTION INTRAMUSCULAR; INTRAVENOUS at 09:08

## 2022-08-05 RX ADMIN — LOSARTAN POTASSIUM 100 MG: 50 TABLET, FILM COATED ORAL at 09:08

## 2022-08-05 RX ADMIN — FUROSEMIDE 40 MG: 10 INJECTION, SOLUTION INTRAMUSCULAR; INTRAVENOUS at 09:08

## 2022-08-05 RX ADMIN — PROPOFOL 10 MG: 10 INJECTION, EMULSION INTRAVENOUS at 03:08

## 2022-08-05 RX ADMIN — METOPROLOL TARTRATE 25 MG: 25 TABLET, FILM COATED ORAL at 08:08

## 2022-08-05 RX ADMIN — SODIUM CHLORIDE 125 MG: 9 INJECTION, SOLUTION INTRAVENOUS at 09:08

## 2022-08-05 RX ADMIN — COLLAGENASE SANTYL: 250 OINTMENT TOPICAL at 09:08

## 2022-08-05 RX ADMIN — CYANOCOBALAMIN TAB 500 MCG 500 MCG: 500 TAB at 09:08

## 2022-08-05 RX ADMIN — TRAZODONE HYDROCHLORIDE 50 MG: 50 TABLET ORAL at 08:08

## 2022-08-05 RX ADMIN — ATORVASTATIN CALCIUM 40 MG: 40 TABLET, FILM COATED ORAL at 08:08

## 2022-08-05 RX ADMIN — CLOPIDOGREL 75 MG: 75 TABLET, FILM COATED ORAL at 09:08

## 2022-08-05 RX ADMIN — MUPIROCIN: 20 OINTMENT TOPICAL at 09:08

## 2022-08-05 RX ADMIN — SODIUM CHLORIDE, SODIUM GLUCONATE, SODIUM ACETATE, POTASSIUM CHLORIDE AND MAGNESIUM CHLORIDE: 526; 502; 368; 37; 30 INJECTION, SOLUTION INTRAVENOUS at 03:08

## 2022-08-05 RX ADMIN — Medication 400 MG: at 09:08

## 2022-08-05 NOTE — TRANSFER OF CARE
"Anesthesia Transfer of Care Note    Patient: Emma Jorgensen    Procedure(s) Performed: Procedure(s) (LRB):  DEBRIDEMENT, FOOT (Right)    Patient location: Telemetry/Step Down Unit    Anesthesia Type: MAC    Transport from OR: Transported from OR on 2-3 L/min O2 by NC with adequate spontaneous ventilation    Post pain: adequate analgesia    Post assessment: no apparent anesthetic complications and tolerated procedure well    Post vital signs: stable    Level of consciousness: awake and alert    Nausea/Vomiting: no nausea/vomiting    Complications: none    Transfer of care protocol was followedComments: Phone report called to Floor RN. All questions answered.       Last vitals:   Visit Vitals  BP (!) 105/55   Pulse 69   Temp 36.7 °C (98 °F) (Oral)   Resp 16   Ht 5' 6" (1.676 m)   Wt 114.8 kg (253 lb)   LMP  (LMP Unknown)   SpO2 95%   BMI 40.84 kg/m²     "

## 2022-08-05 NOTE — PLAN OF CARE
Problem: Infection  Goal: Absence of Infection Signs and Symptoms  8/5/2022 1446 by Denzel Bledsoe RN  Outcome: Ongoing, Progressing  8/5/2022 1241 by Denzel Bledsoe RN  Outcome: Ongoing, Progressing     Problem: Adult Inpatient Plan of Care  Goal: Plan of Care Review  8/5/2022 1446 by Denzel Bledsoe RN  Outcome: Ongoing, Progressing  8/5/2022 1241 by Denzel Bledsoe RN  Outcome: Ongoing, Progressing  Goal: Patient-Specific Goal (Individualized)  8/5/2022 1446 by Denzel Bledsoe RN  Outcome: Ongoing, Progressing  8/5/2022 1241 by Denzel Bledsoe RN  Outcome: Ongoing, Progressing  Goal: Absence of Hospital-Acquired Illness or Injury  8/5/2022 1446 by Denzel Bledsoe RN  Outcome: Ongoing, Progressing  8/5/2022 1241 by Denzel Bledsoe RN  Outcome: Ongoing, Progressing  Goal: Optimal Comfort and Wellbeing  8/5/2022 1446 by Denzel Bledsoe RN  Outcome: Ongoing, Progressing  8/5/2022 1241 by Denzel Bledsoe RN  Outcome: Ongoing, Progressing  Goal: Readiness for Transition of Care  8/5/2022 1446 by Denzel Bledsoe RN  Outcome: Ongoing, Progressing  8/5/2022 1241 by Denzel Bledsoe RN  Outcome: Ongoing, Progressing     Problem: Bariatric Environmental Safety  Goal: Safety Maintained with Care  8/5/2022 1446 by Denzel Bledsoe RN  Outcome: Ongoing, Progressing  8/5/2022 1241 by Denzel Bledsoe RN  Outcome: Ongoing, Progressing     Problem: Fluid Imbalance (Pneumonia)  Goal: Fluid Balance  8/5/2022 1446 by Denzel Bledsoe RN  Outcome: Ongoing, Progressing  8/5/2022 1241 by Denzel Bledsoe RN  Outcome: Ongoing, Progressing     Problem: Infection (Pneumonia)  Goal: Resolution of Infection Signs and Symptoms  8/5/2022 1446 by Denzel Bledsoe RN  Outcome: Ongoing, Progressing  8/5/2022 1241 by Denzel Bledsoe RN  Outcome: Ongoing, Progressing     Problem: Respiratory Compromise (Pneumonia)  Goal: Effective Oxygenation and Ventilation  8/5/2022 1446 by Denzel Bledsoe RN  Outcome: Ongoing, Progressing  8/5/2022 1241 by Denzel Bledsoe RN  Outcome: Ongoing,  Progressing     Problem: Impaired Wound Healing  Goal: Optimal Wound Healing  8/5/2022 1446 by Denzel Bledsoe RN  Outcome: Ongoing, Progressing  8/5/2022 1241 by Denzel Bledsoe RN  Outcome: Ongoing, Progressing     Problem: Fall Injury Risk  Goal: Absence of Fall and Fall-Related Injury  8/5/2022 1446 by Denzel Bledsoe RN  Outcome: Ongoing, Progressing  8/5/2022 1241 by Denzel Bledsoe RN  Outcome: Ongoing, Progressing  Intervention: Identify and Manage Contributors  Flowsheets (Taken 8/5/2022 1446)  Self-Care Promotion: meal set-up provided  Medication Review/Management: medications reviewed  Intervention: Promote Injury-Free Environment  Flowsheets (Taken 8/5/2022 1446)  Safety Promotion/Fall Prevention:   assistive device/personal item within reach   Fall Risk reviewed with patient/family   Fall Risk signage in place   nonskid shoes/socks when out of bed   side rails raised x 3     Problem: Skin Injury Risk Increased  Goal: Skin Health and Integrity  8/5/2022 1446 by Denzel Bledsoe RN  Outcome: Ongoing, Progressing  8/5/2022 1241 by Denzel Bledsoe RN  Outcome: Ongoing, Progressing  Intervention: Optimize Skin Protection  Flowsheets (Taken 8/5/2022 1446)  Pressure Reduction Techniques:   heels elevated off bed   positioned off wounds   pressure points protected  Pressure Reduction Devices:   alternating pressure pump (ADD)   heel offloading device utilized   positioning supports utilized  Skin Protection:   adhesive use limited   incontinence pads utilized  Head of Bed (HOB) Positioning: HOB at 30 degrees  Intervention: Promote and Optimize Oral Intake  Flowsheets (Taken 8/5/2022 1446)  Oral Nutrition Promotion: rest periods promoted     Problem: Pain Acute  Goal: Acceptable Pain Control and Functional Ability  8/5/2022 1446 by Denzel Bledsoe RN  Outcome: Ongoing, Progressing  8/5/2022 1241 by Denzel Bledsoe RN  Outcome: Ongoing, Progressing  Intervention: Develop Pain Management Plan  Flowsheets (Taken 8/5/2022  8831)  Pain Management Interventions:   care clustered   position adjusted  Intervention: Prevent or Manage Pain  Flowsheets (Taken 8/5/2022 1446)  Bowel Elimination Promotion: adequate fluid intake promoted  Medication Review/Management: medications reviewed

## 2022-08-05 NOTE — PROCEDURES
"Emma Jorgensen is a 88 y.o. female patient.    Temp: 98 °F (36.7 °C) (08/05/22 1145)  Pulse: 65 (08/05/22 1145)  Resp: 19 (08/05/22 1145)  BP: (!) 98/59 (08/05/22 1145)  SpO2: 98 % (08/05/22 0758)  Weight: 114.8 kg (253 lb) (07/30/22 1551)  Height: 5' 6" (167.6 cm) (07/30/22 1551)    PICC  Date/Time: 8/5/2022 2:26 PM  Performed by: Michelle Toussaint RN  Consent Done: Yes  Time out: Immediately prior to procedure a time out was called to verify the correct patient, procedure, equipment, support staff and site/side marked as required  Indications: med administration  Local anesthetic: lidocaine 1% without epinephrine  Anesthetic Total (mL): 1  Preparation: skin prepped with ChloraPrep  Skin prep agent dried: skin prep agent completely dried prior to procedure  Sterile barriers: all five maximum sterile barriers used - cap, mask, sterile gown, sterile gloves, and large sterile sheet  Hand hygiene: hand hygiene performed prior to central venous catheter insertion  Location details: left cephalic  Catheter type: single lumen  Catheter size: 4 Fr  Catheter Length: 16cm    Ultrasound guidance: yes  Vessel Caliber: medium, compressibility normal  Needle advanced into vessel with real time Ultrasound guidance.  Guidewire confirmed in vessel.  Sterile sheath used.  Number of attempts: 1  Post-procedure: blood return through all ports, chlorhexidine patch and sterile dressing applied    Complications: none          Michelle toussaint rn  8/5/2022  "

## 2022-08-05 NOTE — PROGRESS NOTES
"Gastroenterology Progress Note    Subjective:    Patient has no GI complaints besides mild LLQ discomfort. Non-tender. + bms. NO GI bleeding. H&H stable after two units PRBC yesterday. No further nose bleeds.    Objective:    ROS:    ROS      Vital Signs:  /74   Pulse 76   Temp 98.4 °F (36.9 °C) (Oral)   Resp 16   Ht 5' 6" (1.676 m)   Wt 114.8 kg (253 lb)   LMP  (LMP Unknown)   SpO2 98%   BMI 40.84 kg/m²   Body mass index is 40.84 kg/m².    Physical Exam:    Physical Exam  Constitutional:       General: She is not in acute distress.     Appearance: She is obese.   Cardiovascular:      Rate and Rhythm: Normal rate and regular rhythm.   Pulmonary:      Effort: Pulmonary effort is normal. No respiratory distress.   Abdominal:      General: Bowel sounds are normal. There is no distension.      Palpations: Abdomen is soft.      Tenderness: There is no abdominal tenderness.   Skin:     General: Skin is warm and dry.   Neurological:      Mental Status: She is alert and oriented to person, place, and time.         Labs:  Recent Results (from the past 48 hour(s))   Hemoglobin and Hematocrit    Collection Time: 08/04/22  3:32 AM   Result Value Ref Range    Hgb 7.3 (L) 12.0 - 16.0 gm/dL    Hct 24.7 (L) 37.0 - 47.0 %   Basic Metabolic Panel    Collection Time: 08/05/22  4:09 AM   Result Value Ref Range    Sodium Level 134 (L) 136 - 145 mmol/L    Potassium Level 3.7 3.5 - 5.1 mmol/L    Chloride 95 (L) 98 - 107 mmol/L    Carbon Dioxide 33 (H) 23 - 31 mmol/L    Glucose Level 106 82 - 115 mg/dL    Blood Urea Nitrogen 9.6 (L) 9.8 - 20.1 mg/dL    Creatinine 0.55 0.55 - 1.02 mg/dL    BUN/Creatinine Ratio 17     Calcium Level Total 8.6 8.4 - 10.2 mg/dL    Estimated GFR- >60 mls/min/1.73/m2    Anion Gap 6.0 mEq/L   Magnesium    Collection Time: 08/05/22  4:09 AM   Result Value Ref Range    Magnesium Level 1.90 1.60 - 2.60 mg/dL   CBC with Differential    Collection Time: 08/05/22  4:09 AM   Result Value Ref " Range    WBC 10.4 4.5 - 11.5 x10(3)/mcL    RBC 3.43 (L) 4.20 - 5.40 x10(6)/mcL    Hgb 10.0 (L) 12.0 - 16.0 gm/dL    Hct 33.1 (L) 37.0 - 47.0 %    MCV 96.5 (H) 80.0 - 94.0 fL    MCH 29.2 27.0 - 31.0 pg    MCHC 30.2 (L) 33.0 - 36.0 mg/dL    RDW 16.0 11.5 - 17.0 %    Platelet 318 130 - 400 x10(3)/mcL    MPV 9.3 7.4 - 10.4 fL    Neut % 79.7 %    Lymph % 8.2 %    Mono % 10.0 %    Eos % 1.3 %    Basophil % 0.3 %    Lymph # 0.86 0.6 - 4.6 x10(3)/mcL    Neut # 8.3 2.1 - 9.2 x10(3)/mcL    Mono # 1.04 0.1 - 1.3 x10(3)/mcL    Eos # 0.14 0 - 0.9 x10(3)/mcL    Baso # 0.03 0 - 0.2 x10(3)/mcL    IG# 0.05 (H) 0 - 0.04 x10(3)/mcL    IG% 0.5 %    NRBC% 0.0 %       Imaging:  No new radiological images to review.      Assessment/Plan:  1. Pressure ulcer of right heel, stage 4    2. Anemia    3. Epistaxis    4. PAD (peripheral artery disease)       89 y/o female with epistaxis and anemia while on Eliquis and Plavix.     1. Epistaxis  2. Anemia  No overt GI bleeding  S/p 5 u PRBC this admission  - Monitor stools for color and signs of bleeding  - Monitor H&H; transfuse to keep hgb >7-8  - PPI daily  - We do not feel the anemia is from a GI bleed given the lack of overt bleeding. We have no plans for endoscopic evaluation  - We will be available over the weekend as needed. Please call us with questions or concerns      Keenan Maldonado PA-C

## 2022-08-05 NOTE — ANESTHESIA PREPROCEDURE EVALUATION
08/05/2022  Emma Jorgensen is a 88 y.o., female.      Pre-op Assessment    I have reviewed the Patient Summary Reports.     I have reviewed the Nursing Notes. I have reviewed the NPO Status.   I have reviewed the Medications.     Review of Systems  Hematology/Oncology:  Hematology Normal   Oncology Normal   Hematology Comments: DVT    EENT/Dental:EENT/Dental Normal   Cardiovascular:   Hypertension CAD asymptomatic CABG/stent  Moderate AS   Pulmonary:   Pneumonia Asthma    Renal/:  Renal/ Normal     Hepatic/GI:   GERD    Musculoskeletal:  Musculoskeletal Normal    Neurological:   Parkinson's Dementia moderate    Endocrine:  Endocrine Normal    Dermatological:  Skin Normal    Psych:   anxiety          Physical Exam  General: Well nourished, Cooperative, Alert and Oriented    Airway:  Mallampati: II   Mouth Opening: Normal  TM Distance: Normal  Neck ROM: Normal ROM    Dental:  Partial Dentures    Chest/Lungs:  Clear to auscultation    Heart:  Rate: Normal  Rhythm: Regular Rhythm        Anesthesia Plan  Type of Anesthesia, risks & benefits discussed:    Anesthesia Type: MAC, Regional  Intra-op Monitoring Plan: Standard ASA Monitors  Post Op Pain Control Plan: multimodal analgesia  Induction:  IV  Airway Plan: Direct  Informed Consent: Informed consent signed with the Patient representative and all parties understand the risks and agree with anesthesia plan.  All questions answered.   ASA Score: 3  Day of Surgery Review of History & Physical: I have interviewed and examined the patient. I have reviewed the patient's H&P dated:     Ready For Surgery From Anesthesia Perspective.     .

## 2022-08-05 NOTE — PLAN OF CARE
Problem: Physical Therapy  Goal: Physical Therapy Goal  Description: LTGs   1. Pt will be jeremy with bed mobility  2. Pt will be jeremy with sit to stand-discontinued  3. Slide board transfer with min A.  Outcome: Ongoing, Progressing

## 2022-08-05 NOTE — PROGRESS NOTES
[facility] Hospital Medicine Progress Note        Chief Complaint: Epistaxis     Source of Information: Patient. Medical Records        HISTORY OF PRESENT ILLNESS:   Emma Jorgensen is a 88 y.o. female with a PMHx of diastolic heart failure (LVEF 55% and grade II diastolic dysfunction from an echo on 05/24/2022),  PAF on Eliquis, moderate to severe aortic stenosis with plans for a TAVR outpatient per Cards, bilateral carotid artery stenosis, HTN, chronic dementia, previous DVT/PE, and recent surgical fixation of a closed right trimalleolar ankle fracture on 04/26/2022 who presented to [facility] Ortho ED from [facility] on 7/30/2022 with c/o epistaxis that began x1 day ago. Reported she is on 2L O2 chronically for hypoxia and occasionally get nose bleeds. She began bleeding from the left nare and felt blood draining in the back of her throat so she was referred to ED for further evaluation. Rhino Rocket was placed. Labs were notable for hgb 6.2, hct 20.1, chloride 97, CO2 34, INR 1.31. She was given KCentra, and transfused platelets. She began bleeding again from the left nare and was transferred to [facility] ED. ENT was consulted. 1 unit PRBC was ordered for transfusion. She was admitted to hospital medicine services for management of care.         Patient currently admitted and being managed for left-sided epistaxis, epistaxis has resolved.  ENT was on board she had a rhino rocket and this has been removed. Hospital course has been complicated by anemia with multiple blood transfusions.  Leukocytosis slightly trending upwards, probably source the right foot with wounds that are draining and off N/C  Consult Podiatry, get x-ray of the right foot, begin IV antibiotics. Arterial ultrasound of the right leg reviewed shows moderate to severe disease, consult patient's cardiologist for further evaluation  Podiatry planning for debridement on 8/5  Discontinue aspirin to avoid triple  therapy  Eliquis currently on hold for 7 days due to recent nosebleed     Today's information  Oxygen requirements trending upwards.  Likely due to blood transfusions received.  Give a dose of IV Lasix 40 x 1, suspend p.o. Lasix today  Hemoglobin trended up appropriately a 10 grams/dL after 2 units of PRBC on 08/04  GI evaluated patient anemia likely due to recent epistaxis no plans for endoscopy.  Podiatry planning for OR today.  Family at bedside answered all questions       Exam   General appearance: Well-developed female in no apparent distress. Oxygen   HENT: Atraumatic head. Moist mucous membranes of oral cavity.  Eyes: Normal extraocular movements.   Neck: Supple.   Lungs: Clear to auscultation bilaterally. No wheezing present.   Heart: Regular rate and rhythm. S1 and S2 present. No pedal edema.  Abdomen: Soft, non-distended, non-tender. Obese  Extremities: No cyanosis, clubbing, or edema.  Skin: R foot wound  Neuro: Motor and sensory exams grossly intact.   Psych/mental status: Appropriate mood and affect. Responds appropriately to questions.         ASSESSMENT & PLAN:   Worsening acute on chronic respiratory failure due to volume overload state in the setting of AS  Acute on Chronic Normocytic Anemia, improved after prbcs 2 units on 8/4  Acute on chronic respiratory failure, stable   - severe AS   Acute pulmonary edema,   Epistaxis, stable   Infected right foot wounds  Leucocytosis, worse   - source likely infected right foot wounds   Asymptomatic bacteriuria   PAF on Eliquis  Previous DVT/PE on chronic anticoagulation  Chronic hypoxia stable on 2 L nasal cannula  Severe aortic stenosis awaiting TAVR  Chronic diastolic congestive heart failure (EF 55% TTE 05/2022)  Dementia  Hypertension  Bilateral carotid artery disease  Peripheral arterial disease, mod- severe on right   contraction alkalosis         Plan:  Give a dose of IV Lasix 40 x 1, suspend p.o. Lasix today  Hemoglobin trended up appropriately a 10  grams/dL after 2 units of PRBC on 08/04  GI evaluated patient anemia likely due to recent epistaxis no plans for endoscopy.  Podiatry planning for OR today.   IV iron a 3/5  X-ray of the right foot reviewed  Follow-up with [facility] recs  Discontinue aspirin to avoid triple therapy  Eliquis currently on hold for 7 days due to recent nosebleed  Arterial ultrasound of the right leg reviewed shows moderate to severe disease, Podiatry on board appreciate plans for debridement  Continue IV ceftriaxone for right foot infected wound  Get PT to get patient out of bed in a recliner chair.  Daughter at bedside reports patient was supposed to have a CT of the chest in preparation for TAVR-will clarify with [facility] what exactly they will need an ordered this while she is inpatient  Continue blood pressure control  Hold Eliquis x7 days, day 6 of 7  Resume other appropriate home medications  Labs in AM        VTE Prophylaxis: will be placed on SCD for DVT prophylaxis and will be advised to be as mobile as possible and sit in a chair as tolerated           Critical Care diagnoses worsening respiratory failure requiring IV Lasix  Critical care time greater than 35 minutes          VITAL SIGNS: 24 HRS MIN & MAX LAST   Temp  Min: 97.6 °F (36.4 °C)  Max: 98.4 °F (36.9 °C) 98.4 °F (36.9 °C)   BP  Min: 107/44  Max: 172/73 117/74   Pulse  Min: 74  Max: 90  76   Resp  Min: 16  Max: 20 16   SpO2  Min: 94 %  Max: 100 % 98 %       Recent Labs   Lab 08/02/22  0508 08/03/22  0357 08/04/22  0332 08/05/22  0409   WBC 11.4 10.1  --  10.4   RBC 2.48* 2.67*  --  3.43*   HGB 7.2* 8.0* 7.3* 10.0*   HCT 24.3* 25.9* 24.7* 33.1*   MCV 98.0* 97.0*  --  96.5*   MCH 29.0 30.0  --  29.2   MCHC 29.6* 30.9*  --  30.2*   RDW 16.0 16.5  --  16.0    352  --  318   MPV 9.6 9.7  --  9.3       Recent Labs   Lab 07/30/22  0508 07/31/22  0422 08/01/22  0403 08/02/22  0508 08/03/22  0356 08/05/22  0409    135*   < > 134* 133* 134*   K 4.3 3.7   < > 3.9 4.2 3.7    CO2 34* 28   < > 32* 33* 33*   BUN 17.1 15.2   < > 7.2* 8.3* 9.6*   CREATININE 0.62 0.59   < > 0.59 0.56 0.55   CALCIUM 9.1 8.6   < > 8.5 8.5 8.6   MG  --   --   --  1.90  --  1.90   ALBUMIN 2.4* 2.5*  --   --   --   --    ALKPHOS 100 91  --   --   --   --    ALT 9 11  --   --   --   --    AST 14 17  --   --   --   --    BILITOT 0.4 1.4  --   --   --   --     < > = values in this interval not displayed.          Microbiology Results (last 7 days)     ** No results found for the last 168 hours. **           See below for Radiology    Scheduled Med:   atorvastatin  40 mg Oral QHS    cefTRIAXone (ROCEPHIN) IVPB  1 g Intravenous Q24H    clopidogreL  75 mg Oral Daily    collagenase   Topical (Top) Daily    cyanocobalamin  500 mcg Oral Daily    donepeziL  10 mg Oral Nightly    ferric gluconate (FERRLECIT) IVPB  125 mg Intravenous Daily    gabapentin  100 mg Oral Nightly    losartan  100 mg Oral Daily    magnesium oxide  400 mg Oral Daily    metoprolol tartrate  25 mg Oral BID    mupirocin   Topical (Top) Daily    pantoprazole  40 mg Intravenous BID    traZODone  50 mg Oral QHS        Continuous Infusions:       PRN Meds:  acetaminophen, acetaminophen, hydrALAZINE, HYDROcodone-acetaminophen, HYDROcodone-acetaminophen, ondansetron, sodium chloride 0.9%         VTE prophylaxis:     Patient condition:  Stable/Fair/Guarded/ Serious/ Critical    Anticipated discharge and Disposition:         All diagnosis and differential diagnosis have been reviewed; assessment and plan has been documented; I have personally reviewed the labs and test results that are presently available; I have reviewed the patients medication list; I have reviewed the consulting providers response and recommendations. I have reviewed or attempted to review medical records based upon their availability    All of the patient's questions have been  addressed and answered. Patient's is agreeable to the above stated plan. I will continue to  monitor closely and make adjustments to medical management as needed.  _____________________________________________________________________    Nutrition Status:    Radiology:  CTA Runoff ABD PEL Bilat Lower Ext  Narrative: EXAMINATION:  CTA RUNOFF ABD PEL BILAT LOWER EXT    CLINICAL HISTORY:  Peripheral arterial disease    TECHNIQUE:  Axial CT images were obtained through the abdomen, pelvis and lower extremities before and after IV contrast.  150 mL Isovue 370. coronal and sagittal reconstructions submitted and interpreted.  Automated exposure control, dose radiation lowering technique, was utilized.  Maximum intensity pixel reconstructions were performed.    COMPARISON:  CTA chest, abdomen and pelvis from 08/01/2022 and lower extremity arterial Doppler from 08/01/2022    FINDINGS:  CTA:    Patent thoracic aorta with widely patent runoff to the aortic bifurcation and patent runoff from the aortic bifurcation the both groins.  There is mild to moderate atherosclerotic plaque.  No aneurysm.  Patent internal iliac arteries.  Deep femoral arteries are patent bilaterally.    Patent renal arteries bilaterally.  There is calcified atherosclerotic plaque at the origins of the celiac trunk and SMA.  Mild to moderate degrees of stenosis.  Origin of the VINCE is not well seen.  This may be secondary to stenosis or the diminutive size of the vessel.    Right-side:    Patent common femoral artery and deep femoral artery.  There is scattered calcified atherosclerotic plaque along the length of the SFA.  There is approximately 60% stenosis at the proximal popliteal artery (image 407, series 20).  At the very distal segment of the popliteal artery, there is a 2nd area of stenosis with approximately 60% narrowing.    Patent popliteal trifurcation with scattered atherosclerotic plaque along the length of the infrapopliteal arterial vasculature.  There is a 2 vessel runoff to the ankle via the peroneal and posterior tibial artery.   Mid to distal segments of the anterior tibial artery are occluded.  Two vessel runoff to the foot via the posterior tibial and reconstituted anterior tibial artery.  Small-vessel vascular calcifications makes differentiation of contrast bolus and small-vessel vessel calcifications difficult.    Left-side:    Patent common femoral artery and profundus femora.  There are multiple segments of narrowing at the mid and distal segments of the SFA with 50-60% luminal narrowing at the most narrowed segments.  Patent runoff to the popliteal artery.  Popliteal artery is narrowed at the neg segment by approximately 60-70%.    Pain popliteal trifurcation with scattered atherosclerotic plaque along the length of the infrapopliteal arterial vasculature.  Small-vessel vascular calcifications makes differentiation of contrast bolus and small vessel calcification difficult.  Primary blood supply to the foot appears to be via collaterals and the peroneal artery.    OTHER:    Heart size is at the upper limits of normal.  Small bilateral pleural effusions are present with compressive atelectasis.  Cirrhotic morphology to the liver.  Gallbladder is surgically absent.  Spleen, pancreas and adrenal glands appear normal.  The bowel is nonobstructed.  A normal appendix is present.  Daugherty catheter is present within a decompressed bladder.  Chronic appearing compression deformities are present at the lower thoracic spine and mid lumbar spine.  Index compression fracture with 50% loss of height at T8 is unchanged compared to 04/29/2022.  Impression: 1. Bilateral SFA, popliteal and infrapopliteal arterial disease as described above.  2. Additional findings include: Small bilateral pleural effusions and cirrhotic morphology to the liver    Electronically signed by: Kle Hodge MD  Date:    08/03/2022  Time:    09:55      Sai Sanchez MD   08/05/2022

## 2022-08-05 NOTE — PT/OT/SLP RE-EVAL
Physical Therapy Re-evaluation    Patient Name:  Emma Jorgensen   MRN:  [id number]    Recommendations:     Discharge Recommendations:  nursing facility, skilled   Discharge Equipment Recommendations: none   Barriers to discharge: Decreased caregiver support    Assessment:     Emma Jorgensen is a 88 y.o. female admitted with a medical diagnosis of <principal problem not specified>.  She presents with the following impairments/functional limitations:  weakness, impaired functional mobility, impaired balance, impaired endurance. The pt's POC updated for slide board transfers, after demonstrating scooting EOB with min A, after difficulty standing and maintaining WB pxns.     Rehab Prognosis:  Fair; patient would benefit from acute skilled PT services to address these deficits and reach maximum level of function.      Recent Surgery: Procedure(s) (LRB):  DEBRIDEMENT, FOOT (Right)      Plan:     During this hospitalization, patient to be seen 5 x/week to address the above listed problems via therapeutic activities, therapeutic exercises, wheelchair management/training  · Plan of Care Expires:  08/26/22   Plan of Care Reviewed with: patient, caregiver, family    Subjective     Communicated with NSG prior to session.  Patient found left sidelying with peripheral IV, oxygen upon PT entry to room, agreeable to evaluation.      Chief Complaint: none  Patient comments/goals: return to PLOF  Pain/Comfort:  ·      Patients cultural, spiritual, Islam conflicts given the current situation:        Objective:     Patient found with: peripheral IV, oxygen     General Precautions: Standard, fall   Orthopedic Precautions:RLE non weight bearing   Braces: N/A  Respiratory Status: oxymask    Exams:  · RLE ROM: limited by weakness  · RLE Strength: grossly >3+/5  · LLE ROM: limited by weakness  · LLE Strength: grossly>3+/5    Functional Mobility:  · Bed Mobility:     · Rolling Left:  minimum assistance  · Rolling Right:  minimum assistance  · Scooting: minimum assistance  · Sit to Supine: moderate assistance  · Transfers:     · Sit to Stand:  maximal assistance with rolling walker      Patient left right sidelying with all lines intact, call button in reach, NSG\ notified and daughter present.    GOALS:   Multidisciplinary Problems     Physical Therapy Goals        Problem: Physical Therapy    Goal Priority Disciplines Outcome Goal Variances Interventions   Physical Therapy Goal     PT, PT/OT Ongoing, Progressing     Description: LTGs   1. Pt will be jeremy with bed mobility  2. Pt will be jeremy with sit to stand-discontinued  3. Slide board transfer with min A.                   History:     Past Medical History:   Diagnosis Date    Anxiety disorder, unspecified     Arthritis     Deep vein thrombosis     Depression     Irregular heart beat     Obesity, unspecified     Other pulmonary embolism without acute cor pulmonale     Parkinson's disease     Peripheral artery disease     Trimalleolar fracture of ankle, closed     Unspecified dementia without behavioral disturbance        Past Surgical History:   Procedure Laterality Date    ANGIOGRAM, CORONARY, WITH LEFT HEART CATHETERIZATION N/A 6/7/2022    Procedure: Angiogram, Coronary, with Left Heart Cath;  Surgeon: Claude Morillo MD;  Location: [facility] CATH LAB;  Service: Cardiology;  Laterality: N/A;    ANKLE FRACTURE SURGERY Right     CATARACT EXTRACTION      CATHETERIZATION OF BOTH LEFT AND RIGHT HEART N/A 6/7/2022    Procedure: CATHETERIZATION, HEART, BOTH LEFT AND RIGHT;  Surgeon: Claude Morillo MD;  Location: [facility] CATH LAB;  Service: Cardiology;  Laterality: N/A;    COLONOSCOPY W/ POLYPECTOMY      HYSTERECTOMY      SMALL INTESTINE SURGERY         Time Tracking:     PT Received On: 08/05/22  PT Start Time: 0911     PT Stop Time: 1000  PT Total Time (min): 49 min     Billable Minutes: Re-eval 15 and Therapeutic Activity 34      08/05/2022

## 2022-08-05 NOTE — PLAN OF CARE
Problem: Infection  Goal: Absence of Infection Signs and Symptoms  Outcome: Ongoing, Progressing     Problem: Adult Inpatient Plan of Care  Goal: Plan of Care Review  Outcome: Ongoing, Progressing  Flowsheets (Taken 8/4/2022 2325)  Plan of Care Reviewed With: patient  Goal: Patient-Specific Goal (Individualized)  Outcome: Ongoing, Progressing  Goal: Absence of Hospital-Acquired Illness or Injury  Outcome: Ongoing, Progressing  Intervention: Identify and Manage Fall Risk  Flowsheets (Taken 8/4/2022 2325)  Safety Promotion/Fall Prevention:   assistive device/personal item within reach   side rails raised x 2   nonskid shoes/socks when out of bed  Intervention: Prevent Skin Injury  Flowsheets (Taken 8/4/2022 2325)  Body Position:   turned   right  Intervention: Prevent and Manage VTE (Venous Thromboembolism) Risk  Flowsheets (Taken 8/4/2022 2325)  VTE Prevention/Management: remove, assess skin, and reapply compression stockings  Intervention: Prevent Infection  Flowsheets (Taken 8/4/2022 2325)  Infection Prevention:   hand hygiene promoted   rest/sleep promoted  Goal: Optimal Comfort and Wellbeing  Outcome: Ongoing, Progressing  Intervention: Monitor Pain and Promote Comfort  Flowsheets (Taken 8/4/2022 2325)  Pain Management Interventions:   medication offered   care clustered   pillow support provided  Goal: Readiness for Transition of Care  Outcome: Ongoing, Progressing     Problem: Bariatric Environmental Safety  Goal: Safety Maintained with Care  Outcome: Ongoing, Progressing     Problem: Fluid Imbalance (Pneumonia)  Goal: Fluid Balance  Outcome: Ongoing, Progressing     Problem: Infection (Pneumonia)  Goal: Resolution of Infection Signs and Symptoms  Outcome: Ongoing, Progressing     Problem: Impaired Wound Healing  Goal: Optimal Wound Healing  Outcome: Ongoing, Progressing  Intervention: Promote Wound Healing  Flowsheets (Taken 8/4/2022 2325)  Pain Management Interventions:   medication offered   care  clustered   pillow support provided     Problem: Fall Injury Risk  Goal: Absence of Fall and Fall-Related Injury  Outcome: Ongoing, Progressing  Intervention: Promote Injury-Free Environment  Flowsheets (Taken 8/4/2022 0415)  Safety Promotion/Fall Prevention:   assistive device/personal item within reach   side rails raised x 2   nonskid shoes/socks when out of bed     Problem: Skin Injury Risk Increased  Goal: Skin Health and Integrity  Outcome: Ongoing, Progressing  Intervention: Optimize Skin Protection  Flowsheets (Taken 8/4/2022 2322)  Pressure Reduction Devices: heel offloading device utilized     Problem: Pain Acute  Goal: Acceptable Pain Control and Functional Ability  Outcome: Ongoing, Progressing

## 2022-08-05 NOTE — PROGRESS NOTES
"Nutrition   Progress Note      Recommendations:  1. Continue Cardiac diet as tolerated  2. Continue Boost Breeze Orange with meals to provide 250 harleen, 9 gm pro per serving.   3. Add Kang BID to assist with wound healing.       Reason for Evaluation:  Consult and RD follow up    Diagnosis:    1. Pressure ulcer of right heel, stage 4    2. Anemia    3. Epistaxis    4. PAD (peripheral artery disease)        Relevant Medical History:    Past Medical History:   Diagnosis Date    Anxiety disorder, unspecified     Arthritis     Deep vein thrombosis     Depression     Irregular heart beat     Obesity, unspecified     Other pulmonary embolism without acute cor pulmonale     Parkinson's disease     Peripheral artery disease     Trimalleolar fracture of ankle, closed     Unspecified dementia without behavioral disturbance          Nutrition Diet History:    Factors affecting nutritional intake: Nose bleed and wounds    Food / Rastafarian / Culture Preferences:  n/a      Nutrition Prescription Ordered:    Current Diet Order: Cardiac    Appetite:  Fair (50% - 75% po intake)    PO intake: 50 - 75 %      Labs / Medications / Procedures:    Nutrition Related Medications: Rocephin, Vit B12, Ferrlecit, furosemide, Mag ox    Nutrition Related Labs:  7/30: H/h-6.2/20.1, Gluc-129  8/5: H/H-10.0/33.1, na-134, bun-9.6      Anthropometrics:  Height: 5' 6" (1.676 m)  Admit Weight:  Weight: 115 kg (253 lb 8.5 oz)  Latest Weight:  114.8 kg (253 lb)  *no new wts since 7/30    Wt Readings from Last 5 Encounters:   07/30/22 114.8 kg (253 lb)   07/14/22 108.9 kg (240 lb 1.3 oz)   06/28/22 108.9 kg (240 lb)   06/07/22 111.5 kg (245 lb 13 oz)   05/28/22 110.5 kg (243 lb 8 oz)     IBW: 59.0kg  %IBW: 195%  UBW: 111kg  %Weight Change: stable  Body mass index is 40.84 kg/m².  BMI classification:  Obese Grade III (BMI >/= 40)      Nutrition Narrative:  7/30: Pt has been NPO from nose bleed. Has improved and clear liquid diet has been ordered. " Rt heel fissure with partial thickness tissue lost has been noted. Consulted to assist with wounds. Will add Boost Breeze Orange while on liquids. Once diet advanced to solids, recommend Kang BID to assist with wound healing.     8/5: Pt eating well. Drinking Boost Breeze. Possible debridement of heel wound scheduled for today. Will add Kang to assist with wound healing.     Monitoring and Evaluation:    Nutrition Monitoring and Evaluation:  food and beverage intake    Nutrition Risk:  Level of Nutrition Risk:  Low  Frequency of Follow up:  Dietitian will f/up within 7 days.

## 2022-08-05 NOTE — ANESTHESIA POSTPROCEDURE EVALUATION
Anesthesia Post Evaluation    Patient: Emma Jorgensen    Procedure(s) Performed: Procedure(s) (LRB):  DEBRIDEMENT, FOOT (Right)    Final Anesthesia Type: general      Patient location during evaluation: PACU  Patient participation: Yes- Able to Participate  Level of consciousness: sedated and awake  Post-procedure vital signs: reviewed and stable  Pain management: adequate  Airway patency: patent  ROMAN mitigation strategies: Verification of full reversal of neuromuscular block  PONV status at discharge: No PONV  Anesthetic complications: no      Cardiovascular status: blood pressure returned to baseline, hemodynamically stable and stable  Respiratory status: nasal cannula, unassisted and spontaneous ventilation  Hydration status: euvolemic  Follow-up not needed.          Vitals Value Taken Time   BP  08/05/22 1616   Temp  08/05/22 1616   Pulse  08/05/22 1616   Resp  08/05/22 1616   SpO2  08/05/22 1616         No case tracking events are documented in the log.      Pain/Anna Score: Pain Rating Prior to Med Admin: 8 (8/5/2022  6:02 AM)  Pain Rating Post Med Admin: 3 (8/5/2022  7:02 AM)

## 2022-08-05 NOTE — OP NOTE
[facility]                       [address]                      [place] [address]    PATIENT NAME:      VIPIN NEVAREZ  YOB: 1934  CSN:               [id number]  MRN:               [id number]  ADMIT DATE:        07/30/2022 02:43:00  PHYSICIAN:         Rosales Redmond DPM                          OPERATIVE REPORT      DATE OF SURGERY:    08/05/2022 00:00:00    SURGEON:  Rosales Redmond DPM    PREOPERATIVE DIAGNOSIS:  Nonhealing, infected right foot wounds.    POSTOPERATIVE DIAGNOSIS:  Nonhealing, infected right foot wounds.    PROCEDURE PERFORMED:  Excisional debridement, right foot wounds.    ANESTHESIA:  Local with MAC.    HEMOSTASIS:  None.    ESTIMATED BLOOD LOSS:  Less than 10 cc.    MATERIALS:  None.    INJECTABLES:  None.    PATHOLOGY:  Debridement products sent for gross and culture, along with exposed   hardware sent for identification only.    COMPLICATIONS:  None.    DESCRIPTION OF PROCEDURE:  The patient was transferred from the floor have been   n.p.o. after breakfast.  History, physical, and preoperative studies reviewed   and there were no contraindications noted.  While in holding, appropriate IVs   were started.  The patient was taken to the OR, placed in a supine position,   after which appropriate anesthetic was administered.  This was supplemented with   a total of approximately 10 cc of 0.5% Marcaine plain infiltrated a right ankle   block.  The extremity was then prepped and draped aseptically.    Attention was directed to the right foot, where there was necrotic and fibrotic   wounds to the posterior aspect of the right heel and similar, but less   problematic wound plantar aspect of the heel.  Directing our attention   plantarly, utilizing a scalpel, we incised circumferentially around the wound,   excising all epithelium and subcutaneous tissues down to the wound bed, which   was actually much more  viable, appearing to encompass granulation tissue   overlying the fascial and short flexor structures of the plantar foot.  There   was good viability and bleeding surrounding this particular area.  There was not   an extension down to the level of the underlying calcaneus.  No visible   hardware exposure in the site.  We removed all the nonviable tissue, again using   forceps and scalpel.  There were 2 fairly adherent fibrotic areas that required   rongeur also to remove.  We then directed our attention posteriorly, with a   much larger wound.  Again utilizing scalpel and forceps, we incised   circumferentially around the wound, excising all the nonviable tissue, which   extended all the way down to the level of the calcaneus in the posterior   calcaneus.    Also down to the distal Achilles.  The Achilles was for the most part,   devitalized throughout the entire area.  Because of this, we proceeded with   removal of the Achilles in the site utilizing scalpel and forceps.  This was   taken out, removed and placed on the back table.  The calcaneus was exposed   along the distal medial aspect.  There were not any destructive changes,   possibly a little bit of some erosions, but nothing soft.  The screw head was   visible within the base of the wound.  This was removed easily.  Utilizing a   rongeur, we proceeded with debridement of some of the exposed bone in the area,   to remove any nonviable, fibrotic cortical margins.  There was good viability   throughout.  We did obtain cultures of the tissues.  The wounds were copiously   irrigated with normal saline.  Hemostasis was obtained throughout.  Wounds were   irrigated once again, after which they were packed in the wet-to-dry fashion.    Wound measurements were approximately 3 x 3 x 1 posteriorly and 2 x 2 x 1.5   plantarly. The patient tolerated procedure and anesthesia well, left the OR to   Recovery Room with vital signs stable and the vascular status intact to  the   right lower extremity.  Once recovered, the patient will be transferred back to   the floor for postop management.        ______________________________  LOR Duran/ANTONY  DD:  08/05/2022  Time:  03:48PM  DT:  08/05/2022  Time:  04:40PM  Job #:  [id number]      OPERATIVE REPORT

## 2022-08-06 ENCOUNTER — APPOINTMENT (OUTPATIENT)
Dept: CARDIOLOGY | Facility: HOSPITAL | Age: 87
End: 2022-08-06
Attending: INTERNAL MEDICINE
Payer: MEDICARE

## 2022-08-06 LAB
ANION GAP SERPL CALC-SCNC: 7 MEQ/L
BASOPHILS # BLD AUTO: 0.04 X10(3)/MCL (ref 0–0.2)
BASOPHILS NFR BLD AUTO: 0.3 %
BUN SERPL-MCNC: 12 MG/DL (ref 9.8–20.1)
CALCIUM SERPL-MCNC: 8.9 MG/DL (ref 8.4–10.2)
CHLORIDE SERPL-SCNC: 93 MMOL/L (ref 98–107)
CO2 SERPL-SCNC: 36 MMOL/L (ref 23–31)
CREAT SERPL-MCNC: 0.6 MG/DL (ref 0.55–1.02)
CREAT/UREA NIT SERPL: 20
EOSINOPHIL # BLD AUTO: 0.15 X10(3)/MCL (ref 0–0.9)
EOSINOPHIL NFR BLD AUTO: 1.3 %
ERYTHROCYTE [DISTWIDTH] IN BLOOD BY AUTOMATED COUNT: 16 % (ref 11.5–17)
GLUCOSE SERPL-MCNC: 106 MG/DL (ref 82–115)
GRAM STN SPEC: ABNORMAL
GRAM STN SPEC: ABNORMAL
HCT VFR BLD AUTO: 33.2 % (ref 37–47)
HGB BLD-MCNC: 10 GM/DL (ref 12–16)
IMM GRANULOCYTES # BLD AUTO: 0.12 X10(3)/MCL (ref 0–0.04)
IMM GRANULOCYTES NFR BLD AUTO: 1 %
LYMPHOCYTES # BLD AUTO: 0.94 X10(3)/MCL (ref 0.6–4.6)
LYMPHOCYTES NFR BLD AUTO: 8.1 %
MAGNESIUM SERPL-MCNC: 2 MG/DL (ref 1.6–2.6)
MCH RBC QN AUTO: 29.5 PG (ref 27–31)
MCHC RBC AUTO-ENTMCNC: 30.1 MG/DL (ref 33–36)
MCV RBC AUTO: 97.9 FL (ref 80–94)
MONOCYTES # BLD AUTO: 1.12 X10(3)/MCL (ref 0.1–1.3)
MONOCYTES NFR BLD AUTO: 9.6 %
NEUTROPHILS # BLD AUTO: 9.3 X10(3)/MCL (ref 2.1–9.2)
NEUTROPHILS NFR BLD AUTO: 79.7 %
NRBC BLD AUTO-RTO: 0 %
OHS CV LEFT LOWER EXTREMITY ABI (NO CALC): 0.82
OHS CV RIGHT ABI LOWER EXTREMITY (NO CALC): 0.63
PLATELET # BLD AUTO: 350 X10(3)/MCL (ref 130–400)
PMV BLD AUTO: 9.4 FL (ref 7.4–10.4)
POTASSIUM SERPL-SCNC: 3.4 MMOL/L (ref 3.5–5.1)
RBC # BLD AUTO: 3.39 X10(6)/MCL (ref 4.2–5.4)
SODIUM SERPL-SCNC: 136 MMOL/L (ref 136–145)
WBC # SPEC AUTO: 11.6 X10(3)/MCL (ref 4.5–11.5)

## 2022-08-06 PROCEDURE — 25000003 PHARM REV CODE 250: Performed by: INTERNAL MEDICINE

## 2022-08-06 PROCEDURE — 94761 N-INVAS EAR/PLS OXIMETRY MLT: CPT

## 2022-08-06 PROCEDURE — 93931 UPPER EXTREMITY STUDY: CPT | Mod: LT

## 2022-08-06 PROCEDURE — 25000003 PHARM REV CODE 250: Performed by: PODIATRIST

## 2022-08-06 PROCEDURE — 11000001 HC ACUTE MED/SURG PRIVATE ROOM

## 2022-08-06 PROCEDURE — C9113 INJ PANTOPRAZOLE SODIUM, VIA: HCPCS | Performed by: PODIATRIST

## 2022-08-06 PROCEDURE — 63600175 PHARM REV CODE 636 W HCPCS: Performed by: PODIATRIST

## 2022-08-06 PROCEDURE — 93931 UPPER EXTREMITY STUDY: CPT | Mod: 26,LT,, | Performed by: SURGERY

## 2022-08-06 PROCEDURE — 80048 BASIC METABOLIC PNL TOTAL CA: CPT | Performed by: PODIATRIST

## 2022-08-06 PROCEDURE — 83735 ASSAY OF MAGNESIUM: CPT | Performed by: PODIATRIST

## 2022-08-06 PROCEDURE — 27000221 HC OXYGEN, UP TO 24 HOURS

## 2022-08-06 PROCEDURE — 36415 COLL VENOUS BLD VENIPUNCTURE: CPT | Performed by: PODIATRIST

## 2022-08-06 PROCEDURE — 93931 CV US DOPPLER ARTERIAL ARM LEFT (CUPID ONLY): ICD-10-PCS | Mod: 26,LT,, | Performed by: SURGERY

## 2022-08-06 PROCEDURE — 85025 COMPLETE CBC W/AUTO DIFF WBC: CPT | Performed by: PODIATRIST

## 2022-08-06 RX ORDER — POTASSIUM CHLORIDE 20 MEQ/1
40 TABLET, EXTENDED RELEASE ORAL
Status: DISPENSED | OUTPATIENT
Start: 2022-08-06 | End: 2022-08-06

## 2022-08-06 RX ORDER — POTASSIUM CHLORIDE 20 MEQ/1
40 TABLET, EXTENDED RELEASE ORAL 2 TIMES DAILY
Status: DISCONTINUED | OUTPATIENT
Start: 2022-08-06 | End: 2022-08-06

## 2022-08-06 RX ADMIN — COLLAGENASE SANTYL: 250 OINTMENT TOPICAL at 09:08

## 2022-08-06 RX ADMIN — MUPIROCIN: 20 OINTMENT TOPICAL at 09:08

## 2022-08-06 RX ADMIN — SODIUM CHLORIDE 125 MG: 9 INJECTION, SOLUTION INTRAVENOUS at 09:08

## 2022-08-06 RX ADMIN — METOPROLOL TARTRATE 25 MG: 25 TABLET, FILM COATED ORAL at 09:08

## 2022-08-06 RX ADMIN — PANTOPRAZOLE SODIUM 40 MG: 40 INJECTION, POWDER, FOR SOLUTION INTRAVENOUS at 09:08

## 2022-08-06 RX ADMIN — CLOPIDOGREL 75 MG: 75 TABLET, FILM COATED ORAL at 09:08

## 2022-08-06 RX ADMIN — CEFTRIAXONE SODIUM 1 G: 1 INJECTION, POWDER, FOR SOLUTION INTRAMUSCULAR; INTRAVENOUS at 09:08

## 2022-08-06 RX ADMIN — DONEPEZIL HYDROCHLORIDE 10 MG: 5 TABLET, FILM COATED ORAL at 09:08

## 2022-08-06 RX ADMIN — ATORVASTATIN CALCIUM 40 MG: 40 TABLET, FILM COATED ORAL at 09:08

## 2022-08-06 RX ADMIN — LOSARTAN POTASSIUM 100 MG: 50 TABLET, FILM COATED ORAL at 09:08

## 2022-08-06 RX ADMIN — Medication 400 MG: at 09:08

## 2022-08-06 RX ADMIN — HYDROCODONE BITARTRATE AND ACETAMINOPHEN 1 TABLET: 5; 325 TABLET ORAL at 04:08

## 2022-08-06 RX ADMIN — POTASSIUM CHLORIDE 40 MEQ: 1500 TABLET, EXTENDED RELEASE ORAL at 09:08

## 2022-08-06 RX ADMIN — CYANOCOBALAMIN TAB 500 MCG 500 MCG: 500 TAB at 09:08

## 2022-08-06 RX ADMIN — GABAPENTIN 100 MG: 100 CAPSULE ORAL at 09:08

## 2022-08-06 RX ADMIN — TRAZODONE HYDROCHLORIDE 50 MG: 50 TABLET ORAL at 09:08

## 2022-08-06 RX ADMIN — HYDROCODONE BITARTRATE AND ACETAMINOPHEN 1 TABLET: 5; 325 TABLET ORAL at 07:08

## 2022-08-06 NOTE — PROGRESS NOTES
[facility]                       [address] [id number]    PATIENT NAME:       VIPIN NEVAREZ  YOB: 1934  CSN:                [id number]   MRN:                [id number]  ADMIT DATE:         07/30/2022 02:43:00  PHYSICIAN:          Rosales Redmond DPM                            PROGRESS NOTE    DATE:  08/06/2022 00:00:00    Patient was seen today.  She is doing well, not voicing any complaints.  No   pain.  Her only question is whether or not she will get out of bed.  Remains on   Rocephin.    PHYSICAL EXAMINATION:  VITAL SIGNS:  Stable.  Currently afebrile.  EXTREMITIES:  Dressings and heel lift boots clean, dry, and intact.    LABORATORY STUDIES:  Labs reviewed.  White cell count is a little elevated at   11.6.    Intraoperative wound cultures pending. Gram stain grew out many gram-positive   cocci.     Wounds are stable.  Nothing actively bleeding.    ASSESSMENT:  Status post excisional debridement, right heel wounds.    PLAN:  We will continue with the current care.  Plan to take down dressings   again tomorrow.  Await for cultures.        ______________________________  Rosales Redmond DPM    GAS/AQS  DD:  08/06/2022  Time:  11:36AM  DT:  08/06/2022  Time:  11:44AM  Job #:  [id number]      PROGRESS NOTE

## 2022-08-06 NOTE — PROGRESS NOTES
[facility] Hospital Medicine Progress Note        Chief Complaint: Epistaxis     Source of Information: Patient. Medical Records        HISTORY OF PRESENT ILLNESS:   Emma Jorgensen is a 88 y.o. female with a PMHx of diastolic heart failure (LVEF 55% and grade II diastolic dysfunction from an echo on 05/24/2022),  PAF on Eliquis, moderate to severe aortic stenosis with plans for a TAVR outpatient per Cards, bilateral carotid artery stenosis, HTN, chronic dementia, previous DVT/PE, and recent surgical fixation of a closed right trimalleolar ankle fracture on 04/26/2022 who presented to [facility] Orth[facility] ED from [facility] on 7/30/2022 with c/o epistaxis that began x1 day ago. Reported she is on 2L O2 chronically for hypoxia and occasionally get nose bleeds. She began bleeding from the left nare and felt blood draining in the back of her throat so she was referred to ED for further evaluation. Rhino Rocket was placed. Labs were notable for hgb 6.2, hct 20.1, chloride 97, CO2 34, INR 1.31. She was given KCentra, and transfused platelets. She began bleeding again from the left nare and was transferred to [facility] ED. ENT was consulted. 1 unit PRBC was ordered for transfusion. She was admitted to hospital medicine services for management of care.         Patient currently admitted and being managed for left-sided epistaxis, epistaxis has resolved.  ENT was on board she had a rhino rocket and this has been removed. Hospital course has been complicated by anemia with multiple blood transfusions.  Leukocytosis slightly trending upwards, probably source the right foot with wounds that are draining and off N/C  Consult Podiatry, get x-ray of the right foot, begin IV antibiotics. Arterial ultrasound of the right leg reviewed shows moderate to severe disease, consult patient's cardiologist for further evaluation  Podiatry planning for debridement on 8/5  Discontinue aspirin to avoid triple  therapy  Eliquis currently on hold for 7 days due to recent nosebleed     Today's information  Patient seen and examined at bedside  Podiatry to the OR on 08/05 for wound debridement.  Labs currently stable on 2 L of oxygen, which is her baseline  Leukocytosis mild noted likely reactive to surgery  Potassium is low will replete  Left upper extremity noted to be swollen with extravasation of blood after PICC line placed on 08/05  Radial pulses palpable  Ultrasound the left upper extremity.  Trend H&H          Exam   General appearance: Well-developed female in no apparent distress. Oxygen   HENT: Atraumatic head. Moist mucous membranes of oral cavity.  Eyes: Normal extraocular movements.   Neck: Supple.   Lungs: Clear to auscultation bilaterally. No wheezing present.   Heart: Regular rate and rhythm. S1 and S2 present. No pedal edema.  Abdomen: Soft, non-distended, non-tender. Obese  Extremities: No cyanosis, clubbing, or edema.  Skin: R foot wound  Neuro: Motor and sensory exams grossly intact.   Psych/mental status: Appropriate mood and affect. Responds appropriately to questions.         ASSESSMENT & PLAN:   Worsening acute on chronic respiratory failure due to volume overload state in the setting of AS  Acute on Chronic Normocytic Anemia, improved after prbcs 2 units on 8/4  Acute on chronic respiratory failure, stable   - severe AS   Acute pulmonary edema,   Epistaxis, stable   Infected right foot wounds  Leucocytosis, worse   - source likely infected right foot wounds   Asymptomatic bacteriuria   PAF on Eliquis  Previous DVT/PE on chronic anticoagulation  Chronic hypoxia stable on 2 L nasal cannula  Severe aortic stenosis awaiting TAVR  Chronic diastolic congestive heart failure (EF 55% TTE 05/2022)  Dementia  Hypertension  Bilateral carotid artery disease  Peripheral arterial disease, mod- severe on right   contraction alkalosis         Plan:  Podiatry to the OR on 08/05 for wound debridement.  Labs currently  stable on 2 L of oxygen, which is her baseline  Leukocytosis mild noted likely reactive to surgery  Potassium is low will replete  Left upper extremity noted to be swollen with extravasation of blood after PICC line placed on 08/05  Radial pulses palpable  Ultrasound the left upper extremity.  Trend H&H  Follow-up with [facility] recs  Discontinue aspirin to avoid triple therapy  Eliquis currently on hold for 7 days due to recent nosebleed  Arterial ultrasound of the right leg reviewed shows moderate to severe disease, Podiatry on board appreciate plans for debridement  Continue IV ceftriaxone for right foot infected wound  Get PT to get patient out of bed in a recliner chair.  Daughter at bedside reports patient was supposed to have a CT of the chest in preparation for TAVR-will clarify with [facility] what exactly they will need an ordered this while she is inpatient  Continue blood pressure control  Hold Eliquis x7 days, day 7 of 7  Resume other appropriate home medications  Labs in AM        VTE Prophylaxis: will be placed on SCD for DVT prophylaxis and will be advised to be as mobile as possible and sit in a chair as tolerated           Critical Care diagnoses worsening respiratory failure requiring IV Lasix  Critical care time greater than 35 minutes      VITAL SIGNS: 24 HRS MIN & MAX LAST   Temp  Min: 97.6 °F (36.4 °C)  Max: 98.5 °F (36.9 °C) 97.6 °F (36.4 °C)   BP  Min: 105/55  Max: 168/70 (!) 138/56   Pulse  Min: 69  Max: 81  73   Resp  Min: 16  Max: 21 20   SpO2  Min: 95 %  Max: 98 % 96 %       Recent Labs   Lab 08/03/22  0357 08/04/22  0332 08/05/22  0409 08/06/22  0336   WBC 10.1  --  10.4 11.6*   RBC 2.67*  --  3.43* 3.39*   HGB 8.0* 7.3* 10.0* 10.0*   HCT 25.9* 24.7* 33.1* 33.2*   MCV 97.0*  --  96.5* 97.9*   MCH 30.0  --  29.2 29.5   MCHC 30.9*  --  30.2* 30.1*   RDW 16.5  --  16.0 16.0     --  318 350   MPV 9.7  --  9.3 9.4       Recent Labs   Lab 07/31/22  0422 08/01/22  0403 08/02/22  0508 08/03/22  0356  08/05/22  0409 08/06/22  0307   *   < > 134* 133* 134* 136   K 3.7   < > 3.9 4.2 3.7 3.4*   CO2 28   < > 32* 33* 33* 36*   BUN 15.2   < > 7.2* 8.3* 9.6* 12.0   CREATININE 0.59   < > 0.59 0.56 0.55 0.60   CALCIUM 8.6   < > 8.5 8.5 8.6 8.9   MG  --   --  1.90  --  1.90 2.00   ALBUMIN 2.5*  --   --   --   --   --    ALKPHOS 91  --   --   --   --   --    ALT 11  --   --   --   --   --    AST 17  --   --   --   --   --    BILITOT 1.4  --   --   --   --   --     < > = values in this interval not displayed.          Microbiology Results (last 7 days)     Procedure Component Value Units Date/Time    Gram Stain [[id number]]  (Abnormal) Collected: 08/05/22 1536    Order Status: Completed Specimen: Wound from Foot, Right Updated: 08/06/22 1039     GRAM STAIN Moderate WBC observed      Many Gram positive cocci    Wound Culture [[id number]] Collected: 08/05/22 1536    Order Status: Completed Specimen: Wound from Foot, Right Updated: 08/06/22 0649     Wound Culture No Growth At 24 Hours    Anaerobic Culture [[id number]] Collected: 08/05/22 1536    Order Status: Sent Specimen: Wound from Foot, Right Updated: 08/05/22 1614           See below for Radiology    Scheduled Med:   atorvastatin  40 mg Oral QHS    cefTRIAXone (ROCEPHIN) IVPB  1 g Intravenous Q24H    clopidogreL  75 mg Oral Daily    collagenase   Topical (Top) Daily    cyanocobalamin  500 mcg Oral Daily    donepeziL  10 mg Oral Nightly    ferric gluconate (FERRLECIT) IVPB  125 mg Intravenous Daily    gabapentin  100 mg Oral Nightly    losartan  100 mg Oral Daily    magnesium oxide  400 mg Oral Daily    metoprolol tartrate  25 mg Oral BID    mupirocin   Topical (Top) Daily    pantoprazole  40 mg Intravenous BID    traZODone  50 mg Oral QHS        Continuous Infusions:       PRN Meds:  acetaminophen, acetaminophen, hydrALAZINE, HYDROcodone-acetaminophen, HYDROcodone-acetaminophen, melatonin, ondansetron, sodium chloride 0.9%         VTE prophylaxis:      Patient condition:  Stable/Fair/Guarded/ Serious/ Critical    Anticipated discharge and Disposition:         All diagnosis and differential diagnosis have been reviewed; assessment and plan has been documented; I have personally reviewed the labs and test results that are presently available; I have reviewed the patients medication list; I have reviewed the consulting providers response and recommendations. I have reviewed or attempted to review medical records based upon their availability    All of the patient's questions have been  addressed and answered. Patient's is agreeable to the above stated plan. I will continue to monitor closely and make adjustments to medical management as needed.  _____________________________________________________________________    Nutrition Status:    Radiology:  CTA Runoff ABD PEL Bilat Lower Ext  Narrative: EXAMINATION:  CTA RUNOFF ABD PEL BILAT LOWER EXT    CLINICAL HISTORY:  Peripheral arterial disease    TECHNIQUE:  Axial CT images were obtained through the abdomen, pelvis and lower extremities before and after IV contrast.  150 mL Isovue 370. coronal and sagittal reconstructions submitted and interpreted.  Automated exposure control, dose radiation lowering technique, was utilized.  Maximum intensity pixel reconstructions were performed.    COMPARISON:  CTA chest, abdomen and pelvis from 08/01/2022 and lower extremity arterial Doppler from 08/01/2022    FINDINGS:  CTA:    Patent thoracic aorta with widely patent runoff to the aortic bifurcation and patent runoff from the aortic bifurcation the both groins.  There is mild to moderate atherosclerotic plaque.  No aneurysm.  Patent internal iliac arteries.  Deep femoral arteries are patent bilaterally.    Patent renal arteries bilaterally.  There is calcified atherosclerotic plaque at the origins of the celiac trunk and SMA.  Mild to moderate degrees of stenosis.  Origin of the VINCE is not well seen.  This may be secondary to  stenosis or the diminutive size of the vessel.    Right-side:    Patent common femoral artery and deep femoral artery.  There is scattered calcified atherosclerotic plaque along the length of the SFA.  There is approximately 60% stenosis at the proximal popliteal artery (image 407, series 20).  At the very distal segment of the popliteal artery, there is a 2nd area of stenosis with approximately 60% narrowing.    Patent popliteal trifurcation with scattered atherosclerotic plaque along the length of the infrapopliteal arterial vasculature.  There is a 2 vessel runoff to the ankle via the peroneal and posterior tibial artery.  Mid to distal segments of the anterior tibial artery are occluded.  Two vessel runoff to the foot via the posterior tibial and reconstituted anterior tibial artery.  Small-vessel vascular calcifications makes differentiation of contrast bolus and small-vessel vessel calcifications difficult.    Left-side:    Patent common femoral artery and profundus femora.  There are multiple segments of narrowing at the mid and distal segments of the SFA with 50-60% luminal narrowing at the most narrowed segments.  Patent runoff to the popliteal artery.  Popliteal artery is narrowed at the neg segment by approximately 60-70%.    Pain popliteal trifurcation with scattered atherosclerotic plaque along the length of the infrapopliteal arterial vasculature.  Small-vessel vascular calcifications makes differentiation of contrast bolus and small vessel calcification difficult.  Primary blood supply to the foot appears to be via collaterals and the peroneal artery.    OTHER:    Heart size is at the upper limits of normal.  Small bilateral pleural effusions are present with compressive atelectasis.  Cirrhotic morphology to the liver.  Gallbladder is surgically absent.  Spleen, pancreas and adrenal glands appear normal.  The bowel is nonobstructed.  A normal appendix is present.  Daugherty catheter is present within a  decompressed bladder.  Chronic appearing compression deformities are present at the lower thoracic spine and mid lumbar spine.  Index compression fracture with 50% loss of height at T8 is unchanged compared to 04/29/2022.  Impression: 1. Bilateral SFA, popliteal and infrapopliteal arterial disease as described above.  2. Additional findings include: Small bilateral pleural effusions and cirrhotic morphology to the liver    Electronically signed by: Kel Hodge MD  Date:    08/03/2022  Time:    09:55      Sai Sanchez MD   08/06/2022

## 2022-08-06 NOTE — PLAN OF CARE
Problem: Infection  Goal: Absence of Infection Signs and Symptoms  Outcome: Ongoing, Progressing     Problem: Adult Inpatient Plan of Care  Goal: Plan of Care Review  Outcome: Ongoing, Progressing  Flowsheets (Taken 8/6/2022 0317)  Plan of Care Reviewed With:   patient   daughter  Goal: Patient-Specific Goal (Individualized)  Outcome: Ongoing, Progressing  Goal: Absence of Hospital-Acquired Illness or Injury  Outcome: Ongoing, Progressing  Goal: Optimal Comfort and Wellbeing  Outcome: Ongoing, Progressing  Goal: Readiness for Transition of Care  Outcome: Ongoing, Progressing     Problem: Bariatric Environmental Safety  Goal: Safety Maintained with Care  Outcome: Ongoing, Progressing     Problem: Impaired Wound Healing  Goal: Optimal Wound Healing  Outcome: Ongoing, Progressing     Problem: Fall Injury Risk  Goal: Absence of Fall and Fall-Related Injury  Outcome: Ongoing, Progressing  Intervention: Identify and Manage Contributors  Flowsheets (Taken 8/6/2022 0317)  Medication Review/Management: medications reviewed     Problem: Skin Injury Risk Increased  Goal: Skin Health and Integrity  Outcome: Ongoing, Progressing     Problem: Pain Acute  Goal: Acceptable Pain Control and Functional Ability  Outcome: Ongoing, Progressing

## 2022-08-07 PROCEDURE — 25000003 PHARM REV CODE 250: Performed by: PODIATRIST

## 2022-08-07 PROCEDURE — 94761 N-INVAS EAR/PLS OXIMETRY MLT: CPT

## 2022-08-07 PROCEDURE — 11000001 HC ACUTE MED/SURG PRIVATE ROOM

## 2022-08-07 PROCEDURE — 63600175 PHARM REV CODE 636 W HCPCS: Performed by: INTERNAL MEDICINE

## 2022-08-07 PROCEDURE — 27000221 HC OXYGEN, UP TO 24 HOURS

## 2022-08-07 PROCEDURE — 25000003 PHARM REV CODE 250: Performed by: INTERNAL MEDICINE

## 2022-08-07 PROCEDURE — 63600175 PHARM REV CODE 636 W HCPCS: Performed by: PODIATRIST

## 2022-08-07 RX ORDER — DICYCLOMINE HYDROCHLORIDE 10 MG/1
20 CAPSULE ORAL 4 TIMES DAILY PRN
Status: DISCONTINUED | OUTPATIENT
Start: 2022-08-07 | End: 2022-08-11 | Stop reason: HOSPADM

## 2022-08-07 RX ORDER — PANTOPRAZOLE SODIUM 40 MG/1
40 TABLET, DELAYED RELEASE ORAL DAILY
Status: DISCONTINUED | OUTPATIENT
Start: 2022-08-07 | End: 2022-08-07

## 2022-08-07 RX ORDER — FUROSEMIDE 40 MG/1
40 TABLET ORAL DAILY
Status: DISCONTINUED | OUTPATIENT
Start: 2022-08-07 | End: 2022-08-08

## 2022-08-07 RX ORDER — PANTOPRAZOLE SODIUM 40 MG/1
40 TABLET, DELAYED RELEASE ORAL
Status: DISCONTINUED | OUTPATIENT
Start: 2022-08-07 | End: 2022-08-11 | Stop reason: HOSPADM

## 2022-08-07 RX ADMIN — DONEPEZIL HYDROCHLORIDE 10 MG: 5 TABLET, FILM COATED ORAL at 08:08

## 2022-08-07 RX ADMIN — CLOPIDOGREL 75 MG: 75 TABLET, FILM COATED ORAL at 10:08

## 2022-08-07 RX ADMIN — CYANOCOBALAMIN TAB 500 MCG 500 MCG: 500 TAB at 10:08

## 2022-08-07 RX ADMIN — MUPIROCIN: 20 OINTMENT TOPICAL at 10:08

## 2022-08-07 RX ADMIN — METOPROLOL TARTRATE 25 MG: 25 TABLET, FILM COATED ORAL at 10:08

## 2022-08-07 RX ADMIN — COLLAGENASE SANTYL: 250 OINTMENT TOPICAL at 10:08

## 2022-08-07 RX ADMIN — ATORVASTATIN CALCIUM 40 MG: 40 TABLET, FILM COATED ORAL at 08:08

## 2022-08-07 RX ADMIN — CEFTRIAXONE SODIUM 1 G: 1 INJECTION, POWDER, FOR SOLUTION INTRAMUSCULAR; INTRAVENOUS at 10:08

## 2022-08-07 RX ADMIN — TRAZODONE HYDROCHLORIDE 50 MG: 50 TABLET ORAL at 08:08

## 2022-08-07 RX ADMIN — SODIUM CHLORIDE 125 MG: 9 INJECTION, SOLUTION INTRAVENOUS at 10:08

## 2022-08-07 RX ADMIN — PANTOPRAZOLE SODIUM 40 MG: 40 TABLET, DELAYED RELEASE ORAL at 09:08

## 2022-08-07 RX ADMIN — Medication 400 MG: at 10:08

## 2022-08-07 RX ADMIN — LOSARTAN POTASSIUM 100 MG: 50 TABLET, FILM COATED ORAL at 10:08

## 2022-08-07 RX ADMIN — PANTOPRAZOLE SODIUM 40 MG: 40 TABLET, DELAYED RELEASE ORAL at 10:08

## 2022-08-07 RX ADMIN — HYDROCODONE BITARTRATE AND ACETAMINOPHEN 1 TABLET: 5; 325 TABLET ORAL at 07:08

## 2022-08-07 RX ADMIN — FUROSEMIDE 40 MG: 40 TABLET ORAL at 10:08

## 2022-08-07 RX ADMIN — GABAPENTIN 100 MG: 100 CAPSULE ORAL at 08:08

## 2022-08-07 RX ADMIN — METOPROLOL TARTRATE 25 MG: 25 TABLET, FILM COATED ORAL at 08:08

## 2022-08-07 RX ADMIN — HYDROCODONE BITARTRATE AND ACETAMINOPHEN 1 TABLET: 5; 325 TABLET ORAL at 01:08

## 2022-08-07 NOTE — PLAN OF CARE
Problem: Infection  Goal: Absence of Infection Signs and Symptoms  Outcome: Ongoing, Progressing  Intervention: Prevent or Manage Infection  Flowsheets (Taken 8/6/2022 1954)  Isolation Precautions: precautions maintained     Problem: Adult Inpatient Plan of Care  Goal: Plan of Care Review  Outcome: Ongoing, Progressing  Flowsheets (Taken 8/6/2022 1954)  Plan of Care Reviewed With:   patient   family  Goal: Patient-Specific Goal (Individualized)  Outcome: Ongoing, Progressing  Goal: Absence of Hospital-Acquired Illness or Injury  Outcome: Ongoing, Progressing  Intervention: Identify and Manage Fall Risk  Flowsheets (Taken 8/6/2022 1954)  Safety Promotion/Fall Prevention:   assistive device/personal item within reach   side rails raised x 2   nonskid shoes/socks when out of bed   Fall Risk signage in place   family to remain at bedside  Intervention: Prevent Skin Injury  Flowsheets (Taken 8/6/2022 1954)  Body Position:   side-lying   left  Skin Protection:   adhesive use limited   protective footwear used   tubing/devices free from skin contact  Intervention: Prevent and Manage VTE (Venous Thromboembolism) Risk  Flowsheets (Taken 8/6/2022 1954)  VTE Prevention/Management: remove, assess skin, and reapply sequential compression device  Range of Motion: active ROM (range of motion) encouraged  Goal: Optimal Comfort and Wellbeing  Outcome: Ongoing, Progressing  Intervention: Monitor Pain and Promote Comfort  Flowsheets (Taken 8/6/2022 1954)  Pain Management Interventions:   position adjusted   care clustered   medication offered   quiet environment facilitated  Goal: Readiness for Transition of Care  Outcome: Ongoing, Progressing     Problem: Bariatric Environmental Safety  Goal: Safety Maintained with Care  Outcome: Ongoing, Progressing     Problem: Fluid Imbalance (Pneumonia)  Goal: Fluid Balance  Outcome: Ongoing, Progressing     Problem: Infection (Pneumonia)  Goal: Resolution of Infection Signs and  Symptoms  Outcome: Ongoing, Progressing     Problem: Respiratory Compromise (Pneumonia)  Goal: Effective Oxygenation and Ventilation  Outcome: Ongoing, Progressing     Problem: Impaired Wound Healing  Goal: Optimal Wound Healing  Outcome: Ongoing, Progressing  Intervention: Promote Wound Healing  Flowsheets (Taken 8/6/2022 1954)  Oral Nutrition Promotion:   calorie-dense foods provided   physical activity promoted  Sleep/Rest Enhancement:   awakenings minimized   regular sleep/rest pattern promoted  Pain Management Interventions:   position adjusted   care clustered   medication offered   quiet environment facilitated     Problem: Fall Injury Risk  Goal: Absence of Fall and Fall-Related Injury  Outcome: Ongoing, Progressing  Intervention: Identify and Manage Contributors  Flowsheets (Taken 8/6/2022 1954)  Medication Review/Management: medications reviewed     Problem: Skin Injury Risk Increased  Goal: Skin Health and Integrity  Outcome: Ongoing, Progressing     Problem: Pain Acute  Goal: Acceptable Pain Control and Functional Ability  Outcome: Ongoing, Progressing  Intervention: Develop Pain Management Plan  Flowsheets (Taken 8/6/2022 1954)  Pain Management Interventions:   position adjusted   care clustered   medication offered   quiet environment facilitated  Intervention: Prevent or Manage Pain  Flowsheets (Taken 8/6/2022 1954)  Sleep/Rest Enhancement:   awakenings minimized   regular sleep/rest pattern promoted  Medication Review/Management: medications reviewed

## 2022-08-07 NOTE — PROGRESS NOTES
[facility]                       [address] [id number]    PATIENT NAME:       VIPIN NEVAREZ  YOB: 1934  CSN:                [id number]   MRN:                [id number]  ADMIT DATE:         07/30/2022 02:43:00  PHYSICIAN:          Rosales Redmond DPM                            PROGRESS NOTE    DATE:  08/07/2022 00:00:00    Patient was seen today.  No family members are present.  No reported fevers or   chills.  No chest pain.  No shortness of breath.    PHYSICAL EXAM:  VITAL SIGNS:  Stable and she is currently afebrile.  EXTREMITIES:  Preliminary wound cultures are growing out presumptive proteus   species.  Anaerobes are negative to date.  Dressings are intact.  Surgical sites   look great.  An increase in granulation tissue throughout.  No pus.  No odor.    No active bleeding.  Foot and the leg are warm.  No palpable pulses.  Left side   is stable.    LABORATORY DATA:  No labs posted for today.    ASSESSMENT:  Right foot wound infection, status post excisional debridement,   currently doing well.    PLAN:  Continue with current care.  Wait for final cultures.  Dressings were   placed.  Continue offloading.        ______________________________  Rosales Redmond DPM    GAS/AQS  DD:  08/07/2022  Time:  10:56AM  DT:  08/07/2022  Time:  11:15AM  Job #:  [id number]      PROGRESS NOTE

## 2022-08-07 NOTE — PROGRESS NOTES
[facility] Hospital Medicine Progress Note        Chief Complaint: Epistaxis     Source of Information: Patient. Medical Records        HISTORY OF PRESENT ILLNESS:   Emma Jrogensen is a 88 y.o. female with a PMHx of diastolic heart failure (LVEF 55% and grade II diastolic dysfunction from an echo on 05/24/2022),  PAF on Eliquis, moderate to severe aortic stenosis with plans for a TAVR outpatient per Cards, bilateral carotid artery stenosis, HTN, chronic dementia, previous DVT/PE, and recent surgical fixation of a closed right trimalleolar ankle fracture on 04/26/2022 who presented to [facility] ED from [facility] on 7/30/2022 with c/o epistaxis that began x1 day ago. Reported she is on 2L O2 chronically for hypoxia and occasionally get nose bleeds. She began bleeding from the left nare and felt blood draining in the back of her throat so she was referred to ED for further evaluation. Rhino Rocket was placed. Labs were notable for hgb 6.2, hct 20.1, chloride 97, CO2 34, INR 1.31. She was given KCentra, and transfused platelets. She began bleeding again from the left nare and was transferred to [facility] ED. ENT was consulted. 1 unit PRBC was ordered for transfusion. She was admitted to hospital medicine services for management of care.         Patient currently admitted and being managed for left-sided epistaxis, epistaxis has resolved.  ENT was on board she had a rhino rocket and this has been removed. Hospital course has been complicated by anemia with multiple blood transfusions.  Leukocytosis slightly trending upwards, probably source the right foot with wounds that are draining and off N/C  Consult Podiatry, get x-ray of the right foot, begin IV antibiotics. Arterial ultrasound of the right leg reviewed shows moderate to severe disease, consult patient's cardiologist for further evaluation  Podiatry planning for debridement on 8/5, Status post excisional debridement of the right  heel wounds on 08/05.  Wound cultures with GPCs.  Currently on vancomycin and ceftriaxone.  Id on board.  Discontinue aspirin to avoid triple therapy  Eliquis currently on hold for 7 days due to recent nosebleed     Today's information  Patient seen and examined at bedside  Status post excisional debridement of the right heel wounds on 08/05.  Wound cultures with GPCs.  Currently on vancomycin and ceftriaxone.  Id on board.   Eliquis on hold day 8/10   Resume home dose of Lasix 40 mg daily           Exam   General appearance: Well-developed female in no apparent distress. Oxygen   HENT: Atraumatic head. Moist mucous membranes of oral cavity.  Eyes: Normal extraocular movements.   Neck: Supple.   Lungs: Clear to auscultation bilaterally. No wheezing present.   Heart: Regular rate and rhythm. S1 and S2 present. No pedal edema.  Abdomen: Soft, non-distended, non-tender. Obese  Extremities: No cyanosis, clubbing, or edema.  Skin: R foot wound  Neuro: Motor and sensory exams grossly intact.   Psych/mental status: Appropriate mood and affect. Responds appropriately to questions.         ASSESSMENT & PLAN:   Worsening acute on chronic respiratory failure due to volume overload state in the setting of AS  Acute on Chronic Normocytic Anemia, improved after prbcs 2 units on 8/4  Acute on chronic respiratory failure, stable   - severe AS   Acute pulmonary edema,   Epistaxis, stable   Infected right foot wounds  Leucocytosis, worse   - source likely infected right foot wounds   Asymptomatic bacteriuria   PAF on Eliquis  Previous DVT/PE on chronic anticoagulation  Chronic hypoxia stable on 2 L nasal cannula  Severe aortic stenosis awaiting TAVR  Chronic diastolic congestive heart failure (EF 55% TTE 05/2022)  Dementia  Hypertension  Bilateral carotid artery disease  Peripheral arterial disease, mod- severe on right   contraction alkalosis         Plan:  Status post excisional debridement of the right heel wounds on 08/05.     Wound cultures with GPCs.  Currently on vancomycin and ceftriaxone.  Id on board.   Eliquis on hold day 8/10   Resume home dose of Lasix 40 mg daily   Labs currently stable on 2 L of oxygen, which is her baseline  Left upper extremity noted to be swollen with extravasation of blood after PICC line placed on 08/05  Radial pulses palpable  Trend H&H  Follow-up with [facility] recs  Discontinue aspirin to avoid triple therapy  Eliquis currently on hold for 7 days due to recent nosebleed  Arterial ultrasound of the right leg reviewed shows moderate to severe disease, Podiatry on board appreciate plans for debridement  Continue IV ceftriaxone for right foot infected wound  Get PT to get patient out of bed in a recliner chair.  Daughter at bedside reports patient was supposed to have a CT of the chest in preparation for TAVR-will clarify with [facility] what exactly they will need an ordered this while she is inpatient  Continue blood pressure control  Hold Eliquis x7 days, day 8/10  Resume other appropriate home medications  Labs in AM        VTE Prophylaxis: will be placed on SCD for DVT prophylaxis and will be advised to be as mobile as possible and sit in a chair as tolerated          VITAL SIGNS: 24 HRS MIN & MAX LAST   Temp  Min: 97.6 °F (36.4 °C)  Max: 98.4 °F (36.9 °C) 98.4 °F (36.9 °C)   BP  Min: 123/69  Max: 164/65 131/60   Pulse  Min: 69  Max: 80  80   Resp  Min: 17  Max: 28 17   SpO2  Min: 94 %  Max: 100 % 100 %       Recent Labs   Lab 08/03/22  0357 08/04/22  0332 08/05/22  0409 08/06/22  0336   WBC 10.1  --  10.4 11.6*   RBC 2.67*  --  3.43* 3.39*   HGB 8.0* 7.3* 10.0* 10.0*   HCT 25.9* 24.7* 33.1* 33.2*   MCV 97.0*  --  96.5* 97.9*   MCH 30.0  --  29.2 29.5   MCHC 30.9*  --  30.2* 30.1*   RDW 16.5  --  16.0 16.0     --  318 350   MPV 9.7  --  9.3 9.4       Recent Labs   Lab 08/02/22  0508 08/03/22  0356 08/05/22  0409 08/06/22  0307   * 133* 134* 136   K 3.9 4.2 3.7 3.4*   CO2 32* 33* 33* 36*   BUN 7.2*  8.3* 9.6* 12.0   CREATININE 0.59 0.56 0.55 0.60   CALCIUM 8.5 8.5 8.6 8.9   MG 1.90  --  1.90 2.00          Microbiology Results (last 7 days)     Procedure Component Value Units Date/Time    Wound Culture [[id number]]  (Abnormal) Collected: 08/05/22 1536    Order Status: Completed Specimen: Wound from Foot, Right Updated: 08/07/22 0923     Wound Culture Rare Presumptive proteus species    Anaerobic Culture [[id number]] Collected: 08/05/22 1536    Order Status: Completed Specimen: Wound from Foot, Right Updated: 08/07/22 0740     Anaerobe Culture No Anaerobes Isolated    Gram Stain [[id number]]  (Abnormal) Collected: 08/05/22 1536    Order Status: Completed Specimen: Wound from Foot, Right Updated: 08/06/22 1039     GRAM STAIN Moderate WBC observed      Many Gram positive cocci           See below for Radiology    Scheduled Med:   atorvastatin  40 mg Oral QHS    cefTRIAXone (ROCEPHIN) IVPB  1 g Intravenous Q24H    cefTRIAXone (ROCEPHIN) IVPB  1 g Intravenous Q24H    clopidogreL  75 mg Oral Daily    collagenase   Topical (Top) Daily    cyanocobalamin  500 mcg Oral Daily    donepeziL  10 mg Oral Nightly    ferric gluconate (FERRLECIT) IVPB  125 mg Intravenous Daily    furosemide  40 mg Oral Daily    gabapentin  100 mg Oral Nightly    losartan  100 mg Oral Daily    magnesium oxide  400 mg Oral Daily    metoprolol tartrate  25 mg Oral BID    mupirocin   Topical (Top) Daily    pantoprazole  40 mg Oral Daily    traZODone  50 mg Oral QHS        Continuous Infusions:       PRN Meds:  acetaminophen, acetaminophen, hydrALAZINE, HYDROcodone-acetaminophen, HYDROcodone-acetaminophen, melatonin, ondansetron, sodium chloride 0.9%         VTE prophylaxis:     Patient condition:  Stable/Fair/Guarded/ Serious/ Critical    Anticipated discharge and Disposition:         All diagnosis and differential diagnosis have been reviewed; assessment and plan has been documented; I have personally reviewed the labs and test results  that are presently available; I have reviewed the patients medication list; I have reviewed the consulting providers response and recommendations. I have reviewed or attempted to review medical records based upon their availability    All of the patient's questions have been  addressed and answered. Patient's is agreeable to the above stated plan. I will continue to monitor closely and make adjustments to medical management as needed.  _____________________________________________________________________    Nutrition Status:    Radiology:  CV Ultrasound doppler arterial legs bilat  · The right lower extremity demonstrated moderately severe arterial flow   reduction primarily in the popliteal, posterior tibial and dorsalis pedis   arteries; consistent with multilevel disease.  · The left lower extremity demonstrated mild arterial flow reduction   primarily in the posterior tibial and dorsalis pedis arteries; consistent   with multilevel disease.     Bilateral lower extremities demonstrated calcified non-compressible   posterior tibial arteries.   CV Ultrasound doppler arterial arm left  The left upper extremity demonstrated mild arterial flow reduction with   diffuse swelling.  There was no evidence of localized hematoma or   pseudoaneurysm.       Sai Sanchez MD   08/07/2022

## 2022-08-08 LAB
ANION GAP SERPL CALC-SCNC: 3 MEQ/L
BACTERIA SPEC CULT: ABNORMAL
BASOPHILS # BLD AUTO: 0.05 X10(3)/MCL (ref 0–0.2)
BASOPHILS NFR BLD AUTO: 0.5 %
BUN SERPL-MCNC: 17.2 MG/DL (ref 9.8–20.1)
CALCIUM SERPL-MCNC: 8.9 MG/DL (ref 8.4–10.2)
CHLORIDE SERPL-SCNC: 97 MMOL/L (ref 98–107)
CO2 SERPL-SCNC: 40 MMOL/L (ref 23–31)
CREAT SERPL-MCNC: 0.54 MG/DL (ref 0.55–1.02)
CREAT/UREA NIT SERPL: 32
EOSINOPHIL # BLD AUTO: 0.09 X10(3)/MCL (ref 0–0.9)
EOSINOPHIL NFR BLD AUTO: 0.8 %
ERYTHROCYTE [DISTWIDTH] IN BLOOD BY AUTOMATED COUNT: 16.3 % (ref 11.5–17)
GFR SERPLBLD CREATININE-BSD FMLA CKD-EPI: >60 MLS/MIN/1.73/M2
GLUCOSE SERPL-MCNC: 117 MG/DL (ref 82–115)
HCT VFR BLD AUTO: 34 % (ref 37–47)
HGB BLD-MCNC: 9.9 GM/DL (ref 12–16)
IMM GRANULOCYTES # BLD AUTO: 0.06 X10(3)/MCL (ref 0–0.04)
IMM GRANULOCYTES NFR BLD AUTO: 0.6 %
LYMPHOCYTES # BLD AUTO: 0.76 X10(3)/MCL (ref 0.6–4.6)
LYMPHOCYTES NFR BLD AUTO: 7 %
MCH RBC QN AUTO: 29.6 PG (ref 27–31)
MCHC RBC AUTO-ENTMCNC: 29.1 MG/DL (ref 33–36)
MCV RBC AUTO: 101.8 FL (ref 80–94)
MONOCYTES # BLD AUTO: 1.07 X10(3)/MCL (ref 0.1–1.3)
MONOCYTES NFR BLD AUTO: 9.9 %
NEUTROPHILS # BLD AUTO: 8.8 X10(3)/MCL (ref 2.1–9.2)
NEUTROPHILS NFR BLD AUTO: 81.2 %
NRBC BLD AUTO-RTO: 0 %
PLATELET # BLD AUTO: 373 X10(3)/MCL (ref 130–400)
PMV BLD AUTO: 9.4 FL (ref 7.4–10.4)
POTASSIUM SERPL-SCNC: 3.7 MMOL/L (ref 3.5–5.1)
RBC # BLD AUTO: 3.34 X10(6)/MCL (ref 4.2–5.4)
SODIUM SERPL-SCNC: 140 MMOL/L (ref 136–145)
WBC # SPEC AUTO: 10.8 X10(3)/MCL (ref 4.5–11.5)

## 2022-08-08 PROCEDURE — 27000221 HC OXYGEN, UP TO 24 HOURS

## 2022-08-08 PROCEDURE — 80048 BASIC METABOLIC PNL TOTAL CA: CPT | Performed by: INTERNAL MEDICINE

## 2022-08-08 PROCEDURE — 63600175 PHARM REV CODE 636 W HCPCS: Performed by: INTERNAL MEDICINE

## 2022-08-08 PROCEDURE — 99223 PR INITIAL HOSPITAL CARE,LEVL III: ICD-10-PCS | Mod: FS,,, | Performed by: GENERAL PRACTICE

## 2022-08-08 PROCEDURE — 99223 1ST HOSP IP/OBS HIGH 75: CPT | Mod: FS,,, | Performed by: GENERAL PRACTICE

## 2022-08-08 PROCEDURE — 25000003 PHARM REV CODE 250: Performed by: PODIATRIST

## 2022-08-08 PROCEDURE — 36415 COLL VENOUS BLD VENIPUNCTURE: CPT | Performed by: INTERNAL MEDICINE

## 2022-08-08 PROCEDURE — 11000001 HC ACUTE MED/SURG PRIVATE ROOM

## 2022-08-08 PROCEDURE — 85025 COMPLETE CBC W/AUTO DIFF WBC: CPT | Performed by: INTERNAL MEDICINE

## 2022-08-08 PROCEDURE — 25000003 PHARM REV CODE 250: Performed by: INTERNAL MEDICINE

## 2022-08-08 RX ORDER — CEFTRIAXONE 2 G/50ML
2 INJECTION, SOLUTION INTRAVENOUS
Status: DISCONTINUED | OUTPATIENT
Start: 2022-08-09 | End: 2022-08-11 | Stop reason: HOSPADM

## 2022-08-08 RX ADMIN — COLLAGENASE SANTYL: 250 OINTMENT TOPICAL at 09:08

## 2022-08-08 RX ADMIN — Medication 400 MG: at 09:08

## 2022-08-08 RX ADMIN — HYDROCODONE BITARTRATE AND ACETAMINOPHEN 1 TABLET: 5; 325 TABLET ORAL at 09:08

## 2022-08-08 RX ADMIN — CEFTRIAXONE SODIUM 1 G: 1 INJECTION, POWDER, FOR SOLUTION INTRAMUSCULAR; INTRAVENOUS at 10:08

## 2022-08-08 RX ADMIN — METOPROLOL TARTRATE 25 MG: 25 TABLET, FILM COATED ORAL at 09:08

## 2022-08-08 RX ADMIN — DONEPEZIL HYDROCHLORIDE 10 MG: 5 TABLET, FILM COATED ORAL at 09:08

## 2022-08-08 RX ADMIN — HYDROCODONE BITARTRATE AND ACETAMINOPHEN 1 TABLET: 5; 325 TABLET ORAL at 01:08

## 2022-08-08 RX ADMIN — GABAPENTIN 100 MG: 100 CAPSULE ORAL at 09:08

## 2022-08-08 RX ADMIN — ATORVASTATIN CALCIUM 40 MG: 40 TABLET, FILM COATED ORAL at 09:08

## 2022-08-08 RX ADMIN — APIXABAN 2.5 MG: 2.5 TABLET, FILM COATED ORAL at 10:08

## 2022-08-08 RX ADMIN — CYANOCOBALAMIN TAB 500 MCG 500 MCG: 500 TAB at 09:08

## 2022-08-08 RX ADMIN — CLOPIDOGREL 75 MG: 75 TABLET, FILM COATED ORAL at 09:08

## 2022-08-08 RX ADMIN — PANTOPRAZOLE SODIUM 40 MG: 40 TABLET, DELAYED RELEASE ORAL at 04:08

## 2022-08-08 RX ADMIN — APIXABAN 2.5 MG: 2.5 TABLET, FILM COATED ORAL at 09:08

## 2022-08-08 RX ADMIN — PANTOPRAZOLE SODIUM 40 MG: 40 TABLET, DELAYED RELEASE ORAL at 06:08

## 2022-08-08 RX ADMIN — LOSARTAN POTASSIUM 100 MG: 50 TABLET, FILM COATED ORAL at 09:08

## 2022-08-08 RX ADMIN — TRAZODONE HYDROCHLORIDE 50 MG: 50 TABLET ORAL at 09:08

## 2022-08-08 RX ADMIN — MUPIROCIN: 20 OINTMENT TOPICAL at 09:08

## 2022-08-08 NOTE — PROGRESS NOTES
[facility] Hospital Medicine Progress Note        Chief Complaint: Epistaxis     Source of Information: Patient. Medical Records        HISTORY OF PRESENT ILLNESS:   Emma Jorgensen is a 88 y.o. female with a PMHx of diastolic heart failure (LVEF 55% and grade II diastolic dysfunction from an echo on 05/24/2022),  PAF on Eliquis, moderate to severe aortic stenosis with plans for a TAVR outpatient per Cards, bilateral carotid artery stenosis, HTN, chronic dementia, previous DVT/PE, and recent surgical fixation of a closed right trimalleolar ankle fracture on 04/26/2022 who presented to [facility] ED from [facility] on 7/30/2022 with c/o epistaxis that began x1 day ago. Reported she is on 2L O2 chronically for hypoxia and occasionally get nose bleeds. She began bleeding from the left nare and felt blood draining in the back of her throat so she was referred to ED for further evaluation. Rhino Rocket was placed. Labs were notable for hgb 6.2, hct 20.1, chloride 97, CO2 34, INR 1.31. She was given KCentra, and transfused platelets. She began bleeding again from the left nare and was transferred to [facility] ED. ENT was consulted. 1 unit PRBC was ordered for transfusion. She was admitted to hospital medicine services for management of care.         Patient currently admitted and being managed for left-sided epistaxis, epistaxis has resolved.  ENT was on board she had a rhino rocket and this has been removed. Hospital course has been complicated by anemia with multiple blood transfusions.  Leukocytosis slightly trending upwards, probably source the right foot with wounds that are draining and off N/C  Consult Podiatry, get x-ray of the right foot, begin IV antibiotics. Arterial ultrasound of the right leg reviewed shows moderate to severe disease, consult patient's cardiologist for further evaluation  Podiatry planning for debridement on 8/5, Status post excisional debridement of the right  heel wounds on 08/05.  Wound cultures with GPCs.  Currently on vancomycin and ceftriaxone.  Id on board.  Discontinue aspirin to avoid triple therapy  Eliquis currently on hold for 7 days due to recent nosebleed     Today's information  Patient seen and examined at bedside  Patient continues with dyspnea on slight exertion and on 2- 4 l of oxygen , this is her baseline she is to have TAVR as outpatient  Contraction alkalosis noted suspend p.o. Lasix today, assess and resume prn  Leukocytosis has resolved.  Hemoglobin is stable at 9.9 grams/dL  Epistaxis  has resolved.  Resume Eliquis at low dose 2.5 mg b.i.d. trend H and H, monitor for rebleeding of the nose  Follow-up with podiatry recs  Consult ID for antibiotic recommendations for discharge, continue IV ceftriaxone day 9            Exam   General appearance: Well-developed female in no apparent distress. Oxygen   HENT: Atraumatic head. Moist mucous membranes of oral cavity.  Eyes: Normal extraocular movements.   Neck: Supple.   Lungs: Clear to auscultation bilaterally. No wheezing present.   Heart: Regular rate and rhythm. S1 and S2 present. No pedal edema.  Abdomen: Soft, non-distended, non-tender. Obese  Extremities: No cyanosis, clubbing, or edema.  Skin: R foot wound  Neuro: Motor and sensory exams grossly intact.   Psych/mental status: Appropriate mood and affect. Responds appropriately to questions.         ASSESSMENT & PLAN:   Worsening acute on chronic respiratory failure due to volume overload state in the setting of AS  Acute on Chronic Normocytic Anemia, improved after prbcs 2 units on 8/4  Acute on chronic respiratory failure, stable   - severe AS   Acute pulmonary edema, resolved  Epistaxis, resolved   Infected right foot heel wounds  -Status post excisional debridement of the right heel wounds on 08/05.    Leucocytosis, resolved   - source likely infected right foot wounds   Asymptomatic bacteriuria   PAF on Eliquis  Previous DVT/PE on chronic  anticoagulation  Chronic hypoxia stable on 2 L nasal cannula  Severe aortic stenosis awaiting TAVR  Chronic diastolic congestive heart failure (EF 55% TTE 05/2022)  Dementia  Hypertension  Bilateral carotid artery disease  Peripheral arterial disease, mod- severe on right   contraction alkalosis         Plan:  Patient continues with dyspnea on slight exertion and on 2- 4 l of oxygen , this is her baseline she is to have TAVR as outpatient  Contraction alkalosis noted suspend p.o. Lasix today, assess and resume prn  Leukocytosis has resolved.  Hemoglobin is stable at 9.9 grams/dL  Epistaxis  has resolved.  Resume Eliquis at low dose 2.5 mg b.i.d. trend H and H, monitor for rebleeding of the nose  Follow-up with podiatry recs  Consult ID for antibiotic recommendations for discharge, continue IV ceftriaxone day 9  Wound cultures with GPCs.  Currently on vancomycin and ceftriaxone.  Id on board.  Left upper extremity noted to be swollen with extravasation of blood after PICC line placed on 08/05  Radial pulses palpable  [facility] signed off, for out pt f/up  Discontinue aspirin to avoid triple therapy  Arterial ultrasound of the right leg reviewed shows moderate to severe disease  Continue blood pressure control    Labs in AM        VTE Prophylaxis: will be placed on SCD for DVT prophylaxis and will be advised to be as mobile as possible and sit in a chair   Disposition likely in the next 24-48 hours.  Pending Podiatry, ID recommendations for antibiotics  Patient is from [place], to return there      VITAL SIGNS: 24 HRS MIN & MAX LAST   Temp  Min: 97.6 °F (36.4 °C)  Max: 99.2 °F (37.3 °C) 97.6 °F (36.4 °C)   BP  Min: 97/56  Max: 139/76 (!) 135/55   Pulse  Min: 66  Max: 89  89   Resp  Min: 16  Max: 19 19   SpO2  Min: 94 %  Max: 100 % 100 %       Recent Labs   Lab 08/05/22  0409 08/06/22  0336 08/08/22  0403   WBC 10.4 11.6* 10.8   RBC 3.43* 3.39* 3.34*   HGB 10.0* 10.0* 9.9*   HCT 33.1* 33.2* 34.0*   MCV 96.5* 97.9* 101.8*    MCH 29.2 29.5 29.6   MCHC 30.2* 30.1* 29.1*   RDW 16.0 16.0 16.3    350 373   MPV 9.3 9.4 9.4       Recent Labs   Lab 08/02/22  0508 08/03/22  0356 08/05/22  0409 08/06/22  0307 08/08/22  0402   *   < > 134* 136 140   K 3.9   < > 3.7 3.4* 3.7   CO2 32*   < > 33* 36* 40*   BUN 7.2*   < > 9.6* 12.0 17.2   CREATININE 0.59   < > 0.55 0.60 0.54*   CALCIUM 8.5   < > 8.6 8.9 8.9   MG 1.90  --  1.90 2.00  --     < > = values in this interval not displayed.          Microbiology Results (last 7 days)     Procedure Component Value Units Date/Time    Wound Culture [[id number]]  (Abnormal)  (Susceptibility) Collected: 08/05/22 1536    Order Status: Completed Specimen: Wound from Foot, Right Updated: 08/08/22 0847     Wound Culture Rare Proteus mirabilis    Anaerobic Culture [[id number]] Collected: 08/05/22 1536    Order Status: Completed Specimen: Wound from Foot, Right Updated: 08/07/22 0740     Anaerobe Culture No Anaerobes Isolated    Gram Stain [[id number]]  (Abnormal) Collected: 08/05/22 1536    Order Status: Completed Specimen: Wound from Foot, Right Updated: 08/06/22 1039     GRAM STAIN Moderate WBC observed      Many Gram positive cocci           See below for Radiology    Scheduled Med:   apixaban  2.5 mg Oral BID    atorvastatin  40 mg Oral QHS    cefTRIAXone (ROCEPHIN) IVPB  1 g Intravenous Q24H    clopidogreL  75 mg Oral Daily    collagenase   Topical (Top) Daily    cyanocobalamin  500 mcg Oral Daily    donepeziL  10 mg Oral Nightly    gabapentin  100 mg Oral Nightly    losartan  100 mg Oral Daily    magnesium oxide  400 mg Oral Daily    metoprolol tartrate  25 mg Oral BID    mupirocin   Topical (Top) Daily    pantoprazole  40 mg Oral BID AC    traZODone  50 mg Oral QHS        Continuous Infusions:       PRN Meds:  acetaminophen, acetaminophen, dicyclomine, hydrALAZINE, HYDROcodone-acetaminophen, HYDROcodone-acetaminophen, melatonin, ondansetron, sodium chloride 0.9%           VTE  prophylaxis:     Patient condition:  Stable/Fair/Guarded/ Serious/ Critical    Anticipated discharge and Disposition:         All diagnosis and differential diagnosis have been reviewed; assessment and plan has been documented; I have personally reviewed the labs and test results that are presently available; I have reviewed the patients medication list; I have reviewed the consulting providers response and recommendations. I have reviewed or attempted to review medical records based upon their availability    All of the patient's questions have been  addressed and answered. Patient's is agreeable to the above stated plan. I will continue to monitor closely and make adjustments to medical management as needed.  _____________________________________________________________________    Nutrition Status:    Radiology:  CV Ultrasound doppler arterial legs bilat  · The right lower extremity demonstrated moderately severe arterial flow   reduction primarily in the popliteal, posterior tibial and dorsalis pedis   arteries; consistent with multilevel disease.  · The left lower extremity demonstrated mild arterial flow reduction   primarily in the posterior tibial and dorsalis pedis arteries; consistent   with multilevel disease.     Bilateral lower extremities demonstrated calcified non-compressible   posterior tibial arteries.   CV Ultrasound doppler arterial arm left  The left upper extremity demonstrated mild arterial flow reduction with   diffuse swelling.  There was no evidence of localized hematoma or   pseudoaneurysm.       Sai Sanchez MD   08/08/2022

## 2022-08-08 NOTE — PT/OT/SLP PROGRESS
Attempted to see pt for PT tx. Pt very lethargic at this time 2/2 having gotten pain meds about 30min prior to PTA arrival. Pt daughter present stating pt also did not sleep well last night. PT to f/u tomorrow. Plan to attempt SB T/F to w/c if appropriate.

## 2022-08-08 NOTE — CONSULTS
CONSULTATION DATE: 8/8/2022          CONSULTING PHYSICIAN:     REASON FOR CONSULTATION:  Antibiotic management, s/p debridement of right heel ulcers       ANTIMICROBIALS: Ceftriaxone - started 8/7           HISTORY OF PRESENT ILLNESS:  She is an 88-year-old female with a past medical history of PAD on Eliquis as well as a right foot fracture in April of this year.  She underwent an ORIF at that time.  She was admitted on 07/30/2022 for epistaxis which has since resolved.  She was noted to have a draining right heel wound and was evaluated by Podiatry.  She subsequently underwent I and D as well as partial removal of hardware on 8/5 - surgical Gram stam was positive for GPCs.  Culture is positive for relatively sensitive Proteus mirabilis.  She is currently receiving ceftriaxone.  She was also seen by Cardiology during this hospitalization for evaluation of PAD-planning on outpatient peripheral angiogram.  We have been consulted for antimicrobial management.  She is currently without complaints and has remained afebrile and hemodynamically stable throughout hospitalization.        PAST MEDICAL HISTORY:   Past Medical History:   Diagnosis Date    Anxiety disorder, unspecified     Arthritis     Deep vein thrombosis     Depression     Irregular heart beat     Obesity, unspecified     Other pulmonary embolism without acute cor pulmonale     Parkinson's disease     Peripheral artery disease     Trimalleolar fracture of ankle, closed     Unspecified dementia without behavioral disturbance             PAST SURGICAL HISTORY:   Past Surgical History:   Procedure Laterality Date    ANGIOGRAM, CORONARY, WITH LEFT HEART CATHETERIZATION N/A 6/7/2022    Procedure: Angiogram, Coronary, with Left Heart Cath;  Surgeon: Claude Morillo MD;  Location[facility] C[facility] LAB;  Service: Cardiology;  Laterality: N/A;    ANKLE FRACTURE SURGERY Right     CATARACT EXTRACTION      CATHETERIZATION OF BOTH LEFT AND RIGHT HEART N/A 6/7/2022     Procedure: CATHETERIZATION, HEART, BOTH LEFT AND RIGHT;  Surgeon: Claude Morillo MD;  Location: [facility] CATH LAB;  Service: Cardiology;  Laterality: N/A;    COLONOSCOPY W/ POLYPECTOMY      HYSTERECTOMY      SMALL INTESTINE SURGERY              SOCIAL HISTORY:   Social History     Socioeconomic History    Marital status:    Tobacco Use    Smoking status: Former Smoker    Smokeless tobacco: Never Used   Substance and Sexual Activity    Alcohol use: Never    Drug use: Never            FAMILY HISTORY:   Family History   Family history unknown: Yes            ALLERGIES: Review of patient's allergies indicates:  No Known Allergies         REVIEW OF SYSTEMS: Negative unless stated above         MEDICATIONS:   Reviewed in EMR        PHYSICAL EXAM:   Vitals:    08/08/22 0951   BP: (!) 135/55   Pulse: 89   Resp:    Temp:       GENERAL: NAD; does not appear toxic  SKIN: no rash  HEENT: sclera non-icteric; PERRL   NECK: supple; no LAD  CHEST: CTA; nonlabored, equal expansion; no adventitious BS  CARDIOVASCULAR: RRR, S1S2; no murmur; equal peripheral pulses; no edema  ABDOMEN:  active bowel sounds; abdomen soft, nondistended, nontender to palpation  GENITOURINARY: Daugherty in place  EXTREMITIES: no cyanosis or clubbing  NEURO: Awake and alert         LABORATORY DATA: Reviewed         RADIOLOGICAL DATA: Reviewed       IMPRESSION:   87 yo female with h/o PAD / PAF on Eliquis admitted with epistaxis, now resolved.  Had previously had a ORIF of a right ankle fracture, now with exposed bone. S/p I&D and partial hardware removal.  ID consulted for antibiotic management    1. Right calcaneal osteomyelitis w/hardware, s/p I&D and partial hardware removal  2. PAD  3. Epistaxis s/t Eliquis, now resolved  4. Aortic stenosis awaiting TAVR  5. Dementia      PLAN:  Continue ceftriaxone, increase dose to 2gm q24hr.  Will need a prolonged IV abx course of 6 weeks from surgery.  Tentative end date: 9/16.    IV abx course to be followed  by prolonged vs indefinite PO suppression given retained hardware.   Wound care per podiatry.  Likely could benefit from placement.  Discussed with patient and daughter.

## 2022-08-08 NOTE — PLAN OF CARE
Problem: Infection  Goal: Absence of Infection Signs and Symptoms  Outcome: Ongoing, Progressing  Intervention: Prevent or Manage Infection  Flowsheets (Taken 8/7/2022 1943)  Isolation Precautions: precautions maintained     Problem: Adult Inpatient Plan of Care  Goal: Plan of Care Review  Outcome: Ongoing, Progressing  Flowsheets (Taken 8/7/2022 1943)  Plan of Care Reviewed With:   patient   daughter  Goal: Patient-Specific Goal (Individualized)  Outcome: Ongoing, Progressing  Goal: Absence of Hospital-Acquired Illness or Injury  Outcome: Ongoing, Progressing  Intervention: Identify and Manage Fall Risk  Flowsheets (Taken 8/7/2022 1943)  Safety Promotion/Fall Prevention:   assistive device/personal item within reach   Fall Risk signage in place   family to remain at bedside   side rails raised x 2   lighting adjusted   nonskid shoes/socks when out of bed  Intervention: Prevent Skin Injury  Flowsheets (Taken 8/7/2022 1943)  Body Position:   left   side-lying  Skin Protection:   adhesive use limited   tubing/devices free from skin contact  Intervention: Prevent and Manage VTE (Venous Thromboembolism) Risk  Flowsheets (Taken 8/7/2022 1943)  Activity Management: Rolling - L1  VTE Prevention/Management:   remove, assess skin, and reapply sequential compression device   bleeding precations maintained  Range of Motion: active ROM (range of motion) encouraged  Intervention: Prevent Infection  Flowsheets (Taken 8/7/2022 1943)  Infection Prevention:   hand hygiene promoted   single patient room provided  Goal: Optimal Comfort and Wellbeing  Outcome: Ongoing, Progressing  Intervention: Monitor Pain and Promote Comfort  Flowsheets (Taken 8/7/2022 1943)  Pain Management Interventions:   position adjusted   quiet environment facilitated   heat applied   medication offered  Goal: Readiness for Transition of Care  Outcome: Ongoing, Progressing     Problem: Bariatric Environmental Safety  Goal: Safety Maintained with  Care  Outcome: Ongoing, Progressing     Problem: Fluid Imbalance (Pneumonia)  Goal: Fluid Balance  Outcome: Ongoing, Progressing  Intervention: Monitor and Manage Fluid Balance  Flowsheets (Taken 8/7/2022 1943)  Fluid/Electrolyte Management: fluids provided     Problem: Infection (Pneumonia)  Goal: Resolution of Infection Signs and Symptoms  Outcome: Ongoing, Progressing     Problem: Respiratory Compromise (Pneumonia)  Goal: Effective Oxygenation and Ventilation  Outcome: Ongoing, Progressing  Intervention: Optimize Oxygenation and Ventilation  Flowsheets (Taken 8/7/2022 1943)  Head of Bed (HOB) Positioning: HOB at 30-45 degrees     Problem: Impaired Wound Healing  Goal: Optimal Wound Healing  Outcome: Ongoing, Progressing     Problem: Fall Injury Risk  Goal: Absence of Fall and Fall-Related Injury  Outcome: Ongoing, Progressing  Intervention: Identify and Manage Contributors  Flowsheets (Taken 8/7/2022 1943)  Medication Review/Management: medications reviewed     Problem: Skin Injury Risk Increased  Goal: Skin Health and Integrity  Outcome: Ongoing, Progressing     Problem: Pain Acute  Goal: Acceptable Pain Control and Functional Ability  Outcome: Ongoing, Progressing  Intervention: Develop Pain Management Plan  Flowsheets (Taken 8/7/2022 1943)  Pain Management Interventions:   position adjusted   quiet environment facilitated   heat applied   medication offered  Intervention: Prevent or Manage Pain  Flowsheets (Taken 8/7/2022 1943)  Sleep/Rest Enhancement:   awakenings minimized   regular sleep/rest pattern promoted  Sensory Stimulation Regulation:   care clustered   quiet environment promoted  Medication Review/Management: medications reviewed

## 2022-08-09 LAB
ANION GAP SERPL CALC-SCNC: 4 MEQ/L
BACTERIA SPEC ANAEROBE CULT: ABNORMAL
BNP BLD-MCNC: 1276.2 PG/ML
BUN SERPL-MCNC: 22.8 MG/DL (ref 9.8–20.1)
CALCIUM SERPL-MCNC: 8.9 MG/DL (ref 8.4–10.2)
CHLORIDE SERPL-SCNC: 93 MMOL/L (ref 98–107)
CO2 SERPL-SCNC: 41 MMOL/L (ref 23–31)
CREAT SERPL-MCNC: 0.62 MG/DL (ref 0.55–1.02)
CREAT/UREA NIT SERPL: 37
ESTROGEN SERPL-MCNC: NORMAL PG/ML
GFR SERPLBLD CREATININE-BSD FMLA CKD-EPI: >60 MLS/MIN/1.73/M2
GLUCOSE SERPL-MCNC: 106 MG/DL (ref 82–115)
HCT VFR BLD AUTO: 31.9 % (ref 37–47)
HGB BLD-MCNC: 9.4 GM/DL (ref 12–16)
INSULIN SERPL-ACNC: NORMAL U[IU]/ML
LAB AP CLINICAL INFORMATION: NORMAL
LAB AP GROSS DESCRIPTION: NORMAL
LAB AP REPORT FOOTNOTES: NORMAL
POTASSIUM SERPL-SCNC: 4.1 MMOL/L (ref 3.5–5.1)
SODIUM SERPL-SCNC: 138 MMOL/L (ref 136–145)
T3RU NFR SERPL: NORMAL %

## 2022-08-09 PROCEDURE — 36415 COLL VENOUS BLD VENIPUNCTURE: CPT | Performed by: INTERNAL MEDICINE

## 2022-08-09 PROCEDURE — 25000003 PHARM REV CODE 250: Performed by: PODIATRIST

## 2022-08-09 PROCEDURE — 97530 THERAPEUTIC ACTIVITIES: CPT | Mod: CQ

## 2022-08-09 PROCEDURE — 99232 SBSQ HOSP IP/OBS MODERATE 35: CPT | Mod: FS,,, | Performed by: GENERAL PRACTICE

## 2022-08-09 PROCEDURE — 99232 PR SUBSEQUENT HOSPITAL CARE,LEVL II: ICD-10-PCS | Mod: FS,,, | Performed by: GENERAL PRACTICE

## 2022-08-09 PROCEDURE — 83880 ASSAY OF NATRIURETIC PEPTIDE: CPT | Performed by: INTERNAL MEDICINE

## 2022-08-09 PROCEDURE — 63600175 PHARM REV CODE 636 W HCPCS: Performed by: NURSE PRACTITIONER

## 2022-08-09 PROCEDURE — 80048 BASIC METABOLIC PNL TOTAL CA: CPT | Performed by: INTERNAL MEDICINE

## 2022-08-09 PROCEDURE — 85014 HEMATOCRIT: CPT | Performed by: INTERNAL MEDICINE

## 2022-08-09 PROCEDURE — 11000001 HC ACUTE MED/SURG PRIVATE ROOM

## 2022-08-09 PROCEDURE — 25000003 PHARM REV CODE 250: Performed by: INTERNAL MEDICINE

## 2022-08-09 RX ADMIN — METOPROLOL TARTRATE 25 MG: 25 TABLET, FILM COATED ORAL at 10:08

## 2022-08-09 RX ADMIN — LOSARTAN POTASSIUM 100 MG: 50 TABLET, FILM COATED ORAL at 10:08

## 2022-08-09 RX ADMIN — CEFTRIAXONE 2 G: 2 INJECTION, SOLUTION INTRAVENOUS at 10:08

## 2022-08-09 RX ADMIN — HYDROCODONE BITARTRATE AND ACETAMINOPHEN 1 TABLET: 5; 325 TABLET ORAL at 02:08

## 2022-08-09 RX ADMIN — GABAPENTIN 100 MG: 100 CAPSULE ORAL at 09:08

## 2022-08-09 RX ADMIN — COLLAGENASE SANTYL: 250 OINTMENT TOPICAL at 10:08

## 2022-08-09 RX ADMIN — ATORVASTATIN CALCIUM 40 MG: 40 TABLET, FILM COATED ORAL at 09:08

## 2022-08-09 RX ADMIN — HYDROCODONE BITARTRATE AND ACETAMINOPHEN 1 TABLET: 5; 325 TABLET ORAL at 05:08

## 2022-08-09 RX ADMIN — APIXABAN 2.5 MG: 2.5 TABLET, FILM COATED ORAL at 09:08

## 2022-08-09 RX ADMIN — MUPIROCIN: 20 OINTMENT TOPICAL at 10:08

## 2022-08-09 RX ADMIN — PANTOPRAZOLE SODIUM 40 MG: 40 TABLET, DELAYED RELEASE ORAL at 05:08

## 2022-08-09 RX ADMIN — DICYCLOMINE HYDROCHLORIDE 20 MG: 10 CAPSULE ORAL at 09:08

## 2022-08-09 RX ADMIN — CLOPIDOGREL 75 MG: 75 TABLET, FILM COATED ORAL at 10:08

## 2022-08-09 RX ADMIN — TRAZODONE HYDROCHLORIDE 50 MG: 50 TABLET ORAL at 09:08

## 2022-08-09 RX ADMIN — Medication 400 MG: at 10:08

## 2022-08-09 RX ADMIN — APIXABAN 2.5 MG: 2.5 TABLET, FILM COATED ORAL at 10:08

## 2022-08-09 RX ADMIN — DONEPEZIL HYDROCHLORIDE 10 MG: 5 TABLET, FILM COATED ORAL at 09:08

## 2022-08-09 RX ADMIN — PANTOPRAZOLE SODIUM 40 MG: 40 TABLET, DELAYED RELEASE ORAL at 04:08

## 2022-08-09 RX ADMIN — CYANOCOBALAMIN TAB 500 MCG 500 MCG: 500 TAB at 10:08

## 2022-08-09 RX ADMIN — METOPROLOL TARTRATE 25 MG: 25 TABLET, FILM COATED ORAL at 09:08

## 2022-08-09 NOTE — PT/OT/SLP PROGRESS
Physical Therapy         Treatment        Emma Jorgensen   MRN: [id number]     PT Received On: 08/09/22  PT Start Time: 1040     PT Stop Time: 1104    PT Total Time (min): 24 min       Billable Minutes:  Therapeutic Activity 24  Total Minutes: 24    Treatment Type: Treatment  PT/PTA: PTA     PTA Visit Number: 1       General Precautions: Standard, fall  Orthopedic Precautions: Orthopedic Precautions : RLE non weight bearing   Braces:           Subjective:  Communicated with NSG prior to session.    Pain/Comfort  Pain Rating 1: 0/10    Objective:  Patient found L sidelying, with Patient found with: peripheral IV, oxygen    Functional Mobility:  Bed Mobility:   Supine to sit: Moderate Assistance   Sit to supine: Activity did not occur   Rolling: Activity did not occur   Scooting: Moderate Assistance    Balance:   Static Sit: NORMAL: No deviations seen in posture held statically  Dynamic Sit:  GOOD: Maintains balance through MODERATE excursions of active trunk movement    Transfer Training:  Bed <> Chair:  Slide Board with Moderate Assistance with No Assistive Device . pt T/F EOB>w/c via SB modAx2. pt required increased time and max vcs to complete T/F.       Additional Treatment:    Activity Tolerance:  Patient tolerated treatment well    Patient left up in chair with all lines intact and call button in reach. Pt left on oleg pad UIC. NSG aware to oleg T/F pt B2B once she is ready.     Assessment:  Emma Jrogensen is a 88 y.o. female with a medical diagnosis of Epistaxis, stable   Acute on Chronic Normocytic Anemia  - s/p 1 unit of prbcs   Leucocytosis, mild  Asymptomatic bacteriuria   PAF on Eliquis  Previous DVT/PE on chronic anticoagulation  Chronic hypoxia stable on 2 L nasal cannula  Severe aortic stenosis awaiting TAVR  Chronic diastolic congestive heart failure (EF 55% TTE 05/2022)  Dementia  Hypertension  Bilateral carotid artery disease  Peripheral arterial disease      Rehab potential is  good.    Activity tolerance: Good    Discharge recommendations: Discharge Facility/Level of Care Needs: nursing facility, skilled     Equipment recommendations:       GOALS:   Multidisciplinary Problems     Physical Therapy Goals        Problem: Physical Therapy    Goal Priority Disciplines Outcome Goal Variances Interventions   Physical Therapy Goal     PT, PT/OT Ongoing, Progressing     Description: LTGs   1. Pt will be jeremy with bed mobility  2. Pt will be jeremy with sit to stand-discontinued  3. Slide board transfer with min A.                   PLAN:    Patient to be seen 5 x/week  to address the above listed problems via therapeutic activities, therapeutic exercises, wheelchair management/training  Plan of Care expires: 08/26/22  Plan of Care reviewed with: patient, daughter         8/9/2022

## 2022-08-09 NOTE — PROGRESS NOTES
Hospital Medicine  Progress Note    Patient Name: Emma Jorgensen  MRN: [id number]  Status: IP- Inpatient   Admission Date: 7/30/2022  Length of Stay: 10      CC: hospital follow-up for epistaxis       SUBJECTIVE   88 y.o. female with a PMHx of diastolic heart failure (LVEF 55% and grade II diastolic dysfunction from an echo on 05/24/2022),  PAF on Eliquis, moderate to severe aortic stenosis with plans for a TAVR outpatient per Cards, bilateral carotid artery stenosis, HTN, chronic dementia, previous DVT/PE, and recent surgical fixation of a closed right trimalleolar ankle fracture on 04/26/2022 who presented to [facility] ED from [facility] on 7/30/2022 with c/o epistaxis that began x1 day ago. Reported she is on 2L O2 chronically for hypoxia and occasionally get nose bleeds. She began bleeding from the left nare and felt blood draining in the back of her throat so she was referred to ED for further evaluation. Rhino Rocket was placed. Labs were notable for hgb 6.2, hct 20.1, Plt 323,000, INR 1.31. She was given KCentra, and transfused platelets. She began bleeding again from the left nare and was transferred to [facility] ED. ENT was consulted and she was transfused PRBCs. She was admitted to hospital medicine services for management of care and being managed for left-sided epistaxis, which resolved.  ENT was on board she had a rhino rocket, which was eventual;ly removed.  Hospital course has been complicated by anemia with multiple blood transfusions.   Leukocytosis slightly trending upwards possbile source being the right foot with wounds.  Podiatry consulted , patient started and patient started on IV antibiotics.  Arterial ultrasound of the right leg reviewed shows moderate to severe disease.  Cardiology was also consulted, CT of the lower extremities performed, though noted 2 vessel runoff to the right foot.  Cardiology planning on outpatient follow up, recommend continuing with Eliquis and Plavix.    Eliquis  on hold x 7 days due to recent nosebleed.  Patient subsequently underwent excisional debridement of the right foot wounds on 08/05 with podiatry.   Surgical wound cultures noting GPCs, patient continued on vancomycin and ceftriaxone.  Infectious Disease consulted.  MRSA screen negative, vancomycin discontinued, Patient continues with dyspnea on exertion, requiring of supplemental oxygen, though this is her baseline due to valvular heart disease, scheduled for TAVR as outpatient.  Developed contraction alkalosis, Lasix suspended.  Hemoglobin is stable at 9.5-10.  Epistaxis has resolved.  Eliquis was resumed at 2.5 mg BID.    Today: Patient seen and examined at bedside, and chart reviewed.  H&H down onlyh slightly, patient denies any further epistaxis.  Does note worsening SOB, requiring 4L today.      MEDICATIONS   Scheduled   apixaban  2.5 mg Oral BID    atorvastatin  40 mg Oral QHS    cefTRIAXone (ROCEPHIN) IVPB  2 g Intravenous Q24H    clopidogreL  75 mg Oral Daily    collagenase   Topical (Top) Daily    cyanocobalamin  500 mcg Oral Daily    donepeziL  10 mg Oral Nightly    gabapentin  100 mg Oral Nightly    losartan  100 mg Oral Daily    magnesium oxide  400 mg Oral Daily    metoprolol tartrate  25 mg Oral BID    mupirocin   Topical (Top) Daily    pantoprazole  40 mg Oral BID AC    traZODone  50 mg Oral QHS     Continuous Infusions  None    PRN  acetaminophen, acetaminophen, dicyclomine, hydrALAZINE, HYDROcodone-acetaminophen, HYDROcodone-acetaminophen, melatonin, ondansetron, sodium chloride 0.9%       PHYSICAL EXAM   VITALS: T 97.7 °F (36.5 °C)   BP (!) 180/62   P 85   RR 19   O2 95 %    GENERAL: Awake and in NAD  LUNGS: course rales bilaterally  CVS: Normal rate, blowing systolic murmur  GI/: Soft, NT, bowel sounds positive.  EXTREMITIES: radial pulses 2+  NEURO: AAOx3  PSYCH: Cooperative      LABS   CBC  Recent Labs     08/08/22  0403 08/09/22  0348   WBC 10.8  --    HGB 9.9* 9.4*   HCT 34.0*  31.9*     --       CHEM  Recent Labs     08/09/22  0348      K 4.1   CO2 41*   BUN 22.8*   CREATININE 0.62   CALCIUM 8.9          MICROBIOLOGY     Microbiology Results (last 7 days)     Procedure Component Value Units Date/Time    Anaerobic Culture [[id number]] Collected: 08/05/22 1536    Order Status: Completed Specimen: Wound from Foot, Right Updated: 08/08/22 1036     Anaerobe Culture No Anaerobes Isolated    Wound Culture [[id number]]  (Abnormal)  (Susceptibility) Collected: 08/05/22 1536    Order Status: Completed Specimen: Wound from Foot, Right Updated: 08/08/22 0847     Wound Culture Rare Proteus mirabilis    Gram Stain [[id number]]  (Abnormal) Collected: 08/05/22 1536    Order Status: Completed Specimen: Wound from Foot, Right Updated: 08/06/22 1039     GRAM STAIN Moderate WBC observed      Many Gram positive cocci            ASSESSMENT   Epistaxis, resolved   Acute on Chronic Normocytic Anemia, improved  Acute on chronic respiratory failure, neat baseline  Acute pulmonary edema s/t VHD/severe AS (awaiting TAVR), improved  Infected right foot heel wounds, s/p debridement of the right heel 8/5.    PAF on Eliquis  Previous DVT/PE on chronic anticoagulation  HFpEF  Baseline Dementia  Essential Hypertension  Peripheral arterial disease, mod- severe on right  Asymptomatic bacteriuria   contraction alkalosis     PLAN   Continue with IV Ceftriaxone, await ID input  Will follow surgical wound cultures  Appreciate Podiatry in management of foot wounds  Eliquis resumed, continue Plavix along with current cardiac regimen  Oxygen as needed to maintain adequate oxygenation  Holding Lasix at present, will check CXR today      Prophylaxis: Glendy Villalta MD  Hospital Medicine  08/09/2022

## 2022-08-09 NOTE — PLAN OF CARE
BEVERLY sent clinicals to [facility]. BEVERLY also noted that patient is expected to return with iv antibiotics. BEVERLY spoke with Anabel with [facility] and was told they are able to provide IV antibiotics. Anabel stated patient will need a PICC and IV order. BEVERLY updated Joan ANDERSON and patient's nurse, Micheline regarding my conversation with [facility].

## 2022-08-09 NOTE — PROGRESS NOTES
[facility]                       [address] [place] [id number]    PATIENT NAME:       VIPIN NEVAREZ  YOB: 1934  CSN:                [id number]   MRN:                [id number]  ADMIT DATE:         07/30/2022 02:43:00  PHYSICIAN:          Rosales Redmond DPM                            PROGRESS NOTE    DATE:  08/09/2022 00:00:00    SUBJECTIVE:  The patient was seen.  No major issues reported but did have a   little bit of drainage from the surgical site.  The patient is resting   comfortably.  No family members are present.    PHYSICAL EXAMINATION:  VITAL SIGNS:  Stable and afebrile.  EXTREMITIES:  Dressings are intact.  Does have a little bit of exudate from the   posterior heel area.  The wounds themselves look great, clean, viable.  No pus,   no odor.  Scant serous exudate.  No fluctuance, crepitation.    LABS:  Reviewed.    ASSESSMENT:  Right foot wounds, status post debridement.    PLAN:    1. Wounds were irrigated and re-bandaged.    2. Will continue with the current care, offloading, nonweightbearing.   3. Antibiotics as per Infectious Disease.    4. Working on LTAC.        ______________________________  Rosales Redmond DPM    GAS/AQS  DD:  08/09/2022  Time:  02:24PM  DT:  08/09/2022  Time:  02:31PM  Job #:  [id number]      PROGRESS NOTE

## 2022-08-09 NOTE — PLAN OF CARE
Problem: Infection  Goal: Absence of Infection Signs and Symptoms  Outcome: Ongoing, Progressing     Problem: Adult Inpatient Plan of Care  Goal: Plan of Care Review  Outcome: Ongoing, Progressing  Flowsheets (Taken 8/9/2022 0103)  Plan of Care Reviewed With: patient  Goal: Patient-Specific Goal (Individualized)  Outcome: Ongoing, Progressing  Goal: Absence of Hospital-Acquired Illness or Injury  Outcome: Ongoing, Progressing  Intervention: Identify and Manage Fall Risk  Flowsheets (Taken 8/9/2022 0103)  Safety Promotion/Fall Prevention:   assistive device/personal item within reach   side rails raised x 2   Fall Risk signage in place   nonskid shoes/socks when out of bed  Intervention: Prevent Skin Injury  Flowsheets (Taken 8/9/2022 0103)  Body Position: left  Intervention: Prevent and Manage VTE (Venous Thromboembolism) Risk  Flowsheets (Taken 8/9/2022 0103)  VTE Prevention/Management: bleeding risk assessed  Intervention: Prevent Infection  Flowsheets (Taken 8/9/2022 0103)  Infection Prevention:   hand hygiene promoted   rest/sleep promoted   single patient room provided  Goal: Optimal Comfort and Wellbeing  Outcome: Ongoing, Progressing  Intervention: Monitor Pain and Promote Comfort  Flowsheets (Taken 8/9/2022 0103)  Pain Management Interventions:   heat applied   medication offered  Goal: Readiness for Transition of Care  Outcome: Ongoing, Progressing     Problem: Bariatric Environmental Safety  Goal: Safety Maintained with Care  Outcome: Ongoing, Progressing     Problem: Fluid Imbalance (Pneumonia)  Goal: Fluid Balance  Outcome: Ongoing, Progressing     Problem: Respiratory Compromise (Pneumonia)  Goal: Effective Oxygenation and Ventilation  Outcome: Ongoing, Progressing     Problem: Impaired Wound Healing  Goal: Optimal Wound Healing  Outcome: Ongoing, Progressing  Intervention: Promote Wound Healing  Flowsheets (Taken 8/9/2022 0103)  Pain Management Interventions:   heat applied   medication offered      Problem: Fall Injury Risk  Goal: Absence of Fall and Fall-Related Injury  Outcome: Ongoing, Progressing  Intervention: Promote Injury-Free Environment  Flowsheets (Taken 8/9/2022 0103)  Safety Promotion/Fall Prevention:   assistive device/personal item within reach   side rails raised x 2   Fall Risk signage in place   nonskid shoes/socks when out of bed     Problem: Skin Injury Risk Increased  Goal: Skin Health and Integrity  Outcome: Ongoing, Progressing     Problem: Pain Acute  Goal: Acceptable Pain Control and Functional Ability  Outcome: Ongoing, Progressing

## 2022-08-09 NOTE — PROGRESS NOTES
SUBJECTIVE:  AF, VSS.  No issues overnight.  No complaints currently.        REVIEW OF SYSTEMS: Negative unless stated above         MEDICATIONS:   Reviewed in EMR        PHYSICAL EXAM:   Vitals:    08/08/22 0951   BP: (!) 135/55   Pulse: 89   Resp:    Temp:       GENERAL: NAD; does not appear toxic  SKIN: no rash  HEENT: sclera non-icteric; PERRL   NECK: supple; no LAD  CHEST: CTA; nonlabored, equal expansion; no adventitious BS  CARDIOVASCULAR: RRR, S1S2; no murmur; equal peripheral pulses; no edema  ABDOMEN:  active bowel sounds; abdomen soft, nondistended, nontender to palpation  GENITOURINARY: Daug[place] in place  EXTREMITIES: no cyanosis or clubbing  NEURO: Awake and alert         LABORATORY DATA: Reviewed         RADIOLOGICAL DATA: Reviewed       IMPRESSION:   87 yo female with h/o PAD / PAF on Eliquis admitted with epistaxis, now resolved.  Had previously had a ORIF of a right ankle fracture, now with exposed bone. S/p I&D and partial hardware removal.  ID consulted for antibiotic management    1. Right calcaneal osteomyelitis w/hardware, s/p I&D and partial hardware removal  2. PAD  3. Epistaxis s/t Eliquis, now resolved  4. Aortic stenosis awaiting TAVR  5. Dementia      PLAN:  Continue ceftriaxone as planned - end date: 9/16.  IV course to be followed by amoxicillin 500 mg PO BID, likely indefinitely given retained hardware.  Weekly CBC, CMP, ESR, CRP while on IV abx.    Place PICC today and DC at end of IV course.  Fax lab results to our office.  Needs f/u with us in 5-6 weeks.  Discussed with nursing and case management.

## 2022-08-10 ENCOUNTER — HOSPITAL ENCOUNTER (OUTPATIENT)
Dept: RADIOLOGY | Facility: HOSPITAL | Age: 87
Discharge: HOME | End: 2022-08-10
Attending: INTERNAL MEDICINE
Payer: MEDICARE

## 2022-08-10 LAB
ANION GAP SERPL CALC-SCNC: 4 MEQ/L
BUN SERPL-MCNC: 23.1 MG/DL (ref 9.8–20.1)
CALCIUM SERPL-MCNC: 8.8 MG/DL (ref 8.4–10.2)
CHLORIDE SERPL-SCNC: 94 MMOL/L (ref 98–107)
CO2 SERPL-SCNC: 41 MMOL/L (ref 23–31)
CREAT SERPL-MCNC: 0.56 MG/DL (ref 0.55–1.02)
CREAT/UREA NIT SERPL: 41
ERYTHROCYTE [DISTWIDTH] IN BLOOD BY AUTOMATED COUNT: 16.4 % (ref 11.5–17)
GFR SERPLBLD CREATININE-BSD FMLA CKD-EPI: >60 MLS/MIN/1.73/M2
GLUCOSE SERPL-MCNC: 100 MG/DL (ref 82–115)
HCT VFR BLD AUTO: 33.1 % (ref 37–47)
HGB BLD-MCNC: 9.5 GM/DL (ref 12–16)
MAGNESIUM SERPL-MCNC: 2.3 MG/DL (ref 1.6–2.6)
MCH RBC QN AUTO: 29.1 PG (ref 27–31)
MCHC RBC AUTO-ENTMCNC: 28.7 MG/DL (ref 33–36)
MCV RBC AUTO: 101.2 FL (ref 80–94)
NRBC BLD AUTO-RTO: 0 %
PLATELET # BLD AUTO: 373 X10(3)/MCL (ref 130–400)
PMV BLD AUTO: 9.7 FL (ref 7.4–10.4)
POTASSIUM SERPL-SCNC: 4.4 MMOL/L (ref 3.5–5.1)
RBC # BLD AUTO: 3.27 X10(6)/MCL (ref 4.2–5.4)
SARS-COV-2 RDRP RESP QL NAA+PROBE: NEGATIVE
SODIUM SERPL-SCNC: 139 MMOL/L (ref 136–145)
WBC # SPEC AUTO: 10.1 X10(3)/MCL (ref 4.5–11.5)

## 2022-08-10 PROCEDURE — 87635 SARS-COV-2 COVID-19 AMP PRB: CPT | Performed by: INTERNAL MEDICINE

## 2022-08-10 PROCEDURE — 63600175 PHARM REV CODE 636 W HCPCS: Performed by: INTERNAL MEDICINE

## 2022-08-10 PROCEDURE — 36415 COLL VENOUS BLD VENIPUNCTURE: CPT | Performed by: INTERNAL MEDICINE

## 2022-08-10 PROCEDURE — 25000003 PHARM REV CODE 250: Performed by: PODIATRIST

## 2022-08-10 PROCEDURE — 25000003 PHARM REV CODE 250: Performed by: INTERNAL MEDICINE

## 2022-08-10 PROCEDURE — 85027 COMPLETE CBC AUTOMATED: CPT | Performed by: INTERNAL MEDICINE

## 2022-08-10 PROCEDURE — 71045 X-RAY EXAM CHEST 1 VIEW: CPT | Mod: TC

## 2022-08-10 PROCEDURE — 97530 THERAPEUTIC ACTIVITIES: CPT | Mod: CQ

## 2022-08-10 PROCEDURE — 83735 ASSAY OF MAGNESIUM: CPT | Performed by: INTERNAL MEDICINE

## 2022-08-10 PROCEDURE — 94761 N-INVAS EAR/PLS OXIMETRY MLT: CPT

## 2022-08-10 PROCEDURE — 80048 BASIC METABOLIC PNL TOTAL CA: CPT | Performed by: INTERNAL MEDICINE

## 2022-08-10 PROCEDURE — 27000221 HC OXYGEN, UP TO 24 HOURS

## 2022-08-10 PROCEDURE — 11000001 HC ACUTE MED/SURG PRIVATE ROOM

## 2022-08-10 RX ORDER — METOPROLOL TARTRATE 25 MG/1
25 TABLET, FILM COATED ORAL 2 TIMES DAILY
Status: DISCONTINUED | OUTPATIENT
Start: 2022-08-10 | End: 2022-08-11 | Stop reason: HOSPADM

## 2022-08-10 RX ADMIN — DONEPEZIL HYDROCHLORIDE 10 MG: 5 TABLET, FILM COATED ORAL at 09:08

## 2022-08-10 RX ADMIN — METOPROLOL TARTRATE 25 MG: 25 TABLET, FILM COATED ORAL at 09:08

## 2022-08-10 RX ADMIN — HYDROCODONE BITARTRATE AND ACETAMINOPHEN 1 TABLET: 5; 325 TABLET ORAL at 09:08

## 2022-08-10 RX ADMIN — TRAZODONE HYDROCHLORIDE 50 MG: 50 TABLET ORAL at 09:08

## 2022-08-10 RX ADMIN — PANTOPRAZOLE SODIUM 40 MG: 40 TABLET, DELAYED RELEASE ORAL at 04:08

## 2022-08-10 RX ADMIN — PANTOPRAZOLE SODIUM 40 MG: 40 TABLET, DELAYED RELEASE ORAL at 05:08

## 2022-08-10 RX ADMIN — CEFTRIAXONE 2 G: 2 INJECTION, SOLUTION INTRAVENOUS at 09:08

## 2022-08-10 RX ADMIN — CLOPIDOGREL 75 MG: 75 TABLET, FILM COATED ORAL at 09:08

## 2022-08-10 RX ADMIN — GABAPENTIN 100 MG: 100 CAPSULE ORAL at 09:08

## 2022-08-10 RX ADMIN — APIXABAN 2.5 MG: 2.5 TABLET, FILM COATED ORAL at 09:08

## 2022-08-10 RX ADMIN — ATORVASTATIN CALCIUM 40 MG: 40 TABLET, FILM COATED ORAL at 09:08

## 2022-08-10 RX ADMIN — LOSARTAN POTASSIUM 100 MG: 50 TABLET, FILM COATED ORAL at 09:08

## 2022-08-10 RX ADMIN — CYANOCOBALAMIN TAB 500 MCG 500 MCG: 500 TAB at 09:08

## 2022-08-10 RX ADMIN — HYDROCODONE BITARTRATE AND ACETAMINOPHEN 1 TABLET: 5; 325 TABLET ORAL at 10:08

## 2022-08-10 RX ADMIN — Medication 400 MG: at 09:08

## 2022-08-10 NOTE — PLAN OF CARE
BEVERLY received a call from patient's son who contacted his sister via phone regarding LTAC/[facility] Swing bed for patient. Patient's sister, Zaria ([phone]) asked if Dr. Redmond would be able to see patient at [facility] However, in contacting [facility] and reaching out to a colleague, I was informed that he does not have privileges there. That being said, patient's daughter would like BEVERLY to still send a referral to [facility]. She noted if there is not a bed available then BEVERLY can go ahead and sent a referral to TCU who I informed her of has a bed available for patient today. BEVERLY will await a response from [facility] before sending a referral to TCU.

## 2022-08-10 NOTE — PLAN OF CARE
SW received another call from patient's daughter who noted she would not like her mother to go to [facility] given the reviews she has read online. Patient's daughter stated that if patient is not accepted by tomorrow then she will want her mother to go back to [facility]. SW made clear that if [facility] does not respond by tomorrow morning then the patient will return to [facility] as originally planned. Patient's daughter verbalized understanding.

## 2022-08-10 NOTE — PROGRESS NOTES
Hospital Medicine  Progress Note    Patient Name: Emma Jorgensen  MRN: [id number]  Status: IP- Inpatient   Admission Date: 7/30/2022  Length of Stay: 11      CC: hospital follow-up for epistaxis       SUBJECTIVE   88 y.o. female with a PMHx of diastolic heart failure (LVEF 55% and grade II diastolic dysfunction from an echo on 05/24/2022),  PAF on Eliquis, moderate to severe aortic stenosis with plans for a TAVR outpatient per Cards, bilateral carotid artery stenosis, HTN, chronic dementia, previous DVT/PE, and recent surgical fixation of a closed right trimalleolar ankle fracture on 04/26/2022 who presented to [facility] ED from [facility] on 7/30/2022 with c/o epistaxis that began x1 day ago. Reported she is on 2L O2 chronically for hypoxia and occasionally get nose bleeds. She began bleeding from the left nare and felt blood draining in the back of her throat so she was referred to ED for further evaluation. Rhino Rocket was placed. Labs were notable for hgb 6.2, hct 20.1, Plt 323,000, INR 1.31. She was given KCentra, and transfused platelets. She began bleeding again from the left nare and was transferred to [facility] ED. ENT was consulted and she was transfused PRBCs. She was admitted to hospital medicine services for management of care and being managed for left-sided epistaxis, which resolved.  ENT was on board she had a rhino rocket, which was eventual;ly removed.  Hospital course has been complicated by anemia with multiple blood transfusions.   Leukocytosis slightly trending upwards possbile source being the right foot with wounds.  Podiatry consulted , patient started and patient started on IV antibiotics.  Arterial ultrasound of the right leg reviewed shows moderate to severe disease.  Cardiology was also consulted, CT of the lower extremities performed, though noted 2 vessel runoff to the right foot.  Cardiology planning on outpatient follow up, recommend continuing with Eliquis and Plavix.    Eliquis  on hold x 7 days due to recent nosebleed.  Patient subsequently underwent excisional debridement of the right foot wounds on 08/05 with podiatry.   Surgical wound cultures noting GPCs, patient continued on vancomycin and ceftriaxone.  Infectious Disease consulted.  MRSA screen negative, vancomycin discontinued, Patient continues with dyspnea on exertion, requiring of supplemental oxygen, though this is her baseline due to valvular heart disease, scheduled for TAVR as outpatient.  Developed contraction alkalosis, Lasix suspended.  Hemoglobin is stable at 9.5-10.  Epistaxis has resolved.  Eliquis was resumed at 2.5 mg BID.    Today: Patient seen and examined at bedside, and chart reviewed.  H&H remains stable, no further epistaxis; Eliquis was restarted for this AM.  Requiring 4.5L today; Lasix remains on hold due to contraction alkalosis.      MEDICATIONS   Scheduled   apixaban  2.5 mg Oral BID    atorvastatin  40 mg Oral QHS    cefTRIAXone (ROCEPHIN) IVPB  2 g Intravenous Q24H    clopidogreL  75 mg Oral Daily    collagenase   Topical (Top) Daily    cyanocobalamin  500 mcg Oral Daily    donepeziL  10 mg Oral Nightly    gabapentin  100 mg Oral Nightly    losartan  100 mg Oral Daily    magnesium oxide  400 mg Oral Daily    metoprolol tartrate  25 mg Oral BID    mupirocin   Topical (Top) Daily    pantoprazole  40 mg Oral BID AC    traZODone  50 mg Oral QHS     Continuous Infusions  None    PRN  acetaminophen, acetaminophen, dicyclomine, hydrALAZINE, HYDROcodone-acetaminophen, HYDROcodone-acetaminophen, melatonin, ondansetron, sodium chloride 0.9%       PHYSICAL EXAM   VITALS: T 98.2 °F (36.8 °C)   BP (!) 115/50   P (!) 57   RR 20   O2 99 %    GENERAL: Awake and in NAD  LUNGS: course rales bilaterally  CVS: Normal rate, blowing systolic murmur  GI/: Soft, NT, bowel sounds positive.  EXTREMITIES: radial pulses 2+  NEURO: AAOx3  PSYCH: Cooperative      LABS   CBC  Recent Labs     08/10/22  0800   WBC 10.1    HGB 9.5*   HCT 33.1*         CHEM  Recent Labs     08/10/22  0759      K 4.4   MG 2.30   CO2 41*   BUN 23.1*   CREATININE 0.56   CALCIUM 8.8          MICROBIOLOGY     Microbiology Results (last 7 days)     Procedure Component Value Units Date/Time    Anaerobic Culture [[id number]]  (Abnormal) Collected: 08/05/22 1536    Order Status: Completed Specimen: Wound from Foot, Right Updated: 08/09/22 1343     Anaerobe Culture Finegoldia magna     Comment: Anaerobic sensitivity is not usually indicated except on single isolates from sterile body sites.       Wound Culture [[id number]]  (Abnormal)  (Susceptibility) Collected: 08/05/22 1536    Order Status: Completed Specimen: Wound from Foot, Right Updated: 08/08/22 0847     Wound Culture Rare Proteus mirabilis    Gram Stain [[id number]]  (Abnormal) Collected: 08/05/22 1536    Order Status: Completed Specimen: Wound from Foot, Right Updated: 08/06/22 1039     GRAM STAIN Moderate WBC observed      Many Gram positive cocci            ASSESSMENT   Epistaxis, resolved   Acute on Chronic Normocytic Anemia, improved  Acute on chronic respiratory failure, neat baseline  Acute pulmonary edema s/t VHD/severe AS (awaiting TAVR), improved  Infected right foot heel wounds, s/p debridement of the right heel 8/5.    PAF on Eliquis  Previous DVT/PE on chronic anticoagulation  HFpEF  Baseline Dementia  Essential Hypertension  Peripheral arterial disease, mod- severe on right  Asymptomatic bacteriuria   contraction alkalosis     PLAN   Continue with IV Ceftriaxone, end date 9/16, with plan for indefinite oral suppression thereafter with Amoxicillin 500mg BID  Appreciate Podiatry in management of foot wounds  Eliquis resumed, continue current cardiac regimen including Plavix  Oxygen as needed to maintain adequate oxygenation  Holding Lasix, will check CXR to assess degree of fluid overload      Prophylaxis: Eliquis resumed        Jag Villalta MD  [facility]  08/10/2022

## 2022-08-10 NOTE — PT/OT/SLP PROGRESS
Physical Therapy         Treatment        Emma Jorgensen   MRN: [id number]     PT Received On: 08/10/22  PT Start Time: 1017     PT Stop Time: 1040    PT Total Time (min): 23 min       Billable Minutes:  Therapeutic Activity 23  Total Minutes: 23    Treatment Type: Treatment  PT/PTA: PTA     PTA Visit Number: 2       General Precautions: Standard, fall  Orthopedic Precautions: Orthopedic Precautions : RLE non weight bearing   Braces:           Subjective:  Communicated with NSG prior to session.    Pain/Comfort  Pain Rating 1: 0/10    Objective:  Patient found in bed with HOB elevated, with Patient found with: peripheral IV, oxygen    Functional Mobility:  Bed Mobility:   Supine to sit: Minimal Assistance   Sit to supine: Activity did not occur   Rolling: Activity did not occur   Scooting: Minimal Assistance    Balance:   Static Sit: GOOD-: Takes MODERATE challenges from all directions but inconsistently  Dynamic Sit:  GOOD-: Incosistently Maintains balance through MODERATE excursions of active trunk movement,       Transfer Training:  Bed <> Chair:  Slide Board with Moderate Assistance with No Assistive Device . pt SB T/F from EOB>w/c modAx2 for safety.     Wheelchair Training:  Pt propelled Standard wheelchair x 10ft/6ft/4ft feet on Level tile with  Bilateral upper extremity with Stand-by Assistance.  pt fatigued very quickly and required rest breaks between trials.      Additional Treatment:      Activity Tolerance:  Patient tolerated treatment well and Patient limited by fatigue    Patient left up in chair with all lines intact and call button in reach.    Assessment:  Emma Jorgensen is a 88 y.o. female with a medical diagnosis of Epistaxis, stable   Acute on Chronic Normocytic Anemia  - s/p 1 unit of prbcs   Leucocytosis, mild  Asymptomatic bacteriuria   PAF on Eliquis  Previous DVT/PE on chronic anticoagulation  Chronic hypoxia stable on 2 L nasal cannula  Severe aortic stenosis awaiting  TAVR  Chronic diastolic congestive heart failure (EF 55% TTE 05/2022)  Dementia  Hypertension  Bilateral carotid artery disease  Peripheral arterial disease     Pt improving well with PT. Still presenting as a fall risk at this time.     Rehab potential is excellent.    Activity tolerance: Excellent    Discharge recommendations: Discharge Facility/Level of Care Needs: nursing facility, skilled     Equipment recommendations:       GOALS:   Multidisciplinary Problems     Physical Therapy Goals        Problem: Physical Therapy    Goal Priority Disciplines Outcome Goal Variances Interventions   Physical Therapy Goal     PT, PT/OT Ongoing, Progressing     Description: LTGs   1. Pt will be jeremy with bed mobility  2. Pt will be jeremy with sit to stand-discontinued  3. Slide board transfer with min A.                   PLAN:    Patient to be seen 5 x/week  to address the above listed problems via therapeutic activities, therapeutic exercises, wheelchair management/training  Plan of Care expires: 08/26/22  Plan of Care reviewed with: patient, son         8/10/2022

## 2022-08-10 NOTE — PLAN OF CARE
BEVERLY received a call from patient's daughter, Zaria who stated that she spoke with her sister and they have decided to have miss Ibarra return to [facility]. BEVERLY then contacted the nursing home and updated them with the family's decision. BEVERLY also spoke with patient's nurse to follow up with the doctor to see if patient is ready for discharge. BEVERLY did request a covid-19 swab.

## 2022-08-10 NOTE — PROGRESS NOTES
[facility]                       [address] [place] [id number]    PATIENT NAME:       VIPIN NEVAERZ  YOB: 1934  CSN:                [id number]   MRN:                [id number]  ADMIT DATE:         07/30/2022 02:43:00  PHYSICIAN:          Rosales Redmond DPM                            PROGRESS NOTE    DATE:  08/10/2022 00:00:00    The patient is seen today.  She is currently sitting up in her wheelchair.  Her   son is present.  She is not voicing any complaints.  Tentative plans for her to   be transferred back to the nursing home tomorrow.  No other issues.  Remains on   Rocephin.    PHYSICAL EXAM:  VITAL SIGNS:  Stable.  Currently afebrile.    EXTREMITIES:  Dressings are intact and there is no visible strike through.    Unable to really get down to the foot to see the wound secondary to recurrent   positioning.  Good capillary refill.  Generalized edema.    ASSESSMENT:  Status post excisional debridement right foot wound with removal of   some retained hardware.    PLAN:  We will continue with the current care.  If patient is discharged   tomorrow, recommend continuation of Santyl dressing changes that she had written   previously and strict offloading.  She would need to follow up with Orthopedics   for the limp.        ______________________________  Rosales Redmond DPM    GAS/AQS  DD:  08/10/2022  Time:  05:04PM  DT:  08/10/2022  Time:  05:23PM  Job #:  [id number]      PROGRESS NOTE

## 2022-08-11 VITALS
SYSTOLIC BLOOD PRESSURE: 119 MMHG | RESPIRATION RATE: 19 BRPM | HEIGHT: 66 IN | DIASTOLIC BLOOD PRESSURE: 63 MMHG | TEMPERATURE: 98 F | WEIGHT: 253 LBS | HEART RATE: 67 BPM | BODY MASS INDEX: 40.66 KG/M2 | OXYGEN SATURATION: 100 %

## 2022-08-11 LAB
ANION GAP SERPL CALC-SCNC: 2 MEQ/L
BUN SERPL-MCNC: 29.2 MG/DL (ref 9.8–20.1)
CALCIUM SERPL-MCNC: 9.2 MG/DL (ref 8.4–10.2)
CHLORIDE SERPL-SCNC: 93 MMOL/L (ref 98–107)
CO2 SERPL-SCNC: 41 MMOL/L (ref 23–31)
CREAT SERPL-MCNC: 0.58 MG/DL (ref 0.55–1.02)
CREAT/UREA NIT SERPL: 50
ERYTHROCYTE [DISTWIDTH] IN BLOOD BY AUTOMATED COUNT: 16.4 % (ref 11.5–17)
GFR SERPLBLD CREATININE-BSD FMLA CKD-EPI: >60 MLS/MIN/1.73/M2
GLUCOSE SERPL-MCNC: 99 MG/DL (ref 82–115)
HCT VFR BLD AUTO: 33.1 % (ref 37–47)
HGB BLD-MCNC: 9.7 GM/DL (ref 12–16)
MCH RBC QN AUTO: 29.8 PG (ref 27–31)
MCHC RBC AUTO-ENTMCNC: 29.3 MG/DL (ref 33–36)
MCV RBC AUTO: 101.5 FL (ref 80–94)
NRBC BLD AUTO-RTO: 0 %
PLATELET # BLD AUTO: 341 X10(3)/MCL (ref 130–400)
PMV BLD AUTO: 9.7 FL (ref 7.4–10.4)
POTASSIUM SERPL-SCNC: 4.2 MMOL/L (ref 3.5–5.1)
RBC # BLD AUTO: 3.26 X10(6)/MCL (ref 4.2–5.4)
SODIUM SERPL-SCNC: 136 MMOL/L (ref 136–145)
WBC # SPEC AUTO: 10.4 X10(3)/MCL (ref 4.5–11.5)

## 2022-08-11 PROCEDURE — 80048 BASIC METABOLIC PNL TOTAL CA: CPT | Performed by: INTERNAL MEDICINE

## 2022-08-11 PROCEDURE — 25000003 PHARM REV CODE 250: Performed by: INTERNAL MEDICINE

## 2022-08-11 PROCEDURE — 85027 COMPLETE CBC AUTOMATED: CPT | Performed by: INTERNAL MEDICINE

## 2022-08-11 PROCEDURE — 36415 COLL VENOUS BLD VENIPUNCTURE: CPT | Performed by: INTERNAL MEDICINE

## 2022-08-11 PROCEDURE — 25000003 PHARM REV CODE 250: Performed by: PODIATRIST

## 2022-08-11 PROCEDURE — 94761 N-INVAS EAR/PLS OXIMETRY MLT: CPT

## 2022-08-11 PROCEDURE — 63600175 PHARM REV CODE 636 W HCPCS: Performed by: INTERNAL MEDICINE

## 2022-08-11 PROCEDURE — 27000221 HC OXYGEN, UP TO 24 HOURS

## 2022-08-11 RX ORDER — VENLAFAXINE HYDROCHLORIDE 37.5 MG/1
37.5 CAPSULE, EXTENDED RELEASE ORAL DAILY
Qty: 30 CAPSULE | Refills: 0 | Status: SHIPPED | OUTPATIENT
Start: 2022-08-11 | End: 2022-09-10

## 2022-08-11 RX ORDER — AMOXICILLIN 500 MG/1
500 TABLET, FILM COATED ORAL EVERY 12 HOURS
Qty: 56 TABLET | Refills: 0 | Status: ON HOLD | OUTPATIENT
Start: 2022-09-17 | End: 2022-08-19 | Stop reason: HOSPADM

## 2022-08-11 RX ORDER — HYDROCODONE BITARTRATE AND ACETAMINOPHEN 5; 325 MG/1; MG/1
1 TABLET ORAL EVERY 8 HOURS PRN
Qty: 15 TABLET | Refills: 0 | Status: ON HOLD | OUTPATIENT
Start: 2022-08-11 | End: 2022-08-19

## 2022-08-11 RX ORDER — TRAZODONE HYDROCHLORIDE 50 MG/1
50 TABLET ORAL NIGHTLY
Qty: 30 TABLET | Refills: 0 | Status: SHIPPED | OUTPATIENT
Start: 2022-08-11 | End: 2022-09-10

## 2022-08-11 RX ORDER — CEFTRIAXONE 2 G/50ML
2 INJECTION, SOLUTION INTRAVENOUS DAILY
Qty: 1500 ML | Refills: 0 | Status: SHIPPED | OUTPATIENT
Start: 2022-08-11 | End: 2022-09-16

## 2022-08-11 RX ADMIN — CLOPIDOGREL 75 MG: 75 TABLET, FILM COATED ORAL at 08:08

## 2022-08-11 RX ADMIN — PANTOPRAZOLE SODIUM 40 MG: 40 TABLET, DELAYED RELEASE ORAL at 05:08

## 2022-08-11 RX ADMIN — MUPIROCIN: 20 OINTMENT TOPICAL at 08:08

## 2022-08-11 RX ADMIN — LOSARTAN POTASSIUM 100 MG: 50 TABLET, FILM COATED ORAL at 08:08

## 2022-08-11 RX ADMIN — APIXABAN 2.5 MG: 2.5 TABLET, FILM COATED ORAL at 08:08

## 2022-08-11 RX ADMIN — METOPROLOL TARTRATE 25 MG: 25 TABLET, FILM COATED ORAL at 08:08

## 2022-08-11 RX ADMIN — CEFTRIAXONE 2 G: 2 INJECTION, SOLUTION INTRAVENOUS at 08:08

## 2022-08-11 RX ADMIN — Medication 400 MG: at 08:08

## 2022-08-11 RX ADMIN — CYANOCOBALAMIN TAB 500 MCG 500 MCG: 500 TAB at 08:08

## 2022-08-11 RX ADMIN — HYDROCODONE BITARTRATE AND ACETAMINOPHEN 1 TABLET: 5; 325 TABLET ORAL at 05:08

## 2022-08-11 NOTE — PLAN OF CARE
BEVERLY contacted [facility] and spoke with Anabel regarding patient's discharge today (8-11-22). BEVERLY also sent patient's discharge summary to [facility] via [employer]. Lastly, BEVERLY confirmed that [facility] will be able to administer patient's IV abx. BEVERLY was told yes along with providing wound care as needed and physical therapy. BEVERLY will provide patient a discharge packet along with hard copy of her scripts as provided by Dr. Shakila Bui.

## 2022-08-11 NOTE — PLAN OF CARE
BEVERLY contacted [facility] and informed Anabel that patient is ready for discharge. She asked to fax patient's scripts and noted that she will contact BEVERLY once the personnel to give report to arrives. BEVERLY will notified patient's nurse during morning rounds.

## 2022-08-11 NOTE — PLAN OF CARE
Problem: Infection  Goal: Absence of Infection Signs and Symptoms  Outcome: Ongoing, Progressing  Intervention: Prevent or Manage Infection  Flowsheets (Taken 8/10/2022 1934)  Isolation Precautions: precautions initiated     Problem: Adult Inpatient Plan of Care  Goal: Plan of Care Review  Outcome: Ongoing, Progressing  Flowsheets (Taken 8/10/2022 1934)  Plan of Care Reviewed With:   patient   son  Goal: Patient-Specific Goal (Individualized)  Outcome: Ongoing, Progressing  Goal: Absence of Hospital-Acquired Illness or Injury  Outcome: Ongoing, Progressing  Intervention: Identify and Manage Fall Risk  Flowsheets (Taken 8/10/2022 1934)  Safety Promotion/Fall Prevention:   assistive device/personal item within reach   Fall Risk signage in place   side rails raised x 2   lighting adjusted   medications reviewed   nonskid shoes/socks when out of bed  Intervention: Prevent Skin Injury  Flowsheets (Taken 8/10/2022 1934)  Body Position:   left   side-lying  Skin Protection:   adhesive use limited   tubing/devices free from skin contact  Intervention: Prevent and Manage VTE (Venous Thromboembolism) Risk  Flowsheets (Taken 8/10/2022 1934)  Activity Management: Rolling - L1  VTE Prevention/Management:   remove, assess skin, and reapply sequential compression device   bleeding precations maintained  Range of Motion: active ROM (range of motion) encouraged  Goal: Optimal Comfort and Wellbeing  Outcome: Ongoing, Progressing  Goal: Readiness for Transition of Care  Outcome: Ongoing, Progressing     Problem: Bariatric Environmental Safety  Goal: Safety Maintained with Care  Outcome: Ongoing, Progressing     Problem: Fluid Imbalance (Pneumonia)  Goal: Fluid Balance  Outcome: Ongoing, Progressing     Problem: Infection (Pneumonia)  Goal: Resolution of Infection Signs and Symptoms  Outcome: Ongoing, Progressing  Intervention: Prevent Infection Progression  Flowsheets (Taken 8/10/2022 1934)  Isolation Precautions: precautions  initiated     Problem: Respiratory Compromise (Pneumonia)  Goal: Effective Oxygenation and Ventilation  Outcome: Ongoing, Progressing     Problem: Impaired Wound Healing  Goal: Optimal Wound Healing  Outcome: Ongoing, Progressing  Intervention: Promote Wound Healing  Flowsheets (Taken 8/10/2022 1934)  Oral Nutrition Promotion: calorie-dense foods provided  Sleep/Rest Enhancement: awakenings minimized  Activity Management: Rolling - L1  Pain Management Interventions:   care clustered   quiet environment facilitated   position adjusted   medication offered     Problem: Fall Injury Risk  Goal: Absence of Fall and Fall-Related Injury  Outcome: Ongoing, Progressing     Problem: Skin Injury Risk Increased  Goal: Skin Health and Integrity  Outcome: Ongoing, Progressing  Intervention: Optimize Skin Protection  Flowsheets (Taken 8/10/2022 1934)  Pressure Reduction Techniques: weight shift assistance provided  Pressure Reduction Devices:   positioning supports utilized   heel offloading device utilized  Skin Protection:   adhesive use limited   tubing/devices free from skin contact  Head of Bed (HOB) Positioning: HOB at 30-45 degrees     Problem: Pain Acute  Goal: Acceptable Pain Control and Functional Ability  Outcome: Ongoing, Progressing  Intervention: Develop Pain Management Plan  Flowsheets (Taken 8/10/2022 1934)  Pain Management Interventions:   care clustered   quiet environment facilitated   position adjusted   medication offered  Intervention: Prevent or Manage Pain  Flowsheets (Taken 8/10/2022 1934)  Sleep/Rest Enhancement: awakenings minimized  Medication Review/Management: medications reviewed     Problem: Anemia  Goal: Anemia Symptom Improvement  Outcome: Ongoing, Progressing

## 2022-08-11 NOTE — DISCHARGE SUMMARY
[facility] Hospital Medicine Discharge Summary    Admit Date: 7/30/2022  Discharge Date and Time: 8/11/20228:11 AM  Discharging Physician: Shakila Bui MD.  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  Patient also had recent history of trimalleolar ankle fracture for which she underwent surgical fixation.  Patient is a resident at nursing home.  Patient was sent to the ED with complaints of epistaxis.  Rhino Rocket was placed.  Labs significant for severe anemia.  Transfused appropriately.  Patient also was treated with Kcentra and transfused with PRBC and platelets.  Patient was admitted to hospitalist medicine service.  ENT services consulted.  ENT recommended to hold Eliquis for 7 days and then reintroduce.  Monitoring hemoglobin closely and transfusing as needed.  Patient also noted to have nonhealing right lower extremity wounds with associated peripheral arterial disease.  Wound care, podiatry consulted.  Patient initiated on appropriate antibiotics.  Given PVD cardiology services consulted.  Cardiology recommended to continue medical management and plan for peripheral angiogram as outpatient once medically optimized.  CT angiogram had revealed two-vessel runoff to the right foot.  Portion also evaluated by GI  given severe anemia.  Anemia source likely loose bleed.  No upper GI complaints and therefore no plans for endoscopic evaluation.  Recommended follow-up as outpatient.  Given right lower extremity wounds and imaging was obtained which revealed calcaneal osteomyelitis.  Patient underwent incision, drainage and partial hardware removal.  Infectious disease recommended continued course of ceftriaxone until 9/16 followed by p.o. amoxicillin.  PICC line was placed.  Patient cleared by all specialist for DC.  Podiatry recommended outpatient wound care.  Infectious disease recommended continued IV antibiotics until 9/16.  Cardiology recommended outpatient follow-up for PAD.  No plans for endoscopic evaluation per GI.  Epistaxis completely resolved and hemoglobin improved.  Hemodynamic stable.  Therefore it was decided to discharge the patient back to nursing home.  Follow-up with infectious disease, cardiology, podiatry, GI, ENT, PCP.  Discharged in stable condition.  Discharge medications per med rec.  Vitals:  VITAL SIGNS: 24 HRS MIN & MAX LAST   Temp  Min: 97.4 °F (36.3 °C)  Max: 99.9 °F (37.7 °C) 98.8 °F (37.1 °C)   BP  Min: 99/56  Max: 138/67 (!) 103/58   Pulse  Min: 57  Max: 76  71   Resp  Min: 17  Max: 21 19   SpO2  Min: 93 %  Max: 100 % 96 %       Physical Exam:  General appearance:  No acute distress  HENT: Atraumatic   Lungs: Clear to auscultation bilaterally.   Heart: RRR,No edema  Abdomen: Soft, non tender   Extremities: warm  Neuro:  Awake, alert, oriented x4  Psych/mental status: Appropriate mood and affect.     Procedures Performed: No admission procedures for hospital encounter.     Significant Diagnostic Studies: See Full reports for all details    Recent Labs   Lab 08/08/22  0403 08/09/22  0348 08/10/22  0800 08/11/22  0424   WBC 10.8  --  10.1 10.4   RBC 3.34*  --  3.27* 3.26*   HGB 9.9* 9.4* 9.5* 9.7*   HCT 34.0* 31.9* 33.1* 33.1*   .8*  --  101.2* 101.5*   MCH 29.6  --  29.1 29.8   MCHC 29.1*   --  28.7* 29.3*   RDW 16.3  --  16.4 16.4     --  373 341   MPV 9.4  --  9.7 9.7       Recent Labs   Lab 08/05/22  0409 08/06/22  0307 08/08/22  0402 08/09/22  0348 08/10/22  0759 08/11/22  0424   * 136   < > 138 139 136   K 3.7 3.4*   < > 4.1 4.4 4.2   CO2 33* 36*   < > 41* 41* 41*   BUN 9.6* 12.0   < > 22.8* 23.1* 29.2*   CREATININE 0.55 0.60   < > 0.62 0.56 0.58   CALCIUM 8.6 8.9   < > 8.9 8.8 9.2   MG 1.90 2.00  --   --  2.30  --     < > = values in this interval not displayed.        Microbiology Results (last 7 days)       Procedure Component Value Units Date/Time    Anaerobic Culture [[id number]]  (Abnormal) Collected: 08/05/22 1536    Order Status: Completed Specimen: Wound from Foot, Right Updated: 08/09/22 1343     Anaerobe Culture Finegoldia magna     Comment: Anaerobic sensitivity is not usually indicated except on single isolates from sterile body sites.       Wound Culture [[id number]]  (Abnormal)  (Susceptibility) Collected: 08/05/22 1536    Order Status: Completed Specimen: Wound from Foot, Right Updated: 08/08/22 0847     Wound Culture Rare Proteus mirabilis    Gram Stain [[id number]]  (Abnormal) Collected: 08/05/22 1536    Order Status: Completed Specimen: Wound from Foot, Right Updated: 08/06/22 1039     GRAM STAIN Moderate WBC observed      Many Gram positive cocci             X-Ray Chest 1 View  Narrative: EXAMINATION:  XR CHEST 1 VIEW    CLINICAL HISTORY:  fluid overload;    TECHNIQUE:  Frontal view(s) of the chest.    COMPARISON:  Radiography 07/31/2022    FINDINGS:  Relatively generalized bilateral interstitial opacities, mildly increased since prior study.  No definitive pneumothorax.  Suspect small bilateral pleural effusions.  Stable cardiac silhouette.  Impression: Mild worsening of bilateral interstitial opacities since 07/31/2022.  Favor that these relate to pulmonary edema.    Electronically signed by: Manny Nava  Date:    08/10/2022  Time:    17:51         Medication List         START taking these medications      amoxicillin 500 MG Tab  Commonly known as: AMOXIL  Take 1 tablet (500 mg total) by mouth every 12 (twelve) hours.  Start taking on: September 17, 2022     cefTRIAXone 2 gram/50 mL IVPB  Commonly known as: ROCEPHIN  Inject 50 mLs (2 g total) into the vein once daily at 6am.     collagenase ointment  Commonly known as: SANTYL  Apply topically once daily.            CHANGE how you take these medications      HYDROcodone-acetaminophen 5-325 mg per tablet  Commonly known as: NORCO  Take 1 tablet by mouth every 8 (eight) hours as needed for Pain.  What changed: when to take this     venlafaxine 37.5 MG 24 hr capsule  Commonly known as: EFFEXOR-XR  Take 1 capsule (37.5 mg total) by mouth once daily at 6am.  What changed: how much to take            CONTINUE taking these medications      ADVAIR -21 mcg/actuation Hfaa inhaler  Generic drug: fluticasone propion-salmeterol 115-21 mcg/dose  Inhale 2 puffs into the lungs 2 (two) times daily.     * albuterol 90 mcg/actuation inhaler  Commonly known as: PROVENTIL/VENTOLIN HFA     * albuterol sulfate 2.5 mg/0.5 mL Nebu  Take 2.5 mg by nebulization every 4 (four) hours as needed (dyspnea). Rescue     apixaban 2.5 mg Tab  Commonly known as: ELIQUIS  Take 2 tablets (5 mg total) by mouth 2 (two) times daily.     atorvastatin 40 MG tablet  Commonly known as: LIPITOR  Take 1 tablet (40 mg total) by mouth every evening.     budesonide-glycopyr-formoterol 160-9-4.8 mcg/actuation Hfaa     clopidogreL 75 mg tablet  Commonly known as: PLAVIX  Take 1 tablet (75 mg total) by mouth once daily.     clotrimazole-betamethasone 1-0.05% cream  Commonly known as: LOTRISONE     cyanocobalamin 500 MCG tablet  Take 1 tablet (500 mcg total) by mouth once daily.     diclofenac sodium 1 % Gel  Commonly known as: VOLTAREN     donepeziL 10 MG tablet  Commonly known as: ARICEPT     furosemide 40 MG tablet  Commonly known as: LASIX  Take 1 tablet (40 mg total)  by mouth once daily.     gabapentin 100 MG capsule  Commonly known as: NEURONTIN  Take 1 capsule (100 mg total) by mouth nightly.     loratadine 10 mg tablet  Commonly known as: CLARITIN     losartan 100 MG tablet  Commonly known as: COZAAR     magnesium oxide 400 mg (241.3 mg magnesium) tablet  Commonly known as: MAG-OX  Take 1 tablet (400 mg total) by mouth once daily.     metoprolol tartrate 25 MG tablet  Commonly known as: LOPRESSOR  Take 1 tablet (25 mg total) by mouth 2 (two) times daily.     mupirocin 2 % ointment  Commonly known as: BACTROBAN  Apply topically once daily. Apply to affected area at right plantar heel. After cleansing well with dermal cleanser and rinsing with saline, cover with nonadherent dressing secured with rolled gauze. Keep off loaded.     pantoprazole 40 MG tablet  Commonly known as: PROTONIX  Take 1 tablet (40 mg total) by mouth once daily.     pramipexole 1 MG tablet  Commonly known as: MIRAPEX     traZODone 50 MG tablet  Commonly known as: DESYREL  Take 1 tablet (50 mg total) by mouth every evening.     white petrolatum 41 % Oint  Apply topically 2 (two) times daily.           * This list has 2 medication(s) that are the same as other medications prescribed for you. Read the directions carefully, and ask your doctor or other care provider to review them with you.                STOP taking these medications      betamethasone dipropionate 0.05 % cream     diltiaZEM 120 MG tablet  Commonly known as: CARDIZEM     hydroCHLOROthiazide 12.5 MG Tab  Commonly known as: HYDRODIURIL     LORazepam 1 MG tablet  Commonly known as: ATIVAN     omeprazole 20 mg Tbec               Where to Get Your Medications        You can get these medications from any pharmacy    Bring a paper prescription for each of these medications  · amoxicillin 500 MG Tab  · cefTRIAXone 2 gram/50 mL IVPB  · collagenase ointment  · HYDROcodone-acetaminophen 5-325 mg per tablet  · traZODone 50 MG tablet  · venlafaxine 37.5  MG 24 hr capsule          Explained in detail to the patient about the discharge plan, medications, and follow-up visits. Pt understands and agrees with the treatment plan  Discharge Disposition: Home or Self Care   Discharged Condition: stable  Diet-   Dietary Orders (From admission, onward)       Start     Ordered    08/05/22 1804  Diet heart healthy  Diet effective now         08/05/22 1803    08/05/22 1008  Dietary nutrition supplements Kang - Orange; BID  Continuous        Question Answer Comment   Select PO Supplement: Kang - Orange    Frequency: BID        08/05/22 1008    07/30/22 1059  Dietary nutrition supplements Boost Breeze Orange; TID  Continuous        Comments: Can NOT have Berry flavor.   Question Answer Comment   Select PO Supplement: Boost Breeze Orange    Frequency: TID        07/30/22 1059                   Medications Per DC med rec  Activities as tolerated   Follow-up Information       Truman Eubanks MD Follow up in 1 week(s).    Specialties: Cardiovascular Disease, Cardiology  Contact information:  [address] [facility]  [facility] [address]  [phone]               Rosalse Redmond DPM Follow up in 1 week(s).    Specialty: Podiatry  Contact information:  [address]  [place] [id number]  [phone]               Andrea Dumont MD Follow up in 2 week(s).    Specialty: Infectious Diseases  Contact information:  [address] [place] [address]   [place] [address] [phone]               Mary Jane Dean MD Follow up in 1 week(s).    Specialty: Family Medicine  Contact information:  [address] [facility] [address]  [phone]               Reza Hand MD Follow up in 1 week(s).    Specialty: Gastroenterology  Contact information:  [address]  [facility] [address]  [phone]                           For further questions contact hospitalist office    Discharge time 33 minutes    For worsening symptoms, chest  pain, shortness of breath, increased abdominal pain, high grade fever, stroke or stroke like symptoms, immediately go to the nearest Emergency Room or call 911 as soon as possible.      Shakila Osman M.D on 8/11/2022. at 8:11 AM.

## 2022-08-13 ENCOUNTER — HOSPITAL ENCOUNTER (INPATIENT)
Facility: HOSPITAL | Age: 87
LOS: 5 days | Discharge: NURSING, REHAB OR LONG-TERM CARE | DRG: 291 | End: 2022-08-19
Attending: EMERGENCY MEDICINE | Admitting: INTERNAL MEDICINE
Payer: MEDICARE

## 2022-08-13 DIAGNOSIS — R06.02 SOB (SHORTNESS OF BREATH): Primary | ICD-10-CM

## 2022-08-13 DIAGNOSIS — I50.33 ACUTE ON CHRONIC DIASTOLIC CONGESTIVE HEART FAILURE: ICD-10-CM

## 2022-08-13 DIAGNOSIS — I82.409 DVT (DEEP VENOUS THROMBOSIS): ICD-10-CM

## 2022-08-13 DIAGNOSIS — R06.02 SHORTNESS OF BREATH: ICD-10-CM

## 2022-08-13 DIAGNOSIS — J96.21 ACUTE ON CHRONIC RESPIRATORY FAILURE WITH HYPOXIA: ICD-10-CM

## 2022-08-13 DIAGNOSIS — R07.9 CHEST PAIN: ICD-10-CM

## 2022-08-13 LAB
ALBUMIN SERPL-MCNC: 3.1 GM/DL (ref 3.4–4.8)
ALBUMIN/GLOB SERPL: 0.6 RATIO (ref 1.1–2)
ALP SERPL-CCNC: 96 UNIT/L (ref 40–150)
ALT SERPL-CCNC: 16 UNIT/L (ref 0–55)
AST SERPL-CCNC: 38 UNIT/L (ref 5–34)
BASOPHILS # BLD AUTO: 0.04 X10(3)/MCL (ref 0–0.2)
BASOPHILS NFR BLD AUTO: 0.4 %
BILIRUBIN DIRECT+TOT PNL SERPL-MCNC: 0.6 MG/DL
BNP BLD-MCNC: 1324.5 PG/ML
BUN SERPL-MCNC: 23.6 MG/DL (ref 9.8–20.1)
CALCIUM SERPL-MCNC: 9.6 MG/DL (ref 8.4–10.2)
CHLORIDE SERPL-SCNC: 93 MMOL/L (ref 98–107)
CO2 SERPL-SCNC: 39 MMOL/L (ref 23–31)
CREAT SERPL-MCNC: 0.62 MG/DL (ref 0.55–1.02)
EOSINOPHIL # BLD AUTO: 0.11 X10(3)/MCL (ref 0–0.9)
EOSINOPHIL NFR BLD AUTO: 1.1 %
ERYTHROCYTE [DISTWIDTH] IN BLOOD BY AUTOMATED COUNT: 16.4 % (ref 11.5–17)
GFR SERPLBLD CREATININE-BSD FMLA CKD-EPI: >60 MLS/MIN/1.73/M2
GLOBULIN SER-MCNC: 4.9 GM/DL (ref 2.4–3.5)
GLUCOSE SERPL-MCNC: 117 MG/DL (ref 82–115)
HCT VFR BLD AUTO: 38.7 % (ref 37–47)
HGB BLD-MCNC: 11.6 GM/DL (ref 12–16)
IMM GRANULOCYTES # BLD AUTO: 0.03 X10(3)/MCL (ref 0–0.04)
IMM GRANULOCYTES NFR BLD AUTO: 0.3 %
LYMPHOCYTES # BLD AUTO: 0.88 X10(3)/MCL (ref 0.6–4.6)
LYMPHOCYTES NFR BLD AUTO: 8.9 %
MAGNESIUM SERPL-MCNC: 2.2 MG/DL (ref 1.6–2.6)
MCH RBC QN AUTO: 29.4 PG (ref 27–31)
MCHC RBC AUTO-ENTMCNC: 30 MG/DL (ref 33–36)
MCV RBC AUTO: 98.2 FL (ref 80–94)
MONOCYTES # BLD AUTO: 0.88 X10(3)/MCL (ref 0.1–1.3)
MONOCYTES NFR BLD AUTO: 8.9 %
NEUTROPHILS # BLD AUTO: 7.9 X10(3)/MCL (ref 2.1–9.2)
NEUTROPHILS NFR BLD AUTO: 80.4 %
NRBC BLD AUTO-RTO: 0 %
PLATELET # BLD AUTO: 347 X10(3)/MCL (ref 130–400)
PMV BLD AUTO: 10.4 FL (ref 7.4–10.4)
POTASSIUM SERPL-SCNC: 5.1 MMOL/L (ref 3.5–5.1)
PROT SERPL-MCNC: 8 GM/DL (ref 5.8–7.6)
RBC # BLD AUTO: 3.94 X10(6)/MCL (ref 4.2–5.4)
SARS-COV-2 RDRP RESP QL NAA+PROBE: NEGATIVE
SODIUM SERPL-SCNC: 140 MMOL/L (ref 136–145)
TROPONIN I SERPL-MCNC: 0.01 NG/ML (ref 0–0.04)
WBC # SPEC AUTO: 9.9 X10(3)/MCL (ref 4.5–11.5)

## 2022-08-13 PROCEDURE — 63600175 PHARM REV CODE 636 W HCPCS: Performed by: EMERGENCY MEDICINE

## 2022-08-13 PROCEDURE — 25000003 PHARM REV CODE 250: Performed by: EMERGENCY MEDICINE

## 2022-08-13 PROCEDURE — 93005 ELECTROCARDIOGRAM TRACING: CPT

## 2022-08-13 PROCEDURE — 83735 ASSAY OF MAGNESIUM: CPT | Performed by: EMERGENCY MEDICINE

## 2022-08-13 PROCEDURE — 99291 CRITICAL CARE FIRST HOUR: CPT | Mod: 25

## 2022-08-13 PROCEDURE — 83880 ASSAY OF NATRIURETIC PEPTIDE: CPT | Performed by: EMERGENCY MEDICINE

## 2022-08-13 PROCEDURE — 36415 COLL VENOUS BLD VENIPUNCTURE: CPT | Performed by: EMERGENCY MEDICINE

## 2022-08-13 PROCEDURE — G0378 HOSPITAL OBSERVATION PER HR: HCPCS

## 2022-08-13 PROCEDURE — 84484 ASSAY OF TROPONIN QUANT: CPT | Performed by: EMERGENCY MEDICINE

## 2022-08-13 PROCEDURE — 96374 THER/PROPH/DIAG INJ IV PUSH: CPT

## 2022-08-13 PROCEDURE — 85025 COMPLETE CBC W/AUTO DIFF WBC: CPT | Performed by: EMERGENCY MEDICINE

## 2022-08-13 PROCEDURE — 80053 COMPREHEN METABOLIC PANEL: CPT | Performed by: EMERGENCY MEDICINE

## 2022-08-13 PROCEDURE — 87635 SARS-COV-2 COVID-19 AMP PRB: CPT | Performed by: EMERGENCY MEDICINE

## 2022-08-13 RX ORDER — FUROSEMIDE 10 MG/ML
40 INJECTION INTRAMUSCULAR; INTRAVENOUS
Status: COMPLETED | OUTPATIENT
Start: 2022-08-13 | End: 2022-08-13

## 2022-08-13 RX ADMIN — FUROSEMIDE 40 MG: 10 INJECTION, SOLUTION INTRAMUSCULAR; INTRAVENOUS at 06:08

## 2022-08-13 RX ADMIN — NITROGLYCERIN 1 INCH: 20 OINTMENT TOPICAL at 06:08

## 2022-08-13 NOTE — ED PROVIDER NOTES
Encounter Date: 8/13/2022    SCRIBE #1 NOTE: I, Maria Antonia Andre, am scribing for, and in the presence of,  Nicolasa Cee MD. I have scribed the following portions of the note - Other sections scribed: HPI, ROS, PE.       History     Chief Complaint   Patient presents with    Shortness of Breath     [facility] that started today, pmh CHF.      An 88 year old female with a history of CHF is presenting to the ED after [facility] for [facility]. The pt states that the SOB began 3-5 days ago and has progressively worsened. The pt states that she has had episodes of SOB similar to this one in the past. She reports diarrhea as well as decreased urination. She denies any CP, vomiting, or fevers. She is currently taking Lasix and is on 2 L of oxygen.          The history is provided by the patient and a caregiver. No  was used.   Shortness of Breath  This is a chronic problem. The current episode started more than 2 days ago (3-5 days ago). The problem has not changed since onset.Pertinent negatives include no fever, no chest pain and no vomiting. She has tried nothing for the symptoms. The treatment provided no relief.     Review of patient's allergies indicates:  No Known Allergies  Past Medical History:   Diagnosis Date    Anxiety disorder, unspecified     Arthritis     Deep vein thrombosis     Depression     Irregular heart beat     Obesity, unspecified     Other pulmonary embolism without acute cor pulmonale     Parkinson's disease     Peripheral artery disease     Trimalleolar fracture of ankle, closed     Unspecified dementia without behavioral disturbance      Past Surgical History:   Procedure Laterality Date    ANGIOGRAM, CORONARY, WITH LEFT HEART CATHETERIZATION N/A 6/7/2022    Procedure: Angiogram, Coronary, with Left Heart Cath;  Surgeon: Claude Morillo MD;  Location: [facility] CATH LAB;  Service: Cardiology;   Laterality: N/A;    ANKLE FRACTURE SURGERY Right     CATARACT EXTRACTION      CATHETERIZATION OF BOTH LEFT AND RIGHT HEART N/A 6/7/2022    Procedure: CATHETERIZATION, HEART, BOTH LEFT AND RIGHT;  Surgeon: Claude Morillo MD;  Location: [facility] CATH LAB;  Service: Cardiology;  Laterality: N/A;    COLONOSCOPY W/ POLYPECTOMY      DEBRIDEMENT OF FOOT Right 8/5/2022    Procedure: DEBRIDEMENT, FOOT;  Surgeon: Rosales Redmond DPM;  Location: [facility] OR;  Service: Podiatry;  Laterality: Right;  REQ NOON    HYSTERECTOMY      SMALL INTESTINE SURGERY       Family History   Family history unknown: Yes     Social History     Tobacco Use    Smoking status: Former Smoker    Smokeless tobacco: Never Used   Substance Use Topics    Alcohol use: Never    Drug use: Never     Review of Systems   Constitutional: Negative for chills, diaphoresis, fever and unexpected weight change.   HENT: Negative.    Eyes: Negative.    Respiratory: Positive for shortness of breath.    Cardiovascular: Negative.  Negative for chest pain.   Gastrointestinal: Positive for diarrhea. Negative for blood in stool, constipation and vomiting.   Genitourinary: Positive for decreased urine volume.   Musculoskeletal: Negative.    Neurological: Negative.  Negative for weakness.   Psychiatric/Behavioral: Negative.        Physical Exam     Initial Vitals [08/13/22 1717]   BP Pulse Resp Temp SpO2   (!) 168/75 84 (!) 32 96.8 °F (36 °C) 97 %      MAP       --         Physical Exam    Nursing note and vitals reviewed.  Constitutional: She appears well-developed and well-nourished. She is not diaphoretic. No distress.   HENT:   Head: Normocephalic and atraumatic.   Nose: Nose normal.   Mouth/Throat: Oropharynx is clear and moist.   Eyes: Conjunctivae and EOM are normal. Pupils are equal, round, and reactive to light.   Neck: Trachea normal. Neck supple.   Normal range of motion.  Cardiovascular: Normal rate, regular rhythm and intact distal pulses.   Murmur heard.    Systolic murmur is present.  Pulmonary/Chest: Tachypnea noted. No respiratory distress. She has no wheezes. She has rhonchi. She has no rales. She exhibits no tenderness.   Bilateral bibasilar crackling   Abdominal: Abdomen is soft. Bowel sounds are normal. She exhibits no distension and no mass. There is no abdominal tenderness. There is no rebound and no guarding.   Musculoskeletal:         General: No tenderness or edema. Normal range of motion.      Cervical back: Normal range of motion and neck supple.      Lumbar back: Normal. Normal range of motion.      Comments: Healing ulcer on the right foot  Healing ulcer on the plantar side on foot     Neurological: She is alert and oriented to person, place, and time. She has normal strength. No cranial nerve deficit or sensory deficit.   Skin: Skin is warm and dry. Capillary refill takes less than 2 seconds. No abscess noted. No erythema. No pallor.   Psychiatric: She has a normal mood and affect. Her behavior is normal. Judgment and thought content normal.         ED Course   Critical Care    Date/Time: 8/13/2022 10:29 PM  Performed by: Nicolasa Cee MD  Authorized by: Nicolasa Cee MD   Direct patient critical care time: 15 minutes  Additional history critical care time: 8 minutes  Ordering / reviewing critical care time: 10 minutes  Documentation critical care time: 10 minutes  Consulting other physicians critical care time: 8 minutes  Total critical care time (exclusive of procedural time) : 51 minutes  Critical care was necessary to treat or prevent imminent or life-threatening deterioration of the following conditions: cardiac failure, circulatory failure and respiratory failure.  Critical care was time spent personally by me on the following activities: discussions with consultants, evaluation of patient's response to treatment, obtaining history from patient or surrogate, ordering and review of laboratory studies, pulse oximetry, review of old  charts, development of treatment plan with patient or surrogate, examination of patient, ordering and performing treatments and interventions, ordering and review of radiographic studies and re-evaluation of patient's condition.        Labs Reviewed   COMPREHENSIVE METABOLIC PANEL - Abnormal; Notable for the following components:       Result Value    Chloride 93 (*)     Carbon Dioxide 39 (*)     Glucose Level 117 (*)     Blood Urea Nitrogen 23.6 (*)     Protein Total 8.0 (*)     Albumin Level 3.1 (*)     Globulin 4.9 (*)     Albumin/Globulin Ratio 0.6 (*)     Aspartate Aminotransferase 38 (*)     All other components within normal limits   B-TYPE NATRIURETIC PEPTIDE - Abnormal; Notable for the following components:    Natriuretic Peptide 1,324.5 (*)     All other components within normal limits   CBC WITH DIFFERENTIAL - Abnormal; Notable for the following components:    RBC 3.94 (*)     Hgb 11.6 (*)     MCV 98.2 (*)     MCHC 30.0 (*)     All other components within normal limits   TROPONIN I - Normal   MAGNESIUM - Normal   SARS-COV-2 RNA AMPLIFICATION, QUAL - Normal   CBC W/ AUTO DIFFERENTIAL    Narrative:     The following orders were created for panel order CBC auto differential.  Procedure                               Abnormality         Status                     ---------                               -----------         ------                     CBC with Differential[[id number]]        Abnormal            Final result                 Please view results for these tests on the individual orders.     EKG Readings: (Independently Interpreted)   Initial Reading: No STEMI. Rhythm: Normal Sinus Rhythm. Heart Rate: 82. Ectopy: No Ectopy. Conduction: Normal. ST Segments: Normal ST Segments. T Waves: Normal. Axis: Normal.   6340     ECG Results          EKG 12-lead (Final result)  Result time 08/13/22 18:06:15    Final result by Interface, Lab In [facility] (08/13/22 18:06:15)                 Narrative:    Test Reason :  R06.02,    Vent. Rate : 082 BPM     Atrial Rate : 082 BPM     P-R Int : 130 ms          QRS Dur : 090 ms      QT Int : 402 ms       P-R-T Axes : 072 -10 013 degrees     QTc Int : 469 ms    Normal sinus rhythm  Normal ECG  Confirmed by Kat Mcfarland MD ([id number]) on 8/13/2022 6:06:07 PM    Referred By:             Confirmed By:Kat Mcfarland MD                            Imaging Results          X-Ray Chest AP Portable (Final result)  Result time 08/13/22 18:13:22    Final result by Darrion Alvarado MD (08/13/22 18:13:22)                 Impression:      Congestive failure.      Electronically signed by: Darrion Alvarado  Date:    08/13/2022  Time:    18:13             Narrative:    EXAMINATION:  XR CHEST AP PORTABLE    CLINICAL HISTORY:  shortness of breath;    TECHNIQUE:  One view    COMPARISON:  August 10, 2022.    FINDINGS:  Cardiopericardial silhouette enlarged appearance similar and the lungs are remarkable for at least moderate congestive failure opacities.  Right perihilar thick linear atelactasis without exclusion of associated infiltrates.  Small pleural effusions. No pneumothorax.                              X-Rays:   Independently Interpreted Readings:   Chest X-Ray: Cardiomegaly present.  Increased vascular markings consistent with CHF are present.     Medications   nitroGLYCERIN 2% TD oint ointment 1 inch (1 inch Topical (Top) Given 8/13/22 1845)   furosemide injection 40 mg (40 mg Intravenous Given 8/13/22 1845)     Medical Decision Making:   History:   I obtained history from: someone other than patient.       <> Summary of History: Family at bedside  Old Medical Records: I decided to obtain old medical records.  Old Records Summarized: records from previous admission(s).  Initial Assessment:   Acute on chronic respiratory failure  Differential Diagnosis:   Chf, isadora, pneumonia  Independently Interpreted Test(s):   I have ordered and independently interpreted X-rays - see prior notes.  I have ordered and  independently interpreted EKG Reading(s) - see prior notes  Clinical Tests:   Lab Tests: Ordered and Reviewed  Radiological Study: Ordered and Reviewed  Medical Tests: Ordered and Reviewed  ED Management:  Diuresis, nitro, labs, imaging, admit  Other:   I have discussed this case with another health care provider.    Additional MDM:     Heart Failure Score:   Systolic BP = 150-159  Heart Rate = <79  Sodium = >139  COPD = No  Black = Yes  BUN = 20-29  Age = 80-89  Patient has a GWTG HF Risk Score for In-Hospital Mortality of 32 which translates into a probability of death of <1% (Low Risk).       Scribe Attestation:   Scribe #1: I performed the above scribed service and the documentation accurately describes the services I performed. I attest to the accuracy of the note.  Comments: Attending:   Physician Attestation Statement for Scribe #1: Nicolasa STARR MD, personally performed the services described in this documentation. All medical record entries made by the scribe were at my direction and in my presence.  I have reviewed the chart and agree that the record reflects my personal performance and is accurate and complete.      Attending Attestation:           Physician Attestation for Scribe:  Physician Attestation Statement for Scribe #1: INicolasa MD, reviewed documentation, as scribed by Maria Antonia Powell in my presence, and it is both accurate and complete.             ED Course as of 08/13/22 2232   Sat Aug 13, 2022   1856 Updated on plan will re-eval after diuresis to determine appropriate dispo [BS]   2042 Has not diuresed much, admit [BS]   2044 Paged hospitalist [MS]      ED Course User Index  [BS] Nicolasa Cee MD  [MS] Sukhjinder Quiñones             Clinical Impression:   Final diagnoses:  [R06.02] Shortness of breath  [R06.02] SOB (shortness of breath) (Primary)  [I50.33] Acute on chronic diastolic congestive heart failure  [J96.21] Acute on chronic respiratory failure with hypoxia           ED Disposition Condition    Observation               Nicolasa Cee MD  08/13/22 4445

## 2022-08-13 NOTE — Clinical Note
Diagnosis: Shortness of breath [786.05.ICD-9-CM]   Admitting Provider:: ROOSEVELT MCDOWELL [[id number]]   Future Attending Provider: ROOSEVELT MCDOWELL [[id number]]   Reason for IP Medical Treatment  (Clinical interventions that can only be accomplished in the IP setting? ) :: chf   Estimated Length of Stay:: 2 midnights   I certify that Inpatient services for greater than or equal to 2 midnights are medically necessary:: Yes   Plans for Post-Acute care--if anticipated (pick the single best option):: E. LTAC Placement

## 2022-08-14 PROBLEM — I50.33 ACUTE ON CHRONIC HEART FAILURE WITH PRESERVED EJECTION FRACTION (HFPEF): Status: ACTIVE | Noted: 2022-08-14

## 2022-08-14 LAB
ANION GAP SERPL CALC-SCNC: 10 MEQ/L
BASOPHILS # BLD AUTO: 0.05 X10(3)/MCL (ref 0–0.2)
BASOPHILS NFR BLD AUTO: 0.4 %
BUN SERPL-MCNC: 18.6 MG/DL (ref 9.8–20.1)
CALCIUM SERPL-MCNC: 9.6 MG/DL (ref 8.4–10.2)
CHLORIDE SERPL-SCNC: 92 MMOL/L (ref 98–107)
CO2 SERPL-SCNC: 40 MMOL/L (ref 23–31)
CORRECTED TEMPERATURE (PCO2): 86 MMHG (ref 19–50)
CORRECTED TEMPERATURE (PH): 7.39 (ref 7.35–7.45)
CORRECTED TEMPERATURE (PO2): 89 MMHG (ref 80–100)
CREAT SERPL-MCNC: 0.6 MG/DL (ref 0.55–1.02)
CREAT/UREA NIT SERPL: 31
EOSINOPHIL # BLD AUTO: 0.23 X10(3)/MCL (ref 0–0.9)
EOSINOPHIL NFR BLD AUTO: 2 %
ERYTHROCYTE [DISTWIDTH] IN BLOOD BY AUTOMATED COUNT: 16.3 % (ref 11.5–17)
GFR SERPLBLD CREATININE-BSD FMLA CKD-EPI: >60 MLS/MIN/1.73/M2
GLUCOSE SERPL-MCNC: 89 MG/DL (ref 82–115)
HCO3 UR-SCNC: 52.1 MMOL/L
HCT VFR BLD AUTO: 37.3 % (ref 37–47)
HGB BLD-MCNC: 10.3 G/DL (ref 12–16)
HGB BLD-MCNC: 10.8 GM/DL (ref 12–16)
IMM GRANULOCYTES # BLD AUTO: 0.06 X10(3)/MCL (ref 0–0.04)
IMM GRANULOCYTES NFR BLD AUTO: 0.5 %
LYMPHOCYTES # BLD AUTO: 0.97 X10(3)/MCL (ref 0.6–4.6)
LYMPHOCYTES NFR BLD AUTO: 8.3 %
MAGNESIUM SERPL-MCNC: 2 MG/DL (ref 1.6–2.6)
MCH RBC QN AUTO: 29.4 PG (ref 27–31)
MCHC RBC AUTO-ENTMCNC: 29 MG/DL (ref 33–36)
MCV RBC AUTO: 101.6 FL (ref 80–94)
MONOCYTES # BLD AUTO: 1.34 X10(3)/MCL (ref 0.1–1.3)
MONOCYTES NFR BLD AUTO: 11.4 %
NEUTROPHILS # BLD AUTO: 9.1 X10(3)/MCL (ref 2.1–9.2)
NEUTROPHILS NFR BLD AUTO: 77.4 %
NRBC BLD AUTO-RTO: 0 %
PCO2 BLDA: 86 MMHG (ref 19–50)
PH SMN: 7.39 [PH] (ref 7.35–7.45)
PHOSPHATE SERPL-MCNC: 3.1 MG/DL (ref 2.3–4.7)
PLATELET # BLD AUTO: 324 X10(3)/MCL (ref 130–400)
PMV BLD AUTO: 10 FL (ref 7.4–10.4)
PO2 BLDA: 89 MMHG (ref 80–100)
POC BASE DEFICIT: 23.2 MMOL/L (ref -2–2)
POC COHB: 2.1 %
POC IONIZED CALCIUM: 1.16 MMOL/L (ref 1.12–1.23)
POC METHB: 0.9 % (ref 0.4–1.5)
POC O2HB: 94.6 % (ref 94–97)
POC SATURATED O2: 96.8 %
POC TEMPERATURE: 37 °C
POTASSIUM BLD-SCNC: 3.2 MMOL/L (ref 3.5–5)
POTASSIUM SERPL-SCNC: 3.8 MMOL/L (ref 3.5–5.1)
RBC # BLD AUTO: 3.67 X10(6)/MCL (ref 4.2–5.4)
SODIUM BLD-SCNC: 136 MMOL/L (ref 137–145)
SODIUM SERPL-SCNC: 142 MMOL/L (ref 136–145)
SPECIMEN SOURCE: ABNORMAL
WBC # SPEC AUTO: 11.8 X10(3)/MCL (ref 4.5–11.5)

## 2022-08-14 PROCEDURE — 82803 BLOOD GASES ANY COMBINATION: CPT

## 2022-08-14 PROCEDURE — G0378 HOSPITAL OBSERVATION PER HR: HCPCS

## 2022-08-14 PROCEDURE — 25000242 PHARM REV CODE 250 ALT 637 W/ HCPCS: Performed by: INTERNAL MEDICINE

## 2022-08-14 PROCEDURE — 94660 CPAP INITIATION&MGMT: CPT

## 2022-08-14 PROCEDURE — 94761 N-INVAS EAR/PLS OXIMETRY MLT: CPT

## 2022-08-14 PROCEDURE — 99900035 HC TECH TIME PER 15 MIN (STAT)

## 2022-08-14 PROCEDURE — 27000221 HC OXYGEN, UP TO 24 HOURS

## 2022-08-14 PROCEDURE — 80048 BASIC METABOLIC PNL TOTAL CA: CPT | Performed by: INTERNAL MEDICINE

## 2022-08-14 PROCEDURE — 85025 COMPLETE CBC W/AUTO DIFF WBC: CPT | Performed by: INTERNAL MEDICINE

## 2022-08-14 PROCEDURE — 84100 ASSAY OF PHOSPHORUS: CPT | Performed by: INTERNAL MEDICINE

## 2022-08-14 PROCEDURE — 83735 ASSAY OF MAGNESIUM: CPT | Performed by: INTERNAL MEDICINE

## 2022-08-14 PROCEDURE — 25000003 PHARM REV CODE 250: Performed by: INTERNAL MEDICINE

## 2022-08-14 PROCEDURE — 36600 WITHDRAWAL OF ARTERIAL BLOOD: CPT

## 2022-08-14 PROCEDURE — 63600175 PHARM REV CODE 636 W HCPCS: Performed by: INTERNAL MEDICINE

## 2022-08-14 PROCEDURE — 36415 COLL VENOUS BLD VENIPUNCTURE: CPT | Performed by: INTERNAL MEDICINE

## 2022-08-14 RX ORDER — TALC
6 POWDER (GRAM) TOPICAL NIGHTLY PRN
Status: DISCONTINUED | OUTPATIENT
Start: 2022-08-14 | End: 2022-08-19 | Stop reason: HOSPADM

## 2022-08-14 RX ORDER — CLOPIDOGREL BISULFATE 75 MG/1
75 TABLET ORAL DAILY
Status: DISCONTINUED | OUTPATIENT
Start: 2022-08-14 | End: 2022-08-19 | Stop reason: HOSPADM

## 2022-08-14 RX ORDER — FUROSEMIDE 10 MG/ML
60 INJECTION INTRAMUSCULAR; INTRAVENOUS EVERY 8 HOURS
Status: DISCONTINUED | OUTPATIENT
Start: 2022-08-14 | End: 2022-08-14

## 2022-08-14 RX ORDER — HYDROCODONE BITARTRATE AND ACETAMINOPHEN 5; 325 MG/1; MG/1
1 TABLET ORAL EVERY 8 HOURS PRN
Status: DISCONTINUED | OUTPATIENT
Start: 2022-08-14 | End: 2022-08-19 | Stop reason: HOSPADM

## 2022-08-14 RX ORDER — TRAZODONE HYDROCHLORIDE 50 MG/1
50 TABLET ORAL NIGHTLY
Status: DISCONTINUED | OUTPATIENT
Start: 2022-08-14 | End: 2022-08-19 | Stop reason: HOSPADM

## 2022-08-14 RX ORDER — UBIDECARENONE 75 MG
500 CAPSULE ORAL DAILY
Status: DISCONTINUED | OUTPATIENT
Start: 2022-08-14 | End: 2022-08-19 | Stop reason: HOSPADM

## 2022-08-14 RX ORDER — FLUTICASONE FUROATE AND VILANTEROL 100; 25 UG/1; UG/1
1 POWDER RESPIRATORY (INHALATION) DAILY
Refills: 0 | Status: DISCONTINUED | OUTPATIENT
Start: 2022-08-14 | End: 2022-08-19 | Stop reason: HOSPADM

## 2022-08-14 RX ORDER — ACETAMINOPHEN 325 MG/1
650 TABLET ORAL EVERY 4 HOURS PRN
Status: DISCONTINUED | OUTPATIENT
Start: 2022-08-14 | End: 2022-08-19 | Stop reason: HOSPADM

## 2022-08-14 RX ORDER — ACETAZOLAMIDE 500 MG/5ML
500 INJECTION, POWDER, LYOPHILIZED, FOR SOLUTION INTRAVENOUS ONCE
Status: COMPLETED | OUTPATIENT
Start: 2022-08-14 | End: 2022-08-14

## 2022-08-14 RX ORDER — DONEPEZIL HYDROCHLORIDE 5 MG/1
10 TABLET, FILM COATED ORAL NIGHTLY
Status: DISCONTINUED | OUTPATIENT
Start: 2022-08-14 | End: 2022-08-19 | Stop reason: HOSPADM

## 2022-08-14 RX ORDER — SIMETHICONE 80 MG
1 TABLET,CHEWABLE ORAL 4 TIMES DAILY PRN
Status: DISCONTINUED | OUTPATIENT
Start: 2022-08-14 | End: 2022-08-19 | Stop reason: HOSPADM

## 2022-08-14 RX ORDER — FUROSEMIDE 10 MG/ML
60 INJECTION INTRAMUSCULAR; INTRAVENOUS
Status: DISCONTINUED | OUTPATIENT
Start: 2022-08-15 | End: 2022-08-18

## 2022-08-14 RX ORDER — MAG HYDROX/ALUMINUM HYD/SIMETH 200-200-20
30 SUSPENSION, ORAL (FINAL DOSE FORM) ORAL 4 TIMES DAILY PRN
Status: DISCONTINUED | OUTPATIENT
Start: 2022-08-14 | End: 2022-08-19 | Stop reason: HOSPADM

## 2022-08-14 RX ORDER — PROCHLORPERAZINE EDISYLATE 5 MG/ML
5 INJECTION INTRAMUSCULAR; INTRAVENOUS EVERY 6 HOURS PRN
Status: DISCONTINUED | OUTPATIENT
Start: 2022-08-14 | End: 2022-08-19 | Stop reason: HOSPADM

## 2022-08-14 RX ORDER — SODIUM CHLORIDE 0.9 % (FLUSH) 0.9 %
10 SYRINGE (ML) INJECTION
Status: DISCONTINUED | OUTPATIENT
Start: 2022-08-14 | End: 2022-08-19 | Stop reason: HOSPADM

## 2022-08-14 RX ORDER — PRAMIPEXOLE DIHYDROCHLORIDE 0.25 MG/1
1 TABLET ORAL NIGHTLY
Status: DISCONTINUED | OUTPATIENT
Start: 2022-08-14 | End: 2022-08-19 | Stop reason: HOSPADM

## 2022-08-14 RX ORDER — GABAPENTIN 100 MG/1
100 CAPSULE ORAL NIGHTLY
Status: DISCONTINUED | OUTPATIENT
Start: 2022-08-14 | End: 2022-08-19 | Stop reason: HOSPADM

## 2022-08-14 RX ORDER — ACETAMINOPHEN 500 MG
1000 TABLET ORAL EVERY 6 HOURS PRN
Status: DISCONTINUED | OUTPATIENT
Start: 2022-08-14 | End: 2022-08-19 | Stop reason: HOSPADM

## 2022-08-14 RX ORDER — HYDRALAZINE HYDROCHLORIDE 20 MG/ML
10 INJECTION INTRAMUSCULAR; INTRAVENOUS EVERY 4 HOURS PRN
Status: DISCONTINUED | OUTPATIENT
Start: 2022-08-14 | End: 2022-08-19 | Stop reason: HOSPADM

## 2022-08-14 RX ORDER — AMOXICILLIN 250 MG
1 CAPSULE ORAL 2 TIMES DAILY PRN
Status: DISCONTINUED | OUTPATIENT
Start: 2022-08-14 | End: 2022-08-19 | Stop reason: HOSPADM

## 2022-08-14 RX ORDER — ONDANSETRON 2 MG/ML
4 INJECTION INTRAMUSCULAR; INTRAVENOUS EVERY 4 HOURS PRN
Status: DISCONTINUED | OUTPATIENT
Start: 2022-08-14 | End: 2022-08-19 | Stop reason: HOSPADM

## 2022-08-14 RX ORDER — CLONIDINE HYDROCHLORIDE 0.2 MG/1
0.2 TABLET ORAL 3 TIMES DAILY PRN
Status: DISCONTINUED | OUTPATIENT
Start: 2022-08-14 | End: 2022-08-19 | Stop reason: HOSPADM

## 2022-08-14 RX ORDER — METOPROLOL TARTRATE 25 MG/1
25 TABLET, FILM COATED ORAL 2 TIMES DAILY
Status: DISCONTINUED | OUTPATIENT
Start: 2022-08-14 | End: 2022-08-19 | Stop reason: HOSPADM

## 2022-08-14 RX ORDER — VENLAFAXINE HYDROCHLORIDE 37.5 MG/1
37.5 CAPSULE, EXTENDED RELEASE ORAL DAILY
Status: DISCONTINUED | OUTPATIENT
Start: 2022-08-15 | End: 2022-08-19 | Stop reason: HOSPADM

## 2022-08-14 RX ORDER — PANTOPRAZOLE SODIUM 40 MG/1
40 TABLET, DELAYED RELEASE ORAL DAILY
Status: DISCONTINUED | OUTPATIENT
Start: 2022-08-14 | End: 2022-08-19 | Stop reason: HOSPADM

## 2022-08-14 RX ORDER — LABETALOL HYDROCHLORIDE 5 MG/ML
10 INJECTION, SOLUTION INTRAVENOUS EVERY 4 HOURS PRN
Status: DISCONTINUED | OUTPATIENT
Start: 2022-08-14 | End: 2022-08-19 | Stop reason: HOSPADM

## 2022-08-14 RX ORDER — POLYETHYLENE GLYCOL 3350 17 G/17G
17 POWDER, FOR SOLUTION ORAL 2 TIMES DAILY PRN
Status: DISCONTINUED | OUTPATIENT
Start: 2022-08-14 | End: 2022-08-19 | Stop reason: HOSPADM

## 2022-08-14 RX ADMIN — HYDROCODONE BITARTRATE AND ACETAMINOPHEN 1 TABLET: 5; 325 TABLET ORAL at 06:08

## 2022-08-14 RX ADMIN — CYANOCOBALAMIN TAB 500 MCG 500 MCG: 500 TAB at 09:08

## 2022-08-14 RX ADMIN — METOPROLOL TARTRATE 25 MG: 25 TABLET, FILM COATED ORAL at 08:08

## 2022-08-14 RX ADMIN — PANTOPRAZOLE SODIUM 40 MG: 40 TABLET, DELAYED RELEASE ORAL at 09:08

## 2022-08-14 RX ADMIN — FUROSEMIDE 60 MG: 10 INJECTION, SOLUTION INTRAMUSCULAR; INTRAVENOUS at 12:08

## 2022-08-14 RX ADMIN — FUROSEMIDE 60 MG: 10 INJECTION, SOLUTION INTRAMUSCULAR; INTRAVENOUS at 01:08

## 2022-08-14 RX ADMIN — ACETAZOLAMIDE 500 MG: 500 INJECTION, POWDER, LYOPHILIZED, FOR SOLUTION INTRAVENOUS at 01:08

## 2022-08-14 RX ADMIN — PRAMIPEXOLE DIHYDROCHLORIDE 1 MG: 0.25 TABLET ORAL at 09:08

## 2022-08-14 RX ADMIN — CLOPIDOGREL 75 MG: 75 TABLET, FILM COATED ORAL at 09:08

## 2022-08-14 RX ADMIN — GABAPENTIN 100 MG: 100 CAPSULE ORAL at 08:08

## 2022-08-14 RX ADMIN — FUROSEMIDE 60 MG: 10 INJECTION, SOLUTION INTRAMUSCULAR; INTRAVENOUS at 06:08

## 2022-08-14 RX ADMIN — METOPROLOL TARTRATE 25 MG: 25 TABLET, FILM COATED ORAL at 09:08

## 2022-08-14 RX ADMIN — APIXABAN 5 MG: 5 TABLET, FILM COATED ORAL at 09:08

## 2022-08-14 RX ADMIN — APIXABAN 5 MG: 5 TABLET, FILM COATED ORAL at 08:08

## 2022-08-14 RX ADMIN — TRAZODONE HYDROCHLORIDE 50 MG: 50 TABLET ORAL at 08:08

## 2022-08-14 RX ADMIN — CEFTRIAXONE SODIUM 2 G: 2 INJECTION, POWDER, FOR SOLUTION INTRAMUSCULAR; INTRAVENOUS at 12:08

## 2022-08-14 RX ADMIN — DONEPEZIL HYDROCHLORIDE 10 MG: 5 TABLET, FILM COATED ORAL at 08:08

## 2022-08-14 RX ADMIN — FLUTICASONE FUROATE AND VILANTEROL TRIFENATATE 1 PUFF: 100; 25 POWDER RESPIRATORY (INHALATION) at 10:08

## 2022-08-14 NOTE — PLAN OF CARE
Problem: Adult Inpatient Plan of Care  Goal: Plan of Care Review  Outcome: Ongoing, Progressing  Goal: Patient-Specific Goal (Individualized)  Outcome: Ongoing, Progressing  Goal: Absence of Hospital-Acquired Illness or Injury  Outcome: Ongoing, Progressing  Goal: Optimal Comfort and Wellbeing  Outcome: Ongoing, Progressing  Goal: Readiness for Transition of Care  Outcome: Ongoing, Progressing     Problem: Infection  Goal: Absence of Infection Signs and Symptoms  Outcome: Ongoing, Progressing     Problem: Impaired Wound Healing  Goal: Optimal Wound Healing  Outcome: Ongoing, Progressing     Problem: Fall Injury Risk  Goal: Absence of Fall and Fall-Related Injury  Outcome: Ongoing, Progressing     Problem: Skin Injury Risk Increased  Goal: Skin Health and Integrity  Outcome: Ongoing, Progressing     Problem: Gas Exchange Impaired  Goal: Optimal Gas Exchange  Outcome: Ongoing, Progressing     Problem: Cardiac Output Decreased (Heart Failure)  Goal: Optimal Cardiac Output  Outcome: Ongoing, Progressing     Problem: Fluid Imbalance (Heart Failure)  Goal: Fluid Balance  Outcome: Ongoing, Progressing     Problem: Functional Ability Impaired (Heart Failure)  Goal: Optimal Functional Ability  Outcome: Ongoing, Progressing     Problem: Respiratory Compromise (Heart Failure)  Goal: Effective Oxygenation and Ventilation  Outcome: Ongoing, Progressing     Problem: Sleep Disordered Breathing (Heart Failure)  Goal: Effective Breathing Pattern During Sleep  Outcome: Ongoing, Progressing

## 2022-08-14 NOTE — PLAN OF CARE
Problem: Adult Inpatient Plan of Care  Goal: Plan of Care Review  8/14/2022 0345 by Cherri Carreno RN  Outcome: Ongoing, Progressing  8/14/2022 0344 by Cherri Carreno RN  Outcome: Ongoing, Progressing  Goal: Patient-Specific Goal (Individualized)  8/14/2022 0345 by Cherri Carreno RN  Outcome: Ongoing, Progressing  8/14/2022 0344 by Cherri Carreno RN  Outcome: Ongoing, Progressing  Goal: Absence of Hospital-Acquired Illness or Injury  8/14/2022 0345 by Cherri Carreno RN  Outcome: Ongoing, Progressing  8/14/2022 0344 by Cherri Carreno RN  Outcome: Ongoing, Progressing  Goal: Optimal Comfort and Wellbeing  8/14/2022 0345 by Cherri Carreno RN  Outcome: Ongoing, Progressing  8/14/2022 0344 by Cherri Carreno RN  Outcome: Ongoing, Progressing  Goal: Readiness for Transition of Care  Outcome: Ongoing, Progressing     Problem: Infection  Goal: Absence of Infection Signs and Symptoms  8/14/2022 0345 by Cherri Carreno RN  Outcome: Ongoing, Progressing  8/14/2022 0344 by Cherri Carreno RN  Outcome: Ongoing, Progressing     Problem: Impaired Wound Healing  Goal: Optimal Wound Healing  8/14/2022 0345 by Cherri Carreno RN  Outcome: Ongoing, Progressing  8/14/2022 0344 by Cherri Carreno RN  Outcome: Ongoing, Progressing     Problem: Fall Injury Risk  Goal: Absence of Fall and Fall-Related Injury  8/14/2022 0345 by Cherri Carreno RN  Outcome: Ongoing, Progressing  8/14/2022 0344 by Cherri Carreno RN  Outcome: Ongoing, Progressing     Problem: Skin Injury Risk Increased  Goal: Skin Health and Integrity  8/14/2022 0345 by Cherri Carreno RN  Outcome: Ongoing, Progressing  8/14/2022 0344 by Cherri Carreno RN  Outcome: Ongoing, Progressing     Problem: Gas Exchange Impaired  Goal: Optimal Gas Exchange  Outcome: Ongoing, Progressing     Problem: Cardiac Output Decreased (Heart Failure)  Goal: Optimal Cardiac Output  Outcome: Ongoing, Progressing     Problem: Fluid Imbalance  (Heart Failure)  Goal: Fluid Balance  Outcome: Ongoing, Progressing     Problem: Functional Ability Impaired (Heart Failure)  Goal: Optimal Functional Ability  Outcome: Ongoing, Progressing     Problem: Respiratory Compromise (Heart Failure)  Goal: Effective Oxygenation and Ventilation  Outcome: Ongoing, Progressing     Problem: Sleep Disordered Breathing (Heart Failure)  Goal: Effective Breathing Pattern During Sleep  Outcome: Ongoing, Progressing

## 2022-08-14 NOTE — H&P
[facility] Hospital Medicine   History & Physical Note      Patient Name: Emma Jorgensen  : 1934  MRN: [id number]  PCP: Mary Jane Dean MD  Admitting Service: Hospital Medicine  Attending Physician: Sudeep Humphries MD  Admission Date: 2022 - OP- Observation   Length of Stay: 1  History source: EMR, patient and/or patient's family  Face-to-Face encounter date: 2022  Code status: DNR    Chief Complaint   Shortness of Breath (EMS reports pt. From [place] [facility] c/o SOB that started today, pmh CHF. )    History of Present Illness   This is an 88-year-old female medical history of morbid obesity, dementia, chronic hypoxemic hypercapnic respiratory failure on nasal cannula and BiPAP at bedtime, chronic HFpEF, Severe aortic stenosis, CAD- recent LAD stent on 2022 on aspirin Plavix, PAF on Eliquis, infected right heel wound on ceftriaxone with end date 2022 present to the ED with complaint of shortness of breath, history limited due to dementia, daughter at bedside report over the past couple days she has been feeling more short of breath and has been using the BiPAP machine during the day as well.  No reported cough fever or chills.      On arrival to ED she was afebrile, tachypneic, hypertensive and saturating 97% on 4 L nasal cannula.  EKG normal sinus rhythm.  Chest x-ray showed pulmonary edema and small bilateral pleural effusions.  She was given Lasix 40 mg IV and referred to hospital medicine service for further evaluation and management.    ROS   Except as documented, all other systems reviewed and negative     Past Medical History     · Dementia  · Infected right heel wound status post debridement 2022 on ceftriaxone in date 2022 followed by oral suppression with amoxicillin 500 mg b.i.d. 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    Home Medications     Medication Sig Start Date End Date Taking?   apixaban (ELIQUIS) 2.5 mg Tab Take 2 tablets (5 mg total) by mouth 2 (two) times daily. 5/3/22  Yes   atorvastatin (LIPITOR) 40 MG tablet Take 1 tablet (40 mg total) by mouth every evening. 6/13/22  Yes   clopidogreL (PLAVIX) 75 mg tablet Take 1 tablet (75 mg total) by mouth once daily. 6/13/22  Yes   clotrimazole-betamethasone 1-0.05% (LOTRISONE) cream Apply 1 application topically 2 (two) times daily. 12/6/21  Yes   collagenase (SANTYL) ointment Apply topically once daily. 8/11/22 9/10/22 Yes   cyanocobalamin 500 MCG tablet Take 1 tablet (500 mcg total) by mouth once daily. 5/3/22  Yes   diclofenac sodium (VOLTAREN) 1 % Gel Apply 1 application topically 4 (four) times daily. 12/6/21  Yes   donepeziL (ARICEPT) 10 MG tablet Take 1 tablet by mouth nightly. 12/6/21  Yes   fluticasone propion-salmeterol 115-21 mcg/dose (ADVAIR HFA) 115-21 mcg/actuation HFAA inhaler Inhale 2 puffs into the lungs 2 (two) times daily. 6/13/22  Yes   furosemide (LASIX) 40 MG tablet Take 1 tablet (40 mg total) by mouth once daily. 5/27/22 5/27/23 Yes   gabapentin (NEURONTIN) 100 MG capsule Take 1 capsule (100 mg total) by mouth nightly. 6/13/22  Yes   HYDROcodone-acetaminophen (NORCO) 5-325 mg per tablet Take 1 tablet by mouth every 8 (eight) hours as  needed for Pain. 8/11/22 8/16/22 Yes   loratadine (CLARITIN) 10 mg tablet Take 1 tablet by mouth once daily at 6am. 1/4/22  Yes   magnesium oxide (MAG-OX) 400 mg (241.3 mg magnesium) tablet Take 1 tablet (400 mg total) by mouth once daily. 6/13/22  Yes   metoprolol tartrate (LOPRESSOR) 25 MG tablet Take 1 tablet (25 mg total) by mouth 2 (two) times daily. 6/13/22  Yes   pantoprazole (PROTONIX) 40 MG tablet Take 1 tablet (40 mg total) by mouth once daily. 5/3/22 5/3/23 Yes   pramipexole (MIRAPEX) 1 MG tablet Take 1 tablet by mouth nightly. 12/6/21  Yes   albuterol (PROVENTIL/VENTOLIN HFA) 90 mcg/actuation inhaler Inhale 2 puffs into the lungs every 6 (six) hours. 3/24/22     albuterol sulfate 2.5 mg/0.5 mL Nebu Take 2.5 mg by nebulization every 4 (four) hours as needed (dyspnea). Rescue 6/28/22 6/28/23    amoxicillin (AMOXIL) 500 MG Tab Take 1 tablet (500 mg total) by mouth every 12 (twelve) hours. 9/17/22 10/15/22    budesonide-glycopyr-formoterol 160-9-4.8 mcg/actuation HFAA Inhale 2 puffs into the lungs 2 (two) times a day. 3/24/22     cefTRIAXone (ROCEPHIN) 2 gram/50 mL IVPB Inject 50 mLs (2 g total) into the vein once daily at 6am. 8/11/22 9/16/22    losartan (COZAAR) 100 MG tablet Take 1 tablet by mouth once daily. 12/30/21     mupirocin (BACTROBAN) 2 % ointment Apply topically once daily. Apply to affected area at right plantar heel. After cleansing well with dermal cleanser and rinsing with saline, cover with nonadherent dressing secured with rolled gauze. Keep off loaded. 6/1/22     traZODone (DESYREL) 50 MG tablet Take 1 tablet (50 mg total) by mouth every evening. 8/11/22 9/10/22    venlafaxine (EFFEXOR-XR) 37.5 MG 24 hr capsule Take 1 capsule (37.5 mg total) by mouth once daily at 6am. 8/11/22 9/10/22    white petrolatum 41 % Oint Apply topically 2 (two) times daily. 6/13/22     betamethasone dipropionate 0.05 % cream Apply 1 application topically 2 (two) times daily. 1/4/22 5/3/22    diltiaZEM (CARDIZEM)  120 MG tablet Take 1 tablet by mouth once daily at 6am. 12/6/21 8/11/22    hydroCHLOROthiazide (HYDRODIURIL) 12.5 MG Tab Take 1 tablet by mouth once daily at 6am. 12/6/21 8/11/22    LORazepam (ATIVAN) 1 MG tablet Take 1 tablet by mouth nightly as needed. 1/4/22 6/13/22    omeprazole 20 mg TbEC Take 1 tablet by mouth once daily at 6am. 12/6/21 5/3/22          Inpatient Medications   Scheduled Meds   apixaban  5 mg Oral BID    cefTRIAXone (ROCEPHIN) IVPB  2 g Intravenous Q24H    clopidogreL  75 mg Oral Daily    cyanocobalamin  500 mcg Oral Daily    donepeziL  10 mg Oral Nightly    fluticasone furoate-vilanteroL  1 puff Inhalation Daily    furosemide (LASIX) injection  60 mg Intravenous Q8H    gabapentin  100 mg Oral Nightly    metoprolol tartrate  25 mg Oral BID    pantoprazole  40 mg Oral Daily    pramipexole  1 mg Oral Nightly    traZODone  50 mg Oral QHS    [START ON 8/15/2022] venlafaxine  37.5 mg Oral Daily     Continuous Infusions    PRN Meds  acetaminophen, acetaminophen, aluminum-magnesium hydroxide-simethicone, cloNIDine, hydrALAZINE, HYDROcodone-acetaminophen, labetalol, melatonin, ondansetron, polyethylene glycol, prochlorperazine, senna-docusate 8.6-50 mg, simethicone, sodium chloride 0.9%    Physical Exam   Vital Signs  Temp:  [96.8 °F (36 °C)]   Pulse:  [74-88]   Resp:  [18-32]   BP: ()/(51-99)   SpO2:  [95 %-100 %]    General:  lethargic  HEENT: NC/AT  Neck:  JVD  Chest:  bilateral rales  CVS: Regular rhythm.  Systolic murmur, +2 pedal edema  Abdomen: nondistended, normoactive BS, soft and non-tender.  MSK: No obvious deformity or joint swelling  Skin: Warm and dry  Neuro:  lethargic, arousable, following commands  Psych: Cooperative    Labs     Recent Labs     08/12/22  0600 08/13/22  1750   WBC 10.6 9.9   RBC 3.47* 3.94*   HGB 10.3* 11.6*   HCT 35.1* 38.7   .2* 98.2*   MCH 29.7 29.4   MCHC 29.3* 30.0*   RDW 16.5 16.4    347       Recent Labs     08/12/22  0600   CHOL  100   TRIG 50   LDL 40.00*   VLDL 10   HDL 50      Recent Labs     08/12/22  0600 08/13/22  1750    140   K 3.8 5.1   CHLORIDE 93* 93*   CO2 41* 39*   BUN 24.8* 23.6*   CREATININE 0.53* 0.62   GLUCOSE 120* 117*   CALCIUM 9.2 9.6   MG 2.00 2.20   ALBUMIN 3.1* 3.1*   GLOBULIN 3.6* 4.9*   ALKPHOS 86 96   ALT 11 16   AST 20 38*   BILITOT 0.8 0.6   TSH 0.9696  --      Recent Labs     08/13/22  1750   BNP 1,324.5*   TROPONINI 0.015          Microbiology Results (last 7 days)     ** No results found for the last 168 hours. **         Imaging     X-Ray Chest AP Portable   Final Result      Congestive failure.         Electronically signed by: Darrion Alvarado   Date:    08/13/2022   Time:    18:13        Assessment & Plan   1. Acute on chronic hypoxemic hypercapnic respiratory failure  2. Acute chronic heart failure with preserved ejection fraction  3. Severe aortic stenosis  4. Infected R heel wound s/p debridement 08/05/2022 on ceftriaxone end date 09/16/2022 followed by oral suppression with amoxicillin 500 mg b.i.d. indefinitely  5. CAD/PCI - LAD stent 6/7/2022  6. PAF - currently NSR  7. 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    Plan:    She did not have significant urine output with Lasix 40 IV  Increase Lasix to 60 mg IV Q8H  Acetazolamide 500 mg IV x1  Continue nasal cannula titrate down for goal SpO2 89-92%  Obtain ABG if significant CO2 retention/respiratory acidosis will placed on BiPAP  Home medication reviewed and resumed  Continue IV ceftriaxone as stated above has left upper extremity PICC line  VTE Prophylaxis:  Continue Eliquis     Critical care time:  45 minutes  Critical care diagnosis:  Decompensated heart failure, acute on chronic respiratory failure    Sudeep Humphries MD  Internal Medicine        Dr rojas- [facility] .  Chart reviewed and pt examined   will decrease lasix to q 12/continue nasal canula during day and bipap at night . Keep o2 sat 88-92%  Alert  and oriented. Son at bedside  Am labs  Monitor output and loc

## 2022-08-15 LAB
ANION GAP SERPL CALC-SCNC: 7 MEQ/L
BASOPHILS # BLD AUTO: 0.03 X10(3)/MCL (ref 0–0.2)
BASOPHILS NFR BLD AUTO: 0.3 %
BUN SERPL-MCNC: 16.8 MG/DL (ref 9.8–20.1)
CALCIUM SERPL-MCNC: 9.3 MG/DL (ref 8.4–10.2)
CHLORIDE SERPL-SCNC: 91 MMOL/L (ref 98–107)
CO2 SERPL-SCNC: 40 MMOL/L (ref 23–31)
CREAT SERPL-MCNC: 0.67 MG/DL (ref 0.55–1.02)
CREAT/UREA NIT SERPL: 25
EOSINOPHIL # BLD AUTO: 0.17 X10(3)/MCL (ref 0–0.9)
EOSINOPHIL NFR BLD AUTO: 1.6 %
ERYTHROCYTE [DISTWIDTH] IN BLOOD BY AUTOMATED COUNT: 16.6 % (ref 11.5–17)
GFR SERPLBLD CREATININE-BSD FMLA CKD-EPI: >60 MLS/MIN/1.73/M2
GLUCOSE SERPL-MCNC: 91 MG/DL (ref 82–115)
HCT VFR BLD AUTO: 38.1 % (ref 37–47)
HGB BLD-MCNC: 11.2 GM/DL (ref 12–16)
IMM GRANULOCYTES # BLD AUTO: 0.04 X10(3)/MCL (ref 0–0.04)
IMM GRANULOCYTES NFR BLD AUTO: 0.4 %
LYMPHOCYTES # BLD AUTO: 1.16 X10(3)/MCL (ref 0.6–4.6)
LYMPHOCYTES NFR BLD AUTO: 11.2 %
MAGNESIUM SERPL-MCNC: 2 MG/DL (ref 1.6–2.6)
MCH RBC QN AUTO: 29.6 PG (ref 27–31)
MCHC RBC AUTO-ENTMCNC: 29.4 MG/DL (ref 33–36)
MCV RBC AUTO: 100.8 FL (ref 80–94)
MONOCYTES # BLD AUTO: 1.09 X10(3)/MCL (ref 0.1–1.3)
MONOCYTES NFR BLD AUTO: 10.5 %
NEUTROPHILS # BLD AUTO: 7.9 X10(3)/MCL (ref 2.1–9.2)
NEUTROPHILS NFR BLD AUTO: 76 %
NRBC BLD AUTO-RTO: 0 %
PLATELET # BLD AUTO: 321 X10(3)/MCL (ref 130–400)
PMV BLD AUTO: 10 FL (ref 7.4–10.4)
POTASSIUM SERPL-SCNC: 3.4 MMOL/L (ref 3.5–5.1)
RBC # BLD AUTO: 3.78 X10(6)/MCL (ref 4.2–5.4)
SODIUM SERPL-SCNC: 138 MMOL/L (ref 136–145)
WBC # SPEC AUTO: 10.4 X10(3)/MCL (ref 4.5–11.5)

## 2022-08-15 PROCEDURE — 97530 THERAPEUTIC ACTIVITIES: CPT

## 2022-08-15 PROCEDURE — 27000221 HC OXYGEN, UP TO 24 HOURS

## 2022-08-15 PROCEDURE — 63600175 PHARM REV CODE 636 W HCPCS: Performed by: INTERNAL MEDICINE

## 2022-08-15 PROCEDURE — 94761 N-INVAS EAR/PLS OXIMETRY MLT: CPT

## 2022-08-15 PROCEDURE — A4216 STERILE WATER/SALINE, 10 ML: HCPCS | Performed by: INTERNAL MEDICINE

## 2022-08-15 PROCEDURE — 36415 COLL VENOUS BLD VENIPUNCTURE: CPT | Performed by: INTERNAL MEDICINE

## 2022-08-15 PROCEDURE — 25000003 PHARM REV CODE 250: Performed by: INTERNAL MEDICINE

## 2022-08-15 PROCEDURE — 80048 BASIC METABOLIC PNL TOTAL CA: CPT | Performed by: INTERNAL MEDICINE

## 2022-08-15 PROCEDURE — 21400001 HC TELEMETRY ROOM

## 2022-08-15 PROCEDURE — 94660 CPAP INITIATION&MGMT: CPT

## 2022-08-15 PROCEDURE — 85025 COMPLETE CBC W/AUTO DIFF WBC: CPT | Performed by: INTERNAL MEDICINE

## 2022-08-15 PROCEDURE — 97162 PT EVAL MOD COMPLEX 30 MIN: CPT

## 2022-08-15 PROCEDURE — 99900031 HC PATIENT EDUCATION (STAT)

## 2022-08-15 PROCEDURE — 83735 ASSAY OF MAGNESIUM: CPT | Performed by: INTERNAL MEDICINE

## 2022-08-15 PROCEDURE — 99900035 HC TECH TIME PER 15 MIN (STAT)

## 2022-08-15 RX ORDER — POTASSIUM CHLORIDE 20 MEQ/1
40 TABLET, EXTENDED RELEASE ORAL 2 TIMES DAILY
Status: DISCONTINUED | OUTPATIENT
Start: 2022-08-15 | End: 2022-08-19 | Stop reason: HOSPADM

## 2022-08-15 RX ORDER — LOSARTAN POTASSIUM 25 MG/1
25 TABLET ORAL DAILY
Status: DISCONTINUED | OUTPATIENT
Start: 2022-08-16 | End: 2022-08-19 | Stop reason: HOSPADM

## 2022-08-15 RX ADMIN — FLUTICASONE FUROATE AND VILANTEROL TRIFENATATE 1 PUFF: 100; 25 POWDER RESPIRATORY (INHALATION) at 10:08

## 2022-08-15 RX ADMIN — CYANOCOBALAMIN TAB 500 MCG 500 MCG: 500 TAB at 08:08

## 2022-08-15 RX ADMIN — FUROSEMIDE 60 MG: 10 INJECTION, SOLUTION INTRAMUSCULAR; INTRAVENOUS at 01:08

## 2022-08-15 RX ADMIN — FUROSEMIDE 60 MG: 10 INJECTION, SOLUTION INTRAMUSCULAR; INTRAVENOUS at 03:08

## 2022-08-15 RX ADMIN — CLOPIDOGREL 75 MG: 75 TABLET, FILM COATED ORAL at 08:08

## 2022-08-15 RX ADMIN — VENLAFAXINE HYDROCHLORIDE 37.5 MG: 37.5 CAPSULE, EXTENDED RELEASE ORAL at 06:08

## 2022-08-15 RX ADMIN — PANTOPRAZOLE SODIUM 40 MG: 40 TABLET, DELAYED RELEASE ORAL at 08:08

## 2022-08-15 RX ADMIN — CEFTRIAXONE SODIUM 2 G: 2 INJECTION, POWDER, FOR SOLUTION INTRAMUSCULAR; INTRAVENOUS at 08:08

## 2022-08-15 RX ADMIN — PRAMIPEXOLE DIHYDROCHLORIDE 1 MG: 0.25 TABLET ORAL at 09:08

## 2022-08-15 RX ADMIN — APIXABAN 5 MG: 5 TABLET, FILM COATED ORAL at 08:08

## 2022-08-15 RX ADMIN — HYDROCODONE BITARTRATE AND ACETAMINOPHEN 1 TABLET: 5; 325 TABLET ORAL at 09:08

## 2022-08-15 RX ADMIN — TRAZODONE HYDROCHLORIDE 50 MG: 50 TABLET ORAL at 09:08

## 2022-08-15 RX ADMIN — GABAPENTIN 100 MG: 100 CAPSULE ORAL at 09:08

## 2022-08-15 RX ADMIN — Medication 10 ML: at 01:08

## 2022-08-15 RX ADMIN — APIXABAN 5 MG: 5 TABLET, FILM COATED ORAL at 09:08

## 2022-08-15 RX ADMIN — DONEPEZIL HYDROCHLORIDE 10 MG: 5 TABLET, FILM COATED ORAL at 09:08

## 2022-08-15 RX ADMIN — METOPROLOL TARTRATE 25 MG: 25 TABLET, FILM COATED ORAL at 08:08

## 2022-08-15 RX ADMIN — POTASSIUM CHLORIDE 40 MEQ: 1500 TABLET, EXTENDED RELEASE ORAL at 09:08

## 2022-08-15 NOTE — PROGRESS NOTES
[facility] Hospital Medicine Progress Note        Chief Complaint: Inpatient Follow-up for Shortness of Breath (EMS reports pt. From [facility] c/o SOB that started today, pmh CHF. )      HPI:This is an 88-year-old female medical history of morbid obesity, dementia, chronic hypoxemic hypercapnic respiratory failure on nasal cannula and BiPAP at bedtime, chronic HFpEF, Severe aortic stenosis, CAD- recent LAD stent on 06/07/2022 on aspirin Plavix, PAF on Eliquis, infected right heel wound on ceftriaxone with end date 09/16/2022 present to the ED with complaint of shortness of breath, history limited due to dementia, daughter at bedside report over the past couple days she has been feeling more short of breath and has been using the BiPAP machine during the day as well. No reported cough fever or chills.   On arrival to ED she was afebrile, tachypneic, hypertensive and saturating 97% on 4 L nasal cannula. EKG normal sinus rhythm. Chest x-ray showed pulmonary edema and small bilateral pleural effusions. She was given Lasix 40 mg IV and referred to hospital medicine service for further evaluation and management.       Interval Hx:   Pt has hx of rle fx/hardware plus heel infection  Slept with bipap  Continues on lasix 60 mgs iv q 12 hours    Objective/physical exam:  General: a/a and oriented  HEENT: NC/AT   Neck: JVD   Chest: crackles at bases  CVS: Regular rhythm. Systolic murmur, +2 pedal edema   Abdomen: nondistended, normoactive BS, soft and non-tender.   MSK:  Right heel wound on rocephin  Skin: Warm and dry/ right heel wound   Neuro: a/a and oriented /follows commands  Psych: Cooperative        VITAL SIGNS: 24 HRS MIN & MAX LAST   Temp  Min: 97.8 °F (36.6 °C)  Max: 98.5 °F (36.9 °C) 97.8 °F (36.6 °C)   BP  Min: 100/61  Max: 171/69 (!) 157/70   Pulse  Min: 49  Max: 75  (!) 58   Resp  Min: 14  Max: 20 18   SpO2  Min: 92 %  Max: 100 % 100 %       Recent Labs   Lab 08/13/22  5750  08/14/22  0723 08/15/22  0728   WBC 9.9 11.8* 10.4   RBC 3.94* 3.67* 3.78*   HGB 11.6* 10.8* 11.2*   HCT 38.7 37.3 38.1   MCV 98.2* 101.6* 100.8*   MCH 29.4 29.4 29.6   MCHC 30.0* 29.0* 29.4*   RDW 16.4 16.3 16.6    324 321   MPV 10.4 10.0 10.0       Recent Labs   Lab 08/12/22  0600 08/13/22  1750 08/14/22  0238 08/14/22  0723 08/15/22  0728    140  --  142 138   K 3.8 5.1  --  3.8 3.4*   CO2 41* 39*  --  40* 40*   BUN 24.8* 23.6*  --  18.6 16.8   CREATININE 0.53* 0.62  --  0.60 0.67   CALCIUM 9.2 9.6  --  9.6 9.3   PH  --   --  7.39  --   --    MG 2.00 2.20  --  2.00 2.00   ALBUMIN 3.1* 3.1*  --   --   --    ALKPHOS 86 96  --   --   --    ALT 11 16  --   --   --    AST 20 38*  --   --   --    BILITOT 0.8 0.6  --   --   --           Microbiology Results (last 7 days)     ** No results found for the last 168 hours. **           See below for Radiology    Scheduled Med:   apixaban  5 mg Oral BID    cefTRIAXone (ROCEPHIN) IVPB  2 g Intravenous Q24H    clopidogreL  75 mg Oral Daily    collagenase   Topical (Top) Daily    cyanocobalamin  500 mcg Oral Daily    donepeziL  10 mg Oral Nightly    fluticasone furoate-vilanteroL  1 puff Inhalation Daily    furosemide (LASIX) injection  60 mg Intravenous Q12H    gabapentin  100 mg Oral Nightly    metoprolol tartrate  25 mg Oral BID    pantoprazole  40 mg Oral Daily    pramipexole  1 mg Oral Nightly    traZODone  50 mg Oral QHS    venlafaxine  37.5 mg Oral Daily        Continuous Infusions:       PRN Meds:  acetaminophen, acetaminophen, aluminum-magnesium hydroxide-simethicone, cloNIDine, hydrALAZINE, HYDROcodone-acetaminophen, labetalol, melatonin, ondansetron, polyethylene glycol, prochlorperazine, senna-docusate 8.6-50 mg, simethicone, sodium chloride 0.9%       Assessment/Plan:  Dementia   Infected right heel wound status post debridement 08/05/2022 on ceftriaxone in date 09/16/2022 followed by oral suppression with amoxicillin 500 mg b.i.d.  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  Continue goal-directed medical treatment for heart failure.  We will get cis  input.  Continue Rocephin for right lower extremity chronic wound.  Continue PT with no weight-bearing to right lower extremity  Cc time 35 minutes  Cc dx- acute on chronic HFpef requiring lasix iv    VTE prophylaxis: DOAC    Patient condition:  Improved and guarded    Anticipated discharge and Disposition: nursing home resident/ tbd        All diagnosis and differential diagnosis have been reviewed; assessment and plan has been documented; I have personally reviewed the labs and test results that are presently available; I have reviewed the patients medication list; I have reviewed the consulting providers response and recommendations. I have reviewed or attempted to review medical records based upon their availability    All of the patient's questions have been  addressed and answered. Patient's is agreeable to the above stated plan. I will continue to monitor closely and make adjustments to medical management as needed.  _____________________________________________________________________    Nutrition Status:    Radiology:  X-Ray Chest AP Portable  Narrative: EXAMINATION:  XR CHEST AP PORTABLE    CLINICAL HISTORY:  shortness of breath;    TECHNIQUE:  One view    COMPARISON:  August 10, 2022.    FINDINGS:  Cardiopericardial silhouette enlarged appearance similar and the lungs are remarkable for at least moderate congestive failure opacities.  Right perihilar  thick linear atelactasis without exclusion of associated infiltrates.  Small pleural effusions. No pneumothorax.  Impression: Congestive failure.    Electronically signed by: Darrion Alvarado  Date:    08/13/2022  Time:    18:13      Ayden Resendiz MD   08/15/2022

## 2022-08-15 NOTE — PLAN OF CARE
Problem: Physical Therapy  Goal: Physical Therapy Goal  Description: Goals to be met by: 9/15/22     Patient will increase functional independence with mobility by performin. Supine to sit with MInimal Assistance  2. Sit to supine with MInimal Assistance  3. Wheelchair propulsion x200 feet with Stand-by Assistance using bilateral uppper extremities    Outcome: Ongoing, Progressing

## 2022-08-15 NOTE — PLAN OF CARE
Problem: Adult Inpatient Plan of Care  Goal: Absence of Hospital-Acquired Illness or Injury  Outcome: Ongoing, Progressing     Problem: Infection  Goal: Absence of Infection Signs and Symptoms  Outcome: Ongoing, Progressing     Problem: Impaired Wound Healing  Goal: Optimal Wound Healing  Outcome: Ongoing, Progressing

## 2022-08-15 NOTE — PROGRESS NOTES
"Inpatient Nutrition Evaluation    Admit Date: 2022   Length of Stay: 1 days  Nutrition Recommendation/Prescription     Continue oral diet as tolerated; add Kang BID for wound healing.    Nutrition Assessment     Chart Review    Reason Seen: continuous nutrition monitoring    Diagnosis:  1. Acute on chronic hypoxemic hypercapnic respiratory failure  2. Acute chronic heart failure with preserved ejection fraction  3. Severe aortic stenosis  4. Infected R heel wound s/p debridement   5. CAD/PCI - LAD stent 2022  6. PAF  7. 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    Relevant Medical History: morbid obesity, dementia, chronic hypoxemic hypercapnic respiratory failure on nasal cannula and BiPAP at bedtime, chronic HFpEF, severe aortic stenosis, CAD- recent LAD stent on 22 on aspirin Plavix, PAF on Eliquis, infected right heel wound on ceftriaxone with end date 22     Nutrition-Related Medications: cyanocobalamin, furosemide    Nutrition-Related Labs:   22 Phos WNL  8/15/22 K 3.4, Cl 91, CO2 40, rest of BMP WNL; Mg WNL    Current Diet: Diet Low Sodium, 2gm  Current Oral Supplements: none at this time  Appetite/Oral Intake: good/% of meals  Factors Affecting Nutritional Intake: none identified at this time  Food/Adventism/Cultural Preferences: none reported    Skin Integrity: incision, wound  Wound(s):      Altered Skin Integrity 22 1100 Right posterior Heel #2 Other (comment)-Tissue loss description: Full thickness     Comments    8/15/22 Patient reports good appetite/intake.    Anthropometrics    Height: 5' 8" (172.7 cm) Height Method: Estimated  Weight: 112.5 kg (248 lb) (Bed weight) Weight Method: Bed Scale  BMI (Calculated): 37.7  BMI Classification: obese grade II (BMI 35-39.9)  Ideal Body Weight (IBW), Female: 140 lb  % Ideal Body Weight, Female (lb): 184.29 %              Usual Body Weight (UBW), k.3 kg  % Usual Body Weight: 108.08    Wt Readings from Last 5 Encounters:   08/15/22 112.5 kg (248 " lb)   07/30/22 114.8 kg (253 lb)   07/14/22 108.9 kg (240 lb 1.3 oz)   06/28/22 108.9 kg (240 lb)   06/07/22 111.5 kg (245 lb 13 oz)     Weight Change(s) Since Admission:  Admit Weight: 104.3 kg (230 lb); usual weight per patient  8/15/22 112.5 kg, weight gain, likely fluid-related    Patient Education    Not applicable.    Monitoring & Evaluation     Dietitian will monitor food and beverage intake and weight.  Nutrition Risk/Follow-Up: low (follow-up in 5-7 days)  Patients assigned 'low nutrition risk' status do not qualify for a full nutritional assessment but will be monitored and re-evaluated in a 5-7 day time period.

## 2022-08-15 NOTE — PT/OT/SLP EVAL
Physical Therapy Evaluation    Patient Name:  Emma Jorgensen   MRN:  [id number]    Recommendations:     Discharge Recommendations:  nursing facility, skilled, nursing facility, basic   Discharge Equipment Recommendations: none   Barriers to discharge: None    Assessment:     Emma Jorgensen is a 88 y.o. female admitted with a medical diagnosis of Acute on chronic heart failure with preserved ejection fraction (HFpEF). Patient is a NH resident. Reports being hoyered to/from wheelchair during day. Reports being able to ambulate in the last year, however, unsure how accurate this is. Spoke to nurse (Petrona). She is to be NWB RLE 2/2 foot wound.  She presents with the following impairments/functional limitations:  weakness, impaired endurance, impaired self care skills, impaired functional mobility, impaired balance, gait instability, decreased lower extremity function, decreased safety awareness, orthopedic precautions. She required MAX A for bed mobility. Sitting balance was fair once stabilized upright. Decided against standing attempt as patient unable to understand WB precautions. Patient to be seen 3x/week with focus on improving strength and mobility to decrease burden of care. Progress as tolerated.    Rehab Prognosis: Fair; patient would benefit from acute skilled PT services to address these deficits and reach maximum level of function.    Recent Surgery: * No surgery found *      Plan:     During this hospitalization, patient to be seen 5 x/week to address the identified rehab impairments via gait training, therapeutic activities, therapeutic exercises, neuromuscular re-education and progress toward the following goals:    · Plan of Care Expires:  09/15/22    Subjective     Chief Complaint: none  Patient/Family Comments/goals: none  Pain/Comfort:  · Pain Rating 1: 4/10  · Location - Side 1: Right  · Location 1:  (foot)  · Pain Addressed 1: Reposition, Distraction  · Pain Rating Post-Intervention 1:  /10    Patients cultural, spiritual, Sabianism conflicts given the current situation: no    Living Environment:  NH  Prior to admission, patient required assistance with ADL's and mobility.  Equipment used at home: wheelchair.  DME owned (not currently used): none.  Upon discharge, patient will have assistance from NH staff.    Objective:     Communicated with nurse prior to session.  Patient found HOB elevated with telemetry  upon PT entry to room.    General Precautions: Standard, fall   Orthopedic Precautions:RLE non weight bearing   Braces: N/A  Respiratory Status: Nasal cannula, flow 3 L/min. Satting 87%-92%.    Exams:  · Cognitive Exam:  Patient is oriented to Person and Place  · Sensation:    · -       Intact  · RLE ROM: WFL  · RLE Strength: 3/5 grossly  · LLE ROM: WFL  · LLE Strength: 3/5 grossly    Functional Mobility:  · Bed Mobility:     · Rolling Left:  moderate assistance  · Rolling Right: moderate assistance  · Supine to Sit: maximal assistance  · Sit to Supine: maximal assistance  · Balance: fair    Therapeutic Activities:  1)rolling for pericare. Required MOD A with cueing.        AM-PAC 6 CLICK MOBILITY  Total Score:9     Patient left HOB elevated with all lines intact, call button in reach and bed alarm on.    GOALS:   Multidisciplinary Problems     Physical Therapy Goals        Problem: Physical Therapy    Goal Priority Disciplines Outcome Goal Variances Interventions   Physical Therapy Goal     PT, PT/OT Ongoing, Progressing     Description: Goals to be met by: 9/15/22     Patient will increase functional independence with mobility by performin. Supine to sit with MInimal Assistance  2. Sit to supine with MInimal Assistance  3. Wheelchair propulsion x200 feet with Stand-by Assistance using bilateral uppper extremities                     History:     Past Medical History:   Diagnosis Date    Anxiety disorder, unspecified     Arthritis     Deep vein thrombosis     Depression      Irregular heart beat     Obesity, unspecified     Other pulmonary embolism without acute cor pulmonale     Parkinson's disease     Peripheral artery disease     Trimalleolar fracture of ankle, closed     Unspecified dementia without behavioral disturbance        Past Surgical History:   Procedure Laterality Date    ANGIOGRAM, CORONARY, WITH LEFT HEART CATHETERIZATION N/A 6/7/2022    Procedure: Angiogram, Coronary, with Left Heart Cath;  Surgeon: Claude Morillo MD;  Location: [facility] CATH LAB;  Service: Cardiology;  Laterality: N/A;    ANKLE FRACTURE SURGERY Right     CATARACT EXTRACTION      CATHETERIZATION OF BOTH LEFT AND RIGHT HEART N/A 6/7/2022    Procedure: CATHETERIZATION, HEART, BOTH LEFT AND RIGHT;  Surgeon: Claude Morillo MD;  Location: [facility] CATH LAB;  Service: Cardiology;  Laterality: N/A;    COLONOSCOPY W/ POLYPECTOMY      DEBRIDEMENT OF FOOT Right 8/5/2022    Procedure: DEBRIDEMENT, FOOT;  Surgeon: Rosales Redmond DPM;  Location: [facility] OR;  Service: Podiatry;  Laterality: Right;  REQ NOON    HYSTERECTOMY      SMALL INTESTINE SURGERY         Time Tracking:     PT Received On: 08/15/22  PT Start Time: 0832     PT Stop Time: 0900  PT Total Time (min): 28 min     Billable Minutes: Evaluation 15 minutes and Therapeutic Activity 13 minutes      08/15/2022

## 2022-08-15 NOTE — NURSING
EMR reviewed , noting recent procedure and plan of care per Dr. Redmond, wounds at right foot cleansed and assessed and redressed with Saline moist gauze under dry gauze and secured with rolled gauze . Recommended daily wound care .

## 2022-08-16 LAB
ANION GAP SERPL CALC-SCNC: 9 MEQ/L
BUN SERPL-MCNC: 16.4 MG/DL (ref 9.8–20.1)
CALCIUM SERPL-MCNC: 9.5 MG/DL (ref 8.4–10.2)
CHLORIDE SERPL-SCNC: 89 MMOL/L (ref 98–107)
CO2 SERPL-SCNC: 40 MMOL/L (ref 23–31)
CREAT SERPL-MCNC: 0.6 MG/DL (ref 0.55–1.02)
CREAT/UREA NIT SERPL: 27
GFR SERPLBLD CREATININE-BSD FMLA CKD-EPI: >60 MLS/MIN/1.73/M2
GLUCOSE SERPL-MCNC: 82 MG/DL (ref 82–115)
MAGNESIUM SERPL-MCNC: 1.9 MG/DL (ref 1.6–2.6)
POTASSIUM SERPL-SCNC: 3.5 MMOL/L (ref 3.5–5.1)
SODIUM SERPL-SCNC: 138 MMOL/L (ref 136–145)

## 2022-08-16 PROCEDURE — 99900035 HC TECH TIME PER 15 MIN (STAT)

## 2022-08-16 PROCEDURE — 25000242 PHARM REV CODE 250 ALT 637 W/ HCPCS: Performed by: INTERNAL MEDICINE

## 2022-08-16 PROCEDURE — 21400001 HC TELEMETRY ROOM

## 2022-08-16 PROCEDURE — 25000003 PHARM REV CODE 250: Performed by: INTERNAL MEDICINE

## 2022-08-16 PROCEDURE — 94660 CPAP INITIATION&MGMT: CPT

## 2022-08-16 PROCEDURE — 97110 THERAPEUTIC EXERCISES: CPT

## 2022-08-16 PROCEDURE — 94761 N-INVAS EAR/PLS OXIMETRY MLT: CPT

## 2022-08-16 PROCEDURE — 97530 THERAPEUTIC ACTIVITIES: CPT

## 2022-08-16 PROCEDURE — 27000221 HC OXYGEN, UP TO 24 HOURS

## 2022-08-16 PROCEDURE — 36415 COLL VENOUS BLD VENIPUNCTURE: CPT | Performed by: INTERNAL MEDICINE

## 2022-08-16 PROCEDURE — 63600175 PHARM REV CODE 636 W HCPCS: Performed by: INTERNAL MEDICINE

## 2022-08-16 PROCEDURE — 80048 BASIC METABOLIC PNL TOTAL CA: CPT | Performed by: INTERNAL MEDICINE

## 2022-08-16 PROCEDURE — 83735 ASSAY OF MAGNESIUM: CPT | Performed by: INTERNAL MEDICINE

## 2022-08-16 RX ADMIN — APIXABAN 5 MG: 5 TABLET, FILM COATED ORAL at 08:08

## 2022-08-16 RX ADMIN — VENLAFAXINE HYDROCHLORIDE 37.5 MG: 37.5 CAPSULE, EXTENDED RELEASE ORAL at 06:08

## 2022-08-16 RX ADMIN — CLOPIDOGREL 75 MG: 75 TABLET, FILM COATED ORAL at 08:08

## 2022-08-16 RX ADMIN — METOPROLOL TARTRATE 25 MG: 25 TABLET, FILM COATED ORAL at 08:08

## 2022-08-16 RX ADMIN — APIXABAN 5 MG: 5 TABLET, FILM COATED ORAL at 10:08

## 2022-08-16 RX ADMIN — HYDROCODONE BITARTRATE AND ACETAMINOPHEN 1 TABLET: 5; 325 TABLET ORAL at 06:08

## 2022-08-16 RX ADMIN — CYANOCOBALAMIN TAB 500 MCG 500 MCG: 500 TAB at 08:08

## 2022-08-16 RX ADMIN — FLUTICASONE FUROATE AND VILANTEROL TRIFENATATE 1 PUFF: 100; 25 POWDER RESPIRATORY (INHALATION) at 08:08

## 2022-08-16 RX ADMIN — FUROSEMIDE 60 MG: 10 INJECTION, SOLUTION INTRAMUSCULAR; INTRAVENOUS at 06:08

## 2022-08-16 RX ADMIN — DONEPEZIL HYDROCHLORIDE 10 MG: 5 TABLET, FILM COATED ORAL at 10:08

## 2022-08-16 RX ADMIN — CEFTRIAXONE SODIUM 2 G: 2 INJECTION, POWDER, FOR SOLUTION INTRAMUSCULAR; INTRAVENOUS at 11:08

## 2022-08-16 RX ADMIN — POTASSIUM CHLORIDE 40 MEQ: 1500 TABLET, EXTENDED RELEASE ORAL at 10:08

## 2022-08-16 RX ADMIN — PRAMIPEXOLE DIHYDROCHLORIDE 1 MG: 0.25 TABLET ORAL at 10:08

## 2022-08-16 RX ADMIN — COLLAGENASE SANTYL: 250 OINTMENT TOPICAL at 08:08

## 2022-08-16 RX ADMIN — GABAPENTIN 100 MG: 100 CAPSULE ORAL at 10:08

## 2022-08-16 RX ADMIN — LOSARTAN POTASSIUM 25 MG: 25 TABLET, FILM COATED ORAL at 08:08

## 2022-08-16 RX ADMIN — METOPROLOL TARTRATE 25 MG: 25 TABLET, FILM COATED ORAL at 10:08

## 2022-08-16 RX ADMIN — POTASSIUM CHLORIDE 40 MEQ: 1500 TABLET, EXTENDED RELEASE ORAL at 08:08

## 2022-08-16 RX ADMIN — PANTOPRAZOLE SODIUM 40 MG: 40 TABLET, DELAYED RELEASE ORAL at 08:08

## 2022-08-16 RX ADMIN — TRAZODONE HYDROCHLORIDE 50 MG: 50 TABLET ORAL at 10:08

## 2022-08-16 NOTE — CONSULTS
Inpatient consult to Cardiology  Consult performed by: ALICIA HernandezCNP-BC  Consult ordered by: Ayden Resendiz MD  Reason for consult: acute on chronic HFpEF and aortic stenosis        [facility] - 4th Floor Medical Telemetry  Cardiology  Consult Note    Patient Name: Emma Jorgensen  MRN: [id number]  Admission Date: 8/13/2022  Hospital Length of Stay: 2 days  Code Status: DNR   Attending Provider: Ayden Resendiz MD   Cons[facility]  Primary Care Physician: Mary Jane Dean MD  Principal Problem:Acute on chronic heart failure with preserved ejection fraction (HFpEF)    Patient information was obtained from patient, past medical records and ER records.     Subjective:     Chief Complaint:  Consulted for HFpEF and Severe AS     88 year old female known to Dr. Eubanks and Dr. Juarez with a past medical history of Aortic Stenosis, Diastolic CHF, HTN, GERD, DVT, PE, Parkinson's Disease and right ankle fracture.  She presented to [facility] on [facility]/13/2022 with complaints of shortness of breath .  History is limited secondary to dementia.  Her daughter stated over the past couple days she had been feeling more short of breath had been using BiPAP machine during the day .  Chest x-ray revealed pulmonary edema with small bilateral pleural effusions.  BNP was 1324.5.  Troponin was negative.  Cis has been consulted for acute on chronic heart failure and severe aortic stenosis.      PMH: Aortic Stenosis, Diastolic CHF, HTN, GERD, DVT, PE, Parkinson's Disease   PSH: Ankle Surgery, Cataract Surgery, Colonoscopy, Colon Resection, Toe Surgery, Hysterectomy  Family History: Father - CVA; Sister - CAD/CABG  Social History: Former Smoker.  Denies Alcohol Use.  Denies Illicit Drug Use.     Previous Cardiac Diagnostics:   [facility] 6.7.22  The Mid LAD lesion was 85% stenosed with 0% stenosis post-intervention.  A stent was successfully placed at 12 LETTY for 10 sec.  The estimated  blood loss was <50 mL.  There was single vessel coronary artery disease.  The PCI was successful.  The coronary FFR was abnormal.  The filling pressures mildly elevated     Echocardiogram 5.24.22:  TDS due to poor acoustical windows; suboptimal doppler angles.  Mild concentric hypertrophy and normal systolic function.  The estimated ejection fraction is 55%.  Grade II left ventricular diastolic dysfunction.  Moderate aortic regurgitation.  There is moderate aortic valve stenosis.  Aortic valve area is 0.75 cm squared; peak velocity is 3.53m/s; mean gradient is 30mmHg.  Intermediate central venous pressure (8mmHg).     Carotid US 4.21.22  The right internal carotid artery demonstrated less than 50% stenosis.  The left internal carotid artery demonstrated less than 50% stenosis.  Bilateral vertebral arteries were patent with antegrade flow.        PET MPI 2.22.18:  This is a normal perfusion study, no perfusion defects noted.  There is no evidence of ischemia.  This scan is suggestive of low risk for future cardiovascular events.  The left ventricular cavity is noted to be normal on the stress studies.  The stress left ventricular ejection fraction was calculated to be 74% and left ventricular global function is normal.  The rest left ventricular cavity is noted to be normal.  The rest left ventricular ejection fraction was calculated to be 74% and rest left ventricular global function is michael;.  Compared with rest and stress studies the ejection fraction remains unchanged (74).         Review of patient's allergies indicates:  No Known Allergies    No current facility-administered medications on file prior to encounter.     Current Outpatient Medications on File Prior to Encounter   Medication Sig    apixaban (ELIQUIS) 2.5 mg Tab Take 2 tablets (5 mg total) by mouth 2 (two) times daily.    atorvastatin (LIPITOR) 40 MG tablet Take 1 tablet (40 mg total) by mouth every evening.    clopidogreL (PLAVIX) 75 mg tablet  Take 1 tablet (75 mg total) by mouth once daily.    clotrimazole-betamethasone 1-0.05% (LOTRISONE) cream Apply 1 application topically 2 (two) times daily.    collagenase (SANTYL) ointment Apply topically once daily.    cyanocobalamin 500 MCG tablet Take 1 tablet (500 mcg total) by mouth once daily.    diclofenac sodium (VOLTAREN) 1 % Gel Apply 1 application topically 4 (four) times daily.    donepeziL (ARICEPT) 10 MG tablet Take 1 tablet by mouth nightly.    fluticasone propion-salmeterol 115-21 mcg/dose (ADVAIR HFA) 115-21 mcg/actuation HFAA inhaler Inhale 2 puffs into the lungs 2 (two) times daily.    furosemide (LASIX) 40 MG tablet Take 1 tablet (40 mg total) by mouth once daily.    gabapentin (NEURONTIN) 100 MG capsule Take 1 capsule (100 mg total) by mouth nightly.    HYDROcodone-acetaminophen (NORCO) 5-325 mg per tablet Take 1 tablet by mouth every 8 (eight) hours as needed for Pain.    loratadine (CLARITIN) 10 mg tablet Take 1 tablet by mouth once daily at 6am.    magnesium oxide (MAG-OX) 400 mg (241.3 mg magnesium) tablet Take 1 tablet (400 mg total) by mouth once daily.    metoprolol tartrate (LOPRESSOR) 25 MG tablet Take 1 tablet (25 mg total) by mouth 2 (two) times daily.    pantoprazole (PROTONIX) 40 MG tablet Take 1 tablet (40 mg total) by mouth once daily.    pramipexole (MIRAPEX) 1 MG tablet Take 1 tablet by mouth nightly.    albuterol (PROVENTIL/VENTOLIN HFA) 90 mcg/actuation inhaler Inhale 2 puffs into the lungs every 6 (six) hours.    albuterol sulfate 2.5 mg/0.5 mL Nebu Take 2.5 mg by nebulization every 4 (four) hours as needed (dyspnea). Rescue    [START ON 9/17/2022] amoxicillin (AMOXIL) 500 MG Tab Take 1 tablet (500 mg total) by mouth every 12 (twelve) hours.    budesonide-glycopyr-formoterol 160-9-4.8 mcg/actuation HFAA Inhale 2 puffs into the lungs 2 (two) times a day.    cefTRIAXone (ROCEPHIN) 2 gram/50 mL IVPB Inject 50 mLs (2 g total) into the vein once daily at 6am.     losartan (COZAAR) 100 MG tablet Take 1 tablet by mouth once daily.    mupirocin (BACTROBAN) 2 % ointment Apply topically once daily. Apply to affected area at right plantar heel. After cleansing well with dermal cleanser and rinsing with saline, cover with nonadherent dressing secured with rolled gauze. Keep off loaded.    traZODone (DESYREL) 50 MG tablet Take 1 tablet (50 mg total) by mouth every evening.    venlafaxine (EFFEXOR-XR) 37.5 MG 24 hr capsule Take 1 capsule (37.5 mg total) by mouth once daily at 6am.    white petrolatum 41 % Oint Apply topically 2 (two) times daily.    [DISCONTINUED] betamethasone dipropionate 0.05 % cream Apply 1 application topically 2 (two) times daily.    [DISCONTINUED] diltiaZEM (CARDIZEM) 120 MG tablet Take 1 tablet by mouth once daily at 6am.    [DISCONTINUED] hydroCHLOROthiazide (HYDRODIURIL) 12.5 MG Tab Take 1 tablet by mouth once daily at 6am.    [DISCONTINUED] LORazepam (ATIVAN) 1 MG tablet Take 1 tablet by mouth nightly as needed.    [DISCONTINUED] omeprazole 20 mg TbEC Take 1 tablet by mouth once daily at 6am.       Review of Systems:  Review of Systems   Unable to perform ROS: Dementia       Objective:     Vital Signs (Most Recent):  Temp: 98.8 °F (37.1 °C) (08/16/22 0712)  Pulse: 76 (08/16/22 0834)  Resp: 18 (08/16/22 0834)  BP: (!) 147/80 (08/16/22 0830)  SpO2: 95 % (08/16/22 0834) Vital Signs (24h Range):  Temp:  [97.6 °F (36.4 °C)-98.8 °F (37.1 °C)] 98.8 °F (37.1 °C)  Pulse:  [53-76] 76  Resp:  [15-22] 18  SpO2:  [93 %-100 %] 95 %  BP: (102-157)/(45-80) 147/80     Weight: 112.5 kg (248 lb) (Bed weight)  Body mass index is 37.71 kg/m².    SpO2: 95 %  O2 Device (Oxygen Therapy): BiPAP      Intake/Output Summary (Last 24 hours) at 8/16/2022 0910  Last data filed at 8/15/2022 1500  Gross per 24 hour   Intake 720 ml   Output 1950 ml   Net -1230 ml       Lines/Drains/Airways     Drain  Duration                Urethral Catheter 07/31/22 1430 Silicone 16 Fr. 15  days          Peripheral Intravenous Line  Duration                Midline Catheter Insertion/Assessment  - Single Lumen 08/02/22 2033 Right brachial vein other (see comments) 13 days         Midline Catheter Insertion/Assessment  - Single Lumen 08/05/22 1426 Left cephalic vein (lateral side of arm) other (see comments) 10 days                  Significant Labs:   CMP   Recent Labs   Lab 08/15/22  0728 08/16/22  0423    138   K 3.4* 3.5   CO2 40* 40*   BUN 16.8 16.4   CREATININE 0.67 0.60   CALCIUM 9.3 9.5    and CBC   Recent Labs   Lab 08/15/22  0728   WBC 10.4   HGB 11.2*   HCT 38.1            Significant Imaging:   Imaging Results          X-Ray Chest AP Portable (Final result)  Result time 08/13/22 18:13:22    Final result by Darrion Alvarado MD (08/13/22 18:13:22)                 Impression:      Congestive failure.      Electronically signed by: Darrion Alvarado  Date:    08/13/2022  Time:    18:13             Narrative:    EXAMINATION:  XR CHEST AP PORTABLE    CLINICAL HISTORY:  shortness of breath;    TECHNIQUE:  One view    COMPARISON:  August 10, 2022.    FINDINGS:  Cardiopericardial silhouette enlarged appearance similar and the lungs are remarkable for at least moderate congestive failure opacities.  Right perihilar thick linear atelactasis without exclusion of associated infiltrates.  Small pleural effusions. No pneumothorax.                                  EKG:    Results for orders placed or performed during the hospital encounter of 08/13/22   EKG 12-lead    Narrative    Test Reason : R06.02,    Vent. Rate : 082 BPM     Atrial Rate : 082 BPM     P-R Int : 130 ms          QRS Dur : 090 ms      QT Int : 402 ms       P-R-T Axes : 072 -10 013 degrees     QTc Int : 469 ms    Normal sinus rhythm  Normal ECG  Confirmed by Kat Mcfarland MD ([id number]) on 8/13/2022 6:06:07 PM    Referred By:             Confirmed By:Kat Mcfarland MD       Telemetry:  SR    Physical Exam:  Physical  Exam  Constitutional:       Appearance: Normal appearance.   HENT:      Head: Atraumatic.   Eyes:      Extraocular Movements: Extraocular movements intact.      Conjunctiva/sclera: Conjunctivae normal.   Cardiovascular:      Rate and Rhythm: Normal rate and regular rhythm.      Pulses: Normal pulses.      Heart sounds: Normal heart sounds.   Pulmonary:      Breath sounds: Rales present.   Abdominal:      Palpations: Abdomen is soft.   Musculoskeletal:         General: Normal range of motion.      Cervical back: Neck supple.   Skin:     General: Skin is warm and dry.   Neurological:      Mental Status: She is alert and oriented to person, place, and time.   Psychiatric:         Mood and Affect: Mood normal.         Behavior: Behavior normal.           Home Medications:   No current facility-administered medications on file prior to encounter.     Current Outpatient Medications on File Prior to Encounter   Medication Sig Dispense Refill    apixaban (ELIQUIS) 2.5 mg Tab Take 2 tablets (5 mg total) by mouth 2 (two) times daily.      atorvastatin (LIPITOR) 40 MG tablet Take 1 tablet (40 mg total) by mouth every evening. 90 tablet 0    clopidogreL (PLAVIX) 75 mg tablet Take 1 tablet (75 mg total) by mouth once daily. 30 tablet 0    clotrimazole-betamethasone 1-0.05% (LOTRISONE) cream Apply 1 application topically 2 (two) times daily.      collagenase (SANTYL) ointment Apply topically once daily. 20 g 0    cyanocobalamin 500 MCG tablet Take 1 tablet (500 mcg total) by mouth once daily. 30 tablet 0    diclofenac sodium (VOLTAREN) 1 % Gel Apply 1 application topically 4 (four) times daily.      donepeziL (ARICEPT) 10 MG tablet Take 1 tablet by mouth nightly.      fluticasone propion-salmeterol 115-21 mcg/dose (ADVAIR HFA) 115-21 mcg/actuation HFAA inhaler Inhale 2 puffs into the lungs 2 (two) times daily. 12 g 0    furosemide (LASIX) 40 MG tablet Take 1 tablet (40 mg total) by mouth once daily. 30 tablet 11     gabapentin (NEURONTIN) 100 MG capsule Take 1 capsule (100 mg total) by mouth nightly. 30 capsule 0    HYDROcodone-acetaminophen (NORCO) 5-325 mg per tablet Take 1 tablet by mouth every 8 (eight) hours as needed for Pain. 15 tablet 0    loratadine (CLARITIN) 10 mg tablet Take 1 tablet by mouth once daily at 6am.      magnesium oxide (MAG-OX) 400 mg (241.3 mg magnesium) tablet Take 1 tablet (400 mg total) by mouth once daily.  0    metoprolol tartrate (LOPRESSOR) 25 MG tablet Take 1 tablet (25 mg total) by mouth 2 (two) times daily. 60 tablet 0    pantoprazole (PROTONIX) 40 MG tablet Take 1 tablet (40 mg total) by mouth once daily. 30 tablet 11    pramipexole (MIRAPEX) 1 MG tablet Take 1 tablet by mouth nightly.      albuterol (PROVENTIL/VENTOLIN HFA) 90 mcg/actuation inhaler Inhale 2 puffs into the lungs every 6 (six) hours.      albuterol sulfate 2.5 mg/0.5 mL Nebu Take 2.5 mg by nebulization every 4 (four) hours as needed (dyspnea). Rescue 60 each 0    [START ON 9/17/2022] amoxicillin (AMOXIL) 500 MG Tab Take 1 tablet (500 mg total) by mouth every 12 (twelve) hours. 56 tablet 0    budesonide-glycopyr-formoterol 160-9-4.8 mcg/actuation HFAA Inhale 2 puffs into the lungs 2 (two) times a day.      cefTRIAXone (ROCEPHIN) 2 gram/50 mL IVPB Inject 50 mLs (2 g total) into the vein once daily at 6am. 1500 mL 0    losartan (COZAAR) 100 MG tablet Take 1 tablet by mouth once daily.      mupirocin (BACTROBAN) 2 % ointment Apply topically once daily. Apply to affected area at right plantar heel. After cleansing well with dermal cleanser and rinsing with saline, cover with nonadherent dressing secured with rolled gauze. Keep off loaded. 2 g 0    traZODone (DESYREL) 50 MG tablet Take 1 tablet (50 mg total) by mouth every evening. 30 tablet 0    venlafaxine (EFFEXOR-XR) 37.5 MG 24 hr capsule Take 1 capsule (37.5 mg total) by mouth once daily at 6am. 30 capsule 0    white petrolatum 41 % Oint Apply topically 2 (two)  times daily.      [DISCONTINUED] betamethasone dipropionate 0.05 % cream Apply 1 application topically 2 (two) times daily.      [DISCONTINUED] diltiaZEM (CARDIZEM) 120 MG tablet Take 1 tablet by mouth once daily at 6am.      [DISCONTINUED] hydroCHLOROthiazide (HYDRODIURIL) 12.5 MG Tab Take 1 tablet by mouth once daily at 6am.      [DISCONTINUED] LORazepam (ATIVAN) 1 MG tablet Take 1 tablet by mouth nightly as needed.      [DISCONTINUED] omeprazole 20 mg TbEC Take 1 tablet by mouth once daily at 6am.         Current Inpatient Medications:    Current Facility-Administered Medications:     acetaminophen tablet 1,000 mg, 1,000 mg, Oral, Q6H PRN, Sudeep Humphries MD    acetaminophen tablet 650 mg, 650 mg, Oral, Q4H PRN, Sudeep Humphries MD    aluminum-magnesium hydroxide-simethicone 200-200-20 mg/5 mL suspension 30 mL, 30 mL, Oral, QID PRN, Sudeep Humphries MD    apixaban tablet 5 mg, 5 mg, Oral, BID, Sudeep Humphries MD, 5 mg at 08/16/22 0831    cefTRIAXone (ROCEPHIN) 2 g in dextrose 5 % in water (D5W) 5 % 50 mL IVPB (MB+), 2 g, Intravenous, Q24H, Ayden Resendiz MD, Stopped at 08/15/22 0929    cloNIDine tablet 0.2 mg, 0.2 mg, Oral, TID PRN, Sudeep Humphries MD    clopidogreL tablet 75 mg, 75 mg, Oral, Daily, Sudeep Humphries MD, 75 mg at 08/16/22 0830    collagenase ointment, , Topical (Top), Daily, Ayden Resendiz MD, Given at 08/16/22 0831    cyanocobalamin tablet 500 mcg, 500 mcg, Oral, Daily, Sudeep Humphries MD, 500 mcg at 08/16/22 0830    donepeziL tablet 10 mg, 10 mg, Oral, Nightly, Sudeep Humphries MD, 10 mg at 08/15/22 2137    fluticasone furoate-vilanteroL 100-25 mcg/dose diskus inhaler 1 puff, 1 puff, Inhalation, Daily, Sudeep Humphries MD, 1 puff at 08/16/22 0834    furosemide injection 60 mg, 60 mg, Intravenous, Q12H, Ayden Resendiz MD, 60 mg at 08/16/22 0609    gabapentin capsule 100 mg, 100 mg, Oral, Nightly, Sudeep Humphries MD, 100 mg at 08/15/22 2140    hydrALAZINE injection 10 mg, 10 mg, Intravenous, Q4H PRN, Suedep SAINI  MD Yandel    HYDROcodone-acetaminophen 5-325 mg per tablet 1 tablet, 1 tablet, Oral, Q8H PRN, Sudeep Humphries MD, 1 tablet at 08/16/22 0608    labetaloL injection 10 mg, 10 mg, Intravenous, Q4H PRN, Sudeep Humphries MD    losartan tablet 25 mg, 25 mg, Oral, Daily, Ayden Resendiz MD, 25 mg at 08/16/22 0830    melatonin tablet 6 mg, 6 mg, Oral, Nightly PRN, Sudeep Humphries MD    metoprolol tartrate (LOPRESSOR) tablet 25 mg, 25 mg, Oral, BID, Sudeep Humphries MD, 25 mg at 08/16/22 0830    ondansetron injection 4 mg, 4 mg, Intravenous, Q4H PRN, Sudeep Humphries MD    pantoprazole EC tablet 40 mg, 40 mg, Oral, Daily, Sudeep Humphries MD, 40 mg at 08/16/22 0831    polyethylene glycol packet 17 g, 17 g, Oral, BID PRN, Sudeep Humphries MD    potassium chloride SA CR tablet 40 mEq, 40 mEq, Oral, BID, Ayden Resendiz MD, 40 mEq at 08/16/22 0830    pramipexole tablet 1 mg, 1 mg, Oral, Nightly, Sudeep Humphries MD, 1 mg at 08/15/22 2137    prochlorperazine injection Soln 5 mg, 5 mg, Intravenous, Q6H PRN, Sudeep Humphries MD    senna-docusate 8.6-50 mg per tablet 1 tablet, 1 tablet, Oral, BID PRN, Sudeep Humphries MD    simethicone chewable tablet 80 mg, 1 tablet, Oral, QID PRN, Sudeep Humphries MD    sodium chloride 0.9% flush 10 mL, 10 mL, Intravenous, PRN, Sudeep Humphries MD, 10 mL at 08/15/22 0101    traZODone tablet 50 mg, 50 mg, Oral, QHS, Sudeep Humphries MD, 50 mg at 08/15/22 2136    venlafaxine 24 hr capsule 37.5 mg, 37.5 mg, Oral, Daily, Sudeep Humphries MD, 37.5 mg at 08/16/22 0608           Assessment:     IMPRESSION:  Acute on chronic diastolic HF  VHD/Severe AS   -TAVR work-up in progress  Acute on chronic respiratory failure  Infected R heel wound  PAD  CAD/stent 6.7.22  PAF   -[id number]   -On Eliquis  HTN  Hx of DVT/PE  Parkinson's  Right Ankle Fracture      PLAN:     PLAN:  Continue Lasix 60mg TID  Continue home medications  ABX per hospitalist  Strict I&O/daily weight  Low sodium diet  TAVR work-up as outpatient    Thank you for your consult.      Franki Seymour, [facility]  Cardiology  [facility] - 4th Floor Medical Telemetry  08/16/2022 9:10 AM

## 2022-08-16 NOTE — PT/OT/SLP PROGRESS
Physical Therapy Treatment    Patient Name:  Emma Jorgensen   MRN:  [id number]    Recommendations:     Discharge Recommendations:  nursing facility, skilled, nursing facility, basic   Discharge Equipment Recommendations: none   Barriers to discharge: None    Assessment:     Emma Jorgensen is a 88 y.o. female admitted with a medical diagnosis of Acute on chronic heart failure with preserved ejection fraction (HFpEF).  She presents with the following impairments/functional limitations:  weakness, impaired endurance, impaired self care skills, impaired functional mobility, gait instability, decreased lower extremity function, impaired balance, decreased safety awareness, orthopedic precautions. Progressing with PT. Unable to stand on one leg for pivot transfer, therefore, oleg is needed for chair<>bed transfer. Motivated to get better. SNF placement when ready for discharge.     Rehab Prognosis: Fair; patient would benefit from acute skilled PT services to address these deficits and reach maximum level of function.    Recent Surgery: * No surgery found *      Plan:     During this hospitalization, patient to be seen 5 x/week to address the identified rehab impairments via gait training, therapeutic activities, therapeutic exercises, neuromuscular re-education and progress toward the following goals:    · Plan of Care Expires:  09/15/22    Subjective     Chief Complaint: none  Patient/Family Comments/goals: none  Pain/Comfort:  · Pain Rating 1: 0/10      Objective:     Communicated with nurse prior to session.  Patient found supine with telemetry upon PT entry to room.     General Precautions: Standard, fall   Orthopedic Precautions:RLE non weight bearing   Braces: N/A  Respiratory Status: Nasal cannula, flow 1 L/min . Unable to obtain accurate sats.      Functional Mobility:  · Bed Mobility:     · Rolling Left:  minimum assistance  · Rolling Right: minimum assistance  · Scooting: minimum assistance  · Supine  to Sit: minimum assistance  · Sit to Supine: minimum assistance  · Sitting Balance: fair      AM-PAC 6 CLICK MOBILITY  Turning over in bed (including adjusting bedclothes, sheets and blankets)?: 3  Sitting down on and standing up from a chair with arms (e.g., wheelchair, bedside commode, etc.): 1  Moving from lying on back to sitting on the side of the bed?: 3  Moving to and from a bed to a chair (including a wheelchair)?: 2  Need to walk in hospital room?: 1  Climbing 3-5 steps with a railing?: 1  Basic Mobility Total Score: 11       Therapeutic Activities and Exercises:    Therex:   1)Patient performed seated bilateral Marches, Heel Raises, HS Curls, Glute Sets, Hip ABD/ADD. Pillow used for Hip ADD. All performed 2 x 10 reps.     Theract:   1)Rolling left & right: MIN A  2)Scooting: MIN A  3)supine<>sit: MIN A. Increased time to complete task.       Patient left up in chair with all lines intact, call button in reach and daughter present..    GOALS:   Multidisciplinary Problems     Physical Therapy Goals        Problem: Physical Therapy    Goal Priority Disciplines Outcome Goal Variances Interventions   Physical Therapy Goal     PT, PT/OT Ongoing, Progressing     Description: Goals to be met by: 9/15/22     Patient will increase functional independence with mobility by performin. Supine to sit with MInimal Assistance  2. Sit to supine with MInimal Assistance  3. Wheelchair propulsion x200 feet with Stand-by Assistance using bilateral uppper extremities                     Time Tracking:     PT Received On: 22  PT Start Time: 809     PT Stop Time: 835  PT Total Time (min): 26 min     Billable Minutes: Therapeutic Activity 13 minutes and Therapeutic Exercise 13 minutes    Treatment Type: Treatment  PT/PTA: PT     PTA Visit Number: 1     2022

## 2022-08-16 NOTE — PLAN OF CARE
Problem: Adult Inpatient Plan of Care  Goal: Absence of Hospital-Acquired Illness or Injury  Outcome: Ongoing, Progressing     Problem: Infection  Goal: Absence of Infection Signs and Symptoms  Outcome: Ongoing, Progressing     Problem: Impaired Wound Healing  Goal: Optimal Wound Healing  Outcome: Ongoing, Progressing      Negative

## 2022-08-17 LAB
ANION GAP SERPL CALC-SCNC: 7 MEQ/L
BUN SERPL-MCNC: 21.3 MG/DL (ref 9.8–20.1)
CALCIUM SERPL-MCNC: 9.3 MG/DL (ref 8.4–10.2)
CHLORIDE SERPL-SCNC: 92 MMOL/L (ref 98–107)
CO2 SERPL-SCNC: 37 MMOL/L (ref 23–31)
CREAT SERPL-MCNC: 0.61 MG/DL (ref 0.55–1.02)
CREAT/UREA NIT SERPL: 35
GFR SERPLBLD CREATININE-BSD FMLA CKD-EPI: >60 MLS/MIN/1.73/M2
GLUCOSE SERPL-MCNC: 80 MG/DL (ref 82–115)
MAGNESIUM SERPL-MCNC: 1.9 MG/DL (ref 1.6–2.6)
POTASSIUM SERPL-SCNC: 4.5 MMOL/L (ref 3.5–5.1)
SODIUM SERPL-SCNC: 136 MMOL/L (ref 136–145)

## 2022-08-17 PROCEDURE — 25000003 PHARM REV CODE 250: Performed by: INTERNAL MEDICINE

## 2022-08-17 PROCEDURE — 27000221 HC OXYGEN, UP TO 24 HOURS

## 2022-08-17 PROCEDURE — 80048 BASIC METABOLIC PNL TOTAL CA: CPT | Performed by: INTERNAL MEDICINE

## 2022-08-17 PROCEDURE — 36415 COLL VENOUS BLD VENIPUNCTURE: CPT | Performed by: INTERNAL MEDICINE

## 2022-08-17 PROCEDURE — 99900035 HC TECH TIME PER 15 MIN (STAT)

## 2022-08-17 PROCEDURE — 94761 N-INVAS EAR/PLS OXIMETRY MLT: CPT

## 2022-08-17 PROCEDURE — 21400001 HC TELEMETRY ROOM

## 2022-08-17 PROCEDURE — 83735 ASSAY OF MAGNESIUM: CPT | Performed by: INTERNAL MEDICINE

## 2022-08-17 PROCEDURE — 97530 THERAPEUTIC ACTIVITIES: CPT

## 2022-08-17 PROCEDURE — 63600175 PHARM REV CODE 636 W HCPCS: Performed by: INTERNAL MEDICINE

## 2022-08-17 PROCEDURE — 97110 THERAPEUTIC EXERCISES: CPT

## 2022-08-17 PROCEDURE — 25000242 PHARM REV CODE 250 ALT 637 W/ HCPCS: Performed by: INTERNAL MEDICINE

## 2022-08-17 PROCEDURE — 94660 CPAP INITIATION&MGMT: CPT

## 2022-08-17 RX ADMIN — FUROSEMIDE 60 MG: 10 INJECTION, SOLUTION INTRAMUSCULAR; INTRAVENOUS at 08:08

## 2022-08-17 RX ADMIN — FUROSEMIDE 60 MG: 10 INJECTION, SOLUTION INTRAMUSCULAR; INTRAVENOUS at 05:08

## 2022-08-17 RX ADMIN — FLUTICASONE FUROATE AND VILANTEROL TRIFENATATE 1 PUFF: 100; 25 POWDER RESPIRATORY (INHALATION) at 09:08

## 2022-08-17 RX ADMIN — METOPROLOL TARTRATE 25 MG: 25 TABLET, FILM COATED ORAL at 09:08

## 2022-08-17 RX ADMIN — APIXABAN 5 MG: 5 TABLET, FILM COATED ORAL at 08:08

## 2022-08-17 RX ADMIN — LOSARTAN POTASSIUM 25 MG: 25 TABLET, FILM COATED ORAL at 09:08

## 2022-08-17 RX ADMIN — CEFTRIAXONE SODIUM 2 G: 2 INJECTION, POWDER, FOR SOLUTION INTRAMUSCULAR; INTRAVENOUS at 09:08

## 2022-08-17 RX ADMIN — DONEPEZIL HYDROCHLORIDE 10 MG: 5 TABLET, FILM COATED ORAL at 08:08

## 2022-08-17 RX ADMIN — POTASSIUM CHLORIDE 40 MEQ: 1500 TABLET, EXTENDED RELEASE ORAL at 09:08

## 2022-08-17 RX ADMIN — VENLAFAXINE HYDROCHLORIDE 37.5 MG: 37.5 CAPSULE, EXTENDED RELEASE ORAL at 05:08

## 2022-08-17 RX ADMIN — PRAMIPEXOLE DIHYDROCHLORIDE 1 MG: 0.25 TABLET ORAL at 08:08

## 2022-08-17 RX ADMIN — PANTOPRAZOLE SODIUM 40 MG: 40 TABLET, DELAYED RELEASE ORAL at 09:08

## 2022-08-17 RX ADMIN — CLOPIDOGREL 75 MG: 75 TABLET, FILM COATED ORAL at 09:08

## 2022-08-17 RX ADMIN — METOPROLOL TARTRATE 25 MG: 25 TABLET, FILM COATED ORAL at 08:08

## 2022-08-17 RX ADMIN — HYDROCODONE BITARTRATE AND ACETAMINOPHEN 1 TABLET: 5; 325 TABLET ORAL at 04:08

## 2022-08-17 RX ADMIN — APIXABAN 5 MG: 5 TABLET, FILM COATED ORAL at 09:08

## 2022-08-17 RX ADMIN — POTASSIUM CHLORIDE 40 MEQ: 1500 TABLET, EXTENDED RELEASE ORAL at 08:08

## 2022-08-17 RX ADMIN — CYANOCOBALAMIN TAB 500 MCG 500 MCG: 500 TAB at 09:08

## 2022-08-17 RX ADMIN — COLLAGENASE SANTYL: 250 OINTMENT TOPICAL at 04:08

## 2022-08-17 RX ADMIN — GABAPENTIN 100 MG: 100 CAPSULE ORAL at 08:08

## 2022-08-17 RX ADMIN — TRAZODONE HYDROCHLORIDE 50 MG: 50 TABLET ORAL at 08:08

## 2022-08-17 NOTE — PT/OT/SLP PROGRESS
Physical Therapy Treatment    Patient Name:  Emma Jorgensen   MRN:  [id number]    Recommendations:     Discharge Recommendations:  nursing facility, skilled, nursing facility, basic   Discharge Equipment Recommendations: none   Barriers to discharge: None    Assessment:     Emma Jorgensen is a 88 y.o. female admitted with a medical diagnosis of Acute on chronic heart failure with preserved ejection fraction (HFpEF).  She presents with the following impairments/functional limitations:  weakness, impaired endurance, impaired self care skills, impaired functional mobility, decreased lower extremity function, decreased safety awareness.     Rehab Prognosis: Fair; patient would benefit from acute skilled PT services to address these deficits and reach maximum level of function.    Recent Surgery: * No surgery found *      Plan:     During this hospitalization, patient to be seen 5 x/week to address the identified rehab impairments via therapeutic activities, therapeutic exercises, neuromuscular re-education and progress toward the following goals:    · Plan of Care Expires:  09/15/22    Subjective     Chief Complaint: none  Patient/Family Comments/goals: none  Pain/Comfort:  · Pain Rating 1: 0/10      Objective:     Communicated with nurse prior to session.  Patient found HOB elevated with telemetry upon PT entry to room.     General Precautions: Standard, fall   Orthopedic Precautions:RLE non weight bearing   Braces: N/A  Respiratory Status: Nasal cannula, flow 6 L/min     Functional Mobility:  · Bed Mobility:     · Rolling Left:  stand by assistance  · Rolling Right: minimum assistance  · Supine to Sit: minimum assistance  · Sit to Supine: minimum assistance  · Transfers:     · Sit to Stand:  maximal assistance and of 2 persons with rolling walker  · Balance: fair      AM-PAC 6 CLICK MOBILITY  Turning over in bed (including adjusting bedclothes, sheets and blankets)?: 3  Sitting down on and standing up from a  chair with arms (e.g., wheelchair, bedside commode, etc.): 2  Moving from lying on back to sitting on the side of the bed?: 3  Moving to and from a bed to a chair (including a wheelchair)?: 2  Need to walk in hospital room?: 1  Climbing 3-5 steps with a railing?: 1  Basic Mobility Total Score: 12       Therapeutic Activities and Exercises:     Therex:   1)Patient performed seated bilateral Marches, Heel Raises, HS Curls, Glute Sets, Resisted Hip ABD/ADD. Red resistance band used for HS Curls & Hip ABD. Pillow used for Hip ADD. All performed 2 x 10 reps.    Theract:   1)BM: MIN A - SBA for rolling. MIN A for supine<>sit.   2)Sit<>stand: Required MAX A of 2. PT foot under patients NWB foot. Cueing for erect spine & to use arms.     Patient left up in chair with all lines intact and call button in reach..    GOALS:   Multidisciplinary Problems     Physical Therapy Goals        Problem: Physical Therapy    Goal Priority Disciplines Outcome Goal Variances Interventions   Physical Therapy Goal     PT, PT/OT Ongoing, Progressing     Description: Goals to be met by: 9/15/22     Patient will increase functional independence with mobility by performin. Supine to sit with MInimal Assistance  2. Sit to supine with MInimal Assistance  3. Wheelchair propulsion x200 feet with Stand-by Assistance using bilateral uppper extremities                     Time Tracking:     PT Received On: 22  PT Start Time: 0845     PT Stop Time: 912  PT Total Time (min): 27 min     Billable Minutes: Therapeutic Activity 17 minutes and Therapeutic Exercise 10 minutes    Treatment Type: Treatment  PT/PTA: PT     PTA Visit Number: 2     2022

## 2022-08-17 NOTE — PROGRESS NOTES
[facility] - 4th Floor Medical Telemetry  Cardiology  Progress Note    Patient Name: Emma Jorgensen  MRN: [id number]  Admission Date: 8/13/2022  Hospital Length of Stay: 3 days  Code Status: DNR   Attending Physician: Ayden Resendiz MD   Primary Care Physician: Mary Jane Dean MD  Expected Discharge Date:   Principal Problem:Acute on chronic heart failure with preserved ejection fraction (HFpEF)    Subjective:     Brief HPI:   88 year old female known to Dr. Eubanks and Dr. Juarez with a past medical history of Aortic Stenosis, Diastolic CHF, HTN, GERD, DVT, PE, Parkinson's Disease and right ankle fracture.  She presented to [facility] on [facility]/13/2022 with complaints of shortness of breath .  History is limited secondary to dementia.  Her daughter stated over the past couple days she had been feeling more short of breath had been using BiPAP machine during the day .  Chest x-ray revealed pulmonary edema with small bilateral pleural effusions.  BNP was 1324.5.  Troponin was negative.  Cis has been consulted for acute on chronic heart failure and severe aortic stenosis.     Hospital Course:   8.17.22: NAD noted. Sitting at chair at bedside. States she feels better today. Denies CP/Palps, improved SOB and edema. Negative 2935mL in 24hr.     PMH: Aortic Stenosis, Diastolic CHF, HTN, GERD, DVT, PE, Parkinson's Disease   PSH: Ankle Surgery, Cataract Surgery, Colonoscopy, Colon Resection, Toe Surgery, Hysterectomy  Family History: Father - CVA; Sister - CAD/CABG  Social History: Former Smoker.  Denies Alcohol Use.  Denies Illicit Drug Use.     Previous Cardiac Diagnostics:   [facility] 6.7.22  The Mid LAD lesion was 85% stenosed with 0% stenosis post-intervention.  A stent was successfully placed at 12 LETTY for 10 sec.  The estimated blood loss was <50 mL.  There was single vessel coronary artery disease.  The PCI was successful.  The coronary FFR was abnormal.  The filling pressures mildly  elevated     Echocardiogram 5.24.22:  TDS due to poor acoustical windows; suboptimal doppler angles.  Mild concentric hypertrophy and normal systolic function.  The estimated ejection fraction is 55%.  Grade II left ventricular diastolic dysfunction.  Moderate aortic regurgitation.  There is moderate aortic valve stenosis.  Aortic valve area is 0.75 cm squared; peak velocity is 3.53m/s; mean gradient is 30mmHg.  Intermediate central venous pressure (8mmHg).     Carotid US 4.21.22  The right internal carotid artery demonstrated less than 50% stenosis.  The left internal carotid artery demonstrated less than 50% stenosis.  Bilateral vertebral arteries were patent with antegrade flow.        PET MPI 2.22.18:  This is a normal perfusion study, no perfusion defects noted.  There is no evidence of ischemia.  This scan is suggestive of low risk for future cardiovascular events.  The left ventricular cavity is noted to be normal on the stress studies.  The stress left ventricular ejection fraction was calculated to be 74% and left ventricular global function is normal.  The rest left ventricular cavity is noted to be normal.  The rest left ventricular ejection fraction was calculated to be 74% and rest left ventricular global function is michael;.  Compared with rest and stress studies the ejection fraction remains unchanged (74).           Review of Systems   Constitutional: Negative for chills and fever.   Cardiovascular: Positive for leg swelling. Negative for chest pain and palpitations.   Respiratory: Positive for shortness of breath.    Psychiatric/Behavioral: Negative for altered mental status.           Objective:     Vital Signs (Most Recent):  Temp: 98.9 °F (37.2 °C) (08/17/22 1119)  Pulse: 65 (08/17/22 1119)  Resp: 18 (08/17/22 1119)  BP: (!) 108/48 (08/17/22 1119)  SpO2: 98 % (08/17/22 0738) Vital Signs (24h Range):  Temp:  [97.5 °F (36.4 °C)-98.9 °F (37.2 °C)] 98.9 °F (37.2 °C)  Pulse:  [65-80] 65  Resp:  [18-21]  18  SpO2:  [98 %-100 %] 98 %  BP: (108-156)/(48-80) 108/48     Weight: 112.5 kg (248 lb) (Bed weight)  Body mass index is 37.71 kg/m².    SpO2: 98 %  O2 Device (Oxygen Therapy): nasal cannula      Intake/Output Summary (Last 24 hours) at 8/17/2022 1205  Last data filed at 8/17/2022 0400  Gross per 24 hour   Intake 740 ml   Output 3675 ml   Net -2935 ml       Lines/Drains/Airways     Drain  Duration                Urethral Catheter 07/31/22 1430 Silicone 16 Fr. 16 days          Peripheral Intravenous Line  Duration                Midline Catheter Insertion/Assessment  - Single Lumen 08/02/22 2033 Right brachial vein other (see comments) 14 days         Midline Catheter Insertion/Assessment  - Single Lumen 08/05/22 1426 Left cephalic vein (lateral side of arm) other (see comments) 11 days                Significant Labs:   CMP   Recent Labs   Lab 08/16/22  0423 08/17/22  0403    136   K 3.5 4.5   CO2 40* 37*   BUN 16.4 21.3*   CREATININE 0.60 0.61   CALCIUM 9.5 9.3    and CBC No results for input(s): WBC, HGB, HCT, PLT in the last 48 hours.    Telemetry:  SR    Physical Exam:  Physical Exam  Constitutional:       Appearance: Normal appearance.   HENT:      Head: Atraumatic.   Eyes:      Extraocular Movements: Extraocular movements intact.      Conjunctiva/sclera: Conjunctivae normal.   Cardiovascular:      Rate and Rhythm: Normal rate and regular rhythm.      Pulses: Normal pulses.      Heart sounds: Normal heart sounds.   Pulmonary:      Breath sounds: Rales present.   Abdominal:      Palpations: Abdomen is soft.   Musculoskeletal:         General: Normal range of motion.      Cervical back: Neck supple.   Skin:     General: Skin is warm and dry.   Neurological:      Mental Status: She is alert and oriented to person, place, and time.   Psychiatric:         Mood and Affect: Mood normal.         Behavior: Behavior normal.         Current Inpatient Medications:    Current Facility-Administered Medications:      acetaminophen tablet 1,000 mg, 1,000 mg, Oral, Q6H PRN, Sudeep Hupmhries MD    acetaminophen tablet 650 mg, 650 mg, Oral, Q4H PRN, Sudeep Humphries MD    aluminum-magnesium hydroxide-simethicone 200-200-20 mg/5 mL suspension 30 mL, 30 mL, Oral, QID PRN, Sudeep Humphries MD    apixaban tablet 5 mg, 5 mg, Oral, BID, Sudeep Humphries MD, 5 mg at 08/17/22 0946    cefTRIAXone (ROCEPHIN) 2 g in dextrose 5 % in water (D5W) 5 % 50 mL IVPB (MB+), 2 g, Intravenous, Q24H, Ayden Resendiz MD, Last Rate: 100 mL/hr at 08/17/22 0947, 2 g at 08/17/22 0947    cloNIDine tablet 0.2 mg, 0.2 mg, Oral, TID PRN, Sudeep Humphries MD    clopidogreL tablet 75 mg, 75 mg, Oral, Daily, Sudeep Humphries MD, 75 mg at 08/17/22 0946    collagenase ointment, , Topical (Top), Daily, Ayden Resendiz MD, Given at 08/16/22 0831    cyanocobalamin tablet 500 mcg, 500 mcg, Oral, Daily, Sudeep Humphries MD, 500 mcg at 08/17/22 0945    donepeziL tablet 10 mg, 10 mg, Oral, Nightly, Sudeep Humphries MD, 10 mg at 08/16/22 2214    fluticasone furoate-vilanteroL 100-25 mcg/dose diskus inhaler 1 puff, 1 puff, Inhalation, Daily, Sudeep Humphries MD, 1 puff at 08/16/22 0834    furosemide injection 60 mg, 60 mg, Intravenous, Q12H, Ayden Resendiz MD, 60 mg at 08/17/22 0539    gabapentin capsule 100 mg, 100 mg, Oral, Nightly, Sudeep Humphries MD, 100 mg at 08/16/22 2215    hydrALAZINE injection 10 mg, 10 mg, Intravenous, Q4H PRN, Sudeep Humphries MD    HYDROcodone-acetaminophen 5-325 mg per tablet 1 tablet, 1 tablet, Oral, Q8H PRN, Sudeep Humphries MD, 1 tablet at 08/16/22 0608    labetaloL injection 10 mg, 10 mg, Intravenous, Q4H PRN, Sudeep Humphries MD    losartan tablet 25 mg, 25 mg, Oral, Daily, Ayden Resendiz MD, 25 mg at 08/17/22 0946    melatonin tablet 6 mg, 6 mg, Oral, Nightly PRN, Sudeep Humphries MD    metoprolol tartrate (LOPRESSOR) tablet 25 mg, 25 mg, Oral, BID, Sudeep Humphries MD, 25 mg at 08/17/22 0946    ondansetron injection 4 mg, 4 mg, Intravenous, Q4H PRN, Sudeep Humphries MD     pantoprazole EC tablet 40 mg, 40 mg, Oral, Daily, Sudeep Humphries MD, 40 mg at 08/17/22 0946    polyethylene glycol packet 17 g, 17 g, Oral, BID PRN, Sudeep Humphries MD    potassium chloride SA CR tablet 40 mEq, 40 mEq, Oral, BID, Ayden Resendiz MD, 40 mEq at 08/17/22 0946    pramipexole tablet 1 mg, 1 mg, Oral, Nightly, Sudeep Humphries MD, 1 mg at 08/16/22 2214    prochlorperazine injection Soln 5 mg, 5 mg, Intravenous, Q6H PRN, Sudeep Humphries MD    senna-docusate 8.6-50 mg per tablet 1 tablet, 1 tablet, Oral, BID PRN, Sudeep Humphries MD    simethicone chewable tablet 80 mg, 1 tablet, Oral, QID PRN, Sudeep Humphries MD    sodium chloride 0.9% flush 10 mL, 10 mL, Intravenous, PRN, Sudeep Humphries MD, 10 mL at 08/15/22 0101    traZODone tablet 50 mg, 50 mg, Oral, QHS, Sudeep Humphries MD, 50 mg at 08/16/22 2215    venlafaxine 24 hr capsule 37.5 mg, 37.5 mg, Oral, Daily, Sudeep Humphries MD, 37.5 mg at 08/17/22 0539        Assessment:     IMPRESSION:  Acute on chronic diastolic HF  VHD/Severe AS                   -TAVR work-up in progress  Acute on chronic respiratory failure  Infected R heel wound  PAD  CAD/stent 6.7.22  PAF                   -[id number]                   -On Eliquis  HTN  Hx of DVT/PE  Parkinson's  Right Ankle Fracture      Plan:     PLAN:  Continue Lasix 60mg TID  Continue home medications  ABX per hospitalist  Strict I&O/daily weight  Low sodium diet  TAVR work-up as outpatient    RAHUL Hernandez-BC  Cardiology  [facility] - 4th Floor Medical Telemetry  08/17/2022

## 2022-08-17 NOTE — PROGRESS NOTES
[facility] Hospital Medicine Progress Note        Chief Complaint: Inpatient Follow-up for Shortness of Breath (EMS reports pt. From [facility] c/o SOB that started today, pmh CHF. )      HPI:This is an 88-year-old female medical history of morbid obesity, dementia, chronic hypoxemic hypercapnic respiratory failure on nasal cannula and BiPAP at bedtime, chronic HFpEF, Severe aortic stenosis, CAD- recent LAD stent on 06/07/2022 on aspirin Plavix, PAF on Eliquis, infected right heel wound on ceftriaxone with end date 09/16/2022 present to the ED with complaint of shortness of breath, history limited due to dementia, daughter at bedside report over the past couple days she has been feeling more short of breath and has been using the BiPAP machine during the day as well. No reported cough fever or chills.   On arrival to ED she was afebrile, tachypneic, hypertensive and saturating 97% on 4 L nasal cannula. EKG normal sinus rhythm. Chest x-ray showed pulmonary edema and small bilateral pleural effusions. She was given Lasix 40 mg IV and referred to hospital medicine service for further evaluation and management.       Interval Hx:   8/15/22-Pt has hx of rle fx/hardware plus heel infection  Slept with bipap  Continues on lasix 60 mgs iv q 12 hours    8/16/22 looks well/alert and active. Sleeping w bipap and nc during day    Objective/physical exam:  General: a/a and oriented  HEENT: NC/AT   Neck: JVD   Chest: crackles at bases  CVS: Regular rhythm. Systolic murmur, +2 pedal edema   Abdomen: nondistended, normoactive BS, soft and non-tender.   MSK:  Right heel wound on rocephin  Skin: Warm and dry/ right heel wound   Neuro: a/a and oriented /follows commands  Psych: Cooperative        VITAL SIGNS: 24 HRS MIN & MAX LAST   Temp  Min: 97.9 °F (36.6 °C)  Max: 98.8 °F (37.1 °C) 97.9 °F (36.6 °C)   BP  Min: 102/61  Max: 147/80 (!) 145/80   Pulse  Min: 59  Max: 76  73   Resp  Min: 15  Max: 22  18   SpO2  Min: 93 %  Max: 100 % 100 %       Recent Labs   Lab 08/13/22  1750 08/14/22  0723 08/15/22  0728   WBC 9.9 11.8* 10.4   RBC 3.94* 3.67* 3.78*   HGB 11.6* 10.8* 11.2*   HCT 38.7 37.3 38.1   MCV 98.2* 101.6* 100.8*   MCH 29.4 29.4 29.6   MCHC 30.0* 29.0* 29.4*   RDW 16.4 16.3 16.6    324 321   MPV 10.4 10.0 10.0       Recent Labs   Lab 08/12/22  0600 08/13/22  1750 08/14/22  0238 08/14/22  0723 08/15/22  0728 08/16/22  0423    140  --  142 138 138   K 3.8 5.1  --  3.8 3.4* 3.5   CO2 41* 39*  --  40* 40* 40*   BUN 24.8* 23.6*  --  18.6 16.8 16.4   CREATININE 0.53* 0.62  --  0.60 0.67 0.60   CALCIUM 9.2 9.6  --  9.6 9.3 9.5   PH  --   --  7.39  --   --   --    MG 2.00 2.20  --  2.00 2.00 1.90   ALBUMIN 3.1* 3.1*  --   --   --   --    ALKPHOS 86 96  --   --   --   --    ALT 11 16  --   --   --   --    AST 20 38*  --   --   --   --    BILITOT 0.8 0.6  --   --   --   --           Microbiology Results (last 7 days)     ** No results found for the last 168 hours. **           See below for Radiology    Scheduled Med:   apixaban  5 mg Oral BID    cefTRIAXone (ROCEPHIN) IVPB  2 g Intravenous Q24H    clopidogreL  75 mg Oral Daily    collagenase   Topical (Top) Daily    cyanocobalamin  500 mcg Oral Daily    donepeziL  10 mg Oral Nightly    fluticasone furoate-vilanteroL  1 puff Inhalation Daily    furosemide (LASIX) injection  60 mg Intravenous Q12H    gabapentin  100 mg Oral Nightly    losartan  25 mg Oral Daily    metoprolol tartrate  25 mg Oral BID    pantoprazole  40 mg Oral Daily    potassium chloride  40 mEq Oral BID    pramipexole  1 mg Oral Nightly    traZODone  50 mg Oral QHS    venlafaxine  37.5 mg Oral Daily        Continuous Infusions:       PRN Meds:  acetaminophen, acetaminophen, aluminum-magnesium hydroxide-simethicone, cloNIDine, hydrALAZINE, HYDROcodone-acetaminophen, labetalol, melatonin, ondansetron, polyethylene glycol, prochlorperazine, senna-docusate 8.6-50 mg,  simethicone, sodium chloride 0.9%       Assessment/Plan:  Dementia   Infected right heel wound status post debridement 08/05/2022 on ceftriaxone in date 09/16/2022 followed by oral suppression with amoxicillin 500 mg b.i.d. indefinitely   acute on Chronic hypoxemic hypercapnic respiratory failure on nasal cannula during day and BIPAP at bedtime   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   CAD/PCI - LAD stent 6/7/2022   Acute on chronic HFpEF (LVEF 55% and grade II diastolic dysfunction 05/24/2022),   PAF on Eliquis  VHD- moderate to severe AS, plans for a TAVR outpatient per Cards at some point   Bilateral JEFFY   Hypertension   Chronic anemia     hypokalemia  HX DVT/PE on chronic OAC  Plan- continue diuresis with Lasix 60 mg IV q.12 hours.  Continue goal-directed medical treatment for heart failure.  We will get cis  input.  Continue Rocephin for right lower extremity chronic wound.  Continue PT with no weight-bearing to right lower extremity  Cc time 35 minutes  Cc dx- acute on chronic HFpef requiring lasix iv    VTE prophylaxis: DOAC    Patient condition:  Improved and guarded    Anticipated discharge and Disposition: nursing home resident/ tbd        All diagnosis and differential diagnosis have been reviewed; assessment and plan has been documented; I have personally reviewed the labs and test results that are presently available; I have reviewed the patients medication list; I have reviewed the consulting providers response and recommendations. I have reviewed or attempted to review medical records based upon their availability    All of the patient's questions have been  addressed and answered. Patient's is agreeable to the above stated plan. I will continue to monitor closely and make adjustments to medical management as needed.  _____________________________________________________________________    Nutrition Status:    Radiology:  CV Ultrasound doppler venous arm left  There was no evidence of deep or superficial vein thrombosis  in the left   upper extremity.      Ayden Resendiz MD   08/16/2022

## 2022-08-17 NOTE — PROGRESS NOTES
[facility] Hospital Medicine Progress Note        Chief Complaint: Inpatient Follow-up for Shortness of Breath (EMS reports pt. From [facility] c/o SOB that started today, pmh CHF. )      HPI:This is an 88-year-old female medical history of morbid obesity, dementia, chronic hypoxemic hypercapnic respiratory failure on nasal cannula and BiPAP at bedtime, chronic HFpEF, Severe aortic stenosis, CAD- recent LAD stent on 06/07/2022 on aspirin Plavix, PAF on Eliquis, infected right heel wound on ceftriaxone with end date 09/16/2022 present to the ED with complaint of shortness of breath, history limited due to dementia, daughter at bedside report over the past couple days she has been feeling more short of breath and has been using the BiPAP machine during the day as well. No reported cough fever or chills.   On arrival to ED she was afebrile, tachypneic, hypertensive and saturating 97% on 4 L nasal cannula. EKG normal sinus rhythm. Chest x-ray showed pulmonary edema and small bilateral pleural effusions. She was given Lasix 40 mg IV and referred to hospital medicine service for further evaluation and management.       Interval Hx:   8/15/22-Pt has hx of rle fx/hardware plus heel infection  Slept with bipap  Continues on lasix 60 mgs iv q 12 hours    8/16/22 looks well/alert and active. Sleeping w bipap and nc during day    8/17/22 out of bed w oleg lift to recliner/diuresed 2.5 liter/ voices no complains    Objective/physical exam:  General: a/a and oriented  HEENT: NC/AT   Neck: JVD   Chest: crackles at bases  CVS: Regular rhythm. Systolic murmur, +2 pedal edema   Abdomen: nondistended, normoactive BS, soft and non-tender.   MSK:  Right heel wound on rocephin  Skin: Warm and dry/ right heel wound   Neuro: a/a and oriented /follows commands  Psych: Cooperative        VITAL SIGNS: 24 HRS MIN & MAX LAST   Temp  Min: 97.5 °F (36.4 °C)  Max: 98.9 °F (37.2 °C) 98.9 °F (37.2 °C)   BP  Min:  108/48  Max: 156/78 (!) 108/48   Pulse  Min: 65  Max: 80  65   Resp  Min: 18  Max: 21 18   SpO2  Min: 98 %  Max: 100 % 98 %       Recent Labs   Lab 08/13/22  1750 08/14/22  0723 08/15/22  0728   WBC 9.9 11.8* 10.4   RBC 3.94* 3.67* 3.78*   HGB 11.6* 10.8* 11.2*   HCT 38.7 37.3 38.1   MCV 98.2* 101.6* 100.8*   MCH 29.4 29.4 29.6   MCHC 30.0* 29.0* 29.4*   RDW 16.4 16.3 16.6    324 321   MPV 10.4 10.0 10.0       Recent Labs   Lab 08/12/22  0600 08/13/22 1750 08/14/22  0238 08/14/22 0723 08/15/22  0728 08/16/22  0423 08/17/22  0014 08/17/22  0403    140  --    < > 138 138  --  136   K 3.8 5.1  --    < > 3.4* 3.5  --  4.5   CO2 41* 39*  --    < > 40* 40*  --  37*   BUN 24.8* 23.6*  --    < > 16.8 16.4  --  21.3*   CREATININE 0.53* 0.62  --    < > 0.67 0.60  --  0.61   CALCIUM 9.2 9.6  --    < > 9.3 9.5  --  9.3   PH  --   --  7.39  --   --   --   --   --    MG 2.00 2.20  --    < > 2.00 1.90 1.90  --    ALBUMIN 3.1* 3.1*  --   --   --   --   --   --    ALKPHOS 86 96  --   --   --   --   --   --    ALT 11 16  --   --   --   --   --   --    AST 20 38*  --   --   --   --   --   --    BILITOT 0.8 0.6  --   --   --   --   --   --     < > = values in this interval not displayed.          Microbiology Results (last 7 days)     ** No results found for the last 168 hours. **           See below for Radiology    Scheduled Med:   apixaban  5 mg Oral BID    cefTRIAXone (ROCEPHIN) IVPB  2 g Intravenous Q24H    clopidogreL  75 mg Oral Daily    collagenase   Topical (Top) Daily    cyanocobalamin  500 mcg Oral Daily    donepeziL  10 mg Oral Nightly    fluticasone furoate-vilanteroL  1 puff Inhalation Daily    furosemide (LASIX) injection  60 mg Intravenous Q12H    gabapentin  100 mg Oral Nightly    losartan  25 mg Oral Daily    metoprolol tartrate  25 mg Oral BID    pantoprazole  40 mg Oral Daily    potassium chloride  40 mEq Oral BID    pramipexole  1 mg Oral Nightly    traZODone  50 mg Oral QHS     venlafaxine  37.5 mg Oral Daily        Continuous Infusions:       PRN Meds:  acetaminophen, acetaminophen, aluminum-magnesium hydroxide-simethicone, cloNIDine, hydrALAZINE, HYDROcodone-acetaminophen, labetalol, melatonin, ondansetron, polyethylene glycol, prochlorperazine, senna-docusate 8.6-50 mg, simethicone, sodium chloride 0.9%       Assessment/Plan:  Dementia   Infected right heel wound status post debridement 08/05/2022 on ceftriaxone in date 09/16/2022 followed by oral suppression with amoxicillin 500 mg b.i.d. 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  Continue goal-directed medical treatment for heart failure. .  Continue Rocephin for right lower extremity chronic wound.    -Continue PT with no weight-bearing to right lower extremity  - much improved    Cc time 35 minutes  Cc dx- acute on chronic HFpef / DD requiring lasix     VTE prophylaxis: DOAC    Patient condition:  Improved and guarded    Anticipated discharge and Disposition: nursing home resident/ tbd        All diagnosis and differential diagnosis have been reviewed; assessment and plan has been documented; I have personally reviewed the labs and test results that are presently available; I have reviewed the patients medication list; I have reviewed the consulting providers response and recommendations. I have reviewed or attempted to review medical records based upon their availability    All of the patient's questions have been  addressed and answered. Patient's is agreeable to the  above stated plan. I will continue to monitor closely and make adjustments to medical management as needed.  _____________________________________________________________________    Nutrition Status:    Radiology:  CV Ultrasound doppler venous arm left  There was no evidence of deep or superficial vein thrombosis in the left   upper extremity.      Ayden Resendiz MD   08/17/2022

## 2022-08-18 LAB
ANION GAP SERPL CALC-SCNC: 8 MEQ/L
BUN SERPL-MCNC: 24.9 MG/DL (ref 9.8–20.1)
CALCIUM SERPL-MCNC: 9.8 MG/DL (ref 8.4–10.2)
CHLORIDE SERPL-SCNC: 92 MMOL/L (ref 98–107)
CO2 SERPL-SCNC: 35 MMOL/L (ref 23–31)
CORRECTED TEMPERATURE (PCO2): 69 MMHG (ref 19–50)
CORRECTED TEMPERATURE (PH): 7.44 (ref 7.35–7.45)
CORRECTED TEMPERATURE (PO2): 79 MMHG (ref 80–100)
CREAT SERPL-MCNC: 0.66 MG/DL (ref 0.55–1.02)
CREAT/UREA NIT SERPL: 38
GFR SERPLBLD CREATININE-BSD FMLA CKD-EPI: >60 MLS/MIN/1.73/M2
GLUCOSE SERPL-MCNC: 79 MG/DL (ref 82–115)
HCO3 UR-SCNC: 46.9 MMOL/L
HGB BLD-MCNC: 11.7 G/DL (ref 12–16)
MAGNESIUM SERPL-MCNC: 1.9 MG/DL (ref 1.6–2.6)
PCO2 BLDA: 69 MMHG (ref 19–50)
PH SMN: 7.44 [PH] (ref 7.35–7.45)
PO2 BLDA: 79 MMHG (ref 80–100)
POC BASE DEFICIT: 19.3 MMOL/L (ref -2–2)
POC COHB: 2 %
POC IONIZED CALCIUM: 1.21 MMOL/L (ref 1.12–1.23)
POC METHB: 1.1 % (ref 0.4–1.5)
POC O2HB: 94.1 % (ref 94–97)
POC SATURATED O2: 96 %
POC TEMPERATURE: 37 °C
POTASSIUM BLD-SCNC: 4.6 MMOL/L (ref 3.5–5)
POTASSIUM SERPL-SCNC: 5.1 MMOL/L (ref 3.5–5.1)
SODIUM BLD-SCNC: 131 MMOL/L (ref 137–145)
SODIUM SERPL-SCNC: 135 MMOL/L (ref 136–145)
SPECIMEN SOURCE: ABNORMAL

## 2022-08-18 PROCEDURE — 99900031 HC PATIENT EDUCATION (STAT)

## 2022-08-18 PROCEDURE — 36415 COLL VENOUS BLD VENIPUNCTURE: CPT | Performed by: INTERNAL MEDICINE

## 2022-08-18 PROCEDURE — 25000003 PHARM REV CODE 250: Performed by: INTERNAL MEDICINE

## 2022-08-18 PROCEDURE — 94660 CPAP INITIATION&MGMT: CPT

## 2022-08-18 PROCEDURE — 82803 BLOOD GASES ANY COMBINATION: CPT

## 2022-08-18 PROCEDURE — 36600 WITHDRAWAL OF ARTERIAL BLOOD: CPT

## 2022-08-18 PROCEDURE — 27000221 HC OXYGEN, UP TO 24 HOURS

## 2022-08-18 PROCEDURE — 63600175 PHARM REV CODE 636 W HCPCS: Performed by: INTERNAL MEDICINE

## 2022-08-18 PROCEDURE — 99900035 HC TECH TIME PER 15 MIN (STAT)

## 2022-08-18 PROCEDURE — 83735 ASSAY OF MAGNESIUM: CPT | Performed by: INTERNAL MEDICINE

## 2022-08-18 PROCEDURE — 97530 THERAPEUTIC ACTIVITIES: CPT | Mod: CQ

## 2022-08-18 PROCEDURE — 94761 N-INVAS EAR/PLS OXIMETRY MLT: CPT

## 2022-08-18 PROCEDURE — 21400001 HC TELEMETRY ROOM

## 2022-08-18 PROCEDURE — 80048 BASIC METABOLIC PNL TOTAL CA: CPT | Performed by: INTERNAL MEDICINE

## 2022-08-18 PROCEDURE — 25000242 PHARM REV CODE 250 ALT 637 W/ HCPCS: Performed by: INTERNAL MEDICINE

## 2022-08-18 RX ORDER — FUROSEMIDE 40 MG/1
40 TABLET ORAL 2 TIMES DAILY
Status: DISCONTINUED | OUTPATIENT
Start: 2022-08-19 | End: 2022-08-19 | Stop reason: HOSPADM

## 2022-08-18 RX ADMIN — GABAPENTIN 100 MG: 100 CAPSULE ORAL at 10:08

## 2022-08-18 RX ADMIN — METOPROLOL TARTRATE 25 MG: 25 TABLET, FILM COATED ORAL at 10:08

## 2022-08-18 RX ADMIN — METOPROLOL TARTRATE 25 MG: 25 TABLET, FILM COATED ORAL at 08:08

## 2022-08-18 RX ADMIN — POTASSIUM CHLORIDE 40 MEQ: 1500 TABLET, EXTENDED RELEASE ORAL at 10:08

## 2022-08-18 RX ADMIN — FLUTICASONE FUROATE AND VILANTEROL TRIFENATATE 1 PUFF: 100; 25 POWDER RESPIRATORY (INHALATION) at 08:08

## 2022-08-18 RX ADMIN — CLOPIDOGREL 75 MG: 75 TABLET, FILM COATED ORAL at 08:08

## 2022-08-18 RX ADMIN — PRAMIPEXOLE DIHYDROCHLORIDE 1 MG: 0.25 TABLET ORAL at 10:08

## 2022-08-18 RX ADMIN — TRAZODONE HYDROCHLORIDE 50 MG: 50 TABLET ORAL at 10:08

## 2022-08-18 RX ADMIN — POTASSIUM CHLORIDE 40 MEQ: 1500 TABLET, EXTENDED RELEASE ORAL at 08:08

## 2022-08-18 RX ADMIN — FUROSEMIDE 60 MG: 10 INJECTION, SOLUTION INTRAMUSCULAR; INTRAVENOUS at 05:08

## 2022-08-18 RX ADMIN — COLLAGENASE SANTYL: 250 OINTMENT TOPICAL at 09:08

## 2022-08-18 RX ADMIN — LOSARTAN POTASSIUM 25 MG: 25 TABLET, FILM COATED ORAL at 08:08

## 2022-08-18 RX ADMIN — CEFTRIAXONE SODIUM 2 G: 2 INJECTION, POWDER, FOR SOLUTION INTRAMUSCULAR; INTRAVENOUS at 09:08

## 2022-08-18 RX ADMIN — PANTOPRAZOLE SODIUM 40 MG: 40 TABLET, DELAYED RELEASE ORAL at 08:08

## 2022-08-18 RX ADMIN — DONEPEZIL HYDROCHLORIDE 10 MG: 5 TABLET, FILM COATED ORAL at 10:08

## 2022-08-18 RX ADMIN — APIXABAN 5 MG: 5 TABLET, FILM COATED ORAL at 10:08

## 2022-08-18 RX ADMIN — APIXABAN 5 MG: 5 TABLET, FILM COATED ORAL at 08:08

## 2022-08-18 RX ADMIN — CYANOCOBALAMIN TAB 500 MCG 500 MCG: 500 TAB at 08:08

## 2022-08-18 RX ADMIN — VENLAFAXINE HYDROCHLORIDE 37.5 MG: 37.5 CAPSULE, EXTENDED RELEASE ORAL at 05:08

## 2022-08-18 NOTE — PROGRESS NOTES
[facility] - 4th Floor Medical Telemetry  Cardiology  Progress Note    Patient Name: Emma Jorgensen  MRN: [id number]  Admission Date: 8/13/2022  Hospital Length of Stay: 4 days  Code Status: DNR   Attending Physician: Ayden Resendiz MD   Primary Care Physician: Mary Jane Dean MD  Expected Discharge Date:   Principal Problem:Acute on chronic heart failure with preserved ejection fraction (HFpEF)    Subjective:     Brief HPI:   88 year old female known to Dr. Eubanks and Dr. Juarez with a past medical history of Aortic Stenosis, Diastolic CHF, HTN, GERD, DVT, PE, Parkinson's Disease and right ankle fracture.  She presented to [facility] on 08/13/2022 with complaints of shortness of breath .  History is limited secondary to dementia.  Her daughter stated over the past couple days she had been feeling more short of breath had been using BiPAP machine during the day .  Chest x-ray revealed pulmonary edema with small bilateral pleural effusions.  BNP was 1324.5.  Troponin was negative.  Cis has been consulted for acute on chronic heart failure and severe aortic stenosis.     Hospital Course:   8.17.22: NAD noted. Sitting at chair at bedside. States she feels better today. Denies CP/Palps, improved SOB and edema. Negative 2935mL in 24hr.  8.18.22: NAD noted. VSS. SR on tele. Negative 510mL in 24hrs. Denies CP/Palps, continued improvement of SOB.     PMH: Aortic Stenosis, Diastolic CHF, HTN, GERD, DVT, PE, Parkinson's Disease   PSH: Ankle Surgery, Cataract Surgery, Colonoscopy, Colon Resection, Toe Surgery, Hysterectomy  Family History: Father - CVA; Sister - CAD/CABG  Social History: Former Smoker.  Denies Alcohol Use.  Denies Illicit Drug Use.     Previous Cardiac Diagnostics:   [facility] 6.7.22  The Mid LAD lesion was 85% stenosed with 0% stenosis post-intervention.  A stent was successfully placed at 12 LETTY for 10 sec.  The estimated blood loss was <50 mL.  There was single vessel coronary artery  disease.  The PCI was successful.  The coronary FFR was abnormal.  The filling pressures mildly elevated     Echocardiogram 5.24.22:  TDS due to poor acoustical windows; suboptimal doppler angles.  Mild concentric hypertrophy and normal systolic function.  The estimated ejection fraction is 55%.  Grade II left ventricular diastolic dysfunction.  Moderate aortic regurgitation.  There is moderate aortic valve stenosis.  Aortic valve area is 0.75 cm squared; peak velocity is 3.53m/s; mean gradient is 30mmHg.  Intermediate central venous pressure (8mmHg).     Carotid US 4.21.22  The right internal carotid artery demonstrated less than 50% stenosis.  The left internal carotid artery demonstrated less than 50% stenosis.  Bilateral vertebral arteries were patent with antegrade flow.        PET MPI 2.22.18:  This is a normal perfusion study, no perfusion defects noted.  There is no evidence of ischemia.  This scan is suggestive of low risk for future cardiovascular events.  The left ventricular cavity is noted to be normal on the stress studies.  The stress left ventricular ejection fraction was calculated to be 74% and left ventricular global function is normal.  The rest left ventricular cavity is noted to be normal.  The rest left ventricular ejection fraction was calculated to be 74% and rest left ventricular global function is michael;.  Compared with rest and stress studies the ejection fraction remains unchanged (74).           Review of Systems   Constitutional: Negative for chills and fever.   Cardiovascular: Positive for leg swelling. Negative for chest pain and palpitations.   Respiratory: Positive for shortness of breath.    Psychiatric/Behavioral: Negative for altered mental status.           Objective:     Vital Signs (Most Recent):  Temp: 98.8 °F (37.1 °C) (08/18/22 1100)  Pulse: 61 (08/18/22 1100)  Resp: 20 (08/18/22 1100)  BP: 128/60 (08/18/22 1100)  SpO2: 98 % (08/18/22 0813) Vital Signs (24h Range):  Temp:   [97.5 °F (36.4 °C)-99.2 °F (37.3 °C)] 98.8 °F (37.1 °C)  Pulse:  [61-77] 61  Resp:  [16-20] 20  SpO2:  [95 %-100 %] 98 %  BP: (118-137)/(56-81) 128/60     Weight: 112.5 kg (248 lb) (Bed weight)  Body mass index is 37.71 kg/m².    SpO2: 98 %  O2 Device (Oxygen Therapy): nasal cannula      Intake/Output Summary (Last 24 hours) at 8/18/2022 1203  Last data filed at 8/17/2022 2200  Gross per 24 hour   Intake 1440 ml   Output 1150 ml   Net 290 ml       Lines/Drains/Airways     Drain  Duration                Urethral Catheter 07/31/22 1430 Silicone 16 Fr. 17 days          Peripheral Intravenous Line  Duration                Midline Catheter Insertion/Assessment  - Single Lumen 08/05/22 1426 Left cephalic vein (lateral side of arm) other (see comments) 12 days                Significant Labs:   CMP   Recent Labs   Lab 08/17/22  0403 08/18/22  0351    135*   K 4.5 5.1   CO2 37* 35*   BUN 21.3* 24.9*   CREATININE 0.61 0.66   CALCIUM 9.3 9.8    and CBC No results for input(s): WBC, HGB, HCT, PLT in the last 48 hours.    Telemetry:  SR    Physical Exam:  Physical Exam  Constitutional:       Appearance: Normal appearance.   HENT:      Head: Atraumatic.   Eyes:      Extraocular Movements: Extraocular movements intact.      Conjunctiva/sclera: Conjunctivae normal.   Cardiovascular:      Rate and Rhythm: Normal rate and regular rhythm.      Pulses: Normal pulses.      Heart sounds: Normal heart sounds.   Pulmonary:      Breath sounds: Rales present.   Abdominal:      Palpations: Abdomen is soft.   Musculoskeletal:         General: Normal range of motion.      Cervical back: Neck supple.   Skin:     General: Skin is warm and dry.   Neurological:      Mental Status: She is alert and oriented to person, place, and time.   Psychiatric:         Mood and Affect: Mood normal.         Behavior: Behavior normal.         Current Inpatient Medications:    Current Facility-Administered Medications:     acetaminophen tablet 1,000 mg,  1,000 mg, Oral, Q6H PRN, Sudeep Humphries MD    acetaminophen tablet 650 mg, 650 mg, Oral, Q4H PRN, Sudeep Humphries MD    aluminum-magnesium hydroxide-simethicone 200-200-20 mg/5 mL suspension 30 mL, 30 mL, Oral, QID PRN, Sudeep Humphries MD    apixaban tablet 5 mg, 5 mg, Oral, BID, Sudeep Humphries MD, 5 mg at 08/18/22 0859    cefTRIAXone (ROCEPHIN) 2 g in dextrose 5 % in water (D5W) 5 % 50 mL IVPB (MB+), 2 g, Intravenous, Q24H, Ayden Resendiz MD, Last Rate: 100 mL/hr at 08/18/22 0900, 2 g at 08/18/22 0900    cloNIDine tablet 0.2 mg, 0.2 mg, Oral, TID PRN, Sudeep Humphries MD    clopidogreL tablet 75 mg, 75 mg, Oral, Daily, Sudeep Humphries MD, 75 mg at 08/18/22 0859    collagenase ointment, , Topical (Top), Daily, Ayden Resendiz MD, Given at 08/18/22 0900    cyanocobalamin tablet 500 mcg, 500 mcg, Oral, Daily, Sudeep Humphries MD, 500 mcg at 08/18/22 0859    donepeziL tablet 10 mg, 10 mg, Oral, Nightly, Sudeep Humphries MD, 10 mg at 08/17/22 2034    fluticasone furoate-vilanteroL 100-25 mcg/dose diskus inhaler 1 puff, 1 puff, Inhalation, Daily, Sudeep Humphries MD, 1 puff at 08/18/22 0856    furosemide injection 60 mg, 60 mg, Intravenous, Q12H, Ayden Resendiz MD, 60 mg at 08/18/22 0555    gabapentin capsule 100 mg, 100 mg, Oral, Nightly, Sudeep Humphries MD, 100 mg at 08/17/22 2034    hydrALAZINE injection 10 mg, 10 mg, Intravenous, Q4H PRN, Sudeep Humphries MD    HYDROcodone-acetaminophen 5-325 mg per tablet 1 tablet, 1 tablet, Oral, Q8H PRN, Sudeep Humphries MD, 1 tablet at 08/17/22 1613    labetaloL injection 10 mg, 10 mg, Intravenous, Q4H PRN, Sudeep Humphries MD    losartan tablet 25 mg, 25 mg, Oral, Daily, Ayden Resendiz MD, 25 mg at 08/18/22 0859    melatonin tablet 6 mg, 6 mg, Oral, Nightly PRN, Sudeep Humphries MD    metoprolol tartrate (LOPRESSOR) tablet 25 mg, 25 mg, Oral, BID, Sudeep Humphries MD, 25 mg at 08/18/22 0859    ondansetron injection 4 mg, 4 mg, Intravenous, Q4H PRN, Sudeep Humphries MD    pantoprazole EC tablet 40 mg, 40 mg,  Oral, Daily, Sudeep Humphries MD, 40 mg at 08/18/22 0859    polyethylene glycol packet 17 g, 17 g, Oral, BID PRN, Sudeep Humphries MD    potassium chloride SA CR tablet 40 mEq, 40 mEq, Oral, BID, Ayden Resendiz MD, 40 mEq at 08/18/22 0859    pramipexole tablet 1 mg, 1 mg, Oral, Nightly, Sudeep Humphries MD, 1 mg at 08/17/22 2034    prochlorperazine injection Soln 5 mg, 5 mg, Intravenous, Q6H PRN, Sudeep Humphries MD    senna-docusate 8.6-50 mg per tablet 1 tablet, 1 tablet, Oral, BID PRN, Sudeep Humphries MD    simethicone chewable tablet 80 mg, 1 tablet, Oral, QID PRN, Sudeep Humphries MD    sodium chloride 0.9% flush 10 mL, 10 mL, Intravenous, PRN, Sudeep Humphries MD, 10 mL at 08/15/22 0101    traZODone tablet 50 mg, 50 mg, Oral, QHS, Sudeep Humphries MD, 50 mg at 08/17/22 2036    venlafaxine 24 hr capsule 37.5 mg, 37.5 mg, Oral, Daily, Sudeep Humphries MD, 37.5 mg at 08/18/22 0555        Assessment:     IMPRESSION:  Acute on chronic diastolic HF  VHD/Severe AS                   -TAVR work-up in progress  Acute on chronic respiratory failure  Infected R heel wound  PAD  CAD/stent 6.7.22  PAF                   -[id number]                   -On Eliquis  HTN  Hx of DVT/PE  Parkinson's  Right Ankle Fracture      Plan:     PLAN:  Continue Lasix 60mg BID  Continue home medications  ABX per hospitalist  Strict I&O/daily weight  Low sodium diet  TAVR work-up as outpatient    RAHUL Hernandez-BC  Cardiology  [facility] - 4th Floor Medical Telemetry  08/18/2022

## 2022-08-18 NOTE — PROGRESS NOTES
[facility]  Hospital Medicine Progress Note        Chief Complaint: Shortness of Breath     HPI:   88-year-old female medical history of morbid obesity, dementia, chronic hypoxemic hypercapnic respiratory failure on nasal cannula and BiPAP at bedtime, chronic HFpEF, Severe aortic stenosis, CAD- recent LAD stent on 06/07/2022 on aspirin Plavix, PAF on Eliquis, infected right heel wound on ceftriaxone with end date 09/16/2022.    Present to the ED with complaint of shortness of breath, history limited due to dementia, daughter at bedside report over the past couple days she has been feeling more short of breath and has been using the BiPAP machine during the day as well. No reported cough fever or chills.   On arrival to ED she was afebrile, tachypneic, hypertensive and saturating 97% on 4 L nasal cannula. EKG normal sinus rhythm. Chest x-ray showed pulmonary edema and small bilateral pleural effusions. She was given Lasix 40 mg IV and referred to hospital medicine service for further evaluation and management.    Interval Hx:   No new complaints.  Nurse reports patient seems more tired than normal and didn't use her BIPAP last night.     Objective/physical exam:  General: Appears comfortable, no acute distress.  Integumentary: Warm, dry, intact.  Neuro: awake, alert, oriented.    Musculoskeletal: Purposeful movement noted.   Respiratory: No accessory muscle use. Breath sounds are equal.  Cardiovascular: Regular rate. No peripheral edema.    VITAL SIGNS: 24 HRS MIN & MAX LAST   Temp  Min: 97.5 °F (36.4 °C)  Max: 99.2 °F (37.3 °C) 97.5 °F (36.4 °C)   BP  Min: 108/48  Max: 137/73 124/70   Pulse  Min: 61  Max: 77  77   Resp  Min: 16  Max: 20 16   SpO2  Min: 95 %  Max: 100 % 98 %     CV Ultrasound doppler venous arm left  There was no evidence of deep or superficial vein thrombosis in the left   upper extremity.    Recent Labs   Lab 08/13/22  1750 08/14/22  0723 08/15/22  0728   WBC 9.9 11.8*  10.4   RBC 3.94* 3.67* 3.78*   HGB 11.6* 10.8* 11.2*   HCT 38.7 37.3 38.1   MCV 98.2* 101.6* 100.8*   MCH 29.4 29.4 29.6   MCHC 30.0* 29.0* 29.4*   RDW 16.4 16.3 16.6    324 321   MPV 10.4 10.0 10.0       Recent Labs   Lab 08/12/22  0600 08/13/22  1750 08/14/22  0238 08/14/22  0723 08/16/22  0423 08/17/22  0014 08/17/22  0403 08/18/22  0351    140  --    < > 138  --  136 135*   K 3.8 5.1  --    < > 3.5  --  4.5 5.1   CO2 41* 39*  --    < > 40*  --  37* 35*   BUN 24.8* 23.6*  --    < > 16.4  --  21.3* 24.9*   CREATININE 0.53* 0.62  --    < > 0.60  --  0.61 0.66   CALCIUM 9.2 9.6  --    < > 9.5  --  9.3 9.8   PH  --   --  7.39  --   --   --   --   --    MG 2.00 2.20  --    < > 1.90 1.90  --  1.90   ALBUMIN 3.1* 3.1*  --   --   --   --   --   --    ALKPHOS 86 96  --   --   --   --   --   --    ALT 11 16  --   --   --   --   --   --    AST 20 38*  --   --   --   --   --   --    BILITOT 0.8 0.6  --   --   --   --   --   --     < > = values in this interval not displayed.          Microbiology Results (last 7 days)     ** No results found for the last 168 hours. **           See below for Radiology    Scheduled Med:   apixaban  5 mg Oral BID    cefTRIAXone (ROCEPHIN) IVPB  2 g Intravenous Q24H    clopidogreL  75 mg Oral Daily    collagenase   Topical (Top) Daily    cyanocobalamin  500 mcg Oral Daily    donepeziL  10 mg Oral Nightly    fluticasone furoate-vilanteroL  1 puff Inhalation Daily    furosemide (LASIX) injection  60 mg Intravenous Q12H    gabapentin  100 mg Oral Nightly    losartan  25 mg Oral Daily    metoprolol tartrate  25 mg Oral BID    pantoprazole  40 mg Oral Daily    potassium chloride  40 mEq Oral BID    pramipexole  1 mg Oral Nightly    traZODone  50 mg Oral QHS    venlafaxine  37.5 mg Oral Daily          PRN Meds:  acetaminophen, acetaminophen, aluminum-magnesium hydroxide-simethicone, cloNIDine, hydrALAZINE, HYDROcodone-acetaminophen, labetalol, melatonin, ondansetron,  polyethylene glycol, prochlorperazine, senna-docusate 8.6-50 mg, simethicone, sodium chloride 0.9%     Nutrition Status:      Assessment/Plan:  Dementia   Infected right heel wound   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   CAD/PCI - LAD stent 6/7/2022   Acute on chronic HFpEF (LVEF 55% and grade II diastolic dysfunction 05/24/2022),   PAF on Eliquis  VHD- moderate to severe AS  Bilateral JEFFY   Hypertension   Chronic anemia   Hypokalemia  DVT/PE on chronic OAC    Plan  status post debridement 08/05/2022 on ceftriaxone end date 09/16/2022 followed by oral suppression with amoxicillin 500 mg b.i.d. indefinitely   acute on Chronic hypoxemic hypercapnic respiratory failure on nasal cannula during day and BIPAP at bedtime    plans for a TAVR workup outpatient per Cards at some point     Transition to oral lasix therapy.  Continue goal-directed medical treatment for heart failure.  We will get cis  input.  Continue Rocephin for right lower extremity chronic wound.  Continue PT with no weight-bearing to right lower extremity      Anticipated discharge and Disposition:  Plan discharge in 24-48hrs.  TAVR [place] outpatient per Card    All diagnosis and differential diagnosis have been reviewed; assessment and plan has been documented; I have personally reviewed the labs and test results that are presently available; I have reviewed the patients medication list; I have reviewed the consulting providers response and recommendations. I have reviewed or attempted to review medical records based upon their availability    All of the patient's questions have been  addressed and answered. Patient's is agreeable to the above stated plan.   I will continue to monitor closely and make adjustments to medical management as needed.          Linda Sweeney DO   08/18/2022        This note was created with the assistance of Dragon voice recognition software. There may be transcription errors as a result of using this technology however minimal. Effort has been  made to assure accuracy of transcription but any obvious errors or omissions should be clarified with the author of the document.

## 2022-08-18 NOTE — PT/OT/SLP PROGRESS
Physical Therapy Treatment    Patient Name:  Emma Jorgensen   MRN:  [id number]    Recommendations:     Discharge Recommendations:  nursing facility, skilled, nursing facility, basic   Discharge Equipment Recommendations: none     Subjective     Patient awake and alert.     Objective:     General Precautions: Standard, fall   Orthopedic Precautions:RLE non weight bearing   Braces:    Respiratory Status:     Communicated with nurse prior to treatment.     Functional Mobility:    Rolling:Minimal Assistance  Supine to sit:Moderate Assistance  Sit to stand transfer: Total Assistance  Bed to chair transfer:Total Assistance  Pt had BM and hygiene performed. Pt assisted to chair via squat pivot transfer x 2 NWB RLE. Pt is on oleg pad and nursing staff to assist BTB.       AM-PAC 6 CLICK MOBILITY  Turning over in bed (including adjusting bedclothes, sheets and blankets)?: 3  Sitting down on and standing up from a chair with arms (e.g., wheelchair, bedside commode, etc.): 2  Moving from lying on back to sitting on the side of the bed?: 2  Moving to and from a bed to a chair (including a wheelchair)?: 2  Need to walk in hospital room?: 1  Climbing 3-5 steps with a railing?: 1  Basic Mobility Total Score: 11     Patient left up in chair with call button in reach..    GOALS:   Multidisciplinary Problems     Physical Therapy Goals        Problem: Physical Therapy    Goal Priority Disciplines Outcome Goal Variances Interventions   Physical Therapy Goal     PT, PT/OT Ongoing, Progressing     Description: Goals to be met by: 9/15/22     Patient will increase functional independence with mobility by performin. Supine to sit with MInimal Assistance  2. Sit to supine with MInimal Assistance  3. Wheelchair propulsion x200 feet with Stand-by Assistance using bilateral uppper extremities                     Assessment:     Emma Jorgensen is a 88 y.o. female admitted with a medical diagnosis of Acute on chronic heart  failure with preserved ejection fraction (HFpEF).     Rehab Prognosis: Good; patient would benefit from acute skilled PT services to address these deficits and reach maximum level of function.    Recent Surgery: * No surgery found *      Plan:     During this hospitalization, patient to be seen 5 x/week to address the identified rehab impairments via therapeutic activities, therapeutic exercises, neuromuscular re-education and progress toward the following goals:    · Plan of Care Expires:  09/15/22    Billable Minutes     Billable Minutes: Therapeutic Activity 25    Treatment Type: Treatment  PT/PTA: PTA     PTA Visit Number: 3     08/18/2022

## 2022-08-19 VITALS
SYSTOLIC BLOOD PRESSURE: 105 MMHG | HEIGHT: 68 IN | RESPIRATION RATE: 18 BRPM | OXYGEN SATURATION: 98 % | WEIGHT: 248 LBS | HEART RATE: 63 BPM | TEMPERATURE: 99 F | BODY MASS INDEX: 37.59 KG/M2 | DIASTOLIC BLOOD PRESSURE: 50 MMHG

## 2022-08-19 PROCEDURE — 27000221 HC OXYGEN, UP TO 24 HOURS

## 2022-08-19 PROCEDURE — 25000003 PHARM REV CODE 250: Performed by: STUDENT IN AN ORGANIZED HEALTH CARE EDUCATION/TRAINING PROGRAM

## 2022-08-19 PROCEDURE — 94761 N-INVAS EAR/PLS OXIMETRY MLT: CPT

## 2022-08-19 PROCEDURE — 94660 CPAP INITIATION&MGMT: CPT

## 2022-08-19 PROCEDURE — 25000003 PHARM REV CODE 250: Performed by: INTERNAL MEDICINE

## 2022-08-19 PROCEDURE — 99900035 HC TECH TIME PER 15 MIN (STAT)

## 2022-08-19 PROCEDURE — 63600175 PHARM REV CODE 636 W HCPCS: Performed by: INTERNAL MEDICINE

## 2022-08-19 PROCEDURE — 97530 THERAPEUTIC ACTIVITIES: CPT | Mod: CQ

## 2022-08-19 PROCEDURE — 25000242 PHARM REV CODE 250 ALT 637 W/ HCPCS: Performed by: INTERNAL MEDICINE

## 2022-08-19 RX ORDER — HYDROCODONE BITARTRATE AND ACETAMINOPHEN 5; 325 MG/1; MG/1
1 TABLET ORAL EVERY 8 HOURS PRN
Refills: 0
Start: 2022-08-19 | End: 2022-08-24

## 2022-08-19 RX ORDER — FUROSEMIDE 40 MG/1
40 TABLET ORAL 2 TIMES DAILY
Start: 2022-08-19 | End: 2022-09-03

## 2022-08-19 RX ORDER — AMOXICILLIN 250 MG
1 CAPSULE ORAL 2 TIMES DAILY PRN
Start: 2022-08-19

## 2022-08-19 RX ORDER — LOSARTAN POTASSIUM 25 MG/1
25 TABLET ORAL DAILY
Start: 2022-08-19

## 2022-08-19 RX ADMIN — PANTOPRAZOLE SODIUM 40 MG: 40 TABLET, DELAYED RELEASE ORAL at 08:08

## 2022-08-19 RX ADMIN — FLUTICASONE FUROATE AND VILANTEROL TRIFENATATE 1 PUFF: 100; 25 POWDER RESPIRATORY (INHALATION) at 08:08

## 2022-08-19 RX ADMIN — CEFTRIAXONE SODIUM 2 G: 2 INJECTION, POWDER, FOR SOLUTION INTRAMUSCULAR; INTRAVENOUS at 08:08

## 2022-08-19 RX ADMIN — APIXABAN 5 MG: 5 TABLET, FILM COATED ORAL at 08:08

## 2022-08-19 RX ADMIN — LOSARTAN POTASSIUM 25 MG: 25 TABLET, FILM COATED ORAL at 08:08

## 2022-08-19 RX ADMIN — FUROSEMIDE 40 MG: 40 TABLET ORAL at 08:08

## 2022-08-19 RX ADMIN — COLLAGENASE SANTYL: 250 OINTMENT TOPICAL at 08:08

## 2022-08-19 RX ADMIN — METOPROLOL TARTRATE 25 MG: 25 TABLET, FILM COATED ORAL at 08:08

## 2022-08-19 RX ADMIN — POTASSIUM CHLORIDE 40 MEQ: 1500 TABLET, EXTENDED RELEASE ORAL at 08:08

## 2022-08-19 RX ADMIN — CYANOCOBALAMIN TAB 500 MCG 500 MCG: 500 TAB at 08:08

## 2022-08-19 RX ADMIN — CLOPIDOGREL 75 MG: 75 TABLET, FILM COATED ORAL at 08:08

## 2022-08-19 RX ADMIN — VENLAFAXINE HYDROCHLORIDE 37.5 MG: 37.5 CAPSULE, EXTENDED RELEASE ORAL at 06:08

## 2022-08-19 NOTE — PLAN OF CARE
Patient is ready to return [facility] Discharge information sent via [facility] and left message for Anabel.

## 2022-08-19 NOTE — PLAN OF CARE
08/19/22 1015   Final Note   Assessment Type Final Discharge Note   Anticipated Discharge Disposition SNF  (Return to [facility])   Post-Acute Status   Post-Acute Authorization Placement   Post-Acute Placement Status Set-up Complete/Auth obtained   Received call from Anabel. Nurse can call report to Carli and transport van will be sent.

## 2022-08-19 NOTE — PT/OT/SLP PROGRESS
Physical Therapy Treatment    Patient Name:  Emma Jorgensen   MRN:  [id number]    Recommendations:     Discharge Recommendations:  nursing facility, skilled   Discharge Equipment Recommendations: none   Barriers to discharge:      Assessment:     Emma Jorgensen is a 88 y.o. female admitted with a medical diagnosis of Acute on chronic heart failure with preserved ejection fraction (HFpEF), R heel wound.  She presents with the following impairments/functional limitations:  weakness, impaired endurance, impaired balance, impaired self care skills, impaired functional mobility, gait instability.     Rehab Prognosis: Good; patient would benefit from acute skilled PT services to address these deficits and reach maximum level of function.    Recent Surgery: * No surgery found *      Plan:     During this hospitalization, patient to be seen 5 x/week to address the identified rehab impairments via therapeutic activities, therapeutic exercises, neuromuscular re-education and progress toward the following goals:    · Plan of Care Expires:  09/15/22    Subjective     Chief Complaint:   Patient/Family Comments/goals:   Pain/Comfort:  ·        Objective:     Communicated with NSG prior to session.  Patient found with HOB elevated with telemetry upon PTA entry to room.     General Precautions: Standard, fall   Orthopedic Precautions:RLE non weight bearing   Braces: N/A  Respiratory Status: 4L/NC, SPO2 98%, c/o feeling SOB during      Functional Mobility:  · Bed: ModA sit <-> supine  · T/F: MaxA x2, pt has difficulty maintaining NWB R LE  · Lateral scooting:  ModA          Therapeutic Activities and Exercises:   B LE x 10 reps: LAQ, hip abd, hip add, ankle pumps   Pillow catch and throw for endurance training.     Patient left with HOB elevated with all call bell in reach and all lines intact.     GOALS:   Multidisciplinary Problems     Physical Therapy Goals        Problem: Physical Therapy    Goal Priority Disciplines  Outcome Goal Variances Interventions   Physical Therapy Goal     PT, PT/OT Ongoing, Progressing     Description: Goals to be met by: 9/15/22     Patient will increase functional independence with mobility by performin. Supine to sit with MInimal Assistance  2. Sit to supine with MInimal Assistance  3. Wheelchair propulsion x200 feet with Stand-by Assistance using bilateral uppper extremities                     Time Tracking:     PT Received On:    PT Start Time: 1006     PT Stop Time: 1030  PT Total Time (min): 24 min     Billable Minutes: Theraputic activity 24    Treatment Type: Treatment  PT/PTA: PTA     PTA Visit Number: 2     2022

## 2022-08-19 NOTE — PROGRESS NOTES
[facility] - 4th Floor Medical Telemetry  Cardiology  Progress Note    Patient Name: Emma Jorgensen  MRN: [id number]  Admission Date: 8/13/2022  Hospital Length of Stay: 5 days  Code Status: DNR   Attending Physician: Ayden Resendiz MD   Primary Care Physician: Mary Jane Dean MD  Expected Discharge Date:   Principal Problem:Acute on chronic heart failure with preserved ejection fraction (HFpEF)    Subjective:     Brief HPI:   88 year old female known to Dr. Eubanks and Dr. Juarez with a past medical history of Aortic Stenosis, Diastolic CHF, HTN, GERD, DVT, PE, Parkinson's Disease and right ankle fracture.  She presented to [facility] on 08/13/2022 with complaints of shortness of breath .  History is limited secondary to dementia.  Her daughter stated over the past couple days she had been feeling more short of breath had been using BiPAP machine during the day .  Chest x-ray revealed pulmonary edema with small bilateral pleural effusions.  BNP was 1324.5.  Troponin was negative.  Cis has been consulted for acute on chronic heart failure and severe aortic stenosis.     Hospital Course:   8.17.22: NAD noted. Sitting at chair at bedside. States she feels better today. Denies CP/Palps, improved SOB and edema. Negative 2935mL in 24hr.  8.18.22: NAD noted. VSS. SR on tele. Negative 510mL in 24hrs. Denies CP/Palps, continued improvement of SOB.  8.19.22: NAD noted.VSS. SR on tele. Negative 1000mL in 24hrs. Denies CP/SOB/Palps     PMH: Aortic Stenosis, Diastolic CHF, HTN, GERD, DVT, PE, Parkinson's Disease   PSH: Ankle Surgery, Cataract Surgery, Colonoscopy, Colon Resection, Toe Surgery, Hysterectomy  Family History: Father - CVA; Sister - CAD/CABG  Social History: Former Smoker.  Denies Alcohol Use.  Denies Illicit Drug Use.     Previous Cardiac Diagnostics:   [facility] 6.7.22  The Mid LAD lesion was 85% stenosed with 0% stenosis post-intervention.  A stent was successfully placed at 12 LETTY for 10  sec.  The estimated blood loss was <50 mL.  There was single vessel coronary artery disease.  The PCI was successful.  The coronary FFR was abnormal.  The filling pressures mildly elevated     Echocardiogram 5.24.22:  TDS due to poor acoustical windows; suboptimal doppler angles.  Mild concentric hypertrophy and normal systolic function.  The estimated ejection fraction is 55%.  Grade II left ventricular diastolic dysfunction.  Moderate aortic regurgitation.  There is moderate aortic valve stenosis.  Aortic valve area is 0.75 cm squared; peak velocity is 3.53m/s; mean gradient is 30mmHg.  Intermediate central venous pressure (8mmHg).     Carotid US 4.21.22  The right internal carotid artery demonstrated less than 50% stenosis.  The left internal carotid artery demonstrated less than 50% stenosis.  Bilateral vertebral arteries were patent with antegrade flow.        PET MPI 2.22.18:  This is a normal perfusion study, no perfusion defects noted.  There is no evidence of ischemia.  This scan is suggestive of low risk for future cardiovascular events.  The left ventricular cavity is noted to be normal on the stress studies.  The stress left ventricular ejection fraction was calculated to be 74% and left ventricular global function is normal.  The rest left ventricular cavity is noted to be normal.  The rest left ventricular ejection fraction was calculated to be 74% and rest left ventricular global function is michael;.  Compared with rest and stress studies the ejection fraction remains unchanged (74).           Review of Systems   Constitutional: Negative for chills and fever.   Cardiovascular: Negative for chest pain and palpitations.   Respiratory: Negative for shortness of breath.    Psychiatric/Behavioral: Negative for altered mental status.           Objective:     Vital Signs (Most Recent):  Temp: 98.5 °F (36.9 °C) (08/19/22 0803)  Pulse: 77 (08/19/22 0843)  Resp: 18 (08/19/22 0843)  BP: 135/74 (08/19/22  0849)  SpO2: 97 % (08/19/22 0803) Vital Signs (24h Range):  Temp:  [97 °F (36.1 °C)-98.8 °F (37.1 °C)] 98.5 °F (36.9 °C)  Pulse:  [58-77] 77  Resp:  [15-21] 18  SpO2:  [94 %-99 %] 97 %  BP: ()/(49-74) 135/74     Weight: 112.5 kg (248 lb) (Bed weight)  Body mass index is 37.71 kg/m².    SpO2: 97 %  O2 Device (Oxygen Therapy): nasal cannula      Intake/Output Summary (Last 24 hours) at 8/19/2022 1030  Last data filed at 8/19/2022 0600  Gross per 24 hour   Intake 300 ml   Output 1300 ml   Net -1000 ml       Lines/Drains/Airways     Drain  Duration                Urethral Catheter 07/31/22 1430 Silicone 16 Fr. 18 days          Peripheral Intravenous Line  Duration                Midline Catheter Insertion/Assessment  - Single Lumen 08/05/22 1426 Left cephalic vein (lateral side of arm) other (see comments) 13 days                Significant Labs:   CMP   Recent Labs   Lab 08/18/22  0351   *   K 5.1   CO2 35*   BUN 24.9*   CREATININE 0.66   CALCIUM 9.8    and CBC No results for input(s): WBC, HGB, HCT, PLT in the last 48 hours.    Telemetry:  SR    Physical Exam:  Physical Exam  Constitutional:       Appearance: Normal appearance.   HENT:      Head: Atraumatic.   Eyes:      Extraocular Movements: Extraocular movements intact.      Conjunctiva/sclera: Conjunctivae normal.   Cardiovascular:      Rate and Rhythm: Normal rate and regular rhythm.      Pulses: Normal pulses.      Heart sounds: Normal heart sounds.   Pulmonary:      Breath sounds: Rales present.   Abdominal:      Palpations: Abdomen is soft.   Musculoskeletal:         General: Normal range of motion.      Cervical back: Neck supple.   Skin:     General: Skin is warm and dry.   Neurological:      Mental Status: She is alert and oriented to person, place, and time.   Psychiatric:         Mood and Affect: Mood normal.         Behavior: Behavior normal.         Current Inpatient Medications:    Current Facility-Administered Medications:      acetaminophen tablet 1,000 mg, 1,000 mg, Oral, Q6H PRN, Sudeep Humphries MD    acetaminophen tablet 650 mg, 650 mg, Oral, Q4H PRN, Sudeep Humphries MD    aluminum-magnesium hydroxide-simethicone 200-200-20 mg/5 mL suspension 30 mL, 30 mL, Oral, QID PRN, Sudeep Humphries MD    apixaban tablet 5 mg, 5 mg, Oral, BID, Sudeep Humphries MD, 5 mg at 08/19/22 0842    cefTRIAXone (ROCEPHIN) 2 g in dextrose 5 % in water (D5W) 5 % 50 mL IVPB (MB+), 2 g, Intravenous, Q24H, Ayden Resendiz MD, Stopped at 08/19/22 0912    cloNIDine tablet 0.2 mg, 0.2 mg, Oral, TID PRN, Sudeep Humphries MD    clopidogreL tablet 75 mg, 75 mg, Oral, Daily, Sudeep Humphries MD, 75 mg at 08/19/22 0842    collagenase ointment, , Topical (Top), Daily, Ayden Resendiz MD, Given at 08/19/22 0843    cyanocobalamin tablet 500 mcg, 500 mcg, Oral, Daily, Sudeep Humphries MD, 500 mcg at 08/19/22 0842    donepeziL tablet 10 mg, 10 mg, Oral, Nightly, Sudeep Humphries MD, 10 mg at 08/18/22 2209    fluticasone furoate-vilanteroL 100-25 mcg/dose diskus inhaler 1 puff, 1 puff, Inhalation, Daily, Sudeep Humphrise MD, 1 puff at 08/19/22 0843    furosemide tablet 40 mg, 40 mg, Oral, BID, Linda Sweeney DO, 40 mg at 08/19/22 0849    gabapentin capsule 100 mg, 100 mg, Oral, Nightly, Sudeep Humphries MD, 100 mg at 08/18/22 2210    hydrALAZINE injection 10 mg, 10 mg, Intravenous, Q4H PRN, Sudeep Humphries MD    HYDROcodone-acetaminophen 5-325 mg per tablet 1 tablet, 1 tablet, Oral, Q8H PRN, Sudeep Humphries MD, 1 tablet at 08/17/22 1613    labetaloL injection 10 mg, 10 mg, Intravenous, Q4H PRN, Sudeep Humphries MD    losartan tablet 25 mg, 25 mg, Oral, Daily, Ayden Resendiz MD, 25 mg at 08/19/22 0842    melatonin tablet 6 mg, 6 mg, Oral, Nightly PRN, Sudeep Humphries MD    metoprolol tartrate (LOPRESSOR) tablet 25 mg, 25 mg, Oral, BID, Sudeep Humphries MD, 25 mg at 08/19/22 0842    ondansetron injection 4 mg, 4 mg, Intravenous, Q4H PRN, Sudeep Humphries MD    pantoprazole EC tablet 40 mg, 40 mg, Oral, Daily, Ali  YENY Humphries MD, 40 mg at 08/19/22 0842    polyethylene glycol packet 17 g, 17 g, Oral, BID PRN, Sudeep Humphries MD    potassium chloride SA CR tablet 40 mEq, 40 mEq, Oral, BID, Ayden Resendiz MD, 40 mEq at 08/19/22 0842    pramipexole tablet 1 mg, 1 mg, Oral, Nightly, Sudeep Humphries MD, 1 mg at 08/18/22 2209    prochlorperazine injection Soln 5 mg, 5 mg, Intravenous, Q6H PRN, Sudeep Humphries MD    senna-docusate 8.6-50 mg per tablet 1 tablet, 1 tablet, Oral, BID PRN, Sudeep Hmuphries MD    simethicone chewable tablet 80 mg, 1 tablet, Oral, QID PRN, Sudeep Humphries MD    sodium chloride 0.9% flush 10 mL, 10 mL, Intravenous, PRN, Sudeep Humphries MD, 10 mL at 08/15/22 0101    traZODone tablet 50 mg, 50 mg, Oral, QHS, Sudeep Humphries MD, 50 mg at 08/18/22 2209    venlafaxine 24 hr capsule 37.5 mg, 37.5 mg, Oral, Daily, Sudeep Humphries MD, 37.5 mg at 08/19/22 0606        Assessment:     IMPRESSION:  Acute on chronic diastolic HF  VHD/Severe AS                   -TAVR work-up in progress  Acute on chronic respiratory failure  Infected R heel wound  PAD  CAD/stent 6.7.22  PAF                   -[id number]                   -On Eliquis  HTN  Hx of DVT/PE  Parkinson's  Right Ankle Fracture      Plan:     PLAN:  Continue Lasix 40mg BID  Continue Lopressor and Losartan  Continue home medications  ABX per hospitalist  Strict I&O/daily weight  Low sodium diet  TAVR work-up as outpatient  F/U with Dr. Juarez in 7-10 days  Will sign off. Thank you for allowing us to participate in the care of this patient. Please call us with any questions.      Franki Seymour, [facility]-BC  Cardiology  [facility] - 4th Floor Medical Telemetry  08/19/2022

## 2022-08-21 ENCOUNTER — LAB REQUISITION (OUTPATIENT)
Dept: LAB | Facility: HOSPITAL | Age: 87
End: 2022-08-21
Payer: MEDICARE

## 2022-08-21 DIAGNOSIS — Z02.0 ENCOUNTER FOR EXAMINATION FOR ADMISSION TO EDUCATIONAL INSTITUTION: ICD-10-CM

## 2022-08-21 LAB
ALBUMIN SERPL-MCNC: 3 GM/DL (ref 3.4–4.8)
ALBUMIN/GLOB SERPL: 0.8 RATIO (ref 1.1–2)
ALP SERPL-CCNC: 109 UNIT/L (ref 40–150)
ALT SERPL-CCNC: 13 UNIT/L (ref 0–55)
AST SERPL-CCNC: 23 UNIT/L (ref 5–34)
BASOPHILS # BLD AUTO: 0.05 X10(3)/MCL (ref 0–0.2)
BASOPHILS NFR BLD AUTO: 0.5 %
BILIRUBIN DIRECT+TOT PNL SERPL-MCNC: 0.5 MG/DL
BUN SERPL-MCNC: 30.3 MG/DL (ref 9.8–20.1)
CALCIUM SERPL-MCNC: 9.2 MG/DL (ref 8.4–10.2)
CHLORIDE SERPL-SCNC: 94 MMOL/L (ref 98–107)
CHOLEST SERPL-MCNC: 113 MG/DL
CHOLEST/HDLC SERPL: 2 {RATIO} (ref 0–5)
CO2 SERPL-SCNC: 37 MMOL/L (ref 23–31)
CREAT SERPL-MCNC: 0.81 MG/DL (ref 0.55–1.02)
DEPRECATED CALCIDIOL+CALCIFEROL SERPL-MC: 30.1 NG/ML (ref 30–80)
EOSINOPHIL # BLD AUTO: 0.24 X10(3)/MCL (ref 0–0.9)
EOSINOPHIL NFR BLD AUTO: 2.5 %
ERYTHROCYTE [DISTWIDTH] IN BLOOD BY AUTOMATED COUNT: 16.3 % (ref 11.5–17)
FT4I SERPL CALC-MCNC: 2.76 (ref 2.6–3.6)
GFR SERPLBLD CREATININE-BSD FMLA CKD-EPI: >60 MLS/MIN/1.73/M2
GLOBULIN SER-MCNC: 3.8 GM/DL (ref 2.4–3.5)
GLUCOSE SERPL-MCNC: 101 MG/DL (ref 82–115)
HCT VFR BLD AUTO: 36.5 % (ref 37–47)
HDLC SERPL-MCNC: 51 MG/DL (ref 35–60)
HGB BLD-MCNC: 10.8 GM/DL (ref 12–16)
IMM GRANULOCYTES # BLD AUTO: 0.04 X10(3)/MCL (ref 0–0.04)
IMM GRANULOCYTES NFR BLD AUTO: 0.4 %
LDLC SERPL CALC-MCNC: 50 MG/DL (ref 50–140)
LYMPHOCYTES # BLD AUTO: 1.11 X10(3)/MCL (ref 0.6–4.6)
LYMPHOCYTES NFR BLD AUTO: 11.8 %
MAGNESIUM SERPL-MCNC: 2.2 MG/DL (ref 1.6–2.6)
MCH RBC QN AUTO: 29.5 PG (ref 27–31)
MCHC RBC AUTO-ENTMCNC: 29.6 MG/DL (ref 33–36)
MCV RBC AUTO: 99.7 FL (ref 80–94)
MONOCYTES # BLD AUTO: 1.07 X10(3)/MCL (ref 0.1–1.3)
MONOCYTES NFR BLD AUTO: 11.3 %
NEUTROPHILS # BLD AUTO: 6.9 X10(3)/MCL (ref 2.1–9.2)
NEUTROPHILS NFR BLD AUTO: 73.5 %
NRBC BLD AUTO-RTO: 0 %
PLATELET # BLD AUTO: 284 X10(3)/MCL (ref 130–400)
PMV BLD AUTO: 10.6 FL (ref 7.4–10.4)
POTASSIUM SERPL-SCNC: 4.7 MMOL/L (ref 3.5–5.1)
PREALB SERPL-MCNC: 16.4 MG/DL (ref 14–37)
PROT SERPL-MCNC: 6.8 GM/DL (ref 5.8–7.6)
RBC # BLD AUTO: 3.66 X10(6)/MCL (ref 4.2–5.4)
SODIUM SERPL-SCNC: 135 MMOL/L (ref 136–145)
T3RU NFR SERPL: 34.61 % (ref 31–39)
T4 SERPL-MCNC: 7.97 UG/DL (ref 4.87–11.72)
TRIGL SERPL-MCNC: 59 MG/DL (ref 37–140)
TSH SERPL-ACNC: 2.04 UIU/ML (ref 0.35–4.94)
VIT B12 SERPL-MCNC: 689 PG/ML (ref 213–816)
VLDLC SERPL CALC-MCNC: 12 MG/DL
WBC # SPEC AUTO: 9.4 X10(3)/MCL (ref 4.5–11.5)

## 2022-08-21 PROCEDURE — 83735 ASSAY OF MAGNESIUM: CPT | Performed by: NURSE PRACTITIONER

## 2022-08-21 PROCEDURE — 84443 ASSAY THYROID STIM HORMONE: CPT | Performed by: NURSE PRACTITIONER

## 2022-08-21 PROCEDURE — 82306 VITAMIN D 25 HYDROXY: CPT | Performed by: NURSE PRACTITIONER

## 2022-08-21 PROCEDURE — 80061 LIPID PANEL: CPT | Performed by: NURSE PRACTITIONER

## 2022-08-21 PROCEDURE — 80053 COMPREHEN METABOLIC PANEL: CPT | Performed by: NURSE PRACTITIONER

## 2022-08-21 PROCEDURE — 84075 ASSAY ALKALINE PHOSPHATASE: CPT | Performed by: NURSE PRACTITIONER

## 2022-08-21 PROCEDURE — 84479 ASSAY OF THYROID (T3 OR T4): CPT | Performed by: NURSE PRACTITIONER

## 2022-08-21 PROCEDURE — 84436 ASSAY OF TOTAL THYROXINE: CPT | Performed by: NURSE PRACTITIONER

## 2022-08-21 PROCEDURE — 85025 COMPLETE CBC W/AUTO DIFF WBC: CPT | Performed by: NURSE PRACTITIONER

## 2022-08-21 PROCEDURE — 84134 ASSAY OF PREALBUMIN: CPT | Performed by: NURSE PRACTITIONER

## 2022-08-21 PROCEDURE — 82607 VITAMIN B-12: CPT | Performed by: NURSE PRACTITIONER

## 2022-08-29 ENCOUNTER — OFFICE VISIT (OUTPATIENT)
Dept: ORTHOPEDICS | Facility: CLINIC | Age: 87
End: 2022-08-29
Payer: MEDICARE

## 2022-08-29 ENCOUNTER — HOSPITAL ENCOUNTER (OUTPATIENT)
Dept: RADIOLOGY | Facility: CLINIC | Age: 87
Discharge: HOME | End: 2022-08-29
Attending: ORTHOPAEDIC SURGERY
Payer: MEDICARE

## 2022-08-29 VITALS
HEIGHT: 68 IN | DIASTOLIC BLOOD PRESSURE: 50 MMHG | RESPIRATION RATE: 18 BRPM | WEIGHT: 248 LBS | HEART RATE: 63 BPM | BODY MASS INDEX: 37.59 KG/M2 | SYSTOLIC BLOOD PRESSURE: 105 MMHG

## 2022-08-29 DIAGNOSIS — S82.854D: ICD-10-CM

## 2022-08-29 DIAGNOSIS — S82.854D: Primary | ICD-10-CM

## 2022-08-29 PROCEDURE — 99212 PR OFFICE/OUTPT VISIT, EST, LEVL II, 10-19 MIN: ICD-10-PCS | Mod: ,,, | Performed by: NURSE PRACTITIONER

## 2022-08-29 PROCEDURE — 73610 XR ANKLE COMPLETE 3 VIEW RIGHT: ICD-10-PCS | Mod: RT,,, | Performed by: ORTHOPAEDIC SURGERY

## 2022-08-29 PROCEDURE — 73610 X-RAY EXAM OF ANKLE: CPT | Mod: RT,,, | Performed by: ORTHOPAEDIC SURGERY

## 2022-08-29 PROCEDURE — 99212 OFFICE O/P EST SF 10 MIN: CPT | Mod: ,,, | Performed by: NURSE PRACTITIONER

## 2022-08-29 NOTE — PROGRESS NOTES
Subjective:       Patient ID: Emma Jorgensen is a 88 y.o. female.    Chief Complaint   Patient presents with    Right Ankle - Injury     4 month f/u right ankle fusion, reports some swelling.         The patient is here today for a follow-up evaluation for months out from a right hindfoot fusion nail for a trimalleolar ankle fracture.  She denies pain to her right ankle.  She has been nonweightbearing due to poor wound healing.  Since her last evaluation, she has had debridement of her right heel and foot wound with Podiatry as well as removal of 1 of her distal interlock screws.  She is on long-term IV antibiotics managed by Infectious Disease.  She is being managed by the wound care nurse and physician at the [facility] nursing [facility] where she is currently admitted.  She has no new complaints related to her right ankle today.      Review of Systems   Constitutional: Negative for chills and fever.   HENT:  Negative for congestion and hearing loss.    Eyes:  Negative for visual disturbance.   Cardiovascular:  Negative for chest pain and syncope.   Respiratory:  Negative for cough and shortness of breath.    Hematologic/Lymphatic: Does not bruise/bleed easily.   Skin:  Negative for color change and rash.   Gastrointestinal:  Negative for abdominal pain, nausea and vomiting.   Genitourinary:  Negative for dysuria and hematuria.   Neurological:  Negative for numbness, sensory change and weakness.   Psychiatric/Behavioral:  Negative for altered mental status.       Current Outpatient Medications on File Prior to Visit   Medication Sig Dispense Refill    albuterol (PROVENTIL/VENTOLIN HFA) 90 mcg/actuation inhaler Inhale 2 puffs into the lungs every 6 (six) hours.      albuterol sulfate 2.5 mg/0.5 mL Nebu Take 2.5 mg by nebulization every 4 (four) hours as needed (dyspnea). Rescue 60 each 0    apixaban (ELIQUIS) 2.5 mg Tab Take 2 tablets (5 mg total) by mouth 2 (two) times daily.      atorvastatin (LIPITOR) 40 MG  tablet Take 1 tablet (40 mg total) by mouth every evening. 90 tablet 0    budesonide-glycopyr-formoterol 160-9-4.8 mcg/actuation HFAA Inhale 2 puffs into the lungs 2 (two) times a day.      cefTRIAXone (ROCEPHIN) 2 gram/50 mL IVPB Inject 50 mLs (2 g total) into the vein once daily at 6am. 1500 mL 0    clopidogreL (PLAVIX) 75 mg tablet Take 1 tablet (75 mg total) by mouth once daily. 30 tablet 0    clotrimazole-betamethasone 1-0.05% (LOTRISONE) cream Apply 1 application topically 2 (two) times daily.      collagenase (SANTYL) ointment Apply topically once daily. 20 g 0    cyanocobalamin 500 MCG tablet Take 1 tablet (500 mcg total) by mouth once daily. 30 tablet 0    diclofenac sodium (VOLTAREN) 1 % Gel Apply 1 application topically 4 (four) times daily.      donepeziL (ARICEPT) 10 MG tablet Take 1 tablet by mouth nightly.      fluticasone propion-salmeterol 115-21 mcg/dose (ADVAIR HFA) 115-21 mcg/actuation HFAA inhaler Inhale 2 puffs into the lungs 2 (two) times daily. 12 g 0    furosemide (LASIX) 40 MG tablet Take 1 tablet (40 mg total) by mouth 2 (two) times daily. for 15 days      gabapentin (NEURONTIN) 100 MG capsule Take 1 capsule (100 mg total) by mouth nightly. 30 capsule 0    loratadine (CLARITIN) 10 mg tablet Take 1 tablet by mouth once daily at 6am.      losartan (COZAAR) 25 MG tablet Take 1 tablet (25 mg total) by mouth once daily.      magnesium oxide (MAG-OX) 400 mg (241.3 mg magnesium) tablet Take 1 tablet (400 mg total) by mouth once daily.  0    metoprolol tartrate (LOPRESSOR) 25 MG tablet Take 1 tablet (25 mg total) by mouth 2 (two) times daily. 60 tablet 0    mupirocin (BACTROBAN) 2 % ointment Apply topically once daily. Apply to affected area at right plantar heel. After cleansing well with dermal cleanser and rinsing with saline, cover with nonadherent dressing secured with rolled gauze. Keep off loaded. 2 g 0    pramipexole (MIRAPEX) 1 MG tablet Take 1 tablet by mouth nightly.      senna-docusate  "8.6-50 mg (PERICOLACE) 8.6-50 mg per tablet Take 1 tablet by mouth 2 (two) times daily as needed for Constipation.      traZODone (DESYREL) 50 MG tablet Take 1 tablet (50 mg total) by mouth every evening. 30 tablet 0    venlafaxine (EFFEXOR-XR) 37.5 MG 24 hr capsule Take 1 capsule (37.5 mg total) by mouth once daily at 6am. 30 capsule 0    white petrolatum 41 % Oint Apply topically 2 (two) times daily.      [DISCONTINUED] betamethasone dipropionate 0.05 % cream Apply 1 application topically 2 (two) times daily.      [DISCONTINUED] diltiaZEM (CARDIZEM) 120 MG tablet Take 1 tablet by mouth once daily at 6am.      [DISCONTINUED] hydroCHLOROthiazide (HYDRODIURIL) 12.5 MG Tab Take 1 tablet by mouth once daily at 6am.      [DISCONTINUED] LORazepam (ATIVAN) 1 MG tablet Take 1 tablet by mouth nightly as needed.      [DISCONTINUED] omeprazole 20 mg TbEC Take 1 tablet by mouth once daily at 6am.      [DISCONTINUED] pantoprazole (PROTONIX) 40 MG tablet Take 1 tablet (40 mg total) by mouth once daily. 30 tablet 11     No current facility-administered medications on file prior to visit.          Objective:      BP (!) 105/50   Pulse 63   Resp 18   Ht 5' 8" (1.727 m)   Wt 112.5 kg (248 lb 0.3 oz)   LMP  (LMP Unknown)   BMI 37.71 kg/m²   Physical Exam  Constitutional:       General: She is not in acute distress.     Appearance: Normal appearance.   HENT:      Head: Normocephalic and atraumatic.      Mouth/Throat:      Mouth: Mucous membranes are moist.   Eyes:      Extraocular Movements: Extraocular movements intact.   Cardiovascular:      Rate and Rhythm: Normal rate.      Pulses: Normal pulses.   Pulmonary:      Effort: Pulmonary effort is normal. No respiratory distress.   Abdominal:      General: There is no distension.      Palpations: Abdomen is soft.      Tenderness: There is no abdominal tenderness.   Musculoskeletal:      Cervical back: Normal range of motion and neck supple.      Comments: Right lower extremity:  " No calf swelling or tenderness.  No obvious swelling of the ankle.  No painful or prominent hardware.  No ankle range of motion.  Good range of motion to the digits.  Brisk capillary refill distally.  She has chronic wounds to the plantar aspect of the foot and the heel which are packed.  Dressings are clean and dry.  Dressings were removed for examination is she has no surrounding erythema and no purulence noted to the dressings.   Neurological:      Mental Status: She is alert and oriented to person, place, and time. Mental status is at baseline.   Psychiatric:         Mood and Affect: Mood normal.         Behavior: Behavior normal.         Thought Content: Thought content normal.         Judgment: Judgment normal.      Body mass index is 37.71 kg/m².    Radiology:   Three-view x-ray right ankle:  Hardware is intact with no failure or loosening.  Alignment unchanged compared to previous films.  Trimalleolar ankle fracture is well healed.  Calcaneus screw has been removed since previous x-rays.      Assessment:         1. Closed nondisplaced trimalleolar fracture of right ankle with routine healing, subsequent encounter  X-Ray Ankle Complete Right              Plan:       Patient is doing well from a bone healing standpoint.  Her x-rays demonstrate stable alignment and her fracture appears to be well healed.  She does have chronic wounds to her heel and the plantar aspect of foot which are being managed by wound care and podiatry.  From our standpoint, she can advance weight-bearing as tolerated once her wounds are healed.  She can follow-up with us on an as-needed basis for any issues or concerns related to her right ankle.  She will continue to follow up with Podiatry, Wound Care, and Infectious Disease as scheduled.  All questions were addressed with the patient and her family.  They understand and agree with the plan.    The above findings, diagnosis, and treatment plan were discussed with Dr. Miguel Krause who  is in agreement.    Follow up if symptoms worsen or fail to improve.    Closed nondisplaced trimalleolar fracture of right ankle with routine healing, subsequent encounter  -     X-Ray Ankle Complete Right; Future; Expected date: 08/29/2022              Orders Placed This Encounter   Procedures    X-Ray Ankle Complete Right     Standing Status:   Future     Number of Occurrences:   1     Standing Expiration Date:   8/26/2023     Order Specific Question:   May the Radiologist modify the order per protocol to meet the clinical needs of the patient?     Answer:   Yes     Order Specific Question:   Release to patient     Answer:   Immediate       Future Appointments   Date Time Provider Department Center   9/28/2022 10:00 AM Andrea Dumont MD [facility] ID

## 2022-09-12 NOTE — DISCHARGE SUMMARY
[facility] Hospital Medicine Discharge Summary    Admit Date: 8/13/2022  Discharge Date: 8/19/22  Admitting Physician:  Team  Discharging Physician: Linda Sweeney DO.  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     Present to the ED with complaint of shortness of breath, history limited due to dementia, daughter at bedside report over the past couple days she has been feeling more short of breath and has been using the BiPAP machine during the day as well. No reported cough fever or chills.   On arrival to ED she was afebrile, tachypneic, hypertensive and saturating 97% on 4 L nasal cannula. EKG normal sinus rhythm. Chest x-ray showed pulmonary edema and small bilateral pleural effusions. She was given Lasix 40 mg IV and referred to hospital medicine service for further evaluation and management.    status post debridement 08/05/2022 on ceftriaxone end date 09/16/2022 followed by oral suppression with amoxicillin 500 mg b.i.d. indefinitely   acute on Chronic hypoxemic hypercapnic respiratory failure on nasal cannula during day and BIPAP at bedtime     plans for a TAVR workup outpatient per Cardiologist recommendations/plan.      Vitals:  VITAL SIGNS: 24 HRS MIN & MAX LAST   No data recorded  98.8 °F (37.1 °C)   No data recorded (!) 105/50     No data recorded  63   No data recorded 18   No data recorded 98 %       Physical Exam:    General: Appears comfortable, no acute distress.  Integumentary: Warm, dry, intact.  Musculoskeletal: Purposeful movement noted.   Respiratory: No accessory muscle use. Breath sounds are equal.  Cardiovascular: Regular rate. No peripheral edema.      Procedures Performed: No admission procedures for hospital encounter.     Significant Diagnostic Studies: See Full reports for all details    No results for input(s): WBC, RBC, HGB, HCT, MCV, MCH, MCHC, RDW, PLT, MPV, GRAN, LYMPH, MONO, BASO, NRBC in the last 168 hours.    No results for input(s): NA, K, CL, CO2, ANIONGAP, BUN, CREATININE, GLU, CALCIUM, PH, MG, ALBUMIN, PROT, ALKPHOS, ALT, AST, BILITOT in the last 168 hours.     Microbiology Results (last 7 days)       ** No results found for the last 168 hours. **             X-Ray Ankle Complete Right  See Provider Notes for results.     IMPRESSION: Please see provider office/clinic notes for radiology   interpretation    This procedure was auto-finalized by: Virtual Radiologist         Medication List        START taking these medications      senna-docusate 8.6-50 mg 8.6-50 mg per tablet  Commonly known as: PERICOLACE  Take 1 tablet by mouth 2 (two) times daily as needed for Constipation.            CHANGE how you take these medications      furosemide 40 MG tablet  Commonly known as: LASIX  Take 1 tablet (40 mg total) by mouth 2 (two) times daily. for 15 days  What changed: when to take this     losartan 25 MG tablet  Commonly known as: COZAAR  Take 1 tablet (25 mg total) by mouth once daily.  What changed:   medication strength  how much to take            CONTINUE taking these medications      ADVAIR -21 mcg/actuation Hfaa inhaler  Generic drug: fluticasone propion-salmeterol 115-21 mcg/dose  Inhale 2 puffs into the lungs 2 (two) times daily.     * albuterol 90  mcg/actuation inhaler  Commonly known as: PROVENTIL/VENTOLIN HFA     * albuterol sulfate 2.5 mg/0.5 mL Nebu  Take 2.5 mg by nebulization every 4 (four) hours as needed (dyspnea). Rescue     apixaban 2.5 mg Tab  Commonly known as: ELIQUIS  Take 2 tablets (5 mg total) by mouth 2 (two) times daily.     atorvastatin 40 MG tablet  Commonly known as: LIPITOR  Take 1 tablet (40 mg total) by mouth every evening.     budesonide-glycopyr-formoterol 160-9-4.8 mcg/actuation Hfaa     cefTRIAXone 2 gram/50 mL IVPB  Commonly known as: ROCEPHIN  Inject 50 mLs (2 g total) into the vein once daily at 6am.     clopidogreL 75 mg tablet  Commonly known as: PLAVIX  Take 1 tablet (75 mg total) by mouth once daily.     clotrimazole-betamethasone 1-0.05% cream  Commonly known as: LOTRISONE     cyanocobalamin 500 MCG tablet  Take 1 tablet (500 mcg total) by mouth once daily.     diclofenac sodium 1 % Gel  Commonly known as: VOLTAREN     donepeziL 10 MG tablet  Commonly known as: ARICEPT     gabapentin 100 MG capsule  Commonly known as: NEURONTIN  Take 1 capsule (100 mg total) by mouth nightly.     loratadine 10 mg tablet  Commonly known as: CLARITIN     magnesium oxide 400 mg (241.3 mg magnesium) tablet  Commonly known as: MAG-OX  Take 1 tablet (400 mg total) by mouth once daily.     metoprolol tartrate 25 MG tablet  Commonly known as: LOPRESSOR  Take 1 tablet (25 mg total) by mouth 2 (two) times daily.     mupirocin 2 % ointment  Commonly known as: BACTROBAN  Apply topically once daily. Apply to affected area at right plantar heel. After cleansing well with dermal cleanser and rinsing with saline, cover with nonadherent dressing secured with rolled gauze. Keep off loaded.     pramipexole 1 MG tablet  Commonly known as: MIRAPEX     traZODone 50 MG tablet  Commonly known as: DESYREL  Take 1 tablet (50 mg total) by mouth every evening.     venlafaxine 37.5 MG 24 hr capsule  Commonly known as: EFFEXOR-XR  Take 1 capsule (37.5 mg total) by mouth  once daily at 6am.     white petrolatum 41 % Oint  Apply topically 2 (two) times daily.           * This list has 2 medication(s) that are the same as other medications prescribed for you. Read the directions carefully, and ask your doctor or other care provider to review them with you.                STOP taking these medications      amoxicillin 500 MG Tab  Commonly known as: AMOXIL     betamethasone dipropionate 0.05 % cream     diltiaZEM 120 MG tablet  Commonly known as: CARDIZEM     hydroCHLOROthiazide 12.5 MG Tab  Commonly known as: HYDRODIURIL     HYDROcodone-acetaminophen 5-325 mg per tablet  Commonly known as: NORCO     LORazepam 1 MG tablet  Commonly known as: ATIVAN     omeprazole 20 mg Tbec     pantoprazole 40 MG tablet  Commonly known as: PROTONIX            ASK your doctor about these medications      collagenase ointment  Commonly known as: SANTYL  Apply topically once daily.  Ask about: Should I take this medication?     HYDROcodone-acetaminophen 5-325 mg per tablet  Commonly known as: NORCO  Take 1 tablet by mouth every 8 (eight) hours as needed for Pain.  Ask about: Should I take this medication?               Where to Get Your Medications        Information about where to get these medications is not yet available    Ask your nurse or doctor about these medications  furosemide 40 MG tablet  HYDROcodone-acetaminophen 5-325 mg per tablet  losartan 25 MG tablet  senna-docusate 8.6-50 mg 8.6-50 mg per tablet          Explained in detail to the patient about the discharge plan, medications, and follow-up visits. Pt understands and agrees with the treatment plan  Discharge Disposition: assisted Nursing Home   Discharged Condition: stable  Diet-    Medications Per DC med rec  Activities as tolerated   Follow-up Information       Brodie Juarez MD Follow up.    Specialties: Cardiovascular Disease, Cardiology  Why: Follow up Monday, August 29, 2022 at 11:15 am.  Contact information:  [address]  [place] [address]  [phone]                           For further questions contact hospitalist office    Discharge time 33 minutes    For worsening symptoms, chest pain, shortness of breath, increased abdominal pain, high grade fever, stroke or stroke like symptoms, immediately go to the nearest Emergency Room or call 911 as soon as possible.      Linda Gonzalez M.D, on 9/12/2022. at 12:51 PM.

## (undated) DEVICE — CATH JACKY RADIAL 5FR 100CM

## (undated) DEVICE — VALVE CONTROL COPILOT

## (undated) DEVICE — NDL HYPO REG 25G X 1 1/2

## (undated) DEVICE — GUIDEWIRE STF .035X180CM STR

## (undated) DEVICE — SET ANGIO ACIST CVI ANGIOTOUCH

## (undated) DEVICE — ELECTRODE PATIENT RETURN DISP

## (undated) DEVICE — GLOVE PROTEXIS HYDROGEL SZ7.5

## (undated) DEVICE — SOL NACL IRR 1000ML BTL

## (undated) DEVICE — SHEATH INTRODUCER 7FR 11CM

## (undated) DEVICE — GAUZE SPONGE 4X4 12PLY

## (undated) DEVICE — CATH IMPULSE 6FR MULTI-PAK

## (undated) DEVICE — CATH BLLN FG APEX MR 2.50X12MM

## (undated) DEVICE — SHEATH INTRODUCER 5FR 10CM

## (undated) DEVICE — DEVICE INDEFLATOR BASIX

## (undated) DEVICE — GUIDEWIRE COPE MANDRIL .018X60

## (undated) DEVICE — SET EXT NAMIC ARTERIAL 12IN

## (undated) DEVICE — DRAPE EXTREMITY HVY DTY REINF

## (undated) DEVICE — CATH SWAN GANZ STND 7FR

## (undated) DEVICE — Device

## (undated) DEVICE — GUIDEWIRE OMNI STR TIP 185CM

## (undated) DEVICE — GUIDEWIRE SION BLUE STR 190CM

## (undated) DEVICE — SEE MEDLINE ITEM 154981

## (undated) DEVICE — SPONGE KERLIX ANTIMIC 6X6.75IN

## (undated) DEVICE — BANDAGE ESMARK 4INX3YD

## (undated) DEVICE — NDL HYPO 22GX1 1/2 SYR 10ML LL

## (undated) DEVICE — DRESSING ADAPTIC N ADH 3X8IN

## (undated) DEVICE — CANNULA NASAL ADULT

## (undated) DEVICE — KIT SURGICAL TURNOVER

## (undated) DEVICE — CATH EAGLE EYE ST .014X20X150

## (undated) DEVICE — BLADE SURG STAINLESS STEEL #15

## (undated) DEVICE — WIRE GUIDE .035D 260CM

## (undated) DEVICE — BOWL UTILITY BLUE 32OZ

## (undated) DEVICE — BANDAGE VELCLOSE ELAS 4INX5YD

## (undated) DEVICE — ELECTRODE DEFIB NON-RADIOPAQUE

## (undated) DEVICE — PIGTAIL 125 MOD 4F 110CM

## (undated) DEVICE — BANDAGE KERLIX AMD

## (undated) DEVICE — STOPCOCK 3-WAY

## (undated) DEVICE — SUT ETHILON 4-0 PS4 18 BLK

## (undated) DEVICE — SHEATH PINNACLE INTRO .035 4FR

## (undated) DEVICE — GUIDE LAUNCHER 6FR EBU 3.5